# Patient Record
Sex: MALE | Race: OTHER | HISPANIC OR LATINO | URBAN - METROPOLITAN AREA
[De-identification: names, ages, dates, MRNs, and addresses within clinical notes are randomized per-mention and may not be internally consistent; named-entity substitution may affect disease eponyms.]

---

## 2017-07-18 ENCOUNTER — OUTPATIENT (OUTPATIENT)
Dept: OUTPATIENT SERVICES | Facility: HOSPITAL | Age: 77
LOS: 1 days | Discharge: HOME | End: 2017-07-18

## 2017-07-18 DIAGNOSIS — R79.82 ELEVATED C-REACTIVE PROTEIN (CRP): ICD-10-CM

## 2017-11-08 ENCOUNTER — OUTPATIENT (OUTPATIENT)
Dept: OUTPATIENT SERVICES | Facility: HOSPITAL | Age: 77
LOS: 1 days | Discharge: HOME | End: 2017-11-08

## 2017-11-08 DIAGNOSIS — R30.0 DYSURIA: ICD-10-CM

## 2017-11-08 DIAGNOSIS — R35.0 FREQUENCY OF MICTURITION: ICD-10-CM

## 2017-12-29 ENCOUNTER — OUTPATIENT (OUTPATIENT)
Dept: OUTPATIENT SERVICES | Facility: HOSPITAL | Age: 77
LOS: 1 days | Discharge: HOME | End: 2017-12-29

## 2017-12-29 DIAGNOSIS — R35.0 FREQUENCY OF MICTURITION: ICD-10-CM

## 2017-12-29 DIAGNOSIS — R30.0 DYSURIA: ICD-10-CM

## 2018-01-15 ENCOUNTER — OUTPATIENT (OUTPATIENT)
Dept: OUTPATIENT SERVICES | Facility: HOSPITAL | Age: 78
LOS: 1 days | Discharge: HOME | End: 2018-01-15

## 2018-01-15 DIAGNOSIS — R68.89 OTHER GENERAL SYMPTOMS AND SIGNS: ICD-10-CM

## 2018-01-15 DIAGNOSIS — E55.9 VITAMIN D DEFICIENCY, UNSPECIFIED: ICD-10-CM

## 2018-01-15 DIAGNOSIS — Z02.89 ENCOUNTER FOR OTHER ADMINISTRATIVE EXAMINATIONS: ICD-10-CM

## 2018-01-15 DIAGNOSIS — R74.8 ABNORMAL LEVELS OF OTHER SERUM ENZYMES: ICD-10-CM

## 2018-01-15 DIAGNOSIS — R79.82 ELEVATED C-REACTIVE PROTEIN (CRP): ICD-10-CM

## 2018-04-09 ENCOUNTER — OUTPATIENT (OUTPATIENT)
Dept: OUTPATIENT SERVICES | Facility: HOSPITAL | Age: 78
LOS: 1 days | Discharge: HOME | End: 2018-04-09

## 2018-04-09 DIAGNOSIS — R97.20 ELEVATED PROSTATE SPECIFIC ANTIGEN [PSA]: ICD-10-CM

## 2018-04-09 DIAGNOSIS — N40.1 BENIGN PROSTATIC HYPERPLASIA WITH LOWER URINARY TRACT SYMPTOMS: ICD-10-CM

## 2018-06-16 ENCOUNTER — OUTPATIENT (OUTPATIENT)
Dept: OUTPATIENT SERVICES | Facility: HOSPITAL | Age: 78
LOS: 1 days | Discharge: HOME | End: 2018-06-16

## 2018-06-16 DIAGNOSIS — N25.9 DISORDER RESULTING FROM IMPAIRED RENAL TUBULAR FUNCTION, UNSPECIFIED: ICD-10-CM

## 2018-10-10 ENCOUNTER — INPATIENT (INPATIENT)
Facility: HOSPITAL | Age: 78
LOS: 3 days | Discharge: HOME | End: 2018-10-14
Attending: INTERNAL MEDICINE | Admitting: INTERNAL MEDICINE
Payer: MEDICARE

## 2018-10-10 VITALS
SYSTOLIC BLOOD PRESSURE: 175 MMHG | RESPIRATION RATE: 19 BRPM | DIASTOLIC BLOOD PRESSURE: 89 MMHG | TEMPERATURE: 98 F | HEART RATE: 65 BPM | OXYGEN SATURATION: 99 % | WEIGHT: 160.94 LBS

## 2018-10-10 LAB
BASOPHILS # BLD AUTO: 0.03 K/UL — SIGNIFICANT CHANGE UP (ref 0–0.2)
BASOPHILS NFR BLD AUTO: 0.3 % — SIGNIFICANT CHANGE UP (ref 0–1)
EOSINOPHIL # BLD AUTO: 0.04 K/UL — SIGNIFICANT CHANGE UP (ref 0–0.7)
EOSINOPHIL NFR BLD AUTO: 0.4 % — SIGNIFICANT CHANGE UP (ref 0–8)
HCT VFR BLD CALC: 39.6 % — LOW (ref 42–52)
HGB BLD-MCNC: 13.5 G/DL — LOW (ref 14–18)
IMM GRANULOCYTES NFR BLD AUTO: 0.4 % — HIGH (ref 0.1–0.3)
LYMPHOCYTES # BLD AUTO: 0.91 K/UL — LOW (ref 1.2–3.4)
LYMPHOCYTES # BLD AUTO: 9.4 % — LOW (ref 20.5–51.1)
MCHC RBC-ENTMCNC: 30.7 PG — SIGNIFICANT CHANGE UP (ref 27–31)
MCHC RBC-ENTMCNC: 34.1 G/DL — SIGNIFICANT CHANGE UP (ref 32–37)
MCV RBC AUTO: 90 FL — SIGNIFICANT CHANGE UP (ref 80–94)
MONOCYTES # BLD AUTO: 0.4 K/UL — SIGNIFICANT CHANGE UP (ref 0.1–0.6)
MONOCYTES NFR BLD AUTO: 4.1 % — SIGNIFICANT CHANGE UP (ref 1.7–9.3)
NEUTROPHILS # BLD AUTO: 8.28 K/UL — HIGH (ref 1.4–6.5)
NEUTROPHILS NFR BLD AUTO: 85.4 % — HIGH (ref 42.2–75.2)
NRBC # BLD: 0 /100 WBCS — SIGNIFICANT CHANGE UP (ref 0–0)
PLATELET # BLD AUTO: 177 K/UL — SIGNIFICANT CHANGE UP (ref 130–400)
RBC # BLD: 4.4 M/UL — LOW (ref 4.7–6.1)
RBC # FLD: 12.2 % — SIGNIFICANT CHANGE UP (ref 11.5–14.5)
WBC # BLD: 9.7 K/UL — SIGNIFICANT CHANGE UP (ref 4.8–10.8)
WBC # FLD AUTO: 9.7 K/UL — SIGNIFICANT CHANGE UP (ref 4.8–10.8)

## 2018-10-10 NOTE — ED ADULT TRIAGE NOTE - CHIEF COMPLAINT QUOTE
Pt came c/o near syncope episode in a [employer]  at 8:30 pm today, pt was standing and was caught by his family preventing him from falling, pt also c/o nausea.

## 2018-10-11 LAB
ALBUMIN SERPL ELPH-MCNC: 4.2 G/DL — SIGNIFICANT CHANGE UP (ref 3.5–5.2)
ALP SERPL-CCNC: 90 U/L — SIGNIFICANT CHANGE UP (ref 30–115)
ALT FLD-CCNC: 13 U/L — SIGNIFICANT CHANGE UP (ref 0–41)
ANION GAP SERPL CALC-SCNC: 13 MMOL/L — SIGNIFICANT CHANGE UP (ref 7–14)
AST SERPL-CCNC: 16 U/L — SIGNIFICANT CHANGE UP (ref 0–41)
BILIRUB SERPL-MCNC: <0.2 MG/DL — SIGNIFICANT CHANGE UP (ref 0.2–1.2)
BUN SERPL-MCNC: 19 MG/DL — SIGNIFICANT CHANGE UP (ref 10–20)
CALCIUM SERPL-MCNC: 9.2 MG/DL — SIGNIFICANT CHANGE UP (ref 8.5–10.1)
CHLORIDE SERPL-SCNC: 98 MMOL/L — SIGNIFICANT CHANGE UP (ref 98–110)
CK MB CFR SERPL CALC: 2.3 NG/ML — SIGNIFICANT CHANGE UP (ref 0.6–6.3)
CK MB CFR SERPL CALC: 2.4 NG/ML — SIGNIFICANT CHANGE UP (ref 0.6–6.3)
CK SERPL-CCNC: 124 U/L — SIGNIFICANT CHANGE UP (ref 0–225)
CK SERPL-CCNC: 130 U/L — SIGNIFICANT CHANGE UP (ref 0–225)
CO2 SERPL-SCNC: 24 MMOL/L — SIGNIFICANT CHANGE UP (ref 17–32)
CREAT SERPL-MCNC: 1.2 MG/DL — SIGNIFICANT CHANGE UP (ref 0.7–1.5)
GLUCOSE BLDC GLUCOMTR-MCNC: 152 MG/DL — HIGH (ref 70–99)
GLUCOSE SERPL-MCNC: 119 MG/DL — HIGH (ref 70–99)
LACTATE SERPL-SCNC: 1.2 MMOL/L — SIGNIFICANT CHANGE UP (ref 0.5–2.2)
MAGNESIUM SERPL-MCNC: 2.4 MG/DL — SIGNIFICANT CHANGE UP (ref 1.8–2.4)
POTASSIUM SERPL-MCNC: 5 MMOL/L — SIGNIFICANT CHANGE UP (ref 3.5–5)
POTASSIUM SERPL-SCNC: 5 MMOL/L — SIGNIFICANT CHANGE UP (ref 3.5–5)
PROT SERPL-MCNC: 6.9 G/DL — SIGNIFICANT CHANGE UP (ref 6–8)
SODIUM SERPL-SCNC: 135 MMOL/L — SIGNIFICANT CHANGE UP (ref 135–146)
TROPONIN T SERPL-MCNC: <0.01 NG/ML — SIGNIFICANT CHANGE UP

## 2018-10-11 RX ORDER — CHLORHEXIDINE GLUCONATE 213 G/1000ML
1 SOLUTION TOPICAL
Qty: 0 | Refills: 0 | Status: DISCONTINUED | OUTPATIENT
Start: 2018-10-11 | End: 2018-10-14

## 2018-10-11 RX ORDER — ATORVASTATIN CALCIUM 80 MG/1
10 TABLET, FILM COATED ORAL AT BEDTIME
Qty: 0 | Refills: 0 | Status: DISCONTINUED | OUTPATIENT
Start: 2018-10-11 | End: 2018-10-14

## 2018-10-11 RX ORDER — METOPROLOL TARTRATE 50 MG
25 TABLET ORAL
Qty: 0 | Refills: 0 | Status: DISCONTINUED | OUTPATIENT
Start: 2018-10-11 | End: 2018-10-14

## 2018-10-11 RX ORDER — ASPIRIN/CALCIUM CARB/MAGNESIUM 324 MG
325 TABLET ORAL DAILY
Qty: 0 | Refills: 0 | Status: DISCONTINUED | OUTPATIENT
Start: 2018-10-11 | End: 2018-10-14

## 2018-10-11 RX ORDER — LANOLIN ALCOHOL/MO/W.PET/CERES
3 CREAM (GRAM) TOPICAL AT BEDTIME
Qty: 0 | Refills: 0 | Status: DISCONTINUED | OUTPATIENT
Start: 2018-10-11 | End: 2018-10-14

## 2018-10-11 RX ORDER — HEPARIN SODIUM 5000 [USP'U]/ML
5000 INJECTION INTRAVENOUS; SUBCUTANEOUS EVERY 8 HOURS
Qty: 0 | Refills: 0 | Status: DISCONTINUED | OUTPATIENT
Start: 2018-10-11 | End: 2018-10-14

## 2018-10-11 RX ADMIN — Medication 25 MILLIGRAM(S): at 17:09

## 2018-10-11 RX ADMIN — ATORVASTATIN CALCIUM 10 MILLIGRAM(S): 80 TABLET, FILM COATED ORAL at 22:01

## 2018-10-11 RX ADMIN — HEPARIN SODIUM 5000 UNIT(S): 5000 INJECTION INTRAVENOUS; SUBCUTANEOUS at 22:01

## 2018-10-11 RX ADMIN — CHLORHEXIDINE GLUCONATE 1 APPLICATION(S): 213 SOLUTION TOPICAL at 14:02

## 2018-10-11 RX ADMIN — Medication 325 MILLIGRAM(S): at 12:27

## 2018-10-11 RX ADMIN — HEPARIN SODIUM 5000 UNIT(S): 5000 INJECTION INTRAVENOUS; SUBCUTANEOUS at 14:02

## 2018-10-11 NOTE — ED PROVIDER NOTE - PHYSICAL EXAMINATION
CONSTITUTIONAL: Well-developed; well-nourished; in no acute distress, nontoxic appearing  SKIN: skin exam is warm and dry,  HEAD: Normocephalic; atraumatic.  EYES: PERRL, 3 mm bilateral, no nystagmus, EOM intact; conjunctiva and sclera clear.  ENT: MMM, no nasal congestion  NECK: Supple; non tender.+ full passive ROM in all directions. No JVD  CARD: S1, S2 normal, no murmur  RESP: No wheezes, rales or rhonchi. Good air movement bilaterally  ABD: soft; non-distended; non-tender. No Rebound, No guarding  EXT: Normal ROM. No cyanosis or edema. Dp Pulses intact.   NEURO: awake, alert, following commands CN II to XII wnl, normal motor and sensation exams,. GCS 15.   PSYCH: Cooperative, appropriate.

## 2018-10-11 NOTE — ED PROVIDER NOTE - OBJECTIVE STATEMENT
78 yr M with hx of CAD s/p 2 stents of  daily, hypothyroidism, HLD, BPH with complaints of near syncope. Pt was at [employer] when while state lost  consciousness, falling backwards. Pt was caught by other [employer] goers and denies a complete fall, no injuries. Denies any CP, SOB prior to falling. Endorses feeling tired for the whole day prior. No f/c, dysuria, hemturia.

## 2018-10-11 NOTE — ED PROVIDER NOTE - NS ED ROS FT
Constitutional:  no fevers, no chills, no malaise  Eyes:  No visual changes  ENMT: No neck pain or stiffness, no nasal congestion, no ear pain, no throat pain  Cardiac:  No chest pain, + hx of CAD  Respiratory:  No cough or sob  GI:  No nausea, vomiting, diarrhea or abdominal pain.  :  No dysuria, frequency or burning.  MS:  No back pain, no joint pain.  Neuro: As per HPI  Skin:  No skin rash

## 2018-10-11 NOTE — H&P ADULT - ATTENDING COMMENTS
patient seen and examined independently on morning rounds in ED, chart reviewed and discussed with medicine resident and agree with the above H/P and assessment and plan with the following addendum:     in brief, Mr. Lambert is a 79 yo man with h/o CAD s/p pci and dyslipidemia who p/w near syncope that occurred while at [employer]. He was standing up at the time- began feeling light headed and as per witnessed family members he was not responsive and post-episode does not recall events that occurred.  He had a similar episode of near syncope/dizziness last year while in [place] ambulating outside on hot day.  He denies any chest pain, sob, headache. no bowel or bladder incontinence.  no personal or family history of seizures and no witnessed tremors or tongue biting.      pcp- Dr. Calzada ([facility])/ Cardio- Dr. Vikki OWENS as per above otherwise review of systems unremarkable    PE:  GEN-NAD, AAOx3  HEENT- NCAT, PERRLA, EOMI  NECK- supple no jvd, no lymphadenopathy  PULM- Clear to auscultation bilaterally, fair air entry  CVS- +s1/s2 RRR no murmurs  GI- soft NT ND +bs, no rebound, no guarding  EXT- no edema  NEURO- nonfocal    labs/radiology reviewed    a/p:   #dizzness- pre-syncope  -suspect 2/2 orthostatics as associated with positional change  -check orthostatics  -EEG pending  -had carotid US done earlier in 2018 which as per wife showed mild stenosis---will repeat carotid US  -cardiology eval (Dr. Florez)  -ct head negative  -check ECHO    #CAD s/p cabg, dyslipidemia  -cont asa and statin---f/u cardiology recomm    #DVT/GI ppx    guarded prognosis---anticipate likley dc home in 24-48 hrs if no further symptoms and all workup negative

## 2018-10-11 NOTE — H&P ADULT - NSHPLABSRESULTS_GEN_ALL_CORE
CBC Full  -  ( 10 Oct 2018 23:15 )  WBC Count : 9.70 K/uL  Hemoglobin : 13.5 g/dL  Hematocrit : 39.6 %  Platelet Count - Automated : 177 K/uL  Mean Cell Volume : 90.0 fL  Mean Cell Hemoglobin : 30.7 pg  Mean Cell Hemoglobin Concentration : 34.1 g/dL  Auto Neutrophil # : 8.28 K/uL  Auto Lymphocyte # : 0.91 K/uL  Auto Monocyte # : 0.40 K/uL  Auto Eosinophil # : 0.04 K/uL  Auto Basophil # : 0.03 K/uL  Auto Neutrophil % : 85.4 %  Auto Lymphocyte % : 9.4 %  Auto Monocyte % : 4.1 %  Auto Eosinophil % : 0.4 %  Auto Basophil % : 0.3 %  10-10    135  |  98  |  19  ----------------------------<  119<H>  5.0   |  24  |  1.2    Ca    9.2      10 Oct 2018 23:15  Mg     2.4     10-10    TPro  6.9  /  Alb  4.2  /  TBili  <0.2  /  DBili  x   /  AST  16  /  ALT  13  /  AlkPhos  90  10-10  CARDIAC MARKERS ( 10 Oct 2018 23:15 )  x     / <0.01 ng/mL / x     / x     / x        < from: Xray Chest 1 View-PORTABLE IMMEDIATE (10.11.18 @ 05:01) >    Impression:      No radiographic evidence of acute pulmonary disease.    < end of copied text >    < from: CT Head No Cont (10.11.18 @ 01:40) >    Impression:    No evidence of acute intracranial abnormality.    < end of copied text >

## 2018-10-11 NOTE — H&P ADULT - RS GEN PE MLT RESP DETAILS PC
respirations non-labored/no rales/no wheezes/no rhonchi/breath sounds equal/good air movement/airway patent/normal

## 2018-10-11 NOTE — H&P ADULT - HISTORY OF PRESENT ILLNESS
Patient is a 77 yo M with PMH of CAD s/p 2 stents and DLD who presented to the hospital s/p pre-syncopal event. Patient and wife state that they were at [employer] when the patient suddenly felt weak, began to "space out" and almost fell backwards prior to being caught by 2 [employer] members. Patient states that he does not remember the event. Wife at bedside notes that the patient has been having the episode where he "spaces out" often and its almost as if he is "removed." He does not respond to verbal stimuli. She states that she has also noticed that when walking he seems to favor his left side and leans that way. When patient questioned about this he states that he doesn't feel that he is leaning to the left. They patient states that he does not feel like he is off balance. He denies a f/n/v/d/c/ chills, abdominal pain, cp, palpitations sob, denies ringing in the ears, denies prior history of seizures denies and dysuria, frequency, or hesitance. Patient and wife state that he only takes pravastatin and asa as reconciled. They also note that he has a left carotid stenosis of 25%.

## 2018-10-11 NOTE — H&P ADULT - ASSESSMENT
ASSESSMENT / PLAN:    Patient is a 79 yo M with PMH of CAD s/p 2 stents and DLD who presented to the hospital s/p pre-syncopal event associated with "spacing out" and when ambulating leaning to left. CT Head negative for an acute intracranial event.     # Syncopal Event:  (neuro mediated vs orthostatic vs cardiovascular)  - Admit tele   - Check monitor for arrhythmic events  - Check orthostatic vs  - Check eeg  - ECHO pending, f/u  - Cardiology evaluation: Dr. Florez's Service  - Neurology evaluation: Service     # CAD s/p CABG: Continue statin and ASA. Consider decreasing ASA to 81 mg PO QD.     # HLD: Patient is on Pravastatin at home. Will place him on Atorvastatin while inpatient.     dvt ppx    Full Code    ** Medications reconciled as per wife and patient. Pharmacy is [facility] on [facility]. ASSESSMENT / PLAN:    Patient is a 79 yo M with PMH of CAD s/p 2 stents and DLD who presented to the hospital s/p pre-syncopal event associated with "spacing out" and when ambulating leaning to left. CT Head negative for an acute intracranial event.     # Syncopal Event:  (neuro mediated vs orthostatic vs cardiovascular)  - Admit tele   - Check monitor for arrhythmic events  - Check orthostatic vs  - Check eeg  - ECHO pending, f/u  - Consider Cardiology evaluation: (If so, Dr. Florez's Service)  - Neurology evaluation: Service     # CAD s/p CABG: Continue statin and ASA. Consider decreasing ASA to 81 mg PO QD    # HLD: Patient is on Pravastatin at home. Will place him on Atorvastatin while inpatient    dvt ppx    Full Code    ** Medications reconciled as per wife and patient. [facility].

## 2018-10-11 NOTE — ED ADULT NURSE NOTE - CHIEF COMPLAINT QUOTE
Pt came c/o near syncope episode in a [employer]  at 8:30 pm today, pt was standing and was caught by his family preventing him from falling, pt also c/o nausea.

## 2018-10-11 NOTE — H&P ADULT - PMH
Coronary artery disease, angina presence unspecified, unspecified vessel or lesion type, unspecified whether native or transplanted heart    Hyperlipidemia, unspecified hyperlipidemia type

## 2018-10-11 NOTE — CONSULT NOTE ADULT - SUBJECTIVE AND OBJECTIVE BOX
Neurology Consult    Patient is a 78y old  Male who presents with a chief complaint of Weakness and " spacing out" (11 Oct 2018 11:42)      HPI:  Patient is a 79 yo M with PMH of CAD s/p 2 stents and DLD who presented to the hospital s/p pre-syncopal event. Patient and wife state that they were at [employer] when the patient suddenly felt weak, began to "space out" and almost fell backwards prior to being caught by 2 [employer] members. Patient states that he does not remember the event. Wife at bedside notes that the patient has been having the episode where he "spaces out" often and its almost as if he is "removed." He does not respond to verbal stimuli. She states that she has also noticed that when walking he seems to favor his left side and leans that way. When patient questioned about this he states that he doesn't feel that he is leaning to the left. They patient states that he does not feel like he is off balance. He denies a f/n/v/d/c/ chills, abdominal pain, cp, palpitations sob, denies ringing in the ears, denies prior history of seizures denies and dysuria, frequency, or hesitance. Patient and wife state that he only takes pravastatin and asa as reconciled. They also note that he has a left carotid stenosis of 25%. (11 Oct 2018 11:42)    ***   Patient's wife reports he was in [employer] last night and had dizzy spell.  He felt light headed.  He was given water and skin was pale.  WIfe states instead of low BP was found to have elevated BP.  Wife reports he had 5 minute episode where it was hard for him to find words.  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    After I examined patient I spoke to wife outside the room and asked if he has been experiencing any problems with his memory and wife says yes for 2 years such that she has been worried he may have dementia - maybe Alzheimers or a tumor.    PAST MEDICAL & SURGICAL HISTORY:  Hyperlipidemia, unspecified hyperlipidemia type  Coronary artery disease, angina presence unspecified, unspecified vessel or lesion type, unspecified whether native or transplanted heart  2 coronary stents placed ~10 years ago.    FAMILY HISTORY:    Social History: (-) x 3    Allergies  No Known Allergies        MEDICATIONS  (STANDING):  aspirin 325 milliGRAM(s) Oral daily  atorvastatin 10 milliGRAM(s) Oral at bedtime  chlorhexidine 4% Liquid 1 Application(s) Topical <User Schedule>  heparin  Injectable 5000 Unit(s) SubCutaneous every 8 hours  metoprolol tartrate 25 milliGRAM(s) Oral two times a day    wife says he is also on plavix daily    MEDICATIONS  (PRN):      Review of systems:    Constitutional: No fever, weight loss or fatigue    Eyes: No eye pain or discharge  ENMT:  No difficulty hearing; No sinus or throat pain  Neck: No pain or stiffness  Respiratory: No cough, wheezing, chills or hemoptysis  Cardiovascular: No chest pain, palpitations, shortness of breath, dyspnea on exertion  Gastrointestinal: No abdominal pain, nausea, vomiting or hematemesis; No diarrhea or constipation.   Genitourinary: No dysuria, frequency, hematuria or incontinence  Neurological: As per HPI  Skin: No rashes or lesions   Endocrine: No heat or cold intolerance; No hair loss  Musculoskeletal: No joint pain or swelling  Psychiatric: No depression, anxiety, mood swings  Heme/Lymph: No easy bruising or bleeding gums    Vital Signs Last 24 Hrs  T(C): 35.9 (11 Oct 2018 15:45), Max: 36.5 (10 Oct 2018 21:11)  T(F): 96.6 (11 Oct 2018 15:45), Max: 97.7 (10 Oct 2018 21:11)  HR: 76 (11 Oct 2018 15:45) (65 - 76)  BP: 155/99 (11 Oct 2018 15:59) (115/62 - 222/125)  BP(mean): --  RR: 18 (11 Oct 2018 15:45) (18 - 19)  SpO2: 99% (11 Oct 2018 15:45) (97% - 99%)    Neurologic Examination:  General:  Appearance is consistent with chronologic age.  No abnormal facies.   General: The patient is oriented to person, place not month or year.  Recent memory is severely impaired.  Remote memory less impaired.  Fund of knowledge is decreased.  Language - impaired naming and repetition.  Nondysarthric.  + apraxia.  Cranial nerves: intact VA, VFF.  EOMI w/o nystagmus, skew or reported double vision.  PERRL.  No ptosis/weakness of eyelid closure.  Facial sensation is normal with normal bite.  No facial asymmetry.  Hearing grossly intact b/l.  Palate elevates midline.  Tongue midline.  Motor examination:   Normal tone, bulk and range of motion.  No tenderness, twitching, tremors or involuntary movements.  Formal Muscle Strength Testing: (MRC grade R/L) 5/5 UE; 5/5 LE.  No observable drift.  Reflexes:   2+ b/l , biceps, triceps, brachioradialis, patella and Achilles.  Plantar response downgoing b/l.  Jaw jerk present. + bilateral Wartenberg and palmomental reflexes.  + glabellar reflex.  Sensory examination:   Intact to light touch temperature.  Vibration diminished in LE's.  Cerebellum:   FTN/HKS intact with normal YANELI in all limbs.  No dysmetria or dysdiadokinesia.  Gait wide based and unsteady.    Labs:   CBC Full  -  ( 10 Oct 2018 23:15 )  WBC Count : 9.70 K/uL  Hemoglobin : 13.5 g/dL  Hematocrit : 39.6 %  Platelet Count - Automated : 177 K/uL  Mean Cell Volume : 90.0 fL  Mean Cell Hemoglobin : 30.7 pg  Mean Cell Hemoglobin Concentration : 34.1 g/dL  Auto Neutrophil # : 8.28 K/uL  Auto Lymphocyte # : 0.91 K/uL  Auto Monocyte # : 0.40 K/uL  Auto Eosinophil # : 0.04 K/uL  Auto Basophil # : 0.03 K/uL  Auto Neutrophil % : 85.4 %  Auto Lymphocyte % : 9.4 %  Auto Monocyte % : 4.1 %  Auto Eosinophil % : 0.4 %  Auto Basophil % : 0.3 %    10-10    135  |  98  |  19  ----------------------------<  119<H>  5.0   |  24  |  1.2    Ca    9.2      10 Oct 2018 23:15  Mg     2.4     10-10    TPro  6.9  /  Alb  4.2  /  TBili  <0.2  /  DBili  x   /  AST  16  /  ALT  13  /  AlkPhos  90  10-10    LIVER FUNCTIONS - ( 10 Oct 2018 23:15 )  Alb: 4.2 g/dL / Pro: 6.9 g/dL / ALK PHOS: 90 U/L / ALT: 13 U/L / AST: 16 U/L / GGT: x             Neuroimaging:  NCHCT: CT Head No Cont:   EXAM:  CT BRAIN          PROCEDURE DATE:  10/11/2018      INTERPRETATION:  CLINICAL HISTORY/REASON FOR EXAM: Syncope.    TECHNIQUE: Contiguous axial CT images of the head were obtained from the   base of the skull to the vertex without IV contrast. Coronal and sagittal   reformatted images were obtained.     COMPARISON: None.    FINDINGS:    Technique: CT head without IV contrast performed. Multiple axial CT   images of the head were obtained from the base of the skull to the vertex   without the administration of IV contrast. Sagittal and coronal reformats   were obtained.    Comparison: None.    Findings:    Ventricular system, basal cisterns and sulcal patterns are symmetric and   appropriate for patient's stated age.       No acute extra-axial collection.  No mass effect, midline shift or acute   hemorrhage is seen.      Patchy hypodensities in the periventricular and subcortical white matter   without mass effect compatible with chronic microvascular changes.      No depressed skull fracture.     Paranasal sinuses and mastoid air cells are well-aerated.    Impression:  No evidence of acute intracranial abnormality.    GAURAV SANABRIA M.D., RESIDENT RADIOLOGIST  This document has been electronically signed.  MIGUEL RICHTER M.D., ATTENDING RADIOLOGIST  This document has been electronically signed. Oct 11 2018  2:16AM         (10-11-18 @ 01:40)      Assessment:    1.  This is a 78y Male with h/o 2 years of memory loss gradually progressive.  His exam findings are compatible with dementia, most likely Alzheimer type.  The staring episodes may simply be related to decreased ability to process information.  Alternatively it could be secondary to partial seizures.  2.  Patient does not appear weak currently.  It is possible there was some transient cardiac event.  Telemetry and orthostatics are recommended.    Plan:   1.  I discussed likely neurologic diagnosis with wife and patient should follow-up with me in 2-4 weeks outpatient.  2.  May order routine EEG to assess for epileptiform abnormalities.  3.  Telemetry and cardiac assessment given history of prior cardiac stents.  4.  Check TSH, RPR, B12/folate.    10-11-18 @ 18:52

## 2018-10-11 NOTE — H&P ADULT - ADDITIONAL PE
Neurological: strength 4/5 on upper extremities b/l and 5/5 on le b/l. Patient with decreased  b/l. He has sensation in tact.

## 2018-10-12 LAB
ANION GAP SERPL CALC-SCNC: 15 MMOL/L — HIGH (ref 7–14)
APTT BLD: 33.6 SEC — SIGNIFICANT CHANGE UP (ref 27–39.2)
BUN SERPL-MCNC: 16 MG/DL — SIGNIFICANT CHANGE UP (ref 10–20)
CALCIUM SERPL-MCNC: 9.6 MG/DL — SIGNIFICANT CHANGE UP (ref 8.5–10.1)
CHLORIDE SERPL-SCNC: 98 MMOL/L — SIGNIFICANT CHANGE UP (ref 98–110)
CO2 SERPL-SCNC: 23 MMOL/L — SIGNIFICANT CHANGE UP (ref 17–32)
CREAT SERPL-MCNC: 1.1 MG/DL — SIGNIFICANT CHANGE UP (ref 0.7–1.5)
GLUCOSE SERPL-MCNC: 137 MG/DL — HIGH (ref 70–99)
HCT VFR BLD CALC: 38.3 % — LOW (ref 42–52)
HGB BLD-MCNC: 13.4 G/DL — LOW (ref 14–18)
INR BLD: 1.17 RATIO — SIGNIFICANT CHANGE UP (ref 0.65–1.3)
MAGNESIUM SERPL-MCNC: 2.1 MG/DL — SIGNIFICANT CHANGE UP (ref 1.8–2.4)
MCHC RBC-ENTMCNC: 31.3 PG — HIGH (ref 27–31)
MCHC RBC-ENTMCNC: 35 G/DL — SIGNIFICANT CHANGE UP (ref 32–37)
MCV RBC AUTO: 89.5 FL — SIGNIFICANT CHANGE UP (ref 80–94)
NRBC # BLD: 0 /100 WBCS — SIGNIFICANT CHANGE UP (ref 0–0)
PLATELET # BLD AUTO: 161 K/UL — SIGNIFICANT CHANGE UP (ref 130–400)
POTASSIUM SERPL-MCNC: 3.9 MMOL/L — SIGNIFICANT CHANGE UP (ref 3.5–5)
POTASSIUM SERPL-SCNC: 3.9 MMOL/L — SIGNIFICANT CHANGE UP (ref 3.5–5)
PROTHROM AB SERPL-ACNC: 12.6 SEC — SIGNIFICANT CHANGE UP (ref 9.95–12.87)
RBC # BLD: 4.28 M/UL — LOW (ref 4.7–6.1)
RBC # FLD: 12.2 % — SIGNIFICANT CHANGE UP (ref 11.5–14.5)
SODIUM SERPL-SCNC: 136 MMOL/L — SIGNIFICANT CHANGE UP (ref 135–146)
WBC # BLD: 6.92 K/UL — SIGNIFICANT CHANGE UP (ref 4.8–10.8)
WBC # FLD AUTO: 6.92 K/UL — SIGNIFICANT CHANGE UP (ref 4.8–10.8)

## 2018-10-12 PROCEDURE — 93880 EXTRACRANIAL BILAT STUDY: CPT | Mod: 26

## 2018-10-12 RX ORDER — INFLUENZA VIRUS VACCINE 15; 15; 15; 15 UG/.5ML; UG/.5ML; UG/.5ML; UG/.5ML
0.5 SUSPENSION INTRAMUSCULAR ONCE
Qty: 0 | Refills: 0 | Status: COMPLETED | OUTPATIENT
Start: 2018-10-12 | End: 2018-10-14

## 2018-10-12 RX ADMIN — Medication 25 MILLIGRAM(S): at 05:38

## 2018-10-12 RX ADMIN — HEPARIN SODIUM 5000 UNIT(S): 5000 INJECTION INTRAVENOUS; SUBCUTANEOUS at 05:38

## 2018-10-12 RX ADMIN — HEPARIN SODIUM 5000 UNIT(S): 5000 INJECTION INTRAVENOUS; SUBCUTANEOUS at 21:16

## 2018-10-12 RX ADMIN — Medication 25 MILLIGRAM(S): at 17:24

## 2018-10-12 RX ADMIN — Medication 325 MILLIGRAM(S): at 13:10

## 2018-10-12 RX ADMIN — Medication 3 MILLIGRAM(S): at 21:16

## 2018-10-12 RX ADMIN — ATORVASTATIN CALCIUM 10 MILLIGRAM(S): 80 TABLET, FILM COATED ORAL at 21:16

## 2018-10-12 RX ADMIN — Medication 3 MILLIGRAM(S): at 00:14

## 2018-10-12 RX ADMIN — HEPARIN SODIUM 5000 UNIT(S): 5000 INJECTION INTRAVENOUS; SUBCUTANEOUS at 13:07

## 2018-10-12 NOTE — PROGRESS NOTE ADULT - SUBJECTIVE AND OBJECTIVE BOX
Patient is a 78y old  Male who presents with a chief complaint of Weakness and " spacing out" (11 Oct 2018 18:52)        Over Night Events: Patient still has pain         ROS:  See HPI    PHYSICAL EXAM    ICU Vital Signs Last 24 Hrs  T(C): 36.7 (12 Oct 2018 08:10), Max: 36.7 (11 Oct 2018 21:27)  T(F): 98 (12 Oct 2018 08:10), Max: 98.1 (11 Oct 2018 21:27)  HR: 60 (12 Oct 2018 08:10) (60 - 68)  BP: 107/57 (12 Oct 2018 08:10) (107/57 - 155/99)  BP(mean): --  ABP: --  ABP(mean): --  RR: 18 (12 Oct 2018 08:10) (18 - 20)  SpO2: 97% (12 Oct 2018 08:10) (97% - 98%)      General:  HEENT: GERARDO             Lymphatic system: No cervical LN   Lungs: Bilateral BS  Cardiovascular: Regular   Gastrointestinal: Soft, Positive BS  Extremities: No clubbing.  Moves extremities.  Full Range of motion   Skin: Warm, intact, left flank has at nephrostomy ostomy site redness x 7 cm wide, with no fluctuation or SQ collection, expressable scant serosanguoinous fluid.  Neurological: No motor or sensory deficit         LABS:                            13.4   6.92  )-----------( 161      ( 12 Oct 2018 08:45 )             38.3                                               10-12    136  |  98  |  16  ----------------------------<  137<H>  3.9   |  23  |  1.1    Ca    9.6      12 Oct 2018 08:45  Mg     2.1     10-12    TPro  6.9  /  Alb  4.2  /  TBili  <0.2  /  DBili  x   /  AST  16  /  ALT  13  /  AlkPhos  90  10-10      PT/INR - ( 12 Oct 2018 08:45 )   PT: 12.60 sec;   INR: 1.17 ratio         PTT - ( 12 Oct 2018 08:45 )  PTT:33.6 sec                                           CARDIAC MARKERS ( 11 Oct 2018 17:06 )  x     / <0.01 ng/mL / 130 U/L / x     / 2.4 ng/mL  CARDIAC MARKERS ( 11 Oct 2018 12:39 )  x     / <0.01 ng/mL / 124 U/L / x     / 2.3 ng/mL  CARDIAC MARKERS ( 10 Oct 2018 23:15 )  x     / <0.01 ng/mL / x     / x     / x                                                LIVER FUNCTIONS - ( 10 Oct 2018 23:15 )  Alb: 4.2 g/dL / Pro: 6.9 g/dL / ALK PHOS: 90 U/L / ALT: 13 U/L / AST: 16 U/L / GGT: x                                                                                                                                       MEDICATIONS  (STANDING):  aspirin 325 milliGRAM(s) Oral daily  atorvastatin 10 milliGRAM(s) Oral at bedtime  chlorhexidine 4% Liquid 1 Application(s) Topical <User Schedule>  heparin  Injectable 5000 Unit(s) SubCutaneous every 8 hours  melatonin 3 milliGRAM(s) Oral at bedtime  metoprolol tartrate 25 milliGRAM(s) Oral two times a day    MEDICATIONS  (PRN):      Xrays:                                                                                     ECHO Patient is a 78y old  Male who presents with a chief complaint of Weakness and " spacing out" (11 Oct 2018 18:52)        Over Night Events: Patient has no pain , SOB, weakness.      ROS:  See HPI    PHYSICAL EXAM    ICU Vital Signs Last 24 Hrs  T(C): 36.7 (12 Oct 2018 08:10), Max: 36.7 (11 Oct 2018 21:27)  T(F): 98 (12 Oct 2018 08:10), Max: 98.1 (11 Oct 2018 21:27)  HR: 60 (12 Oct 2018 08:10) (60 - 68)  BP: 107/57 (12 Oct 2018 08:10) (107/57 - 155/99)  BP(mean): --  ABP: --  ABP(mean): --  RR: 18 (12 Oct 2018 08:10) (18 - 20)  SpO2: 97% (12 Oct 2018 08:10) (97% - 98%)      General: NAD  HEENT: GERARDO             Lymphatic system: No cervical LN   Lungs: Bilateral BS   Cardiovascular: Regular   Gastrointestinal: Soft, Positive BS  Extremities: No clubbing.  Moves extremities.  Full Range of motion   Skin: Warm, intactNeurological: No motor or sensory deficit         LABS:                            13.4   6.92  )-----------( 161      ( 12 Oct 2018 08:45 )             38.3                                               10-12    136  |  98  |  16  ----------------------------<  137<H>  3.9   |  23  |  1.1    Ca    9.6      12 Oct 2018 08:45  Mg     2.1     10-12    TPro  6.9  /  Alb  4.2  /  TBili  <0.2  /  DBili  x   /  AST  16  /  ALT  13  /  AlkPhos  90  10-10      PT/INR - ( 12 Oct 2018 08:45 )   PT: 12.60 sec;   INR: 1.17 ratio         PTT - ( 12 Oct 2018 08:45 )  PTT:33.6 sec                                           CARDIAC MARKERS ( 11 Oct 2018 17:06 )  x     / <0.01 ng/mL / 130 U/L / x     / 2.4 ng/mL  CARDIAC MARKERS ( 11 Oct 2018 12:39 )  x     / <0.01 ng/mL / 124 U/L / x     / 2.3 ng/mL  CARDIAC MARKERS ( 10 Oct 2018 23:15 )  x     / <0.01 ng/mL / x     / x     / x                                                LIVER FUNCTIONS - ( 10 Oct 2018 23:15 )  Alb: 4.2 g/dL / Pro: 6.9 g/dL / ALK PHOS: 90 U/L / ALT: 13 U/L / AST: 16 U/L / GGT: x                                                                                                                                       MEDICATIONS  (STANDING):  aspirin 325 milliGRAM(s) Oral daily  atorvastatin 10 milliGRAM(s) Oral at bedtime  chlorhexidine 4% Liquid 1 Application(s) Topical <User Schedule>  heparin  Injectable 5000 Unit(s) SubCutaneous every 8 hours  melatonin 3 milliGRAM(s) Oral at bedtime  metoprolol tartrate 25 milliGRAM(s) Oral two times a day    MEDICATIONS  (PRN):      Xrays:                                                                                     ECHO

## 2018-10-12 NOTE — PROGRESS NOTE ADULT - SUBJECTIVE AND OBJECTIVE BOX
Patient is a 78y old  Male who presents with a chief complaint of Weakness and " spacing out" (12 Oct 2018 15:55)    HPI:  Patient is a 77 yo M with PMH of CAD s/p 2 stents and DLD who presented to the hospital s/p pre-syncopal event. Patient and wife state that they were at [employer] when the patient suddenly felt weak, began to "space out" and almost fell backwards prior to being caught by 2 [employer] members. Patient states that he does not remember the event. Wife at bedside notes that the patient has been having the episode where he "spaces out" often and its almost as if he is "removed." He does not respond to verbal stimuli. She states that she has also noticed that when walking he seems to favor his left side and leans that way. When patient questioned about this he states that he doesn't feel that he is leaning to the left. They patient states that he does not feel like he is off balance. He denies a f/n/v/d/c/ chills, abdominal pain, cp, palpitations sob, denies ringing in the ears, denies prior history of seizures denies and dysuria, frequency, or hesitance. Patient and wife state that he only takes pravastatin and asa as reconciled. They also note that he has a left carotid stenosis of 25%. (11 Oct 2018 11:42)    PAST MEDICAL & SURGICAL HISTORY:  Hyperlipidemia, unspecified hyperlipidemia type  Coronary artery disease, angina presence unspecified, unspecified vessel or lesion type, unspecified whether native or transplanted heart    patient seen and examined independently on morning rounds, chart reviewed and discussed with the medicine resident and on interdisciplinary rounds    overnight events noted---episode of dizziness with ambulating and variable bp       Vital Signs Last 24 Hrs  T(C): 36.2 (12 Oct 2018 16:10), Max: 36.7 (11 Oct 2018 21:27)  T(F): 97.1 (12 Oct 2018 16:10), Max: 98.1 (11 Oct 2018 21:27)  HR: 67 (12 Oct 2018 16:10) (60 - 68)  BP: 170/95 (12 Oct 2018 16:10) (107/57 - 170/95)  BP(mean): --  RR: 18 (12 Oct 2018 16:10) (18 - 20)  SpO2: 97% (12 Oct 2018 08:10) (97% - 98%)               PE:  GEN-NAD, AAOx3  HEENT- NCAT, PERRLA, EOMI  NECK- supple no jvd, no lymphadenopathy  PULM- Clear to auscultation bilaterally, fair air entry  CVS- +s1/s2 RRR no murmurs  GI- soft NT ND +bs, no rebound, no guarding  EXT- no edema  NEURO- nonfocal    labs/radiology reviewed               13.4   6.92  )-----------( 161      ( 12 Oct 2018 08:45 )             38.3     10-12    136  |  98  |  16  ----------------------------<  137<H>  3.9   |  23  |  1.1    Ca    9.6      12 Oct 2018 08:45  Mg     2.1     10-12    TPro  6.9  /  Alb  4.2  /  TBili  <0.2  /  DBili  x   /  AST  16  /  ALT  13  /  AlkPhos  90  10-10    CARDIAC MARKERS ( 11 Oct 2018 17:06 )  x     / <0.01 ng/mL / 130 U/L / x     / 2.4 ng/mL  CARDIAC MARKERS ( 11 Oct 2018 12:39 )  x     / <0.01 ng/mL / 124 U/L / x     / 2.3 ng/mL  CARDIAC MARKERS ( 10 Oct 2018 23:15 )  x     / <0.01 ng/mL / x     / x     / x                MEDICATIONS  (STANDING):  aspirin 325 milliGRAM(s) Oral daily  atorvastatin 10 milliGRAM(s) Oral at bedtime  chlorhexidine 4% Liquid 1 Application(s) Topical <User Schedule>  heparin  Injectable 5000 Unit(s) SubCutaneous every 8 hours  melatonin 3 milliGRAM(s) Oral at bedtime  metoprolol tartrate 25 milliGRAM(s) Oral two times a day

## 2018-10-13 RX ORDER — LANOLIN ALCOHOL/MO/W.PET/CERES
3 CREAM (GRAM) TOPICAL ONCE
Qty: 0 | Refills: 0 | Status: COMPLETED | OUTPATIENT
Start: 2018-10-13 | End: 2018-10-13

## 2018-10-13 RX ADMIN — HEPARIN SODIUM 5000 UNIT(S): 5000 INJECTION INTRAVENOUS; SUBCUTANEOUS at 05:40

## 2018-10-13 RX ADMIN — HEPARIN SODIUM 5000 UNIT(S): 5000 INJECTION INTRAVENOUS; SUBCUTANEOUS at 13:45

## 2018-10-13 RX ADMIN — Medication 3 MILLIGRAM(S): at 21:09

## 2018-10-13 RX ADMIN — ATORVASTATIN CALCIUM 10 MILLIGRAM(S): 80 TABLET, FILM COATED ORAL at 21:09

## 2018-10-13 RX ADMIN — Medication 25 MILLIGRAM(S): at 17:04

## 2018-10-13 RX ADMIN — Medication 25 MILLIGRAM(S): at 05:40

## 2018-10-13 RX ADMIN — HEPARIN SODIUM 5000 UNIT(S): 5000 INJECTION INTRAVENOUS; SUBCUTANEOUS at 21:08

## 2018-10-13 RX ADMIN — Medication 325 MILLIGRAM(S): at 11:11

## 2018-10-13 NOTE — PROGRESS NOTE ADULT - ASSESSMENT
a/p:   #dizzness- pre-syncope  -suspect 2/2 orthostatics as associated with positional change  -negative orthostatics as done in ED but with labile bp over last 24 hrs  -PLEASE repeat orthostatics today  -f/u EEG   -carotid us mild bilateral stnosis (R and L ICA 20-39%)  -awaiting cardiology eval (Dr. Florez)  -ct head negative  -Echo ef 71%  -PT evaluation    #CAD s/p cabg, dyslipidemia  -cont asa and statin---f/u cardiology recomm    #HTN- restarted metoprolol 25 mg bid  -all antihypertensive meds were stopped as outpatient prior to admission  -monitor hr and bp closely    #DVT/GI ppx    pcp-Dr. Calzada ([facility] physicians)    guarded prognosis---anticipate likley dc home in 24-48 hrs if no further symptoms and all syncope/cardio workup negative and once cleared by cardio    PT evaluation- patient is retired  and lives with wife- good family support but may benefit from outpatient PT
79 yo man with h/o CAD s/p pci and dyslipidemia who p/w near syncope that occurred while at [employer]. He was standing up at the time- began feeling light headed and as per witnessed family members he was not responsive and post-episode does not recall events that occurred.  He had a similar episode of near syncope/dizziness last year while in [place] ambulating outside on hot day.  He denies any chest pain, sob, headache. no bowel or bladder incontinence.  no personal or family history of seizures and no witnessed tremors or tongue biting.        #Dizziness- pre-syncope  -suspect 2/2 orthostatics as associated with positional change  -negative orthostatics as done in ED but with labile bp over last 24 hrs  -PLEASE repeat orthostatics today  -f/u EEG   -carotid us mild bilateral stenosis (R and L ICA 20-39%)  -awaiting cardiology eval (Dr. Sears)  -ct head negative  -Echo ef 71%  -PT evaluation    #CAD s/p cabg, dyslipidemia  -cont asa and statin  -f/u cardiology recommendations     #HTN  - restarted metoprolol 25 mg bid  -all antihypertensive meds were stopped as outpatient prior to admission  -monitor hr and bp closely    #DVT/GI ppx    pcp-Dr. Calzada ([facility] physicians)    guarded prognosis---anticipate likely dc home in 24-48 hrs if no further symptoms and all syncope/cardio workup negative and once cleared by cardio    PT evaluation- patient is retired  and lives with wife- good family support but may benefit from outpatient PT
a/p:   #dizzness- pre-syncope  -suspect 2/2 orthostatics as associated with positional change  -negative orthostatics but with labile bp  -EEG pending  -had carotid US done earlier in 2018 which as per wife showed mild stenosis---f/u repeat carotid US  -cardiology eval (Dr. Florez)  -ct head negative  -check ECHO    #CAD s/p cabg, dyslipidemia  -cont asa and statin---f/u cardiology recomm    #HTN- restarted metoprolol 25 mg bid  -all antihypertensive meds were stopped as outpatient prior to admission  -monitor hr and bp closely    #DVT/GI ppx    pcp-Dr. Calzada ([facility] physicians)    guarded prognosis---anticipate likley dc home in 24-48 hrs if no further symptoms and all syncope/cardio workup negative .
77 yo male hx of CAD and HTN presented for presyncope.    #dizzness- pre-syncope  -suspect 2/2 orthostatics as associated with positional change  -check orthostatics  -EEG pending  -had carotid US done earlier in 2018 which as per wife showed mild stenosis---will repeat carotid US  -cardiology eval (Dr. Florez)  -ct head negative  -check ECHO    #CAD s/p cabg, dyslipidemia  -cont asa and statin---f/u cardiology recommendations    #DVT/GI ppx

## 2018-10-13 NOTE — PROGRESS NOTE ADULT - SUBJECTIVE AND OBJECTIVE BOX
KERWIN HERNANDEZ, male, 78y ([birth year]),   MRN-[id number]  Admit Date: 10-11-18 (2d)      Chief Complaint:  Patient is a 78y old  Male who presents with a chief complaint of Weakness and " spacing out" (13 Oct 2018 14:48)      Interval History:  No acute events overnight. No complaints at this time. Pt was walking with the help of the wife and feels okay.     Past Medical and Surgical History:  PAST MEDICAL & SURGICAL HISTORY:  Hyperlipidemia, unspecified hyperlipidemia type  Coronary artery disease, angina presence unspecified, unspecified vessel or lesion type, unspecified whether native or transplanted heart      Current Medications:  MEDICATIONS  (STANDING):  aspirin 325 milliGRAM(s) Oral daily  atorvastatin 10 milliGRAM(s) Oral at bedtime  chlorhexidine 4% Liquid 1 Application(s) Topical <User Schedule>  heparin  Injectable 5000 Unit(s) SubCutaneous every 8 hours  influenza   Vaccine 0.5 milliLiter(s) IntraMuscular once  melatonin 3 milliGRAM(s) Oral at bedtime  metoprolol tartrate 25 milliGRAM(s) Oral two times a day    MEDICATIONS  (PRN):        Vital Signs:  T(F): 97.8 (10-13-18 @ 14:19), Max: 98.3 (10-12-18 @ 21:12)  HR: 59 (10-13-18 @ 14:19) (59 - 68)  BP: 123/63 (10-13-18 @ 14:19) (97/58 - 170/95)  RR: 18 (10-13-18 @ 14:19) (18 - 20)  SpO2: 97% (10-12-18 @ 08:10) (97% - 98%)  CAPILLARY BLOOD GLUCOSE          Physical Exam:  General: Not in distress.   HEENT: Moist mucus membranes. PERRLA.  Cardio: Regular rate and rhythm, S1, S2, no murmur, rub, or gallop.  Pulm: Clear to auscultation bilaterally. No wheezing, rales, or rhonchi.  Abdomen: Soft, non-tender, non-distended. Normoactive bowel sounds.  Extremities: No cyanosis or edema bilaterally. No calf tenderness to palpation.  Neuro: A&O x3. No focal deficits. CN II - XII grossly intact.    Labs and Imaging:  CBC Full  -  ( 12 Oct 2018 08:45 )  WBC Count : 6.92 K/uL  Hemoglobin : 13.4 g/dL  Hematocrit : 38.3 %  Platelet Count - Automated : 161 K/uL  Mean Cell Volume : 89.5 fL  Mean Cell Hemoglobin : 31.3 pg  Mean Cell Hemoglobin Concentration : 35.0 g/dL  Auto Neutrophil # : x  Auto Lymphocyte # : x  Auto Monocyte # : x  Auto Eosinophil # : x  Auto Basophil # : x  Auto Neutrophil % : x  Auto Lymphocyte % : x  Auto Monocyte % : x  Auto Eosinophil % : x  Auto Basophil % : x    RDW:   PT/INR/PTT: 10-12-18 @ 08:45  12.60 | 33.6        ^      1.17          Home Medications:  Home Medications:  aspirin 325 mg oral tablet: 1 tab(s) orally once a day (11 Oct 2018 11:47)  pravastatin 20 mg oral tablet: 1 tab(s) orally once a day (11 Oct 2018 11:47)

## 2018-10-13 NOTE — CONSULT NOTE ADULT - SUBJECTIVE AND OBJECTIVE BOX
Patient is a 78y old  Male who presents with a chief complaint of Weakness and " spacing out" (13 Oct 2018 18:34)    HPI:  Patient is a 79 yo M with PMH of CAD s/p 2 stents (10 yrs ago) and DLD who presented to the hospital s/p pre-syncopal event. Patient and wife state that they were at [employer] when the patient suddenly felt weak, began to "space out" and almost fell backwards prior to being caught by 2 [employer] members. Patient states that he does not remember the event. Wife at bedside notes that the patient has been having the episode where he "spaces out" often and its almost as if he is "removed." He does not respond to verbal stimuli. She states that she has also noticed that when walking he seems to favor his left side and leans that way. When patient questioned about this he states that he doesn't feel that he is leaning to the left. The patient states that he does not feel like he is off balance. He denies a f/n/v/d/c/ chills, abdominal pain, cp, palpitations sob, denies ringing in the ears, denies prior history of seizures denies and dysuria, frequency, or hesitance. Patient and wife state that he only takes pravastatin and asa as reconciled. They also note that he has a left carotid stenosis of 25%. (11 Oct 2018 11:42)      ROS:  All other systems reviewed and are negative    PAST MEDICAL & SURGICAL HISTORY  Hyperlipidemia, unspecified hyperlipidemia type  Coronary artery disease, angina presence unspecified, unspecified vessel or lesion type, unspecified whether native or transplanted heart      FAMILY HISTORY:  FAMILY HISTORY:  non contributory    SOCIAL HISTORY:  []smoker  []Alcohol  []Drug    ALLERGIES:  No Known Allergies      MEDICATIONS:  MEDICATIONS  (STANDING):  aspirin 325 milliGRAM(s) Oral daily  atorvastatin 10 milliGRAM(s) Oral at bedtime  chlorhexidine 4% Liquid 1 Application(s) Topical <User Schedule>  heparin  Injectable 5000 Unit(s) SubCutaneous every 8 hours  influenza   Vaccine 0.5 milliLiter(s) IntraMuscular once  melatonin 3 milliGRAM(s) Oral at bedtime  metoprolol tartrate 25 milliGRAM(s) Oral two times a day    MEDICATIONS  (PRN):      HOME MEDICATIONS:  Home Medications:  aspirin 325 mg oral tablet: 1 tab(s) orally once a day (11 Oct 2018 11:47)  pravastatin 20 mg oral tablet: 1 tab(s) orally once a day (11 Oct 2018 11:47)      VITALS:   T(F): 97.8 (10-13 @ 14:19), Max: 98.3 (10-12 @ 21:12)  HR: 59 (10-13 @ 14:19) (59 - 76)  BP: 123/63 (10-13 @ 14:19) (97/58 - 222/125)  BP(mean): --  RR: 18 (10-13 @ 14:19) (18 - 20)  SpO2: 97% (10-12 @ 08:10) (97% - 99%)    I&O's Summary      PHYSICAL EXAM:  GEN: Alert and oriented X 3, No acute distress  HEENT: no pallor  NECK: Supple, no JVD  LUNGS: Clear to auscultation bilaterally  CARDIOVASCULAR: S1/S2 regular, no murmurs or rubs  ABD: Soft, BS+  EXT: No Lower extremity edema/cyanosis  NEURO: Non focal  SKIN: Intact    LABS:                        13.4   6.92  )-----------( 161      ( 12 Oct 2018 08:45 )             38.3     10-12    136  |  98  |  16  ----------------------------<  137<H>  3.9   |  23  |  1.1    Ca    9.6      12 Oct 2018 08:45  Mg     2.1     10-12      PT/INR - ( 12 Oct 2018 08:45 )   PT: 12.60 sec;   INR: 1.17 ratio         PTT - ( 12 Oct 2018 08:45 )  PTT:33.6 sec          Troponin trend:        Hemoglobin A1C   Thyroid      RADIOLOGY:  -CXR:  < from: Xray Chest 1 View-PORTABLE IMMEDIATE (10.11.18 @ 05:01) >    Impression:      No radiographic evidence of acute pulmonary disease.    < end of copied text >    -CT:  < from: CT Head No Cont (10.11.18 @ 01:40) >      Impression:    No evidence of acute intracranial abnormality.    < end of copied text >    < from: VA Duplex Carotid, Bilat (10.12.18 @ 13:52) >  Impression:    20-39% stenosis of the right internal carotid artery.  20-39% stenosis of the left internal carotid artery.    < end of copied text >    -TTE:  < from: Transthoracic Echocardiogram (10.12.18 @ 11:51) >    Summary:   1. LV Ejection Fraction by Dale's Method with a biplane EF of 71 %.   2. Normal left ventricular size and wall thicknesses, with normal   systolic function.   3. The mean global longitudinal strain by speckle tracking is -19.1%   which is normal.   4. Mild tricuspid regurgitation.   5. LA volume Index is 17.1 ml/m² ml/m2.    < end of copied text >      ECG:  NSR

## 2018-10-13 NOTE — PROGRESS NOTE ADULT - SUBJECTIVE AND OBJECTIVE BOX
Patient is a 78y old  Male who presents with a chief complaint of Weakness and " spacing out" (12 Oct 2018 15:55)    HPI:  Patient is a 77 yo M with PMH of CAD s/p 2 stents and DLD who presented to the hospital s/p pre-syncopal event. Patient and wife state that they were at [employer] when the patient suddenly felt weak, began to "space out" and almost fell backwards prior to being caught by 2 [employer] members. Patient states that he does not remember the event. Wife at bedside notes that the patient has been having the episode where he "spaces out" often and its almost as if he is "removed." He does not respond to verbal stimuli. She states that she has also noticed that when walking he seems to favor his left side and leans that way. When patient questioned about this he states that he doesn't feel that he is leaning to the left. They patient states that he does not feel like he is off balance. He denies a f/n/v/d/c/ chills, abdominal pain, cp, palpitations sob, denies ringing in the ears, denies prior history of seizures denies and dysuria, frequency, or hesitance. Patient and wife state that he only takes pravastatin and asa as reconciled. They also note that he has a left carotid stenosis of 25%. (11 Oct 2018 11:42)    PAST MEDICAL & SURGICAL HISTORY:  Hyperlipidemia, unspecified hyperlipidemia type  Coronary artery disease, angina presence unspecified, unspecified vessel or lesion type, unspecified whether native or transplanted heart    patient seen and examined independently on morning rounds, chart reviewed and discussed with the medicine resident      no overnight events---wife bedside and states patient more tired today and not getting out of bed- also was moved to 4c unit from ED overnight- no chest pain or sob-     PE:  GEN-NAD, AAOx3  HEENT- NCAT, PERRLA, EOMI  NECK- supple no jvd, no lymphadenopathy  PULM- Clear to auscultation bilaterally, fair air entry  CVS- +s1/s2 RRR no murmurs  GI- soft NT ND +bs, no rebound, no guarding  EXT- no edema  NEURO- nonfocal    labs/radiology reviewed    Carotid US- bilateral 20-39% stenosis  Echo- EF 71% normal LV size and function                 13.4   6.92  )-----------( 161      ( 12 Oct 2018 08:45 )             38.3     10-12    136  |  98  |  16  ----------------------------<  137<H>  3.9   |  23  |  1.1    Ca    9.6      12 Oct 2018 08:45  Mg     2.1     10-12    TPro  6.9  /  Alb  4.2  /  TBili  <0.2  /  DBili  x   /  AST  16  /  ALT  13  /  AlkPhos  90  10-10    CARDIAC MARKERS ( 11 Oct 2018 17:06 )  x     / <0.01 ng/mL / 130 U/L / x     / 2.4 ng/mL  CARDIAC MARKERS ( 11 Oct 2018 12:39 )  x     / <0.01 ng/mL / 124 U/L / x     / 2.3 ng/mL  CARDIAC MARKERS ( 10 Oct 2018 23:15 )  x     / <0.01 ng/mL / x     / x     / x                MEDICATIONS  (STANDING):  aspirin 325 milliGRAM(s) Oral daily  atorvastatin 10 milliGRAM(s) Oral at bedtime  chlorhexidine 4% Liquid 1 Application(s) Topical <User Schedule>  heparin  Injectable 5000 Unit(s) SubCutaneous every 8 hours  melatonin 3 milliGRAM(s) Oral at bedtime  metoprolol tartrate 25 milliGRAM(s) Oral two times a day

## 2018-10-13 NOTE — CONSULT NOTE ADULT - ASSESSMENT
Patient is a 79 yo M with PMH of CAD s/p 2 stents (10 yrs ago) and DLD who presented to the hospital s/p pre-syncopal event. Patient is a 77 yo M with PMH of CAD s/p 2 stents (10 yrs ago) and DLD who presented to the hospital s/p pre-syncopal event.    Impression:  Labile HTN  Near syncopal episode  h/o DLD/ CAD s/p PCI    Plan:  cont ASA  d/c statin   cont lopressor  advised pt to monitor BP twice daily and take lisinopril 5 mg if SBP >150  check orthostats   out pt cardiology and neurology f/u.

## 2018-10-14 ENCOUNTER — TRANSCRIPTION ENCOUNTER (OUTPATIENT)
Age: 78
End: 2018-10-14

## 2018-10-14 VITALS — DIASTOLIC BLOOD PRESSURE: 69 MMHG | SYSTOLIC BLOOD PRESSURE: 113 MMHG | HEART RATE: 62 BPM | RESPIRATION RATE: 18 BRPM

## 2018-10-14 LAB
ANION GAP SERPL CALC-SCNC: 12 MMOL/L — SIGNIFICANT CHANGE UP (ref 7–14)
BUN SERPL-MCNC: 15 MG/DL — SIGNIFICANT CHANGE UP (ref 10–20)
CALCIUM SERPL-MCNC: 10 MG/DL — SIGNIFICANT CHANGE UP (ref 8.5–10.1)
CHLORIDE SERPL-SCNC: 100 MMOL/L — SIGNIFICANT CHANGE UP (ref 98–110)
CO2 SERPL-SCNC: 27 MMOL/L — SIGNIFICANT CHANGE UP (ref 17–32)
CREAT SERPL-MCNC: 1.1 MG/DL — SIGNIFICANT CHANGE UP (ref 0.7–1.5)
GLUCOSE SERPL-MCNC: 90 MG/DL — SIGNIFICANT CHANGE UP (ref 70–99)
HCT VFR BLD CALC: 41.6 % — LOW (ref 42–52)
HGB BLD-MCNC: 14.2 G/DL — SIGNIFICANT CHANGE UP (ref 14–18)
MAGNESIUM SERPL-MCNC: 2.2 MG/DL — SIGNIFICANT CHANGE UP (ref 1.8–2.4)
MCHC RBC-ENTMCNC: 30.9 PG — SIGNIFICANT CHANGE UP (ref 27–31)
MCHC RBC-ENTMCNC: 34.1 G/DL — SIGNIFICANT CHANGE UP (ref 32–37)
MCV RBC AUTO: 90.4 FL — SIGNIFICANT CHANGE UP (ref 80–94)
NRBC # BLD: 0 /100 WBCS — SIGNIFICANT CHANGE UP (ref 0–0)
PLATELET # BLD AUTO: 182 K/UL — SIGNIFICANT CHANGE UP (ref 130–400)
POTASSIUM SERPL-MCNC: 4.9 MMOL/L — SIGNIFICANT CHANGE UP (ref 3.5–5)
POTASSIUM SERPL-SCNC: 4.9 MMOL/L — SIGNIFICANT CHANGE UP (ref 3.5–5)
RBC # BLD: 4.6 M/UL — LOW (ref 4.7–6.1)
RBC # FLD: 12 % — SIGNIFICANT CHANGE UP (ref 11.5–14.5)
SODIUM SERPL-SCNC: 139 MMOL/L — SIGNIFICANT CHANGE UP (ref 135–146)
WBC # BLD: 5.63 K/UL — SIGNIFICANT CHANGE UP (ref 4.8–10.8)
WBC # FLD AUTO: 5.63 K/UL — SIGNIFICANT CHANGE UP (ref 4.8–10.8)

## 2018-10-14 RX ORDER — METOPROLOL TARTRATE 50 MG
1 TABLET ORAL
Qty: 60 | Refills: 0
Start: 2018-10-14 | End: 2018-11-12

## 2018-10-14 RX ORDER — ATORVASTATIN CALCIUM 80 MG/1
1 TABLET, FILM COATED ORAL
Qty: 30 | Refills: 0 | OUTPATIENT
Start: 2018-10-14 | End: 2018-11-12

## 2018-10-14 RX ORDER — ATORVASTATIN CALCIUM 80 MG/1
1 TABLET, FILM COATED ORAL
Qty: 0 | Refills: 0 | COMMUNITY
Start: 2018-10-14

## 2018-10-14 RX ADMIN — HEPARIN SODIUM 5000 UNIT(S): 5000 INJECTION INTRAVENOUS; SUBCUTANEOUS at 05:47

## 2018-10-14 RX ADMIN — Medication 25 MILLIGRAM(S): at 05:47

## 2018-10-14 RX ADMIN — Medication 3 MILLIGRAM(S): at 04:09

## 2018-10-14 RX ADMIN — Medication 325 MILLIGRAM(S): at 11:01

## 2018-10-14 RX ADMIN — INFLUENZA VIRUS VACCINE 0.5 MILLILITER(S): 15; 15; 15; 15 SUSPENSION INTRAMUSCULAR at 12:11

## 2018-10-14 NOTE — DISCHARGE NOTE ADULT - MEDICATION SUMMARY - MEDICATIONS TO TAKE
I will START or STAY ON the medications listed below when I get home from the hospital:    aspirin 325 mg oral tablet  -- 1 tab(s) by mouth once a day  -- Indication: For Coronary artery disease, angina presence unspecified, unspecified vessel or lesion type, unspecified whether native or transplanted heart    metoprolol tartrate 25 mg oral tablet  -- 1 tab(s) by mouth 2 times a day  -- Indication: For Near syncope

## 2018-10-14 NOTE — DISCHARGE NOTE ADULT - PATIENT PORTAL LINK FT
You can access the [facility] Patient Portal, offered by [facility], by registering with the following website: [email or web address]

## 2018-10-14 NOTE — DISCHARGE NOTE ADULT - CARE PROVIDER_API CALL
Babar Calzada), Internal Medicine  [address] [place] [address]  Phone: [phone]  Fax: [phone]    Tramaine Sears), Cardiovascular Disease; Interventional Cardiology  [address] [place] [address]  Phone: [phone]  Fax: [phone]

## 2018-10-14 NOTE — DISCHARGE NOTE ADULT - PLAN OF CARE
preventing future events continue metoprolol , check bp at home, if SBP is less than 110 or you feel dizzy hold metoprolol and call cardiology cont aspirin

## 2018-10-14 NOTE — DISCHARGE NOTE ADULT - HOSPITAL COURSE
HPI:  Patient is a 79 yo M with PMH of CAD s/p 2 stents and DLD who presented to the hospital s/p pre-syncopal event. Patient and wife state that they were at [employer] when the patient suddenly felt weak, began to "space out" and almost fell backwards prior to being caught by 2 [employer] members. Patient was diagnosed with syncope and found to have labile hypertension and possibly hypertensive encephalopathy. at this time he is asymptomatic, his blood pressure is controlled on metoprolol, was advised to start lisinopril if bp is high but pt's systolic blood pressure has been in 120 =130 . he is ambulating with no symptoms.   pt is seen by cardio , rec stop provastatin and cont metoprolo.   follow up with cardio and pmd as outpt.   start lisinopril as outpt if SBP is higher than 150 . HPI:  Patient is a 79 yo M with PMH of CAD s/p 2 stents and DLD who presented to the hospital s/p pre-syncopal event. Patient and wife state that they were at [employer] when the patient suddenly felt weak, began to "space out" and almost fell backwards prior to being caught by 2 [employer] members. Patient was diagnosed with syncope and found to have labile hypertension and possibly hypertensive encephalopathy. at this time he is asymptomatic, his blood pressure is controlled on metoprolol, was advised to start lisinopril if bp is high but pt's systolic blood pressure has been in 120 =130 . he is ambulating with no symptoms.   pt is seen by cardio , rec stop provastatin and cont metoprolo.   follow up with cardio and pmd as outpt.   start lisinopril as outpt if SBP is higher than 150 .   lipid profile to be checked as outpt

## 2018-10-14 NOTE — PROGRESS NOTE ADULT - SUBJECTIVE AND OBJECTIVE BOX
HPI:  Patient is a 79 yo M with PMH of CAD s/p 2 stents and DLD who presented to the hospital s/p pre-syncopal event. Patient and wife state that they were at [employer] when the patient suddenly felt weak, began to "space out" and almost fell backwards prior to being caught by 2 [employer] members. Patient states that he does not remember the event. Wife at bedside notes that the patient has been having the episode where he "spaces out" often and its almost as if he is "removed." He does not respond to verbal stimuli. She states that she has also noticed that when walking he seems to favor his left side and leans that way. When patient questioned about this he states that he doesn't feel that he is leaning to the left. They patient states that he does not feel like he is off balance. He denies a f/n/v/d/c/ chills, abdominal pain, cp, palpitations sob, denies ringing in the ears, denies prior history of seizures denies and dysuria, frequency, or hesitance. Patient and wife state that he only takes pravastatin and asa as reconciled. They also note that he has a left carotid stenosis of 25%. (11 Oct 2018 11:42)    today he is feeling well. no dizziness, ambulates independently, asymptomatic  cardio notes appreciated.     Vital Signs Last 24 Hrs  T(C): 35.9 (14 Oct 2018 05:21), Max: 36.8 (13 Oct 2018 20:24)  T(F): 96.7 (14 Oct 2018 05:21), Max: 98.3 (13 Oct 2018 20:24)  HR: 60 (14 Oct 2018 05:46) (55 - 60)  BP: 124/71 (14 Oct 2018 05:21) (123/63 - 134/82)  BP(mean): --  RR: 18 (14 Oct 2018 05:21) (18 - 18)  SpO2: --    Physical exam:   constitutional NAD, AAOX3, Respiratory  lungs CTA, CVS heart RRR, GI: abdomen Soft NT, ND, BS+, skin: intact  neuro exam non focal.                           14.2   5.63  )-----------( 182      ( 14 Oct 2018 07:51 )             41.6   10-14    139  |  100  |  15  ----------------------------<  90  4.9   |  27  |  1.1    Ca    10.0      14 Oct 2018 07:51  Mg     2.2     10-14    < from: Transthoracic Echocardiogram (10.12.18 @ 11:51) >   1. LV Ejection Fraction by Dale's Method with a biplane EF of 71 %.   2. Normal left ventricular size and wall thicknesses, with normal   systolic function.   3. The mean global longitudinal strain by speckle tracking is -19.1%   which is normal.   4. Mild tricuspid regurgitation.   5. LA volume Index is 17.1 ml/m² ml/m2.    < end of copied text >    a/p  labile HTN, metoprolol  lisinopril if sbp is high ( it is not high)     cad, cont asa    as per cardio statins stopped.     dc home ,  fu with cardio and PMD  time spent 35 min

## 2018-10-14 NOTE — DISCHARGE NOTE ADULT - CARE PLAN
Principal Discharge DX:	Near syncope  Goal:	preventing future events  Assessment and plan of treatment:	continue metoprolol , check bp at home, if SBP is less than 110 or you feel dizzy hold metoprolol and call cardiology  Secondary Diagnosis:	Coronary artery disease, angina presence unspecified, unspecified vessel or lesion type, unspecified whether native or transplanted heart  Goal:	preventing future events  Assessment and plan of treatment:	cont aspirin

## 2018-10-14 NOTE — PROGRESS NOTE ADULT - REASON FOR ADMISSION
Weakness and " spacing out"

## 2018-10-15 LAB
T PALLIDUM AB TITR SER: NEGATIVE — SIGNIFICANT CHANGE UP
TSH SERPL-MCNC: 1.54 UIU/ML — SIGNIFICANT CHANGE UP (ref 0.27–4.2)
VIT B12 SERPL-MCNC: 596 PG/ML — SIGNIFICANT CHANGE UP (ref 232–1245)

## 2018-10-22 DIAGNOSIS — I67.4 HYPERTENSIVE ENCEPHALOPATHY: ICD-10-CM

## 2018-10-22 DIAGNOSIS — I65.23 OCCLUSION AND STENOSIS OF BILATERAL CAROTID ARTERIES: ICD-10-CM

## 2018-10-22 DIAGNOSIS — R55 SYNCOPE AND COLLAPSE: ICD-10-CM

## 2018-10-22 DIAGNOSIS — R03.0 ELEVATED BLOOD-PRESSURE READING, WITHOUT DIAGNOSIS OF HYPERTENSION: ICD-10-CM

## 2018-10-22 DIAGNOSIS — Z95.1 PRESENCE OF AORTOCORONARY BYPASS GRAFT: ICD-10-CM

## 2018-10-22 DIAGNOSIS — N40.0 BENIGN PROSTATIC HYPERPLASIA WITHOUT LOWER URINARY TRACT SYMPTOMS: ICD-10-CM

## 2018-10-22 DIAGNOSIS — E78.5 HYPERLIPIDEMIA, UNSPECIFIED: ICD-10-CM

## 2018-10-22 DIAGNOSIS — G30.9 ALZHEIMER'S DISEASE, UNSPECIFIED: ICD-10-CM

## 2018-10-22 DIAGNOSIS — I25.10 ATHEROSCLEROTIC HEART DISEASE OF NATIVE CORONARY ARTERY WITHOUT ANGINA PECTORIS: ICD-10-CM

## 2018-10-22 DIAGNOSIS — E03.9 HYPOTHYROIDISM, UNSPECIFIED: ICD-10-CM

## 2018-10-22 DIAGNOSIS — Z95.5 PRESENCE OF CORONARY ANGIOPLASTY IMPLANT AND GRAFT: ICD-10-CM

## 2018-10-22 DIAGNOSIS — F02.80 DEMENTIA IN OTHER DISEASES CLASSIFIED ELSEWHERE, UNSPECIFIED SEVERITY, WITHOUT BEHAVIORAL DISTURBANCE, PSYCHOTIC DISTURBANCE, MOOD DISTURBANCE, AND ANXIETY: ICD-10-CM

## 2019-01-04 ENCOUNTER — OUTPATIENT (OUTPATIENT)
Dept: OUTPATIENT SERVICES | Facility: HOSPITAL | Age: 79
LOS: 1 days | Discharge: HOME | End: 2019-01-04

## 2019-01-04 DIAGNOSIS — R35.0 FREQUENCY OF MICTURITION: ICD-10-CM

## 2019-01-04 DIAGNOSIS — E29.1 TESTICULAR HYPOFUNCTION: ICD-10-CM

## 2019-01-04 DIAGNOSIS — R30.0 DYSURIA: ICD-10-CM

## 2019-01-04 PROBLEM — I25.10 ATHEROSCLEROTIC HEART DISEASE OF NATIVE CORONARY ARTERY WITHOUT ANGINA PECTORIS: Chronic | Status: ACTIVE | Noted: 2018-10-11

## 2019-01-04 PROBLEM — E78.5 HYPERLIPIDEMIA, UNSPECIFIED: Chronic | Status: ACTIVE | Noted: 2018-10-11

## 2019-02-05 ENCOUNTER — OUTPATIENT (OUTPATIENT)
Dept: OUTPATIENT SERVICES | Facility: HOSPITAL | Age: 79
LOS: 1 days | Discharge: HOME | End: 2019-02-05

## 2019-02-05 DIAGNOSIS — N39.0 URINARY TRACT INFECTION, SITE NOT SPECIFIED: ICD-10-CM

## 2019-02-19 ENCOUNTER — OUTPATIENT (OUTPATIENT)
Dept: OUTPATIENT SERVICES | Facility: HOSPITAL | Age: 79
LOS: 1 days | Discharge: HOME | End: 2019-02-19

## 2019-02-19 DIAGNOSIS — C61 MALIGNANT NEOPLASM OF PROSTATE: ICD-10-CM

## 2019-04-22 PROBLEM — Z00.00 ENCOUNTER FOR PREVENTIVE HEALTH EXAMINATION: Status: ACTIVE | Noted: 2019-04-22

## 2019-05-29 ENCOUNTER — RECORD ABSTRACTING (OUTPATIENT)
Age: 79
End: 2019-05-29

## 2019-05-29 DIAGNOSIS — R53.1 WEAKNESS: ICD-10-CM

## 2019-05-29 DIAGNOSIS — Z86.79 PERSONAL HISTORY OF OTHER DISEASES OF THE CIRCULATORY SYSTEM: ICD-10-CM

## 2019-05-29 DIAGNOSIS — E88.89 OTHER SPECIFIED METABOLIC DISORDERS: ICD-10-CM

## 2019-05-29 DIAGNOSIS — Z78.9 OTHER SPECIFIED HEALTH STATUS: ICD-10-CM

## 2019-05-29 DIAGNOSIS — I65.29 OCCLUSION AND STENOSIS OF UNSPECIFIED CAROTID ARTERY: ICD-10-CM

## 2019-05-29 DIAGNOSIS — Z86.39 PERSONAL HISTORY OF OTHER ENDOCRINE, NUTRITIONAL AND METABOLIC DISEASE: ICD-10-CM

## 2019-07-16 ENCOUNTER — INPATIENT (INPATIENT)
Facility: HOSPITAL | Age: 79
LOS: 1 days | Discharge: HOME | End: 2019-07-18
Attending: INTERNAL MEDICINE | Admitting: INTERNAL MEDICINE
Payer: MEDICARE

## 2019-07-16 VITALS
TEMPERATURE: 97 F | SYSTOLIC BLOOD PRESSURE: 151 MMHG | RESPIRATION RATE: 19 BRPM | DIASTOLIC BLOOD PRESSURE: 84 MMHG | OXYGEN SATURATION: 100 % | HEART RATE: 64 BPM

## 2019-07-16 LAB
ALBUMIN SERPL ELPH-MCNC: 4.4 G/DL — SIGNIFICANT CHANGE UP (ref 3.5–5.2)
ALP SERPL-CCNC: 92 U/L — SIGNIFICANT CHANGE UP (ref 30–115)
ALT FLD-CCNC: 11 U/L — SIGNIFICANT CHANGE UP (ref 0–41)
ANION GAP SERPL CALC-SCNC: 9 MMOL/L — SIGNIFICANT CHANGE UP (ref 7–14)
APTT BLD: 30.7 SEC — SIGNIFICANT CHANGE UP (ref 27–39.2)
AST SERPL-CCNC: 15 U/L — SIGNIFICANT CHANGE UP (ref 0–41)
BASOPHILS # BLD AUTO: 0.04 K/UL — SIGNIFICANT CHANGE UP (ref 0–0.2)
BASOPHILS NFR BLD AUTO: 0.8 % — SIGNIFICANT CHANGE UP (ref 0–1)
BILIRUB SERPL-MCNC: <0.2 MG/DL — SIGNIFICANT CHANGE UP (ref 0.2–1.2)
BUN SERPL-MCNC: 17 MG/DL — SIGNIFICANT CHANGE UP (ref 10–20)
CALCIUM SERPL-MCNC: 9.6 MG/DL — SIGNIFICANT CHANGE UP (ref 8.5–10.1)
CHLORIDE SERPL-SCNC: 104 MMOL/L — SIGNIFICANT CHANGE UP (ref 98–110)
CO2 SERPL-SCNC: 27 MMOL/L — SIGNIFICANT CHANGE UP (ref 17–32)
CREAT SERPL-MCNC: 1.1 MG/DL — SIGNIFICANT CHANGE UP (ref 0.7–1.5)
EOSINOPHIL # BLD AUTO: 0.1 K/UL — SIGNIFICANT CHANGE UP (ref 0–0.7)
EOSINOPHIL NFR BLD AUTO: 1.9 % — SIGNIFICANT CHANGE UP (ref 0–8)
GLUCOSE SERPL-MCNC: 88 MG/DL — SIGNIFICANT CHANGE UP (ref 70–99)
HCT VFR BLD CALC: 35.3 % — LOW (ref 42–52)
HGB BLD-MCNC: 11.4 G/DL — LOW (ref 14–18)
IMM GRANULOCYTES NFR BLD AUTO: 0.2 % — SIGNIFICANT CHANGE UP (ref 0.1–0.3)
INR BLD: 1.05 RATIO — SIGNIFICANT CHANGE UP (ref 0.65–1.3)
LACTATE SERPL-SCNC: 0.9 MMOL/L — SIGNIFICANT CHANGE UP (ref 0.5–2.2)
LYMPHOCYTES # BLD AUTO: 1.2 K/UL — SIGNIFICANT CHANGE UP (ref 1.2–3.4)
LYMPHOCYTES # BLD AUTO: 22.7 % — SIGNIFICANT CHANGE UP (ref 20.5–51.1)
MCHC RBC-ENTMCNC: 29.9 PG — SIGNIFICANT CHANGE UP (ref 27–31)
MCHC RBC-ENTMCNC: 32.3 G/DL — SIGNIFICANT CHANGE UP (ref 32–37)
MCV RBC AUTO: 92.7 FL — SIGNIFICANT CHANGE UP (ref 80–94)
MONOCYTES # BLD AUTO: 0.4 K/UL — SIGNIFICANT CHANGE UP (ref 0.1–0.6)
MONOCYTES NFR BLD AUTO: 7.6 % — SIGNIFICANT CHANGE UP (ref 1.7–9.3)
NEUTROPHILS # BLD AUTO: 3.54 K/UL — SIGNIFICANT CHANGE UP (ref 1.4–6.5)
NEUTROPHILS NFR BLD AUTO: 66.8 % — SIGNIFICANT CHANGE UP (ref 42.2–75.2)
NRBC # BLD: 0 /100 WBCS — SIGNIFICANT CHANGE UP (ref 0–0)
NT-PROBNP SERPL-SCNC: 25 PG/ML — SIGNIFICANT CHANGE UP (ref 0–300)
PLATELET # BLD AUTO: 182 K/UL — SIGNIFICANT CHANGE UP (ref 130–400)
POTASSIUM SERPL-MCNC: 4.8 MMOL/L — SIGNIFICANT CHANGE UP (ref 3.5–5)
POTASSIUM SERPL-SCNC: 4.8 MMOL/L — SIGNIFICANT CHANGE UP (ref 3.5–5)
PROT SERPL-MCNC: 7.5 G/DL — SIGNIFICANT CHANGE UP (ref 6–8)
PROTHROM AB SERPL-ACNC: 12.1 SEC — SIGNIFICANT CHANGE UP (ref 9.95–12.87)
RBC # BLD: 3.81 M/UL — LOW (ref 4.7–6.1)
RBC # FLD: 12.8 % — SIGNIFICANT CHANGE UP (ref 11.5–14.5)
SODIUM SERPL-SCNC: 140 MMOL/L — SIGNIFICANT CHANGE UP (ref 135–146)
TROPONIN T SERPL-MCNC: <0.01 NG/ML — SIGNIFICANT CHANGE UP
WBC # BLD: 5.29 K/UL — SIGNIFICANT CHANGE UP (ref 4.8–10.8)
WBC # FLD AUTO: 5.29 K/UL — SIGNIFICANT CHANGE UP (ref 4.8–10.8)

## 2019-07-16 PROCEDURE — 93010 ELECTROCARDIOGRAM REPORT: CPT

## 2019-07-16 PROCEDURE — 99285 EMERGENCY DEPT VISIT HI MDM: CPT

## 2019-07-16 PROCEDURE — 71045 X-RAY EXAM CHEST 1 VIEW: CPT | Mod: 26

## 2019-07-16 RX ORDER — ATORVASTATIN CALCIUM 80 MG/1
10 TABLET, FILM COATED ORAL AT BEDTIME
Refills: 0 | Status: DISCONTINUED | OUTPATIENT
Start: 2019-07-16 | End: 2019-07-18

## 2019-07-16 RX ORDER — ENOXAPARIN SODIUM 100 MG/ML
40 INJECTION SUBCUTANEOUS DAILY
Refills: 0 | Status: DISCONTINUED | OUTPATIENT
Start: 2019-07-16 | End: 2019-07-18

## 2019-07-16 RX ORDER — CHLORHEXIDINE GLUCONATE 213 G/1000ML
1 SOLUTION TOPICAL
Refills: 0 | Status: DISCONTINUED | OUTPATIENT
Start: 2019-07-16 | End: 2019-07-18

## 2019-07-16 RX ORDER — ASPIRIN/CALCIUM CARB/MAGNESIUM 324 MG
325 TABLET ORAL DAILY
Refills: 0 | Status: DISCONTINUED | OUTPATIENT
Start: 2019-07-16 | End: 2019-07-18

## 2019-07-16 RX ADMIN — ATORVASTATIN CALCIUM 10 MILLIGRAM(S): 80 TABLET, FILM COATED ORAL at 21:31

## 2019-07-16 NOTE — ED PROVIDER NOTE - OBJECTIVE STATEMENT
78 year old male, pmhx CAD with stents, HLD, presenting with frequent episodes of syncope. Per wife at bedside, patient has been having 1-2 episodes per day when he suddenly becomes unresponsive for 2-3 minutes and then awakens confused for a few seconds before returning to baseline. Patient follows with Dr. Florez as outpatient and is scheduled to undergo Holter monitor, echo, carotid doppler but per wife patient is having episodes more than before so she was concerned and brought him to the ED. Patient is poor historian but denies pre-episode symptoms or post-episode symptoms. States he is currently asymptomatic. Denies fevers, headache, vision changes, weakness/numbness, confusion, URI symptoms, neck pain, chest pain, back pain, dyspnea, cough, palpitations, nausea, vomiting, abdominal pain, diarrhea, constipation, blood in stool/dark stools, urinary symptoms, penile discharge/testicular pain, leg swelling, rash, recent travel or sick contacts.

## 2019-07-16 NOTE — H&P ADULT - HISTORY OF PRESENT ILLNESS
79 yo M w/ PMH of CAD with stents, HLD, presenting with frequent episodes of syncope. Per wife at bedside, patient has been having 1-2 episodes per day when he suddenly becomes unresponsive for 2-3 minutes and then awakens confused for a few seconds before returning to baseline. Patient follows with Dr. Florez as outpatient and is scheduled to undergo Holter monitor, echo, carotid doppler. Per wife, patient is having episodes more than before so she was concerned and brought him to the ED. Patient is poor historian but denies pre-episode symptoms or post-episode symptoms. States he is currently asymptomatic. Denies fevers, headache, vision changes, weakness/numbness, confusion, URI symptoms, neck pain, chest pain, back pain, dyspnea, cough, palpitations, nausea, vomiting, abdominal pain, diarrhea, constipation, blood in stool/dark stools, urinary symptoms, penile discharge/testicular pain, leg swelling, rash, recent travel or sick contacts.    Of note, pt was admitted to hospital on 10/2018 for similar syncopal episodes. Pt also has b/l carotid stenosis of 20-39% at that time. Pt was also to f/u with Dr. Florez and to stop metoprolol. 79 yo M w/ PMH of CAD with stents 20 years ago, HLD presenting to the ED with frequent episodes of syncope. Per wife at bedside, patient has been having 1-2 episodes per day and yesterday had 8 episodes when he suddenly becomes unresponsive for few seconds and then awakens confused for a few seconds before returning to baseline. pt does not recall these episode. wife denies jerking movement, bowel / urinary incontinence, tongue bitting or eye rolling. Patient follows with Dr. Florez as outpatient and is scheduled to undergo Holter monitor, echo, carotid doppler. Denies fevers, headache, vision changes, weakness/numbness, confusion, URI symptoms, neck pain, chest pain, back pain, dyspnea, cough, palpitations, nausea, vomiting, abdominal pain, diarrhea, constipation, blood in stool/dark stools, urinary symptoms, penile discharge/testicular pain, leg swelling, rash, recent travel or sick contacts.    Of note, pt was admitted to hospital on 10/2018 for similar syncopal episodes. Pt also has b/l carotid stenosis of 20-39% at that time. Pt was also to f/u with Dr. Florez and to stop metoprolol.  pt was seen by neurology, had negative eeg and was supposed to follow up with neurology but he didn't.    at the time of the interview pt is hemodynamically stable but he is AAO*0 which is not a baseline per wife and family.   in the ED, 151/84, HR 64, Temp 97, RR 19, SaO2 100% on RA.

## 2019-07-16 NOTE — ED ADULT NURSE NOTE - OBJECTIVE STATEMENT
Pt is a 77 y/o male from home with complaints of hypotension, dizziness and fainting spells almost daily in the morning for the last week. As per pt wife, while sitting down for breakfast in the mornings pt has been slumping over fainting and when he awakens he is disoriented.  As per wife, pt has not fallen to the floor and has not hit head. Pt denies dizziness/LH/headache at this time. Breathing is spontaneous, unlabored. No visibile trauma to head. Pt denies chest pain/SOB/weakness. VS stable.

## 2019-07-16 NOTE — CONSULT NOTE ADULT - ATTENDING COMMENTS
Patient seen last night and I had seen him on a prior hospitalization OCtober 2018.  At the time had 2 years (now > 2.5 years) of progressive memory decline.  Has also some slowing of walking and c/o intermittent spells of decreased responsiveness.    He still manifests some parkinson symptoms but he memory problems began prior to the changes in ambulation.    Recommend:    Once cardiac and telemetry completed if passes those tests consider starting low dose of donepezil 5mg daily in am with food.  Outpatient neurologic f/u in 4-6 weeks.

## 2019-07-16 NOTE — ED PROVIDER NOTE - PROGRESS NOTE DETAILS
Patient seen and evaluated by me. Labs/imaging ordered. Patient asymptomatic at this time. Neuro intact. Will speak with T[facility]urino pending work up. Case discussed with cardiology fellow for Dr. Florez - will come see patient and give recs. Per Cardiology - recommends obs placement over night, neurology eval as well. Patient agreeable with plan. Per Cardiology - recommends admission overnight, neurology eval as well. Patient agreeable with plan. Neurology paged. Spoke with neurology - will see patient and be on consult.

## 2019-07-16 NOTE — H&P ADULT - NSHPPHYSICALEXAM_GEN_ALL_CORE
GENERAL: NAD, lying in bed comfortably  HEAD:  Atraumatic, Normocephalic  EYES: EOMI, PERRLA, conjunctiva and sclera clear  ENT: Moist mucous membranes  NECK: Supple, No JVD  CHEST/LUNG: Clear to auscultation bilaterally; No rales, rhonchi, wheezing, or rubs. Unlabored respirations  HEART: Regular rate and rhythm; No murmurs, rubs, or gallops  ABDOMEN: Bowel sounds present; Soft, Nontender, Nondistended. No hepatomegally  EXTREMITIES:  2+ Peripheral Pulses, brisk capillary refill. No clubbing, cyanosis, or edema  NERVOUS SYSTEM:  Alert & Oriented X3, speech clear. No deficits   MSK: FROM all 4 extremities, full and equal strength  SKIN: No rashes or lesions GENERAL: NAD, lying in bed comfortably  HEAD:  Atraumatic, Normocephalic  EYES: EOMI, PERRLA, conjunctiva and sclera clear  ENT: Moist mucous membranes  NECK: Supple, No JVD  CHEST/LUNG: Clear to auscultation bilaterally; No rales, rhonchi, wheezing, or rubs. Unlabored respirations  HEART: Regular rate and rhythm; No murmurs, rubs, or gallops  ABDOMEN: Bowel sounds present; Soft, Nontender, Nondistended. No hepatomegally  EXTREMITIES:  2+ Peripheral Pulses, brisk capillary refill. No clubbing, cyanosis, or edema  NERVOUS SYSTEM:  Alert & Oriented X0, speech clear. No deficits   MSK: FROM all 4 extremities, full and equal strength  SKIN: No rashes or lesions

## 2019-07-16 NOTE — CONSULT NOTE ADULT - ASSESSMENT
78y male with PMH of CAD s/p stents (more than 10 yrs ago), HLD presents to the hospital complaining of dizziness.    # Syncope   - r/o cardiogenic   - troponin and BNP negative   - EKG shows   - order ECHO to evaluate EF and valvular anomalies   -     # CAD s/p stent  - 78y male with PMH of CAD s/p stents (more than 10 yrs ago), HLD presents to the hospital complaining of dizziness.    # Syncope   - r/o less likely cardiogenic   - troponin and BNP negative   - keep for 24 hr observation with cardiac monitoring   - if no cardiac event or bradycardia, pt can f/u with Dr. Sears for holter monitor   - recommend neuro consult for autonomic cause of syncope     # CAD s/p stent  - c/w aspirin 325 mg   - f/u Dr. Sears OP

## 2019-07-16 NOTE — ED PROVIDER NOTE - CLINICAL SUMMARY MEDICAL DECISION MAKING FREE TEXT BOX
Patient presented with episodes of syncope, otherwise afebrile, HD stable. Fully neuro intact, asymptomatic. Patient poor historian and unable to provide sufficient history in terms of pre-syncopal symptoms or post-episode symptoms. Obtained labs, EKG, CXR, trop which were all negative. Patient remained asymptomatic during ED course. Spoke with cardiology who recommended overnight admission for serial trops and will be on consult. Also requesting neurology eval - spoke with neurology and they will be on consult as well. Will admit for further work up and management. Patient and family agreeable with plan. HD stable at time of admission.

## 2019-07-16 NOTE — CONSULT NOTE ADULT - SUBJECTIVE AND OBJECTIVE BOX
KERWIN HERNANDEZ 78y Male  MRN#: [id number]     REASON FOR CONSULTATIONS: Syncope with Hx of CAD s/p stent    HPI:   79 yo M w/ PMH of CAD with stents, HLD, presenting with frequent episodes of syncope. Per wife at bedside, patient has been having 1-2 episodes per day when he suddenly becomes unresponsive for 2-3 minutes and then awakens confused for a few seconds before returning to baseline. Patient follows with Dr. Florez as outpatient and is scheduled to undergo Holter monitor, echo, carotid doppler. Per wife, patient is having episodes more than before so she was concerned and brought him to the ED. Patient is poor historian but denies pre-episode symptoms or post-episode symptoms. States he is currently asymptomatic. Denies fevers, headache, vision changes, weakness/numbness, confusion, URI symptoms, neck pain, chest pain, back pain, dyspnea, cough, palpitations, nausea, vomiting, abdominal pain, diarrhea, constipation, blood in stool/dark stools, urinary symptoms, penile discharge/testicular pain, leg swelling, rash, recent travel or sick contacts.    Of note, pt was admitted to hospital on 10/2018 for similar syncopal episodes. Pt also has b/l carotid stenosis of 20-39% at that time. Pt was also to f/u with Dr. Florez and to stop metoprolol.    SUBJECTIVE  Patient is a 78y old Male who presents with a chief complaint of   Today is hospital day 0, and this morning he is lying in bed without distress.   No acute overnight events.     OBJECTIVE  PAST MEDICAL & SURGICAL HISTORY  Hyperlipidemia, unspecified hyperlipidemia type  Coronary artery disease, angina presence unspecified, unspecified vessel or lesion type, unspecified whether native or transplanted heart  No significant past surgical history    ALLERGIES:  No Known Allergies    MEDICATIONS:  STANDING MEDICATIONS    PRN MEDICATIONS    HOME MEDICATIONS  Home Medications:  aspirin 325 mg oral tablet: 1 tab(s) orally once a day (16 Jul 2019 11:44)  atorvastatin:  (16 Jul 2019 11:44)      VITAL SIGNS: Last 24 Hours  T(C): 36.3 (16 Jul 2019 11:20), Max: 36.3 (16 Jul 2019 11:20)  T(F): 97.3 (16 Jul 2019 11:20), Max: 97.3 (16 Jul 2019 11:20)  HR: 60 (16 Jul 2019 11:20) (60 - 64)  BP: 158/80 (16 Jul 2019 11:20) (151/84 - 158/80)  BP(mean): --  RR: 17 (16 Jul 2019 11:20) (17 - 19)  SpO2: 99% (16 Jul 2019 11:20) (99% - 100%)      LABS:                        11.4   5.29  )-----------( 182      ( 16 Jul 2019 11:12 )             35.3     07-16    140  |  104  |  17  ----------------------------<  88  4.8   |  27  |  1.1    Ca    9.6      16 Jul 2019 11:12    TPro  7.5  /  Alb  4.4  /  TBili  <0.2  /  DBili  x   /  AST  15  /  ALT  11  /  AlkPhos  92  07-16    LIVER FUNCTIONS - ( 16 Jul 2019 11:12 )  Alb: 4.4 g/dL / Pro: 7.5 g/dL / ALK PHOS: 92 U/L / ALT: 11 U/L / AST: 15 U/L / GGT: x           PT/INR - ( 16 Jul 2019 11:12 )   PT: 12.10 sec;   INR: 1.05 ratio         PTT - ( 16 Jul 2019 11:12 )  PTT:30.7 sec      Lactate, Blood: 0.9 mmol/L (07-16-19 @ 11:12)  Troponin T, Serum: <0.01 ng/mL (07-16-19 @ 11:12)      CARDIAC MARKERS ( 16 Jul 2019 11:12 )  x     / <0.01 ng/mL / x     / x     / x        Serum Pro-Brain Natriuretic Peptide: 25 pg/mL (07.16.19 @ 11:12)      CAPILLARY BLOOD GLUCOSE    RADIOLOGY:    < from: Transthoracic Echocardiogram (10.12.18 @ 11:51) >  Summary:   1. LV Ejection Fraction by Dale's Method with a biplane EF of 71 %.   2. Normal left ventricular size and wall thicknesses, with normal   systolic function.   3. The mean global longitudinal strain by speckle tracking is -19.1%   which is normal.   4. Mild tricuspid regurgitation.   5. LA volume Index is 17.1 ml/m² ml/m2.  < end of copied text >      PHYSICAL EXAM:  GENERAL: NAD, AAO x 4, 78y M  HEAD:  Atraumatic, Normocephalic  EYES: EOMI, conjunctiva clear and sclera white  NECK: Supple, No JVD  CHEST/LUNG: Clear to auscultation bilaterally; No wheeze; No crackles; No accessory muscles used  HEART: Regular rate and rhythm; No murmurs;   ABDOMEN: Soft, Nontender, Nondistended; Bowel sounds present; No guarding  EXTREMITIES:  2+ Peripheral Pulses, No cyanosis or edema  NEUROLOGY: non-focal    ADMISSION SUMMARY  Patient is a 78y old Male who presents with a chief complaint of

## 2019-07-16 NOTE — CONSULT NOTE ADULT - ASSESSMENT
77 yo male with baseline dementia and cardiac history as stated above presents with syncopal episodes lasting for several seconds.     Plan:  Check orthostatics  CTH  CTA head and neck  REEG  Echo  PT eval      Neuroattending note will follow

## 2019-07-16 NOTE — H&P ADULT - NSICDXPASTMEDICALHX_GEN_ALL_CORE_FT
PAST MEDICAL HISTORY:  Coronary artery disease, angina presence unspecified, unspecified vessel or lesion type, unspecified whether native or transplanted heart     Hyperlipidemia, unspecified hyperlipidemia type

## 2019-07-16 NOTE — CONSULT NOTE ADULT - SUBJECTIVE AND OBJECTIVE BOX
Neurology Consult    Patient is a 78y old  Male who presents with a chief complaint of Syncope (16 Jul 2019 15:34)      HPI:  79 yo M w/ PMH of CAD with stents 20 years ago, HLD presenting to the ED with frequent episodes of syncope. Per wife at bedside, patient has been having 1-2 episodes per day and yesterday had 8 episodes when he suddenly becomes unresponsive for few seconds and then awakens confused for a few seconds before returning to baseline. pt does not recall these episode. wife denies jerking movement, bowel / urinary incontinence, tongue bitting or eye rolling. Patient follows with Dr. Florez as outpatient and is scheduled to undergo Holter monitor, echo, carotid doppler. Denies fevers, headache, vision changes, weakness/numbness, confusion, URI symptoms, neck pain, chest pain, back pain, dyspnea, cough, palpitations, nausea, vomiting, abdominal pain, diarrhea, constipation, blood in stool/dark stools, urinary symptoms, penile discharge/testicular pain, leg swelling, rash, recent travel or sick contacts.    Of note, pt was admitted to hospital on 10/2018 for similar syncopal episodes. Pt also has b/l carotid stenosis of 20-39% at that time. Pt was also to f/u with Dr. Florez and to stop metoprolol.  pt was seen by neurology, had negative eeg and was supposed to follow up with neurology but he didn't.    at the time of the interview pt is hemodynamically stable but he is AAO*0 which is not a baseline per wife and family.   in the ED, 151/84, HR 64, Temp 97, RR 19, SaO2 100% on RA. (16 Jul 2019 15:34)    Patient is examined at the bedside, he is aaox1, has difficulty following commands. As per family patient has been forgetful over the past 2 years and has slow shuffling gate.  Patient has no focal deficits on exam.  As per wife he has syncopal episodes for a few seconds when his body becomes flaccid without movement. Patient has no recollection of these episodes.  Patient has low blood pressure as per wife.    PAST MEDICAL & SURGICAL HISTORY:  Hyperlipidemia, unspecified hyperlipidemia type  Coronary artery disease, angina presence unspecified, unspecified vessel or lesion type, unspecified whether native or transplanted heart  No significant past surgical history      FAMILY HISTORY:      Social History: (-) x 3    Allergies    No Known Allergies    Intolerances        MEDICATIONS  (STANDING):  aspirin 325 milliGRAM(s) Oral daily  atorvastatin 10 milliGRAM(s) Oral at bedtime  chlorhexidine 4% Liquid 1 Application(s) Topical <User Schedule>  enoxaparin Injectable 40 milliGRAM(s) SubCutaneous daily    MEDICATIONS  (PRN):      Review of systems:    Constitutional: No fever, weight loss or fatigue    Eyes: No eye pain or discharge  ENMT:  No difficulty hearing; No sinus or throat pain  Neck: No pain or stiffness  Respiratory: No cough, wheezing, chills or hemoptysis  Cardiovascular: No chest pain, palpitations, shortness of breath, dyspnea on exertion  Gastrointestinal: No abdominal pain, nausea, vomiting or hematemesis; No diarrhea or constipation.   Genitourinary: No dysuria, frequency, hematuria or incontinence  Neurological: As per HPI  Skin: No rashes or lesions   Endocrine: No heat or cold intolerance; No hair loss  Musculoskeletal: No joint pain or swelling  Psychiatric: No depression, anxiety, mood swings  Heme/Lymph: No easy bruising or bleeding gums    Vital Signs Last 24 Hrs  T(C): 36.3 (16 Jul 2019 11:20), Max: 36.3 (16 Jul 2019 11:20)  T(F): 97.3 (16 Jul 2019 11:20), Max: 97.3 (16 Jul 2019 11:20)  HR: 63 (16 Jul 2019 16:00) (60 - 64)  BP: 167/93 (16 Jul 2019 16:00) (151/84 - 167/93)  BP(mean): --  RR: 18 (16 Jul 2019 16:00) (17 - 19)  SpO2: 98% (16 Jul 2019 16:00) (98% - 100%)    Neurologic Examination:  General:  Appearance is consistent with chronologic age.  No abnormal facies.   General: The patient is oriented to person,but not to place, time and date.  Nondysarthric.    Cranial nerves: intact VA, VFF.  EOMI w/o nystagmus, skew or reported double vision.  PERRL.  No ptosis/weakness of eyelid closure.  Facial sensation is normal with normal bite.  No facial asymmetry.  Hearing grossly intact b/l.  Palate elevates midline.  Tongue midline.  Motor examination:   Normal tone, bulk and range of motion.  No tenderness, twitching, tremors or involuntary movements.  Formal Muscle Strength Testing: (MRC grade R/L) 5/5 UE; 5/5 LE.  No observable drift.  Reflexes:   2+ b/l pectoralis, biceps, triceps, brachioradialis, patella and Achilles.  Plantar response downgoing b/l.  Jaw jerk, Myrna, clonus absent.  Sensory examination:   Intact to light touch and pinprick, pain, temperature and proprioception and vibration in all extremities.  Cerebellum:   FTN/HKS intact with normal YANELI in all limbs.  No dysmetria or dysdiadokinesia.  Gait is narrow and unsteady.    Labs:   CBC Full  -  ( 16 Jul 2019 11:12 )  WBC Count : 5.29 K/uL  RBC Count : 3.81 M/uL  Hemoglobin : 11.4 g/dL  Hematocrit : 35.3 %  Platelet Count - Automated : 182 K/uL  Mean Cell Volume : 92.7 fL  Mean Cell Hemoglobin : 29.9 pg  Mean Cell Hemoglobin Concentration : 32.3 g/dL  Auto Neutrophil # : 3.54 K/uL  Auto Lymphocyte # : 1.20 K/uL  Auto Monocyte # : 0.40 K/uL  Auto Eosinophil # : 0.10 K/uL  Auto Basophil # : 0.04 K/uL  Auto Neutrophil % : 66.8 %  Auto Lymphocyte % : 22.7 %  Auto Monocyte % : 7.6 %  Auto Eosinophil % : 1.9 %  Auto Basophil % : 0.8 %    07-16    140  |  104  |  17  ----------------------------<  88  4.8   |  27  |  1.1    Ca    9.6      16 Jul 2019 11:12    TPro  7.5  /  Alb  4.4  /  TBili  <0.2  /  DBili  x   /  AST  15  /  ALT  11  /  AlkPhos  92  07-16    LIVER FUNCTIONS - ( 16 Jul 2019 11:12 )  Alb: 4.4 g/dL / Pro: 7.5 g/dL / ALK PHOS: 92 U/L / ALT: 11 U/L / AST: 15 U/L / GGT: x           PT/INR - ( 16 Jul 2019 11:12 )   PT: 12.10 sec;   INR: 1.05 ratio         PTT - ( 16 Jul 2019 11:12 )  PTT:30.7 sec        Neuroimaging:  NCHCT:   CT Angiography/Perfusion:  MRI Brain NC:  MRA Head/Neck:  EEG:    Assessment:  This is a 78y Male with h/o     Plan:   -   07-16-19 @ 18:30

## 2019-07-16 NOTE — H&P ADULT - NSHPLABSRESULTS_GEN_ALL_CORE
Complete Blood Count + Automated Diff (07.16.19 @ 11:12)    WBC Count: 5.29 K/uL    RBC Count: 3.81 M/uL    Hemoglobin: 11.4 g/dL    Hematocrit: 35.3 %    Mean Cell Volume: 92.7 fL    Mean Cell Hemoglobin: 29.9 pg    Mean Cell Hemoglobin Conc: 32.3 g/dL    Red Cell Distrib Width: 12.8 %    Platelet Count - Automated: 182 K/uL    Auto Neutrophil #: 3.54 K/uL    Auto Lymphocyte #: 1.20 K/uL    Auto Monocyte #: 0.40 K/uL    Auto Eosinophil #: 0.10 K/uL    Auto Basophil #: 0.04 K/uL    Auto Neutrophil %: 66.8: Differential percentages must be correlated with absolute numbers for  clinical significance. %    Auto Lymphocyte %: 22.7 %    Auto Monocyte %: 7.6 %    Auto Eosinophil %: 1.9 %    Auto Basophil %: 0.8 %    Auto Immature Granulocyte %: 0.2 %    Nucleated RBC: 0 /100 WBCs      Comprehensive Metabolic Panel (07.16.19 @ 11:12)    Sodium, Serum: 140 mmol/L    Potassium, Serum: 4.8 mmol/L    Chloride, Serum: 104 mmol/L    Carbon Dioxide, Serum: 27 mmol/L    Anion Gap, Serum: 9 mmol/L    Blood Urea Nitrogen, Serum: 17 mg/dL    Creatinine, Serum: 1.1 mg/dL    Glucose, Serum: 88 mg/dL    Calcium, Total Serum: 9.6 mg/dL    Protein Total, Serum: 7.5 g/dL    Albumin, Serum: 4.4 g/dL    Bilirubin Total, Serum: <0.2 mg/dL    Alkaline Phosphatase, Serum: 92 U/L    Aspartate Aminotransferase (AST/SGOT): 15 U/L    Alanine Aminotransferase (ALT/SGPT): 11 U/L    eGFR if Non : 64: Interpretative comment  The units for eGFR are mL/min/1.73M2 (normalized body surface area). The  eGFR is calculated from a serum creatinine using the CKD-EPI equation.  Other variables required for calculation are race, age and sex. Among  patients with chronic kidney disease (CKD), the eGFR is useful in  determining the stage of disease according to KDOQI CKD classification.  All eGFR results are reported numerically with the following  interpretation.          GFR                    With                 Without     (ml/min/1.73 m2)    Kidney Damage       Kidney Damage        >= 90                    Stage 1                     Normal        60-89                    Stage 2                     Decreased GFR        30-59     Stage 3                     Stage 3        15-29                    Stage 4                     Stage 4        < 15                      Stage 5                     Stage 5  Each stage of CKD assumes that the associated GFR level has been in  effect for at least 3 months. Determination of stages one and two (with  eGFR > 59 ml/min/m2) requires estimation of kidney damage for at least 3  months as defined by structural or functional abnormalities.  Limitations: All estimates of GFR will be less accurate for patients at  extremes of muscle mass (including but not limited to frail elderly,  critically ill, or cancer patients), those with unusual diets, and those  with conditions associated with reduced secretion or extrarenal  elimination of creatinine. The eGFR equation is not recommended for use  in patients with unstable creatinine levels. mL/min/1.73M2    eGFR if African American: 74 mL/min/1.73M2      < from: 12 Lead ECG (07.16.19 @ 10:43) >    Diagnosis Line Normal sinus rhythm  Normal ECG      < end of copied text >    < from: Xray Chest 1 View-PORTABLE IMMEDIATE (07.16.19 @ 12:35) >    Impression:      No radiographic evidence of acute cardiopulmonary disease.      < end of copied text >

## 2019-07-16 NOTE — ED ADULT NURSE NOTE - NSIMPLEMENTINTERV_GEN_ALL_ED
Implemented All Fall Risk Interventions:  [place] to call system. Call bell, personal items and telephone within reach. Instruct patient to call for assistance. Room bathroom lighting operational. Non-slip footwear when patient is off stretcher. Physically safe environment: no spills, clutter or unnecessary equipment. Stretcher in lowest position, wheels locked, appropriate side rails in place. Provide visual cue, wrist band, yellow gown, etc. Monitor gait and stability. Monitor for mental status changes and reorient to person, place, and time. Review medications for side effects contributing to fall risk. Reinforce activity limits and safety measures with patient and family.

## 2019-07-17 LAB
ALBUMIN SERPL ELPH-MCNC: 3.7 G/DL — SIGNIFICANT CHANGE UP (ref 3.5–5.2)
ALP SERPL-CCNC: 84 U/L — SIGNIFICANT CHANGE UP (ref 30–115)
ALT FLD-CCNC: 10 U/L — SIGNIFICANT CHANGE UP (ref 0–41)
ANION GAP SERPL CALC-SCNC: 10 MMOL/L — SIGNIFICANT CHANGE UP (ref 7–14)
AST SERPL-CCNC: 14 U/L — SIGNIFICANT CHANGE UP (ref 0–41)
BASOPHILS # BLD AUTO: 0.03 K/UL — SIGNIFICANT CHANGE UP (ref 0–0.2)
BASOPHILS NFR BLD AUTO: 0.6 % — SIGNIFICANT CHANGE UP (ref 0–1)
BILIRUB SERPL-MCNC: 0.3 MG/DL — SIGNIFICANT CHANGE UP (ref 0.2–1.2)
BLD GP AB SCN SERPL QL: SIGNIFICANT CHANGE UP
BUN SERPL-MCNC: 13 MG/DL — SIGNIFICANT CHANGE UP (ref 10–20)
CALCIUM SERPL-MCNC: 9.2 MG/DL — SIGNIFICANT CHANGE UP (ref 8.5–10.1)
CHLORIDE SERPL-SCNC: 102 MMOL/L — SIGNIFICANT CHANGE UP (ref 98–110)
CHOLEST SERPL-MCNC: 148 MG/DL — SIGNIFICANT CHANGE UP (ref 100–200)
CO2 SERPL-SCNC: 26 MMOL/L — SIGNIFICANT CHANGE UP (ref 17–32)
CREAT SERPL-MCNC: 1.1 MG/DL — SIGNIFICANT CHANGE UP (ref 0.7–1.5)
EOSINOPHIL # BLD AUTO: 0.13 K/UL — SIGNIFICANT CHANGE UP (ref 0–0.7)
EOSINOPHIL NFR BLD AUTO: 2.4 % — SIGNIFICANT CHANGE UP (ref 0–8)
ESTIMATED AVERAGE GLUCOSE: 103 MG/DL — SIGNIFICANT CHANGE UP (ref 68–114)
GLUCOSE SERPL-MCNC: 99 MG/DL — SIGNIFICANT CHANGE UP (ref 70–99)
HBA1C BLD-MCNC: 5.2 % — SIGNIFICANT CHANGE UP (ref 4–5.6)
HCT VFR BLD CALC: 31.8 % — LOW (ref 42–52)
HDLC SERPL-MCNC: 57 MG/DL — SIGNIFICANT CHANGE UP
HGB BLD-MCNC: 10.6 G/DL — LOW (ref 14–18)
IMM GRANULOCYTES NFR BLD AUTO: 0.2 % — SIGNIFICANT CHANGE UP (ref 0.1–0.3)
LIPID PNL WITH DIRECT LDL SERPL: 86 MG/DL — SIGNIFICANT CHANGE UP (ref 4–129)
LYMPHOCYTES # BLD AUTO: 1.28 K/UL — SIGNIFICANT CHANGE UP (ref 1.2–3.4)
LYMPHOCYTES # BLD AUTO: 23.9 % — SIGNIFICANT CHANGE UP (ref 20.5–51.1)
MAGNESIUM SERPL-MCNC: 2.1 MG/DL — SIGNIFICANT CHANGE UP (ref 1.8–2.4)
MCHC RBC-ENTMCNC: 30.5 PG — SIGNIFICANT CHANGE UP (ref 27–31)
MCHC RBC-ENTMCNC: 33.3 G/DL — SIGNIFICANT CHANGE UP (ref 32–37)
MCV RBC AUTO: 91.6 FL — SIGNIFICANT CHANGE UP (ref 80–94)
MONOCYTES # BLD AUTO: 0.52 K/UL — SIGNIFICANT CHANGE UP (ref 0.1–0.6)
MONOCYTES NFR BLD AUTO: 9.7 % — HIGH (ref 1.7–9.3)
NEUTROPHILS # BLD AUTO: 3.39 K/UL — SIGNIFICANT CHANGE UP (ref 1.4–6.5)
NEUTROPHILS NFR BLD AUTO: 63.2 % — SIGNIFICANT CHANGE UP (ref 42.2–75.2)
NRBC # BLD: 0 /100 WBCS — SIGNIFICANT CHANGE UP (ref 0–0)
PLATELET # BLD AUTO: 161 K/UL — SIGNIFICANT CHANGE UP (ref 130–400)
POTASSIUM SERPL-MCNC: 4.2 MMOL/L — SIGNIFICANT CHANGE UP (ref 3.5–5)
POTASSIUM SERPL-SCNC: 4.2 MMOL/L — SIGNIFICANT CHANGE UP (ref 3.5–5)
PROT SERPL-MCNC: 6.5 G/DL — SIGNIFICANT CHANGE UP (ref 6–8)
RBC # BLD: 3.47 M/UL — LOW (ref 4.7–6.1)
RBC # FLD: 12.8 % — SIGNIFICANT CHANGE UP (ref 11.5–14.5)
SODIUM SERPL-SCNC: 138 MMOL/L — SIGNIFICANT CHANGE UP (ref 135–146)
TOTAL CHOLESTEROL/HDL RATIO MEASUREMENT: 2.6 RATIO — LOW (ref 4–5.5)
TRIGL SERPL-MCNC: 75 MG/DL — SIGNIFICANT CHANGE UP (ref 10–149)
WBC # BLD: 5.36 K/UL — SIGNIFICANT CHANGE UP (ref 4.8–10.8)
WBC # FLD AUTO: 5.36 K/UL — SIGNIFICANT CHANGE UP (ref 4.8–10.8)

## 2019-07-17 PROCEDURE — 99223 1ST HOSP IP/OBS HIGH 75: CPT | Mod: AI

## 2019-07-17 PROCEDURE — 93880 EXTRACRANIAL BILAT STUDY: CPT | Mod: 26

## 2019-07-17 PROCEDURE — 99222 1ST HOSP IP/OBS MODERATE 55: CPT

## 2019-07-17 RX ORDER — LANOLIN ALCOHOL/MO/W.PET/CERES
5 CREAM (GRAM) TOPICAL ONCE
Refills: 0 | Status: COMPLETED | OUTPATIENT
Start: 2019-07-17 | End: 2019-07-17

## 2019-07-17 RX ORDER — LANOLIN ALCOHOL/MO/W.PET/CERES
5 CREAM (GRAM) TOPICAL AT BEDTIME
Refills: 0 | Status: DISCONTINUED | OUTPATIENT
Start: 2019-07-17 | End: 2019-07-17

## 2019-07-17 RX ORDER — LANOLIN ALCOHOL/MO/W.PET/CERES
5 CREAM (GRAM) TOPICAL AT BEDTIME
Refills: 0 | Status: DISCONTINUED | OUTPATIENT
Start: 2019-07-17 | End: 2019-07-18

## 2019-07-17 RX ORDER — DONEPEZIL HYDROCHLORIDE 10 MG/1
5 TABLET, FILM COATED ORAL AT BEDTIME
Refills: 0 | Status: DISCONTINUED | OUTPATIENT
Start: 2019-07-17 | End: 2019-07-18

## 2019-07-17 RX ADMIN — ENOXAPARIN SODIUM 40 MILLIGRAM(S): 100 INJECTION SUBCUTANEOUS at 11:20

## 2019-07-17 RX ADMIN — Medication 325 MILLIGRAM(S): at 11:20

## 2019-07-17 RX ADMIN — ATORVASTATIN CALCIUM 10 MILLIGRAM(S): 80 TABLET, FILM COATED ORAL at 22:11

## 2019-07-17 RX ADMIN — DONEPEZIL HYDROCHLORIDE 5 MILLIGRAM(S): 10 TABLET, FILM COATED ORAL at 22:11

## 2019-07-17 RX ADMIN — Medication 5 MILLIGRAM(S): at 22:10

## 2019-07-17 RX ADMIN — Medication 5 MILLIGRAM(S): at 02:27

## 2019-07-17 NOTE — PROGRESS NOTE ADULT - ASSESSMENT
KERWIN HERNANDEZ 78y Male  MRN#: [id number]   CODE STATUS: FULL      SUBJECTIVE  Patient is a 78y old Male who presents with a chief complaint of Syncope (16 Jul 2019 18:29)    Currently admitted to medicine with the primary diagnosis of Syncope.      Today is hospital day 1d, and this morning he is laying in bed comfortably and reports no overnight events.   He denies chest pain, shortness of breath, nausea, vomiting or changes in bowel habits.   He has no complaints today.     Present Today:           [place] Catheter (x)No/ ()Yes?   Indication:             Central Line (x)No/ ()Yes?   Indication:          IV Fluids (x)No/ ()Yes? Type:  Rate:  Indication:    OBJECTIVE  PAST MEDICAL & SURGICAL HISTORY  Hyperlipidemia, unspecified hyperlipidemia type  Coronary artery disease, angina presence unspecified, unspecified vessel or lesion type, unspecified whether native or transplanted heart  No significant past surgical history    ALLERGIES:  No Known Allergies    HOME MEDICATIONS:  Home Medications:  aspirin 325 mg oral tablet: 1 tab(s) orally once a day (16 Jul 2019 11:44)  atorvastatin:  (16 Jul 2019 11:44)    MEDICATIONS:  STANDING MEDICATIONS  aspirin 325 milliGRAM(s) Oral daily  atorvastatin 10 milliGRAM(s) Oral at bedtime  chlorhexidine 4% Liquid 1 Application(s) Topical <User Schedule>  enoxaparin Injectable 40 milliGRAM(s) SubCutaneous daily    PRN MEDICATIONS      VITAL SIGNS: Last 24 Hours  T(C): 36.1 (17 Jul 2019 05:48), Max: 36.5 (16 Jul 2019 19:08)  T(F): 97 (17 Jul 2019 05:48), Max: 97.7 (16 Jul 2019 19:08)  HR: 68 (16 Jul 2019 19:08) (60 - 68)  BP: 145/77 (16 Jul 2019 19:08) (145/77 - 167/93)  BP(mean): --  RR: 18 (17 Jul 2019 05:48) (17 - 18)  SpO2: 97% (16 Jul 2019 19:16) (97% - 99%)    LABS:                        10.6   5.36  )-----------( 161      ( 17 Jul 2019 04:30 )             31.8     07-17    138  |  102  |  13  ----------------------------<  99  4.2   |  26  |  1.1    Ca    9.2      17 Jul 2019 04:30  Mg     2.1     07-17    TPro  6.5  /  Alb  3.7  /  TBili  0.3  /  DBili  x   /  AST  14  /  ALT  10  /  AlkPhos  84  07-17    PT/INR - ( 16 Jul 2019 11:12 )   PT: 12.10 sec;   INR: 1.05 ratio         PTT - ( 16 Jul 2019 11:12 )  PTT:30.7 sec      Lactate, Blood: 0.9 mmol/L (07-16-19 @ 11:12)  Troponin T, Serum: <0.01 ng/mL (07-16-19 @ 11:12)    CARDIAC MARKERS ( 16 Jul 2019 11:12 )  x     / <0.01 ng/mL / x     / x     / x            RADIOLOGY:    XR CHEST PORTABLE IMMED 1V            PROCEDURE DATE:  07/16/2019        Impression:      No radiographic evidence of acute cardiopulmonary disease.      ECHO:      PHYSICAL EXAM:    GENERAL: NAD, well-developed, AAOx3, Flat affect, speaks slowly and in low volume  HEENT:  Atraumatic, Normocephalic. EOMI, PERRLA, conjunctiva and sclera clear, No JVD  PULMONARY: Clear to auscultation bilaterally; No wheeze  CARDIOVASCULAR: Regular rate and rhythm; No murmurs, rubs, or gallops  GASTROINTESTINAL: Soft, Nontender, Nondistended; Bowel sounds present  MUSCULOSKELETAL:  2+ Peripheral Pulses, No clubbing, cyanosis, or edema  NEUROLOGY: non-focal  SKIN: No rashes or lesions    ASSESSMENT & PLAN    79 yo M w/ PMH of CAD with stents, HLD, presenting with frequent episodes of syncope.    #) Syncope  - had 8 recent, non traumatic episodes per wife at bedside  - Ddx neuro mediated  vs cardiovascular  - orthostatics negative   - r/o other causes for loc (seizure / tia / metabolic (low glu, o2, co2)  - check eeg, and carotid US (last 20-39% stenosis b/l in 2018)  - Cardio saw the patient: admit for observation and if no events, f/u with Dr. Sears for Holter monitor   - Neuro attending eval is pending, last saw in 10/18  -Given current presentation may need 24 hour EEG     #) Dementia, most likely Alzheimer type  -per neuro on last admission, and continues to have staring episodes that were at that time possibly related to decreased ability to process information pr  secondary to partial seizures.  TSH, RPR, B12/folate on last admission were wnl.   -Was supposed to f/u with neuro after last discharge but did not.     #) CAD s/p stents  -Continue statin and ASA.     Diet, Regular:   DASH/TLC Sodium & Cholesterol Restricted (07-16-19 @ 17:13)    DVT ppx: Lovenox    GI ppx: no ppi    Dispo: from home KERWIN HERNANDEZ 78y Male  MRN#: [id number]   CODE STATUS: FULL      SUBJECTIVE  Patient is a 78y old Male who presents with a chief complaint of Syncope (16 Jul 2019 18:29)    Currently admitted to medicine with the primary diagnosis of Syncope.      Today is hospital day 1d, and this morning he is laying in bed comfortably and reports no overnight events.   He denies chest pain, shortness of breath, nausea, vomiting or changes in bowel habits.   He has no complaints today.     Present Today:           [place] Catheter (x)No/ ()Yes?   Indication:             Central Line (x)No/ ()Yes?   Indication:          IV Fluids (x)No/ ()Yes? Type:  Rate:  Indication:    OBJECTIVE  PAST MEDICAL & SURGICAL HISTORY  Hyperlipidemia, unspecified hyperlipidemia type  Coronary artery disease, angina presence unspecified, unspecified vessel or lesion type, unspecified whether native or transplanted heart  No significant past surgical history    ALLERGIES:  No Known Allergies    HOME MEDICATIONS:  Home Medications:  aspirin 325 mg oral tablet: 1 tab(s) orally once a day (16 Jul 2019 11:44)  atorvastatin:  (16 Jul 2019 11:44)    MEDICATIONS:  STANDING MEDICATIONS  aspirin 325 milliGRAM(s) Oral daily  atorvastatin 10 milliGRAM(s) Oral at bedtime  chlorhexidine 4% Liquid 1 Application(s) Topical <User Schedule>  enoxaparin Injectable 40 milliGRAM(s) SubCutaneous daily    PRN MEDICATIONS      VITAL SIGNS: Last 24 Hours  T(C): 36.1 (17 Jul 2019 05:48), Max: 36.5 (16 Jul 2019 19:08)  T(F): 97 (17 Jul 2019 05:48), Max: 97.7 (16 Jul 2019 19:08)  HR: 68 (16 Jul 2019 19:08) (60 - 68)  BP: 145/77 (16 Jul 2019 19:08) (145/77 - 167/93)  BP(mean): --  RR: 18 (17 Jul 2019 05:48) (17 - 18)  SpO2: 97% (16 Jul 2019 19:16) (97% - 99%)    LABS:                        10.6   5.36  )-----------( 161      ( 17 Jul 2019 04:30 )             31.8     07-17    138  |  102  |  13  ----------------------------<  99  4.2   |  26  |  1.1    Ca    9.2      17 Jul 2019 04:30  Mg     2.1     07-17    TPro  6.5  /  Alb  3.7  /  TBili  0.3  /  DBili  x   /  AST  14  /  ALT  10  /  AlkPhos  84  07-17    PT/INR - ( 16 Jul 2019 11:12 )   PT: 12.10 sec;   INR: 1.05 ratio         PTT - ( 16 Jul 2019 11:12 )  PTT:30.7 sec      Lactate, Blood: 0.9 mmol/L (07-16-19 @ 11:12)  Troponin T, Serum: <0.01 ng/mL (07-16-19 @ 11:12)    CARDIAC MARKERS ( 16 Jul 2019 11:12 )  x     / <0.01 ng/mL / x     / x     / x            RADIOLOGY:    XR CHEST PORTABLE IMMED 1V            PROCEDURE DATE:  07/16/2019        Impression:      No radiographic evidence of acute cardiopulmonary disease.      ECHO:      PHYSICAL EXAM:    GENERAL: NAD, well-developed, AAOx3, Flat affect, speaks slowly and in low volume  HEENT:  Atraumatic, Normocephalic. EOMI, PERRLA, conjunctiva and sclera clear, No JVD  PULMONARY: Clear to auscultation bilaterally; No wheeze  CARDIOVASCULAR: Regular rate and rhythm; No murmurs, rubs, or gallops  GASTROINTESTINAL: Soft, Nontender, Nondistended; Bowel sounds present  MUSCULOSKELETAL:  2+ Peripheral Pulses, No clubbing, cyanosis, or edema  NEUROLOGY: non-focal  SKIN: No rashes or lesions    ASSESSMENT & PLAN    79 yo M w/ PMH of CAD with stents, HLD, presenting with frequent episodes of syncope.    #) Syncope  - had 8 recent, non traumatic episodes per wife at bedside  - Ddx neuro mediated  vs cardiovascular  - orthostatics negative   - r/o other causes for loc (seizure / tia / metabolic (low glu, o2, co2)  - check eeg, and carotid US (last 20-39% stenosis b/l in 2018)  - Cardio saw the patient: admit for observation and if no events, f/u with Dr. Sears for Holter monitor   - Neuro attending eval is pending, last saw in 10/18  -CTH in 10/18 was without acute change, chronic microvascular changes, may repeat if neuro indicates   -Given current presentation may need 24 hour EEG     #) Dementia, most likely Alzheimer type  -per neuro on last admission, and continues to have staring episodes that were at that time possibly related to decreased ability to process information pr  secondary to partial seizures.  TSH, RPR, B12/folate on last admission were wnl.   -Was supposed to f/u with neuro after last discharge but did not.     #) CAD s/p stents  -Continue statin and ASA.     Diet, Regular:   DASH/TLC Sodium & Cholesterol Restricted (07-16-19 @ 17:13)    DVT ppx: Lovenox    GI ppx: no ppi    Dispo: from home

## 2019-07-18 ENCOUNTER — TRANSCRIPTION ENCOUNTER (OUTPATIENT)
Age: 79
End: 2019-07-18

## 2019-07-18 VITALS
HEART RATE: 70 BPM | DIASTOLIC BLOOD PRESSURE: 85 MMHG | SYSTOLIC BLOOD PRESSURE: 122 MMHG | RESPIRATION RATE: 18 BRPM | TEMPERATURE: 98 F

## 2019-07-18 PROCEDURE — 99239 HOSP IP/OBS DSCHRG MGMT >30: CPT

## 2019-07-18 RX ORDER — DONEPEZIL HYDROCHLORIDE 10 MG/1
1 TABLET, FILM COATED ORAL
Qty: 30 | Refills: 0
Start: 2019-07-18 | End: 2019-08-16

## 2019-07-18 RX ORDER — ATORVASTATIN CALCIUM 80 MG/1
0 TABLET, FILM COATED ORAL
Qty: 0 | Refills: 0 | DISCHARGE

## 2019-07-18 RX ORDER — DONEPEZIL HYDROCHLORIDE 10 MG/1
1 TABLET, FILM COATED ORAL
Qty: 0 | Refills: 0 | DISCHARGE
Start: 2019-07-18

## 2019-07-18 RX ORDER — ASPIRIN/CALCIUM CARB/MAGNESIUM 324 MG
1 TABLET ORAL
Qty: 0 | Refills: 0 | DISCHARGE

## 2019-07-18 RX ORDER — ATORVASTATIN CALCIUM 80 MG/1
1 TABLET, FILM COATED ORAL
Qty: 0 | Refills: 0 | DISCHARGE
Start: 2019-07-18

## 2019-07-18 RX ADMIN — ENOXAPARIN SODIUM 40 MILLIGRAM(S): 100 INJECTION SUBCUTANEOUS at 12:05

## 2019-07-18 RX ADMIN — Medication 325 MILLIGRAM(S): at 12:04

## 2019-07-18 NOTE — DISCHARGE NOTE NURSING/CASE MANAGEMENT/SOCIAL WORK - NSDCDPATPORTLINK_GEN_ALL_CORE
You can access the [employer] Patient Portal, offered by [facility], by registering with the following website: [email or web address]

## 2019-07-18 NOTE — DISCHARGE NOTE PROVIDER - NSDCCPCAREPLAN_GEN_ALL_CORE_FT
PRINCIPAL DISCHARGE DIAGNOSIS  Diagnosis: Syncope  Assessment and Plan of Treatment: You had syncope that was likely related to dementia. You will need to follow up with your PMD and neurologist in 1 week.  Please follow up with your cardiologist in 1 week for Holter Monitor to rule out cardiac causes.      SECONDARY DISCHARGE DIAGNOSES  Diagnosis: Dementia  Assessment and Plan of Treatment: Please follow up with your neurologist in 1 week.

## 2019-07-18 NOTE — DISCHARGE NOTE PROVIDER - CARE PROVIDER_API CALL
Babar Calzada)  Internal Medicine  [address] [place] [address]  Phone: [phone]  Fax: [phone]  Follow Up Time:     Aristides Arias)  Neurology  [address], [address]  [place] [address]  Phone: [phone]  Fax: [phone]  Follow Up Time:     Tramaine Sears)  Cardiovascular Disease; Interventional Cardiology  [address] [place] [address], [address]  [place] [address]  Phone: [phone]  Fax: [phone]  Follow Up Time:

## 2019-07-18 NOTE — DISCHARGE NOTE PROVIDER - PROVIDER TOKENS
PROVIDER:[TOKEN:[[id number]]],PROVIDER:[TOKEN:[[id number]]],PROVIDER:[TOKEN:[[id number]]]

## 2019-07-18 NOTE — PROGRESS NOTE ADULT - ASSESSMENT
#syncope--unresponsiveness --advancing dementia-- as per neurology-- start donepezil 5mg.  - less likely cardiac as per cardiology, can get holter monitor as outpatient  # Cad s/o stents on aspirin and statin .    echo< from: Transthoracic Echocardiogram (10.12.18 @ 11:51) >  . LV Ejection Fraction by Dale's Method with a biplane EF of 71 %.   2. Normal left ventricular size and wall thicknesses, with normal   systolic function.   3. The mean global longitudinal strain by speckle tracking is -19.1%   which is normal.   4. Mild tricuspid regurgitation.   5. LA volume Index is 17.1 ml/m² ml/m2.    Dc home today--spent more than 30mins

## 2019-07-18 NOTE — DISCHARGE NOTE PROVIDER - CARE PROVIDERS DIRECT ADDRESSES
[email or web address]

## 2019-07-18 NOTE — DISCHARGE NOTE PROVIDER - HOSPITAL COURSE
79 yo M w/ PMH of CAD with stents 20 years ago, HLD presenting to the ED with frequent episodes of syncope. Per wife at bedside, patient has been having 1-2 episodes per day and yesterday had 8 episodes when he suddenly becomes unresponsive for few seconds and then awakens confused for a few seconds before returning to baseline. He was evaluated by neurology who recommended     initiating Donepezil, as telemetry found no events and the cause was likely dementia related.  He was to follow up with his cardiologist for Holter, Echo and his neurologist and PMD in 1 week. 77 yo M w/ PMH of CAD with stents 20 years ago, HLD presenting to the ED with frequent episodes of syncope. Per wife at bedside, patient has been having 1-2 episodes per day and yesterday had 8 episodes when he suddenly becomes unresponsive for few seconds and then awakens confused for a few seconds before returning to baseline. He was evaluated by neurology who recommended initiating Donepezil, as telemetry found no events and the cause was likely dementia related.  He was to follow up with his cardiologist for Holter, Echo and his neurologist and PMD in 1 week.

## 2019-07-18 NOTE — PROGRESS NOTE ADULT - SUBJECTIVE AND OBJECTIVE BOX
SUBJECTIVE:    Patient is a 78y old Male who presents with a chief complaint of Syncope (18 Jul 2019 09:59)    Currently admitted to medicine with the primary diagnosis of Syncope     Today is hospital day 2d.     PAST MEDICAL & SURGICAL HISTORY  Hyperlipidemia, unspecified hyperlipidemia type  Coronary artery disease, angina presence unspecified, unspecified vessel or lesion type, unspecified whether native or transplanted heart  No significant past surgical history    ALLERGIES:  No Known Allergies    MEDICATIONS:  STANDING MEDICATIONS  aspirin 325 milliGRAM(s) Oral daily  atorvastatin 10 milliGRAM(s) Oral at bedtime  chlorhexidine 4% Liquid 1 Application(s) Topical <User Schedule>  donepezil 5 milliGRAM(s) Oral at bedtime  enoxaparin Injectable 40 milliGRAM(s) SubCutaneous daily  melatonin 5 milliGRAM(s) Oral at bedtime    PRN MEDICATIONS    VITALS:   T(F): 98.3  HR: 70  BP: 122/85  RR: 18  SpO2: --    LABS:                        10.6   5.36  )-----------( 161      ( 17 Jul 2019 04:30 )             31.8     07-17    138  |  102  |  13  ----------------------------<  99  4.2   |  26  |  1.1    Ca    9.2      17 Jul 2019 04:30  Mg     2.1     07-17    TPro  6.5  /  Alb  3.7  /  TBili  0.3  /  DBili  x   /  AST  14  /  ALT  10  /  AlkPhos  84  07-17                  RADIOLOGY:    PHYSICAL EXAM:  GEN: No acute distress  LUNGS: Clear to auscultation bilaterally   HEART: S1/S2 present. RRR.   ABD/ GI: Soft, non-tender, non-distended. Bowel sounds present  EXT: NC/NC/NE/2+PP/MAHER  NEURO: flat affect with poor memory

## 2019-07-24 DIAGNOSIS — I25.10 ATHEROSCLEROTIC HEART DISEASE OF NATIVE CORONARY ARTERY WITHOUT ANGINA PECTORIS: ICD-10-CM

## 2019-07-24 DIAGNOSIS — R55 SYNCOPE AND COLLAPSE: ICD-10-CM

## 2019-07-24 DIAGNOSIS — I65.29 OCCLUSION AND STENOSIS OF UNSPECIFIED CAROTID ARTERY: ICD-10-CM

## 2019-07-24 DIAGNOSIS — G30.9 ALZHEIMER'S DISEASE, UNSPECIFIED: ICD-10-CM

## 2019-07-24 DIAGNOSIS — F02.80 DEMENTIA IN OTHER DISEASES CLASSIFIED ELSEWHERE, UNSPECIFIED SEVERITY, WITHOUT BEHAVIORAL DISTURBANCE, PSYCHOTIC DISTURBANCE, MOOD DISTURBANCE, AND ANXIETY: ICD-10-CM

## 2019-07-24 DIAGNOSIS — Z95.5 PRESENCE OF CORONARY ANGIOPLASTY IMPLANT AND GRAFT: ICD-10-CM

## 2019-07-24 DIAGNOSIS — E78.5 HYPERLIPIDEMIA, UNSPECIFIED: ICD-10-CM

## 2019-07-24 DIAGNOSIS — Z79.82 LONG TERM (CURRENT) USE OF ASPIRIN: ICD-10-CM

## 2019-08-28 ENCOUNTER — APPOINTMENT (OUTPATIENT)
Dept: NEUROLOGY | Facility: CLINIC | Age: 79
End: 2019-08-28

## 2019-10-16 ENCOUNTER — APPOINTMENT (OUTPATIENT)
Dept: NEUROLOGY | Facility: CLINIC | Age: 79
End: 2019-10-16

## 2019-11-21 NOTE — H&P ADULT - ASSESSMENT
79 yo M w/ PMH of CAD with stents, HLD, presenting with frequent episodes of syncope.    # Syncope:  Ddx (neuro mediated vs orthostatic vs cardiovascular)  - r/o other causes for loc (seizure / tia / metabolic (low glu, o2, co2))  - Admit tele   - check monitor for arrhythmic events  - Check orthostatic vs  - check eeg,    - Cardio saw the patient: admit for obsevation, if no events, f/u with Dr. Sears for holter monitor   - Neuro eval is pending    # CAD s/p stents: Continue statin and ASA. Yes 77 yo M w/ PMH of CAD with stents, HLD, presenting with frequent episodes of syncope.    # Syncope:  Ddx (neuro mediated vs orthostatic vs cardiovascular)  - r/o other causes for loc (seizure / tia / metabolic (low glu, o2, co2))  - Admit tele   - check monitor for arrhythmic events  - Check orthostatic vs  - check eeg, and carotid US  - Cardio saw the patient: admit for observation if no events, f/u with Dr. Sears for holter monitor   - Neuro eval is pending    # CAD s/p stents: Continue statin and ASA.     DVT ppx: Lovenox  GI ppx: no ppi  Dispo: from home

## 2020-01-11 ENCOUNTER — INPATIENT (INPATIENT)
Facility: HOSPITAL | Age: 80
LOS: 4 days | Discharge: HOME HEALTH | End: 2020-01-16
Attending: INTERNAL MEDICINE | Admitting: INTERNAL MEDICINE
Payer: MEDICARE

## 2020-01-11 VITALS
RESPIRATION RATE: 16 BRPM | SYSTOLIC BLOOD PRESSURE: 115 MMHG | OXYGEN SATURATION: 96 % | HEART RATE: 63 BPM | TEMPERATURE: 97 F | DIASTOLIC BLOOD PRESSURE: 71 MMHG

## 2020-01-11 LAB
ALBUMIN SERPL ELPH-MCNC: 4.2 G/DL — SIGNIFICANT CHANGE UP (ref 3.5–5.2)
ALP SERPL-CCNC: 135 U/L — HIGH (ref 30–115)
ALT FLD-CCNC: 23 U/L — SIGNIFICANT CHANGE UP (ref 0–41)
ANION GAP SERPL CALC-SCNC: 15 MMOL/L — HIGH (ref 7–14)
ANISOCYTOSIS BLD QL: SLIGHT — SIGNIFICANT CHANGE UP
APPEARANCE UR: CLEAR — SIGNIFICANT CHANGE UP
APTT BLD: 25.6 SEC — LOW (ref 27–39.2)
AST SERPL-CCNC: 22 U/L — SIGNIFICANT CHANGE UP (ref 0–41)
BACTERIA # UR AUTO: NEGATIVE — SIGNIFICANT CHANGE UP
BASE EXCESS BLDV CALC-SCNC: 0.4 MMOL/L — SIGNIFICANT CHANGE UP (ref -2–2)
BASOPHILS # BLD AUTO: 0.11 K/UL — SIGNIFICANT CHANGE UP (ref 0–0.2)
BASOPHILS NFR BLD AUTO: 1.7 % — HIGH (ref 0–1)
BILIRUB SERPL-MCNC: 0.4 MG/DL — SIGNIFICANT CHANGE UP (ref 0.2–1.2)
BILIRUB UR-MCNC: NEGATIVE — SIGNIFICANT CHANGE UP
BLD GP AB SCN SERPL QL: SIGNIFICANT CHANGE UP
BUN SERPL-MCNC: 12 MG/DL — SIGNIFICANT CHANGE UP (ref 10–20)
CA-I SERPL-SCNC: 1.23 MMOL/L — SIGNIFICANT CHANGE UP (ref 1.12–1.3)
CALCIUM SERPL-MCNC: 9.4 MG/DL — SIGNIFICANT CHANGE UP (ref 8.5–10.1)
CHLORIDE SERPL-SCNC: 102 MMOL/L — SIGNIFICANT CHANGE UP (ref 98–110)
CK SERPL-CCNC: 105 U/L — SIGNIFICANT CHANGE UP (ref 0–225)
CO2 SERPL-SCNC: 20 MMOL/L — SIGNIFICANT CHANGE UP (ref 17–32)
COLOR SPEC: SIGNIFICANT CHANGE UP
CREAT SERPL-MCNC: 1.1 MG/DL — SIGNIFICANT CHANGE UP (ref 0.7–1.5)
DIFF PNL FLD: ABNORMAL
EOSINOPHIL # BLD AUTO: 0.06 K/UL — SIGNIFICANT CHANGE UP (ref 0–0.7)
EOSINOPHIL NFR BLD AUTO: 0.9 % — SIGNIFICANT CHANGE UP (ref 0–8)
EPI CELLS # UR: 1 /HPF — SIGNIFICANT CHANGE UP (ref 0–5)
ETHANOL SERPL-MCNC: <10 MG/DL — SIGNIFICANT CHANGE UP
GAS PNL BLDV: 138 MMOL/L — SIGNIFICANT CHANGE UP (ref 136–145)
GAS PNL BLDV: SIGNIFICANT CHANGE UP
GIANT PLATELETS BLD QL SMEAR: PRESENT — SIGNIFICANT CHANGE UP
GLUCOSE BLDC GLUCOMTR-MCNC: 89 MG/DL — SIGNIFICANT CHANGE UP (ref 70–99)
GLUCOSE SERPL-MCNC: 130 MG/DL — HIGH (ref 70–99)
GLUCOSE UR QL: NEGATIVE — SIGNIFICANT CHANGE UP
HCO3 BLDV-SCNC: 26 MMOL/L — SIGNIFICANT CHANGE UP (ref 22–29)
HCT VFR BLD CALC: 40.9 % — LOW (ref 42–52)
HCT VFR BLDA CALC: 44.7 % — HIGH (ref 34–44)
HGB BLD CALC-MCNC: 14.6 G/DL — SIGNIFICANT CHANGE UP (ref 14–18)
HGB BLD-MCNC: 13.7 G/DL — LOW (ref 14–18)
HYALINE CASTS # UR AUTO: 1 /LPF — SIGNIFICANT CHANGE UP (ref 0–7)
INR BLD: 1.1 RATIO — SIGNIFICANT CHANGE UP (ref 0.65–1.3)
KETONES UR-MCNC: NEGATIVE — SIGNIFICANT CHANGE UP
LACTATE BLDV-MCNC: 2.2 MMOL/L — HIGH (ref 0.5–1.6)
LACTATE SERPL-SCNC: 5.3 MMOL/L — CRITICAL HIGH (ref 0.7–2)
LEUKOCYTE ESTERASE UR-ACNC: NEGATIVE — SIGNIFICANT CHANGE UP
LIDOCAIN IGE QN: 32 U/L — SIGNIFICANT CHANGE UP (ref 7–60)
LYMPHOCYTES # BLD AUTO: 0.93 K/UL — LOW (ref 1.2–3.4)
LYMPHOCYTES # BLD AUTO: 14.8 % — LOW (ref 20.5–51.1)
MANUAL SMEAR VERIFICATION: SIGNIFICANT CHANGE UP
MCHC RBC-ENTMCNC: 30.4 PG — SIGNIFICANT CHANGE UP (ref 27–31)
MCHC RBC-ENTMCNC: 33.5 G/DL — SIGNIFICANT CHANGE UP (ref 32–37)
MCV RBC AUTO: 90.7 FL — SIGNIFICANT CHANGE UP (ref 80–94)
METAMYELOCYTES # FLD: 0.9 % — HIGH (ref 0–0)
MONOCYTES # BLD AUTO: 0.38 K/UL — SIGNIFICANT CHANGE UP (ref 0.1–0.6)
MONOCYTES NFR BLD AUTO: 6.1 % — SIGNIFICANT CHANGE UP (ref 1.7–9.3)
NEUTROPHILS # BLD AUTO: 4.65 K/UL — SIGNIFICANT CHANGE UP (ref 1.4–6.5)
NEUTROPHILS NFR BLD AUTO: 73 % — SIGNIFICANT CHANGE UP (ref 42.2–75.2)
NEUTS BAND # BLD: 0.9 % — SIGNIFICANT CHANGE UP (ref 0–6)
NITRITE UR-MCNC: NEGATIVE — SIGNIFICANT CHANGE UP
PCO2 BLDV: 42 MMHG — SIGNIFICANT CHANGE UP (ref 41–51)
PH BLDV: 7.39 — SIGNIFICANT CHANGE UP (ref 7.26–7.43)
PH UR: 7.5 — SIGNIFICANT CHANGE UP (ref 5–8)
PLAT MORPH BLD: NORMAL — SIGNIFICANT CHANGE UP
PLATELET # BLD AUTO: 163 K/UL — SIGNIFICANT CHANGE UP (ref 130–400)
PO2 BLDV: 38 MMHG — SIGNIFICANT CHANGE UP (ref 20–40)
POIKILOCYTOSIS BLD QL AUTO: SIGNIFICANT CHANGE UP
POLYCHROMASIA BLD QL SMEAR: SIGNIFICANT CHANGE UP
POTASSIUM BLDV-SCNC: 3.9 MMOL/L — SIGNIFICANT CHANGE UP (ref 3.3–5.6)
POTASSIUM SERPL-MCNC: 4.9 MMOL/L — SIGNIFICANT CHANGE UP (ref 3.5–5)
POTASSIUM SERPL-SCNC: 4.9 MMOL/L — SIGNIFICANT CHANGE UP (ref 3.5–5)
PROT SERPL-MCNC: 7.1 G/DL — SIGNIFICANT CHANGE UP (ref 6–8)
PROT UR-MCNC: SIGNIFICANT CHANGE UP
PROTHROM AB SERPL-ACNC: 12.6 SEC — SIGNIFICANT CHANGE UP (ref 9.95–12.87)
RBC # BLD: 4.51 M/UL — LOW (ref 4.7–6.1)
RBC # FLD: 14.6 % — HIGH (ref 11.5–14.5)
RBC BLD AUTO: NORMAL — SIGNIFICANT CHANGE UP
RBC CASTS # UR COMP ASSIST: 146 /HPF — HIGH (ref 0–4)
SAO2 % BLDV: 72 % — SIGNIFICANT CHANGE UP
SODIUM SERPL-SCNC: 137 MMOL/L — SIGNIFICANT CHANGE UP (ref 135–146)
SP GR SPEC: 1.02 — SIGNIFICANT CHANGE UP (ref 1.01–1.02)
TROPONIN T SERPL-MCNC: <0.01 NG/ML — SIGNIFICANT CHANGE UP
UROBILINOGEN FLD QL: SIGNIFICANT CHANGE UP
VARIANT LYMPHS # BLD: 1.7 % — SIGNIFICANT CHANGE UP (ref 0–5)
WBC # BLD: 6.29 K/UL — SIGNIFICANT CHANGE UP (ref 4.8–10.8)
WBC # FLD AUTO: 6.29 K/UL — SIGNIFICANT CHANGE UP (ref 4.8–10.8)
WBC UR QL: 1 /HPF — SIGNIFICANT CHANGE UP (ref 0–5)

## 2020-01-11 PROCEDURE — 70450 CT HEAD/BRAIN W/O DYE: CPT | Mod: 26

## 2020-01-11 PROCEDURE — 99284 EMERGENCY DEPT VISIT MOD MDM: CPT

## 2020-01-11 PROCEDURE — 72125 CT NECK SPINE W/O DYE: CPT | Mod: 26

## 2020-01-11 PROCEDURE — 99221 1ST HOSP IP/OBS SF/LOW 40: CPT

## 2020-01-11 PROCEDURE — 99291 CRITICAL CARE FIRST HOUR: CPT | Mod: GC

## 2020-01-11 PROCEDURE — 71260 CT THORAX DX C+: CPT | Mod: 26

## 2020-01-11 PROCEDURE — 71045 X-RAY EXAM CHEST 1 VIEW: CPT | Mod: 26

## 2020-01-11 PROCEDURE — 72170 X-RAY EXAM OF PELVIS: CPT | Mod: 26

## 2020-01-11 PROCEDURE — 93010 ELECTROCARDIOGRAM REPORT: CPT

## 2020-01-11 PROCEDURE — 74177 CT ABD & PELVIS W/CONTRAST: CPT | Mod: 26

## 2020-01-11 RX ORDER — DONEPEZIL HYDROCHLORIDE 10 MG/1
5 TABLET, FILM COATED ORAL AT BEDTIME
Refills: 0 | Status: DISCONTINUED | OUTPATIENT
Start: 2020-01-11 | End: 2020-01-16

## 2020-01-11 RX ORDER — MORPHINE SULFATE 50 MG/1
2 CAPSULE, EXTENDED RELEASE ORAL
Refills: 0 | Status: DISCONTINUED | OUTPATIENT
Start: 2020-01-11 | End: 2020-01-16

## 2020-01-11 RX ORDER — ASPIRIN/CALCIUM CARB/MAGNESIUM 324 MG
81 TABLET ORAL DAILY
Refills: 0 | Status: DISCONTINUED | OUTPATIENT
Start: 2020-01-11 | End: 2020-01-16

## 2020-01-11 RX ORDER — ACETAMINOPHEN 500 MG
650 TABLET ORAL EVERY 6 HOURS
Refills: 0 | Status: DISCONTINUED | OUTPATIENT
Start: 2020-01-11 | End: 2020-01-16

## 2020-01-11 RX ORDER — ENOXAPARIN SODIUM 100 MG/ML
40 INJECTION SUBCUTANEOUS AT BEDTIME
Refills: 0 | Status: DISCONTINUED | OUTPATIENT
Start: 2020-01-11 | End: 2020-01-16

## 2020-01-11 RX ORDER — SENNA PLUS 8.6 MG/1
2 TABLET ORAL AT BEDTIME
Refills: 0 | Status: DISCONTINUED | OUTPATIENT
Start: 2020-01-11 | End: 2020-01-16

## 2020-01-11 RX ORDER — CHLORHEXIDINE GLUCONATE 213 G/1000ML
1 SOLUTION TOPICAL
Refills: 0 | Status: DISCONTINUED | OUTPATIENT
Start: 2020-01-11 | End: 2020-01-16

## 2020-01-11 RX ORDER — ATORVASTATIN CALCIUM 80 MG/1
10 TABLET, FILM COATED ORAL AT BEDTIME
Refills: 0 | Status: DISCONTINUED | OUTPATIENT
Start: 2020-01-11 | End: 2020-01-16

## 2020-01-11 RX ORDER — SODIUM CHLORIDE 9 MG/ML
1000 INJECTION INTRAMUSCULAR; INTRAVENOUS; SUBCUTANEOUS ONCE
Refills: 0 | Status: COMPLETED | OUTPATIENT
Start: 2020-01-11 | End: 2020-01-11

## 2020-01-11 RX ORDER — TAMSULOSIN HYDROCHLORIDE 0.4 MG/1
0.4 CAPSULE ORAL AT BEDTIME
Refills: 0 | Status: DISCONTINUED | OUTPATIENT
Start: 2020-01-11 | End: 2020-01-16

## 2020-01-11 RX ADMIN — MORPHINE SULFATE 2 MILLIGRAM(S): 50 CAPSULE, EXTENDED RELEASE ORAL at 21:53

## 2020-01-11 RX ADMIN — DONEPEZIL HYDROCHLORIDE 5 MILLIGRAM(S): 10 TABLET, FILM COATED ORAL at 21:52

## 2020-01-11 RX ADMIN — MORPHINE SULFATE 2 MILLIGRAM(S): 50 CAPSULE, EXTENDED RELEASE ORAL at 22:00

## 2020-01-11 RX ADMIN — SODIUM CHLORIDE 2000 MILLILITER(S): 9 INJECTION INTRAMUSCULAR; INTRAVENOUS; SUBCUTANEOUS at 12:00

## 2020-01-11 RX ADMIN — TAMSULOSIN HYDROCHLORIDE 0.4 MILLIGRAM(S): 0.4 CAPSULE ORAL at 21:52

## 2020-01-11 RX ADMIN — ENOXAPARIN SODIUM 40 MILLIGRAM(S): 100 INJECTION SUBCUTANEOUS at 21:52

## 2020-01-11 RX ADMIN — SENNA PLUS 2 TABLET(S): 8.6 TABLET ORAL at 21:52

## 2020-01-11 RX ADMIN — ATORVASTATIN CALCIUM 10 MILLIGRAM(S): 80 TABLET, FILM COATED ORAL at 21:52

## 2020-01-11 NOTE — ED ADULT TRIAGE NOTE - CHIEF COMPLAINT QUOTE
pt biba for fall at home, hit his head, altered mental status after fall, ( hx of some dementia , more altered and lethargic after fall.

## 2020-01-11 NOTE — ED PROVIDER NOTE - OBJECTIVE STATEMENT
80 yo M w/ PMH of CAD with stents 20 years ago, HLD, hx of syncopal episodes on prior admissions p/w s/p fall today. Found by family down on floor, urinary incontinence, oral mucosal injury - as per family found of floor, frontal contusion to head, acting normally at his baseline, no fever in ED or at home, no hx of sick contacts. no bloody stools, no nausea/vomiting/diarrhea. no cough or congestion.

## 2020-01-11 NOTE — ED PROVIDER NOTE - PHYSICAL EXAMINATION
VITAL SIGNS: I have reviewed nursing notes and confirm.  CONSTITUTIONAL: well-appearing, non-toxic  SKIN: Warm dry, normal skin turgor  HEAD: NC left frontal contusion   EYES: PERRL, no scleral icterus  ENT: Moist mucous membranes, normal pharynx with no erythema or exudates  NECK: Supple; non tender. Full ROM. No cervical LAD  CARD: RRR, no murmurs, rubs or gallops  RESP: clear to ausculation b/l.  No rales, rhonchi, or wheezing.  ABD: soft, + BS, non-tender, non-distended, no rebound or guarding. =  EXT: Full ROM, no bony tenderness, no pedal edema, no calf tenderness  NEURO: moves all extremities, non-focal   PSYCH: Cooperative, appropriate.

## 2020-01-11 NOTE — H&P ADULT - NSHPPHYSICALEXAM_GEN_ALL_CORE
PHYSICAL EXAM:  GENERAL: NAD, Forehead bruise,   HEAD:  Atraumatic, Normocephalic  EYES: EOMI, PERRLA, conjunctiva and sclera clear  NECK: Supple, No JVD  CHEST/LUNG: Clear to auscultation bilaterally; No wheeze  HEART: Regular rate and rhythm; No murmurs, rubs, or gallops  ABDOMEN: Soft, Nontender, Nondistended; Bowel sounds present  EXTREMITIES:  2+ Peripheral Pulses, No clubbing, cyanosis, or edema  PSYCH: AAOx3  NEUROLOGY: non-focal, AAO x1  SKIN: No rashes or lesions

## 2020-01-11 NOTE — H&P ADULT - ASSESSMENT
80 yo M w/ PMH of CAD with stents 20 years ago, HLD, hx of syncopal episodes on prior admissions p/w s/p fall today.     Unable top raeach Pt wife on number listed in EMR. Unalbe to verify medicines listed in EMR with pt pharmacy- [facility]. Please verify meds in AM    #) H/o Fall with possible LOC( 2/2 Likely seizure vs r/o syncope- 2/2 orthostatics vs cardiac etiology)  - check orthostatics, normal CK, elevated lactate- improved post IV fluids  - CT head negative, Trauma workup negative except age indeterminate Thoracic fracture  - F/u EEG, f/u neuro  - May benefit from keppra  - seizure and fall precautions  - PT eval    #) Thoracic fracture- age indeterminate  - seen by neurosx  - f/u bone scan  - pain control prn    #) stool burden on CT scan  - start on senna    #) prostatomegaly with Prominent collecting system on CT scan  - start on flomax  -monitor urine output and kidney function    #) H/o dementia  -c/w donepezil    #) h/o CAD  - c/w statin, as per EMR- metoprolol was stopped by cardiologist in the past  - c/w ASA    #)diet- dash  #) ambulate as tolerated  #) dvt ppx- lovenox  #) full code  #) from home 80 yo M w/ PMH of CAD with stents 20 years ago, HLD, hx of syncopal episodes on prior admissions p/w s/p fall today.     Unable top raeach Pt wife on number listed in EMR. Unalbe to verify medicines listed in EMR with pt pharmacy- [facility]. Please verify meds in AM    #) H/o Fall with possible LOC( 2/2 Likely seizure vs r/o syncope- 2/2 orthostatics vs cardiac etiology)  - check orthostatics, normal CK, elevated lactate- improved post IV fluids  - CT head negative, Trauma workup negative except age indeterminate Thoracic fracture  - F/u EEG, f/u neuro  - May benefit from keppra  - seizure and fall precautions  - PT eval    #) Thoracic fracture- age indeterminate  - seen by neurosx  - f/u bone scan  - pain control prn    #) stool burden on CT scan  - start on senna    #) prostatomegaly with Prominent collecting system on CT scan  - + blood on UA ( possible from fall during seizure)  - start on flomax  -monitor urine output and kidney function    #) H/o dementia  -c/w donepezil    #) h/o CAD  - c/w statin, as per EMR- metoprolol was stopped by cardiologist in the past  - c/w ASA    #)diet- dash  #) ambulate as tolerated  #) dvt ppx- lovenox  #) full code  #) from home 80 yo M w/ PMH of CAD with stents 20 years ago, HLD, hx of syncopal episodes on prior admissions p/w s/p fall today.     Unable top raeach Pt wife on number listed in EMR. Unalbe to verify medicines listed in EMR with pt pharmacy- [facility]. Please verify meds in AM    #) H/o Fall with possible LOC( 2/2 Likely seizure vs r/o syncope- 2/2 orthostatics vs cardiac etiology)  - check orthostatics, normal CK, elevated lactate- improved post IV fluids  - CT head negative, Trauma workup negative except age indeterminate Thoracic fracture  - F/u EEG, f/u neuro  - May benefit from keppra  - seizure and fall precautions  - PT eval    #) Thoracic fracture- age indeterminate  - seen by neurosx  - f/u bone scan- as per radiology- regular bone scan not done in house.  - pain control prn    #) stool burden on CT scan  - start on senna    #) prostatomegaly with Prominent collecting system on CT scan  - + blood on UA ( possible from fall during seizure)  - start on flomax  -monitor urine output and kidney function    #) H/o dementia  -c/w donepezil    #) h/o CAD  - c/w statin, as per EMR- metoprolol was stopped by cardiologist in the past  - c/w ASA    #)diet- dash  #) ambulate as tolerated  #) dvt ppx- lovenox  #) full code  #) from home

## 2020-01-11 NOTE — CONSULT NOTE ADULT - ATTENDING COMMENTS
Patient seen and examined with surgery team on rounds and discussed management plans. Patient seen and examined in ED hallway and discussed management plans with spouse. Patient with dementia with minimal verbal response lying on stretcher. CT reviewed. No acute trauma of spine. No further trauma workup needed at this time. syncopal workup as per medicine

## 2020-01-11 NOTE — CONSULT NOTE ADULT - SUBJECTIVE AND OBJECTIVE BOX
Patient is a 80 y/o male with PMHx of Dementia, HLD, CAD ( prior stent) that presented from home after being brought in by family fir an unwitnessed fall. Patient was found by family laying on the floor of his kitchen but was not sure exactly what happened. He was found to have an abrasion on his forehead. He note no current pain and appears comfortable in the ED. He is a poor historian and can participate but does not go into details during history.      MECHANISM OF INJURY:   Unwitnessed Fall/ Abrasion on Head      PAST MEDICAL & SURGICAL HISTORY:  Hyperlipidemia, unspecified hyperlipidemia type  Coronary artery disease, angina presence unspecified, unspecified vessel or lesion type, unspecified whether native or transplanted heart  [place]  No significant past surgical history      Allergies  No Known Allergies      Home Medications:  aspirin 81 mg oral tablet: 1 tab(s) orally once a day (18 Jul 2019 13:48)  atorvastatin 10 mg oral tablet: 1 tab(s) orally once a day (at bedtime) (18 Jul 2019 13:48)  donepezil 5 mg oral tablet: 1 tab(s) orally once a day (at bedtime) (18 Jul 2019 15:04)      Review of Systems  General:  Denies Fatigue, Denies Fever, Denies Weakness   HEENT: Denies Trouble Swallowing ,Denies  Sore Throat , Denies Change in hearing/vision/speech ,Denies Dizziness    Cardio: Denies  Chest Pain , Palpitations    Respiratory: Denies worsening of SOB, Denies Cough  Abdomen: Denies abdominal pain   Denies Dizziness, Denies Paresthesias  MSK: Denies pain in Bones/Joints/Muscles   Psych: Patient denies depression  Integ: Patient Denies rash, or new skin lesions   Neuro: Denies Headache        Vital Signs:  T(F): 96.8 (11 Jan 2020 18:19), Max: 98.4 (11 Jan 2020 11:35)  HR: 76 (11 Jan 2020 18:19) (63 - 76)  BP: 182/88 (11 Jan 2020 18:19) (115/71 - 182/88)  RR: 18 (11 Jan 2020 18:19) (16 - 18)  SpO2: 97% (11 Jan 2020 18:19) (96% - 98%)  Physical Exam  Gen: NAD  HEENT: NC/small abrasion over left forehead  Cardio: S1/S2 , RRR  Resp: CTA B/L  Abdomen: Soft, ND/NT  Extremities: FROM x 4  Spine: No stepoff or deformity, no point tenderness                           13.7   6.29  )-----------( 163      ( 11 Jan 2020 11:30 )             40.9       01-11    137  |  102  |  12  ----------------------------<  130<H>  4.9   |  20  |  1.1    Ca    9.4      11 Jan 2020 11:30    TPro  7.1  /  Alb  4.2  /  TBili  0.4  /  DBili  x   /  AST  22  /  ALT  23  /  AlkPhos  135<H>  01-11      CT Abdomen and Pelvis w/ IV Cont 01.11.20   IMPRESSION:     Multiple vertebral body compression deformities as mentioned above, age-indeterminate. The compression deformity at the superior end plate T12 demonstrates trace retropulsion of fracture fragment into the spinalcanal by approximately 2 mm. Correlate with point tenderness for acuity.    Findings may be secondary to prostatomegaly and chronic outflow tract obstruction.        CT Chest w/ IV Cont 01.11.20   IMPRESSION:     Multiple vertebral body compression deformities as mentioned above, age-indeterminate. The compression deformity at the superior end plate T12 demonstrates trace retropulsion of fracture fragment into the spinalcanal by approximately 2 mm. Correlate with point tenderness for acuity.    Findings may be secondary to prostatomegaly and chronic outflow tract obstruction.      CT Cervical Spine No Cont 01.11.20  IMPRESSION:    No evidence of acute cervical spine fracture or subluxation.    Multilevel cervical degenerative changes.      CT Head No Cont 01.11.20  IMPRESSION:     In comparison with the prior noncontrast CT scan of the brain dated October 11, 2018:    No evidence for acute intracranial hemorrhage.    Stable examination.

## 2020-01-11 NOTE — ED PROVIDER NOTE - PROGRESS NOTE DETAILS
Pt comfortable, NAD; CT shows compression frx of indeterminate age. Back non ttp, but pt poor historian. Trauma consulted, Neurosx called, but could not reach, will call again. I spoke w/ Linda, Neurosurgery, will see pt

## 2020-01-11 NOTE — CONSULT NOTE ADULT - ASSESSMENT
Patient is a 78 y/o male with PMHx of Dementia, HLD, CAD ( prior stent) that presented from home after being brought in by family fir an unwitnessed fall. Patient was found by family laying on the floor of his kitchen but was not sure exactly what happened. He was found to have an abrasion on his forehead. Patient had multiple CT Scans done which was notable only for T12 deformity ( could note determine if acute or not). Patient to be admitted to medicine for syncope workup    Unwitnessed fall/ T12 Deformity  - Neurosurgery consult appreciated  - Tertiary survey  - Admit to medicine for syncope workup  - No need for current surgical intervention at this time ASSESSMENT  Patient is a 80 y/o male with PMHx of Dementia, HLD, CAD ( prior stent) that presented from home after being brought in by family fir an unwitnessed fall. Patient was found by family laying on the floor of his kitchen but was not sure exactly what happened. He was found to have an abrasion on his forehead. Patient had multiple CT Scans done which was notable only for T12 deformity ( could note determine if acute or not). Patient to be admitted to medicine for syncope workup    PLAN  Unwitnessed fall/Multiple age indeterminate T and L spine compression abnormalities, T12 Deformity w/ 2mm retropulsion; no focal neuro deficits on exam  - Neurosurgery consult appreciated - pending bone scan, syncope/seizure w/u  - No need for current surgical intervention at this time   - Dispo as per medical team cleared from trauma  - Tertiary survey to follow    Senior Surgical Resident Note  I have edited the above note and agree with the current treatment plan  Above plan was discussed with Dr. Molina, patient, patient's family present at bedside, and the ED team  ---------------------------------------------------------------------------------------  01-11-20 @ 19:45

## 2020-01-11 NOTE — H&P ADULT - NSHPLABSRESULTS_GEN_ALL_CORE
13.7   6.29  )-----------( 163      ( 2020 11:30 )             40.9           137  |  102  |  12  ----------------------------<  130<H>  4.9   |  20  |  1.1    Ca    9.4      2020 11:30    TPro  7.1  /  Alb  4.2  /  TBili  0.4  /  DBili  x   /  AST  22  /  ALT  23  /  AlkPhos  135<H>                Urinalysis Basic - ( 2020 15:00 )    Color: Light Yellow / Appearance: Clear / S.025 / pH: x  Gluc: x / Ketone: Negative  / Bili: Negative / Urobili: <2 mg/dL   Blood: x / Protein: Trace / Nitrite: Negative   Leuk Esterase: Negative / RBC: 146 /HPF / WBC 1 /HPF   Sq Epi: x / Non Sq Epi: 1 /HPF / Bacteria: Negative        PT/INR - ( 2020 11:30 )   PT: 12.60 sec;   INR: 1.10 ratio         PTT - ( 2020 11:30 )  PTT:25.6 sec    Lactate Trend   @ 11:30 Lactate:5.3       CARDIAC MARKERS ( 2020 11:30 )  x     / <0.01 ng/mL / 105 U/L / x     / x            CAPILLARY BLOOD GLUCOSE      POCT Blood Glucose.: 89 mg/dL (2020 18:45)      < from: CT Abdomen and Pelvis w/ IV Cont (20 @ 14:41) >    IMPRESSION:     Multiple vertebral body compression deformities as mentioned above, age-indeterminate. The compression deformity at the superior end plate T12 demonstrates trace retropulsion of fracture fragment into the spinalcanal by approximately 2 mm. Correlate with point tenderness for acuity.    Findings may be secondary to prostatomegaly and chronic outflow tract obstruction.    < end of copied text >    < from: CT Head No Cont (20 @ 14:40) >    IMPRESSION:     In comparison with the prior noncontrast CT scan of the brain dated 2018:    No evidence for acute intracranial hemorrhage.    Stable examination.    < end of copied text >

## 2020-01-11 NOTE — H&P ADULT - HISTORY OF PRESENT ILLNESS
Pt is a 78 yo M w/ PMH of CAD with stents 20 years ago, HLD, hx of syncopal episodes on prior admissions p/w s/p fall today.   Pt last admitted in july 2019- with suimilar episodes when he was seen by neurology and started on donepezil with outpt follow up.   Pt  is a poor historian. H and P obtained from ED chart.   As per chart, pt wife left pt asleep on bed, went to adjoining room when she heard a  thud and found pt on floor next to bed, unresponsive for < 1min.  As per chart, pt had + urine and fecal incontinence and tongue bite.  Pt seen at bedside, on my encounter- pt has limited verbal communication. Unable to obtain ROS.   VS on arrival noted to be stable. Labs showed elevate lactate, CK normal. Taruma workup showed Thoracic vertebrae fracture- for which neurosx was consulted.

## 2020-01-11 NOTE — ED ADULT NURSE NOTE - OBJECTIVE STATEMENT
pt BIBA s/p fall at home abrasion noted to left side of forehead. awake and alert. oriented x1. no other obvious injuries noted.

## 2020-01-11 NOTE — ED PROVIDER NOTE - CLINICAL SUMMARY MEDICAL DECISION MAKING FREE TEXT BOX
Pt presenting after being found down/after a fall. labs ekg imaging reviewed. dw trauma, nsx, will admit to medicine for syncope v seizure workup.

## 2020-01-11 NOTE — ED ADULT NURSE NOTE - NSIMPLEMENTINTERV_GEN_ALL_ED
Implemented All Fall with Harm Risk Interventions:  [place] to call system. Call bell, personal items and telephone within reach. Instruct patient to call for assistance. Room bathroom lighting operational. Non-slip footwear when patient is off stretcher. Physically safe environment: no spills, clutter or unnecessary equipment. Stretcher in lowest position, wheels locked, appropriate side rails in place. Provide visual cue, wrist band, yellow gown, etc. Monitor gait and stability. Monitor for mental status changes and reorient to person, place, and time. Review medications for side effects contributing to fall risk. Reinforce activity limits and safety measures with patient and family. Provide visual clues: red socks.

## 2020-01-11 NOTE — ED PROVIDER NOTE - ATTENDING CONTRIBUTION TO CARE
78 y/o M pmh dementia, cad s/p stent, not on anticoag, here for eval fall. As per wife, she left pt asleep on bed, when to adjoining room, heard thud, found pt on floor next to bed, unresponsive for < 1min. + urine and fecal incontinence; + bit tongue. Has hx prior falls, syncope, but not sz. rapidly back to baseline, which awake, alert, very limited verbal. ROS PE above; Pt is NAD; + contusion for forehead; exam non ttp globally; knows name, moves extrem, but does not follow commands; does not answer questions.  IMP: fall, syncope vs sz vs cva?  P: labs, ekg, ua, panscan, ivf, reassess.

## 2020-01-12 LAB
ALBUMIN SERPL ELPH-MCNC: 4.4 G/DL — SIGNIFICANT CHANGE UP (ref 3.5–5.2)
ALP SERPL-CCNC: 149 U/L — HIGH (ref 30–115)
ALT FLD-CCNC: 19 U/L — SIGNIFICANT CHANGE UP (ref 0–41)
ANION GAP SERPL CALC-SCNC: 15 MMOL/L — HIGH (ref 7–14)
AST SERPL-CCNC: 24 U/L — SIGNIFICANT CHANGE UP (ref 0–41)
BASOPHILS # BLD AUTO: 0.03 K/UL — SIGNIFICANT CHANGE UP (ref 0–0.2)
BASOPHILS NFR BLD AUTO: 0.4 % — SIGNIFICANT CHANGE UP (ref 0–1)
BILIRUB SERPL-MCNC: 0.5 MG/DL — SIGNIFICANT CHANGE UP (ref 0.2–1.2)
BUN SERPL-MCNC: 12 MG/DL — SIGNIFICANT CHANGE UP (ref 10–20)
CALCIUM SERPL-MCNC: 9.7 MG/DL — SIGNIFICANT CHANGE UP (ref 8.5–10.1)
CHLORIDE SERPL-SCNC: 101 MMOL/L — SIGNIFICANT CHANGE UP (ref 98–110)
CO2 SERPL-SCNC: 22 MMOL/L — SIGNIFICANT CHANGE UP (ref 17–32)
CREAT SERPL-MCNC: 1 MG/DL — SIGNIFICANT CHANGE UP (ref 0.7–1.5)
CULTURE RESULTS: NO GROWTH — SIGNIFICANT CHANGE UP
EOSINOPHIL # BLD AUTO: 0.01 K/UL — SIGNIFICANT CHANGE UP (ref 0–0.7)
EOSINOPHIL NFR BLD AUTO: 0.1 % — SIGNIFICANT CHANGE UP (ref 0–8)
GLUCOSE SERPL-MCNC: 105 MG/DL — HIGH (ref 70–99)
HCT VFR BLD CALC: 41.3 % — LOW (ref 42–52)
HGB BLD-MCNC: 13.9 G/DL — LOW (ref 14–18)
IMM GRANULOCYTES NFR BLD AUTO: 0.2 % — SIGNIFICANT CHANGE UP (ref 0.1–0.3)
LYMPHOCYTES # BLD AUTO: 1.14 K/UL — LOW (ref 1.2–3.4)
LYMPHOCYTES # BLD AUTO: 14.1 % — LOW (ref 20.5–51.1)
MAGNESIUM SERPL-MCNC: 2.1 MG/DL — SIGNIFICANT CHANGE UP (ref 1.8–2.4)
MCHC RBC-ENTMCNC: 29.7 PG — SIGNIFICANT CHANGE UP (ref 27–31)
MCHC RBC-ENTMCNC: 33.7 G/DL — SIGNIFICANT CHANGE UP (ref 32–37)
MCV RBC AUTO: 88.2 FL — SIGNIFICANT CHANGE UP (ref 80–94)
MONOCYTES # BLD AUTO: 0.52 K/UL — SIGNIFICANT CHANGE UP (ref 0.1–0.6)
MONOCYTES NFR BLD AUTO: 6.4 % — SIGNIFICANT CHANGE UP (ref 1.7–9.3)
NEUTROPHILS # BLD AUTO: 6.39 K/UL — SIGNIFICANT CHANGE UP (ref 1.4–6.5)
NEUTROPHILS NFR BLD AUTO: 78.8 % — HIGH (ref 42.2–75.2)
NRBC # BLD: 0 /100 WBCS — SIGNIFICANT CHANGE UP (ref 0–0)
PLATELET # BLD AUTO: 157 K/UL — SIGNIFICANT CHANGE UP (ref 130–400)
POTASSIUM SERPL-MCNC: 4.7 MMOL/L — SIGNIFICANT CHANGE UP (ref 3.5–5)
POTASSIUM SERPL-SCNC: 4.7 MMOL/L — SIGNIFICANT CHANGE UP (ref 3.5–5)
PROT SERPL-MCNC: 7.5 G/DL — SIGNIFICANT CHANGE UP (ref 6–8)
RBC # BLD: 4.68 M/UL — LOW (ref 4.7–6.1)
RBC # FLD: 14.6 % — HIGH (ref 11.5–14.5)
SODIUM SERPL-SCNC: 138 MMOL/L — SIGNIFICANT CHANGE UP (ref 135–146)
SPECIMEN SOURCE: SIGNIFICANT CHANGE UP
WBC # BLD: 8.11 K/UL — SIGNIFICANT CHANGE UP (ref 4.8–10.8)
WBC # FLD AUTO: 8.11 K/UL — SIGNIFICANT CHANGE UP (ref 4.8–10.8)

## 2020-01-12 PROCEDURE — 99222 1ST HOSP IP/OBS MODERATE 55: CPT

## 2020-01-12 RX ADMIN — SENNA PLUS 2 TABLET(S): 8.6 TABLET ORAL at 22:18

## 2020-01-12 RX ADMIN — MORPHINE SULFATE 2 MILLIGRAM(S): 50 CAPSULE, EXTENDED RELEASE ORAL at 03:09

## 2020-01-12 RX ADMIN — DONEPEZIL HYDROCHLORIDE 5 MILLIGRAM(S): 10 TABLET, FILM COATED ORAL at 22:18

## 2020-01-12 RX ADMIN — ENOXAPARIN SODIUM 40 MILLIGRAM(S): 100 INJECTION SUBCUTANEOUS at 22:18

## 2020-01-12 RX ADMIN — CHLORHEXIDINE GLUCONATE 1 APPLICATION(S): 213 SOLUTION TOPICAL at 05:18

## 2020-01-12 RX ADMIN — Medication 81 MILLIGRAM(S): at 11:35

## 2020-01-12 RX ADMIN — TAMSULOSIN HYDROCHLORIDE 0.4 MILLIGRAM(S): 0.4 CAPSULE ORAL at 22:18

## 2020-01-12 RX ADMIN — ATORVASTATIN CALCIUM 10 MILLIGRAM(S): 80 TABLET, FILM COATED ORAL at 22:18

## 2020-01-12 RX ADMIN — MORPHINE SULFATE 2 MILLIGRAM(S): 50 CAPSULE, EXTENDED RELEASE ORAL at 04:00

## 2020-01-12 NOTE — PROGRESS NOTE ADULT - ASSESSMENT
80 yo M w/ PMH of CAD with stents 20 years ago, HLD, hx of syncopal episodes on prior admissions p/w s/p fall     #) Fall with possible LOC  DDX: Seizures v. Syncope  agree w chk orthostatics  - CT head negative, Trauma workup negative except age indeterminate Thoracic fracture  - F/u EEG, f/u neuro   - seizure and fall precautions  - PT eval    #) Thoracic fracture- age indeterminate  - seen by neurosx  - f/u bone scan- as per radiology- regular bone scan not done in house.  - pain control prn       #) prostatomegaly with Prominent collecting system on CT scan  now  on flomax     #) H/o dementia  on donepezil    #) h/o CAD   - c/w ASA         #Progress Note Handoff    Pending (specify):  Consults__Neurology_______, Tests__EEG, bone scan______, Test Results_______, Other_________    Family discussion:  na    Disposition: Home___/SNF___/Other________/Unknown at this time__x______

## 2020-01-12 NOTE — CONSULT NOTE ADULT - SUBJECTIVE AND OBJECTIVE BOX
Neurology Consult    Patient is a 79y old  Male who presents with a chief complaint of Loss of consciousness/ Seizures (2020 13:33)    HPI:  Pt is a 80 yo M w/ PMH of CAD with stents 20 years ago, HLD, hx of syncopal episodes on prior admissions p/w s/p fall today.  Admitted 2019 w/ similar episode seen  by Dr. Arias wtih negative EEG/CTA.  Was suspected of having underlying dementia unclear if parkinson-plus but started on donepezil.      As per chart, pt wife left pt asleep on bed, went to adjoining room when she heard a  thud and found pt on floor next to bed, unresponsive for < 1min.  As per chart, pt had + urine and fecal incontinence and tongue bite.  Pt seen at bedside, on my encounter- pt has limited verbal communication. Unable to obtain ROS.   VS on arrival noted to be stable. Labs showed elevate lactate, CK normal. Taruma workup showed Thoracic vertebrae fracture- for which neurosx was consulted. (2020 20:31)    PAST MEDICAL & SURGICAL HISTORY:  Hyperlipidemia, unspecified hyperlipidemia type  Coronary artery disease, angina presence unspecified, unspecified vessel or lesion type, unspecified whether native or transplanted heart  No significant past surgical history    FAMILY HISTORY:    Social History: (-) x 3    Allergies    No Known Allergies    Intolerances    MEDICATIONS  (STANDING):  aspirin  chewable 81 milliGRAM(s) Oral daily  atorvastatin 10 milliGRAM(s) Oral at bedtime  chlorhexidine 4% Liquid 1 Application(s) Topical <User Schedule>  donepezil 5 milliGRAM(s) Oral at bedtime  enoxaparin Injectable 40 milliGRAM(s) SubCutaneous at bedtime  senna 2 Tablet(s) Oral at bedtime  tamsulosin 0.4 milliGRAM(s) Oral at bedtime    MEDICATIONS  (PRN):  acetaminophen   Tablet .. 650 milliGRAM(s) Oral every 6 hours PRN Moderate Pain (4 - 6)  morphine  - Injectable 2 milliGRAM(s) IV Push every 3 hours PRN Severe Pain (7 - 10)    Review of systems:    Constitutional: as per HPI  Eyes: No eye pain or discharge  ENMT:  No difficulty hearing; No sinus or throat pain  Neck: No pain or stiffness  Respiratory: No cough, wheezing, chills or hemoptysis  Cardiovascular: No chest pain, palpitations, shortness of breath, dyspnea on exertion  Gastrointestinal: No abdominal pain, nausea, vomiting or hematemesis; No diarrhea or constipation.   Genitourinary: No dysuria, frequency, hematuria or incontinence  Neurological: As per HPI  Skin: No rashes or lesions   Endocrine: No heat or cold intolerance; No hair loss  Musculoskeletal: No joint pain or swelling  Psychiatric: No depression, anxiety, mood swings  Heme/Lymph: No easy bruising or bleeding gums    Vital Signs Last 24 Hrs  T(C): 35.9 (2020 08:29), Max: 37.2 (2020 00:32)  T(F): 96.6 (2020 08:29), Max: 99 (2020 00:32)  HR: 61 (2020 08:29) (61 - 76)  BP: 141/73 (2020 08:29) (140/78 - 182/88)  BP(mean): --  RR: 18 (2020 08:29) (18 - 18)  SpO2: 96% (2020 08:29) (96% - 97%)    Examination:  General:  Appearance is consistent with chronologic age.  No abnormal facies.  Gross skin survey within normal limits.    Cognitive/Language:  The patient is oriented to person, place, time and date.  Recent and remote memory intact.  Fund of knowledge is intact and normal.  Language with normal repetition, comprehension and naming.  Nondysarthric.    Eyes: intact VA, VFF.  EOMI w/o nystagmus, skew or reported double vision.  PERRL.  No ptosis/weakness of eyelid closure.    Face:  Facial sensation normal V1 - 3, no facial asymmetry.    Ears/Nose/Throat:  Hearing grossly intact b/l.  Palate elevates midline.  Tongue and uvula midline.   Motor examination:   Normal tone, bulk and range of motion.  No tenderness, twitching, tremors or involuntary movements.  Formal Muscle Strength Testing: (MRC grade R/L) 5/5 UE; 5/5 LE.  No observable drift.  Reflexes:   2+ b/l pectoralis, biceps, triceps, brachioradialis, patella and Achilles.  Plantar response downgoing b/l.  Jaw jerk, Myrna, clonus absent.  Sensory examination:   Intact to light touch and pinprick, pain, temperature and proprioception and vibration in all extremities.  Cerebellum:   FTN/HKS intact with normal YANELI in all limbs.  No dysmetria or dysdiadokinesia.  Gait narrow based and normal.    Respiratory:  no audible wheezing or inspiratory stridor.  no use of accessory muscles.   Cardiac: pulse palpable, no audible bruits  Abdomen: supple, no guarding, no TTP    Labs:   CBC Full  -  ( 2020 07:34 )  WBC Count : 8.11 K/uL  RBC Count : 4.68 M/uL  Hemoglobin : 13.9 g/dL  Hematocrit : 41.3 %  Platelet Count - Automated : 157 K/uL  Mean Cell Volume : 88.2 fL  Mean Cell Hemoglobin : 29.7 pg  Mean Cell Hemoglobin Concentration : 33.7 g/dL  Auto Neutrophil # : 6.39 K/uL  Auto Lymphocyte # : 1.14 K/uL  Auto Monocyte # : 0.52 K/uL  Auto Eosinophil # : 0.01 K/uL  Auto Basophil # : 0.03 K/uL  Auto Neutrophil % : 78.8 %  Auto Lymphocyte % : 14.1 %  Auto Monocyte % : 6.4 %  Auto Eosinophil % : 0.1 %  Auto Basophil % : 0.4 %        138  |  101  |  12  ----------------------------<  105<H>  4.7   |  22  |  1.0    Ca    9.7      2020 07:34  Mg     2.1         TPro  7.5  /  Alb  4.4  /  TBili  0.5  /  DBili  x   /  AST  24  /  ALT  19  /  AlkPhos  149<H>  12    LIVER FUNCTIONS - ( 2020 07:34 )  Alb: 4.4 g/dL / Pro: 7.5 g/dL / ALK PHOS: 149 U/L / ALT: 19 U/L / AST: 24 U/L / GGT: x           PT/INR - ( 2020 11:30 )   PT: 12.60 sec;   INR: 1.10 ratio         PTT - ( 2020 11:30 )  PTT:25.6 sec  Urinalysis Basic - ( 2020 15:00 )    Color: Light Yellow / Appearance: Clear / S.025 / pH: x  Gluc: x / Ketone: Negative  / Bili: Negative / Urobili: <2 mg/dL   Blood: x / Protein: Trace / Nitrite: Negative   Leuk Esterase: Negative / RBC: 146 /HPF / WBC 1 /HPF   Sq Epi: x / Non Sq Epi: 1 /HPF / Bacteria: Negative    INTERPRETATION:  Clinical History / Reason for exam: Fall, trauma.    Technique: Noncontrast head CT.  Contiguous unenhanced CT axial images of the head from the base to the vertex with coronal and sagittal reformats.    Comparison: Noncontrast head CT dated 10/11/2018.    Findings:    The lateral and third ventricles are dilated out of proportion to the cortical sulci. 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     There is no evidence for acute intracranial hemorrhage, midline shift, mass effect, or loss of gray-white differentiation to suggest acute territorial infarction.     Scattered foci of periventricular and subcortical white matter hypodensity without surrounding mass effect, most consistent with chronic small vessel ischemic disease.     No evidence of depressed calvarial fracture.    Bilateral basal ganglia calcifications.    Vascular calcifications are noted.    IMPRESSION:     In comparison with the prior noncontrast CT scan of the brain dated 2018:    No evidence for acute intracranial hemorrhage.    Stable examination.    ASHU HORTA M.D., RESIDENT RADIOLOGIST  This document has been electronically signed.  FREDERICK ZEPEDA M.D., ATTENDING RADIOLOGIST  This document has been electronically signed. 2020  3:22PM       EXAM:  EEG        PROCEDURE DATE:  2019        INTERPRETATION:  Clinical History / Reason for exam:     Exam Performed on: 16 Channel Digital Routine EEG done on [employer]   recording system.    History  Possible Seizures  SYNCOPE, DIZZINESS, HLD, CORONARY ARTERY DISEASE    Medications    Medication  Date  LOVENOX    LIPITOR    ASPRIN      State  Awake and Drowsy    Symmetry  Symmetric  -  Organization  -   PDR   -  Background: 6-7 hz    Generalized Slowing  Yes  mild - moderate    Focal Slowing  No    Breach Artifact: -  -    Activation Procedure  Hyper Ventilation  No  -  -Photic Stimulation  No  -  Epileptiform Activity  No    Frequency:  -  -Side:  -  Type:  -  -Area of Activity:  -  Events:  -  -Impression  -  Abnormal due to the presence of: generalized slowing as above  -  Clinical Correlation & Recommendations   -  Reviewed by:  Armand Young    Signed by:  Armand YOUNG MD  This document has been electronically signed. 2019  8:28PM      < from: VA Duplex Carotid, Bilat (19 @ 09:59) >  Impression:    20-39% stenosis of the right internal carotid artery.  20-39% stenosis of the left internal carotid artery.    ICD-10: R42    DEXTER ORNELAS M.D., ATTENDING VASCULAR  This document has been electronically signed. 2019  2:13PM    < end of copied text > Neurology Consult    Patient is a 79y old  Male who presents with a chief complaint of Loss of consciousness/ Seizures (2020 13:33)    HPI:  Pt is a 80 yo M w/ PMH of CAD with stents 20 years ago, HLD, hx of syncopal episodes on prior admissions p/w s/p fall today.  Admitted 2019 w/ similar episode seen  by Dr. Arias wtih negative EEG/CTA.  Was suspected of having underlying dementia unclear if parkinson-plus but started on donepezil.  Currently back to baseline and asymptomatic.  per chart, wife found him on side of bed unresponsive for < 1min with urinary/fecal incontinence and tongue bite.  Pt found with thoracic compression fracture followed by neurosx.     PAST MEDICAL & SURGICAL HISTORY:  Hyperlipidemia, unspecified hyperlipidemia type  Coronary artery disease, angina presence unspecified, unspecified vessel or lesion type, unspecified whether native or transplanted heart  No significant past surgical history    FAMILY HISTORY:    Social History: (-) x 3    Allergies    No Known Allergies    Intolerances    MEDICATIONS  (STANDING):  aspirin  chewable 81 milliGRAM(s) Oral daily  atorvastatin 10 milliGRAM(s) Oral at bedtime  chlorhexidine 4% Liquid 1 Application(s) Topical <User Schedule>  donepezil 5 milliGRAM(s) Oral at bedtime  enoxaparin Injectable 40 milliGRAM(s) SubCutaneous at bedtime  senna 2 Tablet(s) Oral at bedtime  tamsulosin 0.4 milliGRAM(s) Oral at bedtime    MEDICATIONS  (PRN):  acetaminophen   Tablet .. 650 milliGRAM(s) Oral every 6 hours PRN Moderate Pain (4 - 6)  morphine  - Injectable 2 milliGRAM(s) IV Push every 3 hours PRN Severe Pain (7 - 10)    Review of systems:    Constitutional: as per HPI  Eyes: No eye pain or discharge  ENMT:  No difficulty hearing; No sinus or throat pain  Neck: No pain or stiffness  Respiratory: No cough, wheezing, chills or hemoptysis  Cardiovascular: No chest pain, palpitations, shortness of breath, dyspnea on exertion  Gastrointestinal: No abdominal pain, nausea, vomiting or hematemesis; No diarrhea or constipation.   Genitourinary: No dysuria, frequency, hematuria or incontinence  Neurological: As per HPI  Skin: No rashes or lesions   Endocrine: No heat or cold intolerance; No hair loss  Musculoskeletal: No joint pain or swelling  Psychiatric: No depression, anxiety, mood swings  Heme/Lymph: No easy bruising or bleeding gums    Vital Signs Last 24 Hrs  T(C): 35.9 (2020 08:29), Max: 37.2 (2020 00:32)  T(F): 96.6 (2020 08:29), Max: 99 (2020 00:32)  HR: 61 (2020 08:29) (61 - 76)  BP: 141/73 (2020 08:29) (140/78 - 182/88)  BP(mean): --  RR: 18 (2020 08:29) (18 - 18)  SpO2: 96% (2020 08:29) (96% - 97%)    Examination:  General:  Appearance is consistent with chronologic age.  No abnormal facies.  Gross skin survey within normal limits.    Cognitive/Language:  AO x 2 person, "hospitals."  language intact.   CN intact  Ears/Nose/Throat:  Hearing grossly intact b/l.  Palate elevates midline.  Tongue and uvula midline.   Motor examination:   Normal tone, bulk and range of motion.  No tenderness, twitching, tremors or involuntary movements.  Formal Muscle Strength Testing: (MRC grade R/L) 5/5 UE; 5/5 LE.  No observable drift.  Reflexes:   1+ b/l pectoralis, biceps, triceps, brachioradialis, patella and Achilles.  Plantar response downgoing b/l.   Sensory examination:   grossly intact  Cerebellum:   FTN/HKS intact with normal YANELI in all limbs.  No dysmetria or dysdiadokinesia.    Respiratory:  no audible wheezing or inspiratory stridor.  no use of accessory muscles.   Cardiac: pulse palpable, no audible bruits  Abdomen: supple, no guarding, no TTP    Labs:   CBC Full  -  ( 2020 07:34 )  WBC Count : 8.11 K/uL  RBC Count : 4.68 M/uL  Hemoglobin : 13.9 g/dL  Hematocrit : 41.3 %  Platelet Count - Automated : 157 K/uL  Mean Cell Volume : 88.2 fL  Mean Cell Hemoglobin : 29.7 pg  Mean Cell Hemoglobin Concentration : 33.7 g/dL  Auto Neutrophil # : 6.39 K/uL  Auto Lymphocyte # : 1.14 K/uL  Auto Monocyte # : 0.52 K/uL  Auto Eosinophil # : 0.01 K/uL  Auto Basophil # : 0.03 K/uL  Auto Neutrophil % : 78.8 %  Auto Lymphocyte % : 14.1 %  Auto Monocyte % : 6.4 %  Auto Eosinophil % : 0.1 %  Auto Basophil % : 0.4 %        138  |  101  |  12  ----------------------------<  105<H>  4.7   |  22  |  1.0    Ca    9.7      2020 07:34  Mg     2.1         TPro  7.5  /  Alb  4.4  /  TBili  0.5  /  DBili  x   /  AST  24  /  ALT  19  /  AlkPhos  149<H>  12    LIVER FUNCTIONS - ( 2020 07:34 )  Alb: 4.4 g/dL / Pro: 7.5 g/dL / ALK PHOS: 149 U/L / ALT: 19 U/L / AST: 24 U/L / GGT: x           PT/INR - ( 2020 11:30 )   PT: 12.60 sec;   INR: 1.10 ratio         PTT - ( 2020 11:30 )  PTT:25.6 sec  Urinalysis Basic - ( 2020 15:00 )    Color: Light Yellow / Appearance: Clear / S.025 / pH: x  Gluc: x / Ketone: Negative  / Bili: Negative / Urobili: <2 mg/dL   Blood: x / Protein: Trace / Nitrite: Negative   Leuk Esterase: Negative / RBC: 146 /HPF / WBC 1 /HPF   Sq Epi: x / Non Sq Epi: 1 /HPF / Bacteria: Negative    INTERPRETATION:  Clinical History / Reason for exam: Fall, trauma.    Technique: Noncontrast head CT.  Contiguous unenhanced CT axial images of the head from the base to the vertex with coronal and sagittal reformats.    Comparison: Noncontrast head CT dated 10/11/2018.    Findings:    The lateral and third ventricles are dilated out of proportion to the cortical sulci. 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     There is no evidence for acute intracranial hemorrhage, midline shift, mass effect, or loss of gray-white differentiation to suggest acute territorial infarction.     Scattered foci of periventricular and subcortical white matter hypodensity without surrounding mass effect, most consistent with chronic small vessel ischemic disease.     No evidence of depressed calvarial fracture.    Bilateral basal ganglia calcifications.    Vascular calcifications are noted.    IMPRESSION:     In comparison with the prior noncontrast CT scan of the brain dated 2018:    No evidence for acute intracranial hemorrhage.    Stable examination.    ASHU HORTA M.D., RESIDENT RADIOLOGIST  This document has been electronically signed.  FREDERICK ZEPEDA M.D., ATTENDING RADIOLOGIST  This document has been electronically signed. 2020  3:22PM       EXAM:  EEG        PROCEDURE DATE:  2019        INTERPRETATION:  Clinical History / Reason for exam:     Exam Performed on: 16 Channel Digital Routine EEG done on [employer]   recording system.    History  Possible Seizures  SYNCOPE, DIZZINESS, HLD, CORONARY ARTERY DISEASE    Medications    Medication  Date  LOVENOX    LIPITOR    ASPRIN      State  Awake and Drowsy    Symmetry  Symmetric  -  Organization  -   PDR   -  Background: 6-7 hz    Generalized Slowing  Yes  mild - moderate    Focal Slowing  No    Breach Artifact: -  -    Activation Procedure  Hyper Ventilation  No  -  -Photic Stimulation  No  -  Epileptiform Activity  No    Frequency:  -  -Side:  -  Type:  -  -Area of Activity:  -  Events:  -  -Impression  -  Abnormal due to the presence of: generalized slowing as above  -  Clinical Correlation & Recommendations   -  Reviewed by:  Armand Young    Signed by:  Armand YOUNG MD  This document has been electronically signed. 2019  8:28PM      < from: VA Duplex Carotid, Bilat (19 @ 09:59) >  Impression:    20-39% stenosis of the right internal carotid artery.  20-39% stenosis of the left internal carotid artery.    ICD-10: R42    DEXTER ORNELAS M.D., ATTENDING VASCULAR  This document has been electronically signed. 2019  2:13PM    < end of copied text >

## 2020-01-12 NOTE — CONSULT NOTE ADULT - ASSESSMENT
80 yo M w/ PMH of CAD with stents 20 years ago, HLD, hx of prior syncopal episodes 80 yo M w/ PMH of CAD with stents 20 years ago, HLD, hx of prior syncopal episodes r/o seizure    Plan:  - REEG  - CTA head/neck (never done)  - f/u neurosurgery for thoracic Fx  - cardiac w/u

## 2020-01-12 NOTE — CHART NOTE - NSCHARTNOTEFT_GEN_A_CORE
Trauma tertiary Survey    Patient seen and examined. Patient is laying comfortably in bed with c-collar in place. Reports no new complaints at this time.     PHYSICAL EXAM:  Craniofacial: Atraumatic, No deformity, forehead abrasion  Eyes: Pupil Size equal B/L and RTL. EOMI  Oropharynx: Atraumatic  Neck: Non-tender, No deformity, Trachea midline, in c-collar  Chest: Equal breath sounds, non-tender, No deformity or crepitus  Heart: RSR, No murmurs, no rubs  Abdomen: Atraumatic, Non-tender, non-distended. No hepatosplenomegaly  Pelvis: Stable, non-tender, no deformity  Back: Spine non-tender, Atraumatic  Extremities: Atraumatic, no deformities, normal pulses, moving all extremities   Neurologic: CN intact, Motor intact throughout. Sensation intact/normal throughout.  Psych: Mood and affect normal. Judgment and insight normal    Alcohol, Blood: <10 mg/dL (01-11-20 @ 11:30)    All images/reports reviewed. No further traumatic work-up warranted.

## 2020-01-12 NOTE — PROGRESS NOTE ADULT - SUBJECTIVE AND OBJECTIVE BOX
SUBJECTIVE:   Patient not able to give accurate hx at this time.    *********************** VITALS ******************************************  Vital Signs Last 24 Hrs  T(C): 35.9 (12 Jan 2020 08:29), Max: 37.2 (12 Jan 2020 00:32)  T(F): 96.6 (12 Jan 2020 08:29), Max: 99 (12 Jan 2020 00:32)  HR: 61 (12 Jan 2020 08:29) (61 - 76)  BP: 141/73 (12 Jan 2020 08:29) (140/78 - 182/88)  BP(mean): --  RR: 18 (12 Jan 2020 08:29) (18 - 18)  SpO2: 96% (12 Jan 2020 08:29) (96% - 97%)    ******************************** PHYSICAL EXAM:**************************************************  GENERAL:  NAD     PSYCH: no agitation     HEENT:   EOMI     NERVOUS SYSTEM:  Alert & Oriented X1    PULMONARY:  patient is breathing comfortably    CARDIOVASCULAR:  RRR, S1 S2 audible      ABDOMEN: Soft, NT, ND     EXTREMITIES:  warm        LABS:                        13.9   8.11  )-----------( 157      ( 12 Jan 2020 07:34 )             41.3       01-12    138  |  101  |  12  ----------------------------<  105<H>  4.7   |  22  |  1.0    Ca    9.7      12 Jan 2020 07:34  Mg     2.1     01-12    TPro  7.5  /  Alb  4.4  /  TBili  0.5  /  DBili  x   /  AST  24  /  ALT  19  /  AlkPhos  149<H>  01-12      LIVER FUNCTIONS - ( 12 Jan 2020 07:34 )  Alb: 4.4 g/dL / Pro: 7.5 g/dL / ALK PHOS: 149 U/L / ALT: 19 U/L / AST: 24 U/L / GGT: x SUBJECTIVE:   Patient not able to give accurate hx at this time.    *********************** VITALS ******************************************  Vital Signs Last 24 Hrs  T(C): 35.9 (12 Jan 2020 08:29), Max: 37.2 (12 Jan 2020 00:32)  T(F): 96.6 (12 Jan 2020 08:29), Max: 99 (12 Jan 2020 00:32)  HR: 61 (12 Jan 2020 08:29) (61 - 76)  BP: 141/73 (12 Jan 2020 08:29) (140/78 - 182/88)  BP(mean): --  RR: 18 (12 Jan 2020 08:29) (18 - 18)  SpO2: 96% (12 Jan 2020 08:29) (96% - 97%)    ******************************** PHYSICAL EXAM:**************************************************     	GENERAL: NAD, Forehead bruise,   	HEAD:  Atraumatic, Normocephalic  	EYES: EOMI, PERRLA, conjunctiva and sclera clear  	NECK: Supple, No JVD  	CHEST/LUNG: Clear to auscultation bilaterally; No wheeze  	HEART: Regular rate and rhythm; No murmurs, rubs, or gallops  	ABDOMEN: Soft, Nontender, Nondistended; Bowel sounds present  	EXTREMITIES:  2+ Peripheral Pulses, No clubbing, cyanosis, or edema  	PSYCH: AAOx3  	NEUROLOGY: non-focal, AAO x1              SKIN: No rashes or lesions    LABS:                        13.9   8.11  )-----------( 157      ( 12 Jan 2020 07:34 )             41.3       01-12    138  |  101  |  12  ----------------------------<  105<H>  4.7   |  22  |  1.0    Ca    9.7      12 Jan 2020 07:34  Mg     2.1     01-12    TPro  7.5  /  Alb  4.4  /  TBili  0.5  /  DBili  x   /  AST  24  /  ALT  19  /  AlkPhos  149<H>  01-12      LIVER FUNCTIONS - ( 12 Jan 2020 07:34 )  Alb: 4.4 g/dL / Pro: 7.5 g/dL / ALK PHOS: 149 U/L / ALT: 19 U/L / AST: 24 U/L / GGT: x

## 2020-01-13 ENCOUNTER — TRANSCRIPTION ENCOUNTER (OUTPATIENT)
Age: 80
End: 2020-01-13

## 2020-01-13 PROCEDURE — 99233 SBSQ HOSP IP/OBS HIGH 50: CPT

## 2020-01-13 PROCEDURE — 78306 BONE IMAGING WHOLE BODY: CPT | Mod: 26

## 2020-01-13 PROCEDURE — 70498 CT ANGIOGRAPHY NECK: CPT | Mod: 26

## 2020-01-13 PROCEDURE — 70496 CT ANGIOGRAPHY HEAD: CPT | Mod: 26

## 2020-01-13 PROCEDURE — 95816 EEG AWAKE AND DROWSY: CPT | Mod: 26

## 2020-01-13 RX ADMIN — DONEPEZIL HYDROCHLORIDE 5 MILLIGRAM(S): 10 TABLET, FILM COATED ORAL at 21:34

## 2020-01-13 RX ADMIN — ATORVASTATIN CALCIUM 10 MILLIGRAM(S): 80 TABLET, FILM COATED ORAL at 21:34

## 2020-01-13 RX ADMIN — SENNA PLUS 2 TABLET(S): 8.6 TABLET ORAL at 21:34

## 2020-01-13 RX ADMIN — TAMSULOSIN HYDROCHLORIDE 0.4 MILLIGRAM(S): 0.4 CAPSULE ORAL at 21:34

## 2020-01-13 RX ADMIN — CHLORHEXIDINE GLUCONATE 1 APPLICATION(S): 213 SOLUTION TOPICAL at 05:42

## 2020-01-13 RX ADMIN — ENOXAPARIN SODIUM 40 MILLIGRAM(S): 100 INJECTION SUBCUTANEOUS at 21:34

## 2020-01-13 RX ADMIN — Medication 81 MILLIGRAM(S): at 11:12

## 2020-01-13 NOTE — PROGRESS NOTE ADULT - SUBJECTIVE AND OBJECTIVE BOX
HPI  Pt is a 80 yo M w/ PMH of CAD with stents 20 years ago, HLD, hx of syncopal episodes on prior admissions p/w s/p fall today.   Pt last admitted in 2019- with suimilar episodes when he was seen by neurology and started on donepezil with outpt follow up.   Pt  is a poor historian. H and P obtained from ED chart.   As per chart, pt wife left pt asleep on bed, went to adjoining room when she heard a  thud and found pt on floor next to bed, unresponsive for < 1min.  As per chart, pt had + urine and fecal incontinence and tongue bite.  Pt seen at bedside, on my encounter- pt has limited verbal communication. Unable to obtain ROS.   VS on arrival noted to be stable. Labs showed elevate lactate, CK normal. Trauma workup showed Thoracic vertebrae fracture- for which neurosx was consulted.    Currently admitted to medicine with the primary diagnosis of Syncope     Today is hospital day 2d.     INTERVAL HPI / OVERNIGHT EVENTS:  Patient was examined and seen at bedside. This morning he is resting comfortably in bed and reports no new issues or overnight events.     ROS: Otherwise unremarkable     PAST MEDICAL & SURGICAL HISTORY  Hyperlipidemia, unspecified hyperlipidemia type  Coronary artery disease, angina presence unspecified, unspecified vessel or lesion type, unspecified whether native or transplanted heart  No significant past surgical history    ALLERGIES  No Known Allergies    MEDICATIONS  STANDING MEDICATIONS  aspirin  chewable 81 milliGRAM(s) Oral daily  atorvastatin 10 milliGRAM(s) Oral at bedtime  chlorhexidine 4% Liquid 1 Application(s) Topical <User Schedule>  donepezil 5 milliGRAM(s) Oral at bedtime  enoxaparin Injectable 40 milliGRAM(s) SubCutaneous at bedtime  senna 2 Tablet(s) Oral at bedtime  tamsulosin 0.4 milliGRAM(s) Oral at bedtime    PRN MEDICATIONS  acetaminophen   Tablet .. 650 milliGRAM(s) Oral every 6 hours PRN  morphine  - Injectable 2 milliGRAM(s) IV Push every 3 hours PRN    VITALS:  T(F): 97.1  HR: 46  BP: 134/79  RR: 18  SpO2: 97%    PHYSICAL EXAM  GEN: NAD, Resting comfortably in bed  PULM: Clear to auscultation bilaterally, No wheezes  CVS: Regular rate and rhythm, S1-S2, no murmurs  ABD: Soft, non-tender, non-distended, no guarding  EXT: No edema  NEURO: AAOx3, no focal deficits    LABS                        13.9   8.11  )-----------( 157      ( 2020 07:34 )             41.3         138  |  101  |  12  ----------------------------<  105<H>  4.7   |  22  |  1.0    Ca    9.7      2020 07:34  Mg     2.1         TPro  7.5  /  Alb  4.4  /  TBili  0.5  /  DBili  x   /  AST  24  /  ALT  19  /  AlkPhos  149<H>      PT/INR - ( 2020 11:30 )   PT: 12.60 sec;   INR: 1.10 ratio         PTT - ( 2020 11:30 )  PTT:25.6 sec  Urinalysis Basic - ( 2020 15:00 )    Color: Light Yellow / Appearance: Clear / S.025 / pH: x  Gluc: x / Ketone: Negative  / Bili: Negative / Urobili: <2 mg/dL   Blood: x / Protein: Trace / Nitrite: Negative   Leuk Esterase: Negative / RBC: 146 /HPF / WBC 1 /HPF   Sq Epi: x / Non Sq Epi: 1 /HPF / Bacteria: Negative            Culture - Urine (collected 2020 15:00)  Source: .Urine Catheterized  Final Report (2020 21:07):    No growth      CARDIAC MARKERS ( 2020 11:30 )  x     / <0.01 ng/mL / 105 U/L / x     / x          RADIOLOGY    < from: CT Abdomen and Pelvis w/ IV Cont (20 @ 14:41) >    IMPRESSION:     Multiple vertebral body compression deformities as mentioned above, age-indeterminate. The compression deformity at the superior end plate T12 demonstrates trace retropulsion of fracture fragment into the spinalcanal by approximately 2 mm. Correlate with point tenderness for acuity.    Findings may be secondary to prostatomegaly and chronic outflow tract obstruction.    < end of copied text >    < from: CT Chest w/ IV Cont (20 @ 14:40) >  IMPRESSION:     Multiple vertebral body compression deformities as mentioned above, age-indeterminate. The compression deformity at the superior end plate T12 demonstrates trace retropulsion of fracture fragment into the spinalcanal by approximately 2 mm. Correlate with point tenderness for acuity.    Findings may be secondary to prostatomegaly and chronic outflow tract obstruction.    < end of copied text >    < from: CT Cervical Spine No Cont (20 @ 14:40) >  IMPRESSION:    No evidence of acute cervical spine fracture or subluxation.    Multilevel cervical degenerative changes.    < end of copied text >        < from: CT Head No Cont (20 @ 14:40) >  IMPRESSION:     In comparison with the prior noncontrast CT scan of the brain dated 2018:    No evidence for acute intracranial hemorrhage.    Stable examination.    < end of copied text >    < from: Xray Pelvis AP only (20 @ 11:39) >    IMPRESSION:    No evidence of acute fracture.    < end of copied text >    < from: Xray Chest 1 View AP/PA (20 @ 11:38) >  Impression:    Support devices: None.    Cardiac/mediastinum/hilum: Stable    Lung parenchyma/Pleura: Retrocardiac opacity, likely atelectasis. Right lung is unremarkable. No evidence of pneumothorax.    Skeleton/soft tissues: Stable    < end of copied text >    < from: Transthoracic Echocardiogram (10.12.18 @ 11:51) >  Summary:   1. LV Ejection Fraction by Dale's Method with a biplane EF of 71 %.   2. Normal left ventricular size and wall thicknesses, with normal   systolic function.   3. The mean global longitudinal strain by speckle tracking is -19.1%   which is normal.   4. Mild tricuspid regurgitation.   5. LA volume Index is 17.1 ml/m² ml/m2.    < end of copied text > HPI  Pt is a 78 yo M w/ PMH of CAD with stents 20 years ago, HLD, hx of syncopal episodes on prior admissions p/w s/p fall today.   Pt last admitted in 2019- with suimilar episodes when he was seen by neurology and started on donepezil with outpt follow up.   Pt  is a poor historian. H and P obtained from ED chart.   As per chart, pt wife left pt asleep on bed, went to adjoining room when she heard a  thud and found pt on floor next to bed, unresponsive for < 1min.  As per chart, pt had + urine and fecal incontinence and tongue bite.  Pt seen at bedside, on my encounter- pt has limited verbal communication. Unable to obtain ROS.   VS on arrival noted to be stable. Labs showed elevate lactate, CK normal. Trauma workup showed Thoracic vertebrae fracture- for which neurosx was consulted.    Currently admitted to medicine with the primary diagnosis of Syncope     Today is hospital day 2d.     INTERVAL HPI / OVERNIGHT EVENTS:  Patient was examined and seen at bedside. This morning he is resting comfortably in bed and reports no new issues or overnight events. Patient denies all complaints but is AAOx1 and confused.    ROS: Otherwise unremarkable     PAST MEDICAL & SURGICAL HISTORY  Hyperlipidemia, unspecified hyperlipidemia type  Coronary artery disease, angina presence unspecified, unspecified vessel or lesion type, unspecified whether native or transplanted heart  No significant past surgical history    ALLERGIES  No Known Allergies    MEDICATIONS  STANDING MEDICATIONS  aspirin  chewable 81 milliGRAM(s) Oral daily  atorvastatin 10 milliGRAM(s) Oral at bedtime  chlorhexidine 4% Liquid 1 Application(s) Topical <User Schedule>  donepezil 5 milliGRAM(s) Oral at bedtime  enoxaparin Injectable 40 milliGRAM(s) SubCutaneous at bedtime  senna 2 Tablet(s) Oral at bedtime  tamsulosin 0.4 milliGRAM(s) Oral at bedtime    PRN MEDICATIONS  acetaminophen   Tablet .. 650 milliGRAM(s) Oral every 6 hours PRN  morphine  - Injectable 2 milliGRAM(s) IV Push every 3 hours PRN    VITALS:  T(F): 97.1  HR: 46  BP: 134/79  RR: 18  SpO2: 97%    PHYSICAL EXAM  GEN: NAD, Resting comfortably in bed  PULM: Clear to auscultation on left, right sided wheeze  CVS: Regular rate and rhythm, S1-S2  ABD: Soft, non-tender, non-distended, no guarding  NEURO: AAOx1, no focal deficits    LABS                        13.9   8.11  )-----------( 157      ( 2020 07:34 )             41.3     12    138  |  101  |  12  ----------------------------<  105<H>  4.7   |  22  |  1.0    Ca    9.7      2020 07:34  Mg     2.1         TPro  7.5  /  Alb  4.4  /  TBili  0.5  /  DBili  x   /  AST  24  /  ALT  19  /  AlkPhos  149<H>  12    PT/INR - ( 2020 11:30 )   PT: 12.60 sec;   INR: 1.10 ratio         PTT - ( 2020 11:30 )  PTT:25.6 sec  Urinalysis Basic - ( 2020 15:00 )    Color: Light Yellow / Appearance: Clear / S.025 / pH: x  Gluc: x / Ketone: Negative  / Bili: Negative / Urobili: <2 mg/dL   Blood: x / Protein: Trace / Nitrite: Negative   Leuk Esterase: Negative / RBC: 146 /HPF / WBC 1 /HPF   Sq Epi: x / Non Sq Epi: 1 /HPF / Bacteria: Negative            Culture - Urine (collected 2020 15:00)  Source: .Urine Catheterized  Final Report (2020 21:07):    No growth      CARDIAC MARKERS ( 2020 11:30 )  x     / <0.01 ng/mL / 105 U/L / x     / x          RADIOLOGY    < from: CT Abdomen and Pelvis w/ IV Cont (20 @ 14:41) >    IMPRESSION:     Multiple vertebral body compression deformities as mentioned above, age-indeterminate. The compression deformity at the superior end plate T12 demonstrates trace retropulsion of fracture fragment into the spinalcanal by approximately 2 mm. Correlate with point tenderness for acuity.    Findings may be secondary to prostatomegaly and chronic outflow tract obstruction.    < end of copied text >    < from: CT Chest w/ IV Cont (20 @ 14:40) >  IMPRESSION:     Multiple vertebral body compression deformities as mentioned above, age-indeterminate. The compression deformity at the superior end plate T12 demonstrates trace retropulsion of fracture fragment into the spinalcanal by approximately 2 mm. Correlate with point tenderness for acuity.    Findings may be secondary to prostatomegaly and chronic outflow tract obstruction.    < end of copied text >    < from: CT Cervical Spine No Cont (20 @ 14:40) >  IMPRESSION:    No evidence of acute cervical spine fracture or subluxation.    Multilevel cervical degenerative changes.    < end of copied text >        < from: CT Head No Cont (20 @ 14:40) >  IMPRESSION:     In comparison with the prior noncontrast CT scan of the brain dated 2018:    No evidence for acute intracranial hemorrhage.    Stable examination.    < end of copied text >    < from: Xray Pelvis AP only (20 @ 11:39) >    IMPRESSION:    No evidence of acute fracture.    < end of copied text >    < from: Xray Chest 1 View AP/PA (20 @ 11:38) >  Impression:    Support devices: None.    Cardiac/mediastinum/hilum: Stable    Lung parenchyma/Pleura: Retrocardiac opacity, likely atelectasis. Right lung is unremarkable. No evidence of pneumothorax.    Skeleton/soft tissues: Stable    < end of copied text >    < from: Transthoracic Echocardiogram (10.12.18 @ 11:51) >  Summary:   1. LV Ejection Fraction by Dale's Method with a biplane EF of 71 %.   2. Normal left ventricular size and wall thicknesses, with normal   systolic function.   3. The mean global longitudinal strain by speckle tracking is -19.1%   which is normal.   4. Mild tricuspid regurgitation.   5. LA volume Index is 17.1 ml/m² ml/m2.    < end of copied text >

## 2020-01-13 NOTE — PROGRESS NOTE ADULT - SUBJECTIVE AND OBJECTIVE BOX
Neurology Progress Note    Interval History:    Patient seen this afternoon, wife at bedside. Patient reports feeling fine. Denies any syncopal events throughout hospitalization. Patient able to identify being in a hospital, unable to identify which hospital. Patient unable to identify day, month or year. Able to recognize his wife. Wife reports she would like to take patient home as she is retired and can take care of him, does not want patient to go to rehab. Reports that before acute event that led to patient's hospitalization patient was functioning at baseline. Identifies that patient is back to his baseline now.     HPI:  Pt is a 78 yo M w/ PMH of CAD with stents 20 years ago, HLD, hx of syncopal episodes on prior admissions p/w s/p fall today.  Admitted July 2019 w/ similar episode seen  by Dr. Arias wt negative EEG/CTA.  Was suspected of having underlying dementia unclear if parkinson-plus but started on donepezil.  Currently back to baseline and asymptomatic.  per chart, wife found him on side of bed unresponsive for < 1min with urinary/fecal incontinence and tongue bite.  Pt found with thoracic compression fracture followed by neurosx.     PAST MEDICAL & SURGICAL HISTORY:  Hyperlipidemia, unspecified hyperlipidemia type  Coronary artery disease, angina presence unspecified, unspecified vessel or lesion type, unspecified whether native or transplanted heart  No significant past surgical history          Vital Signs Last 24 Hrs  T(C): 36.3 (13 Jan 2020 13:45), Max: 37.4 (12 Jan 2020 20:18)  T(F): 97.4 (13 Jan 2020 13:45), Max: 99.4 (12 Jan 2020 20:18)  HR: 73 (13 Jan 2020 13:45) (46 - 78)  BP: 118/57 (13 Jan 2020 13:45) (118/57 - 134/79)  BP(mean): --  RR: 18 (13 Jan 2020 13:45) (18 - 18)  SpO2: 96% (13 Jan 2020 09:46) (96% - 96%)    Neurological Exam:   General:  Appearance is consistent with chronologic age.  No abnormal facies.  Gross skin survey within normal limits.    Cognitive/Language:  AO x 2 person, "hospital."  language intact.   CN intact  Ears/Nose/Throat:  Hearing grossly intact b/l.  Palate elevates midline.  Tongue and uvula midline.   Motor examination:   Normal tone, bulk and range of motion.  No tenderness, twitching, tremors or involuntary movements.  Formal Muscle Strength Testing: (MRC grade R/L) 5/5 UE; 5/5 LE.  No observable drift.  Reflexes:   1+ b/l pectoralis, biceps, triceps, brachioradialis, patella and Achilles.  Plantar response downgoing b/l.   Sensory examination:   grossly intact  Cerebellum:    normal ROM in all limbs.  No dysmetria or dysdiadokinesia.    acetaminophen   Tablet .. 650 milliGRAM(s) Oral every 6 hours PRN  aspirin  chewable 81 milliGRAM(s) Oral daily  atorvastatin 10 milliGRAM(s) Oral at bedtime  chlorhexidine 4% Liquid 1 Application(s) Topical <User Schedule>  donepezil 5 milliGRAM(s) Oral at bedtime  enoxaparin Injectable 40 milliGRAM(s) SubCutaneous at bedtime  morphine  - Injectable 2 milliGRAM(s) IV Push every 3 hours PRN  senna 2 Tablet(s) Oral at bedtime  tamsulosin 0.4 milliGRAM(s) Oral at bedtime      Labs:  CBC Full  -  ( 12 Jan 2020 07:34 )  WBC Count : 8.11 K/uL  RBC Count : 4.68 M/uL  Hemoglobin : 13.9 g/dL  Hematocrit : 41.3 %  Platelet Count - Automated : 157 K/uL  Mean Cell Volume : 88.2 fL  Mean Cell Hemoglobin : 29.7 pg  Mean Cell Hemoglobin Concentration : 33.7 g/dL  Auto Neutrophil # : 6.39 K/uL  Auto Lymphocyte # : 1.14 K/uL  Auto Monocyte # : 0.52 K/uL  Auto Eosinophil # : 0.01 K/uL  Auto Basophil # : 0.03 K/uL  Auto Neutrophil % : 78.8 %  Auto Lymphocyte % : 14.1 %  Auto Monocyte % : 6.4 %  Auto Eosinophil % : 0.1 %  Auto Basophil % : 0.4 %    01-12    138  |  101  |  12  ----------------------------<  105<H>  4.7   |  22  |  1.0    Ca    9.7      12 Jan 2020 07:34  Mg     2.1     01-12    TPro  7.5  /  Alb  4.4  /  TBili  0.5  /  DBili  x   /  AST  24  /  ALT  19  /  AlkPhos  149<H>  01-12    LIVER FUNCTIONS - ( 12 Jan 2020 07:34 )  Alb: 4.4 g/dL / Pro: 7.5 g/dL / ALK PHOS: 149 U/L / ALT: 19 U/L / AST: 24 U/L / GGT: x                 < from: EEG Awake or Drowsy (01.12.20 @ 14:00) >  Impression  Abnormal due to the presence of: generalized slowing as above, interictal activity as above      Clinical Correlation & Recommendations   Consistent with diffuse cerebral electrophysiological dysfunction, secondary to nonspecific cause. The significance of sharp transients in regard to epileptogenicity is not clear.  Clinical correlation is recommended.       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    < end of copied text >

## 2020-01-13 NOTE — DISCHARGE NOTE NURSING/CASE MANAGEMENT/SOCIAL WORK - PATIENT PORTAL LINK FT
You can access the FollowMyHealth Patient Portal offered by [facility] by registering at the following website: [email or web address]. By joining [employer]’s FollowMyHealth portal, you will also be able to view your health information using other applications (apps) compatible with our system.

## 2020-01-13 NOTE — PROGRESS NOTE ADULT - ASSESSMENT
78 yo M w/ PMH of CAD with stents 20 years ago, HLD, hx of syncopal episodes on prior admissions p/w s/p fall     #) Fall with possible LOC, DDX: Seizures v. Syncope  - Follow up orthostatics  - CT head negative, Trauma workup negative except age indeterminate Thoracic fracture  - F/u EEG  - Neuro appreciated  - F/U CTA head/neck (never done)  - f/u neurosurgery for thoracic Fx  - cardiac w/u  - seizure and fall precautions  - PT eval    #) Thoracic fracture- age indeterminate  - seen by neurosx  - f/u bone scan- as per radiology- regular bone scan not done in house.  - pain control prn     #) prostatomegaly with Prominent collecting system on CT scan  now  on flomax     #) H/o dementia  on donepezil    #) h/o CAD   - c/w ASA     #Progress Note Handoff    Pending (specify):  Consults__Neurology_______, Tests__EEG, bone scan______, Test Results_______, Other_________    Family discussion:  na    Disposition: Home___/SNF___/Other________/Unknown at this time__x______ 78 yo M w/ PMH of CAD with stents 20 years ago, HLD, hx of syncopal episodes on prior admissions p/w s/p fall     #) Fall with possible LOC, DDX: Seizures v. Syncope  - Follow up orthostatics  - CT head negative, Trauma workup negative except age indeterminate Thoracic fracture  - F/u EEG  - Neuro appreciated  - F/U CTA head/neck (never done)  - f/u neurosurgery for thoracic Fx  - cardiac w/u  - seizure and fall precautions  - PT eval    #) Thoracic fracture- age indeterminate  - seen by neurosx  - f/u bone scan  - pain control prn     #) prostatomegaly with Prominent collecting system on CT scan  now  on flomax     #) H/o dementia  on donepezil    #) h/o CAD   - c/w ASA, Atorvastatin     Diet: Dash/TLC  Activity: Fall precaution  DVT PPX: ASA and Enoxaparin

## 2020-01-13 NOTE — PROGRESS NOTE ADULT - ATTENDING COMMENTS
Patient seen this morning 1/14/20 and has advanced dementia (just finished breakfast with pieces of pancake on his gown and can't tell me what he just ate for breakfast).  Able to follow simple commands.  Episode of syncope.  Okay to continue donepezil.  Would avoid memantine given syncope hx.  Okay to discharge home from neurologic standpoint. Patient seen this morning 1/14/20 and has advanced dementia (just finished breakfast with pieces of pancake on his gown and can't tell me what he just ate for breakfast).  Able to follow simple commands.  Episode of syncope.  Okay to continue donepezil.  Would avoid memantine given syncope hx.  Okay to discharge home from neurologic standpoint.    CTA showed:  Moderate calcific stenosis of the proximal V4 segment of the left vertebral artery.  I don't think this is likely to have caused his syncope and no intervention is warranted.  Vascular risk factor reduction - aspirin/statin. Patient seen this morning 1/14/20 and has advanced dementia (just finished breakfast with pieces of pancake on his gown and can't tell me what he just ate for breakfast).  Able to follow simple commands.  Episode of syncope.  Okay to continue donepezil.  Would avoid memantine given syncope hx.  Okay to discharge home from neurologic standpoint.    CTA showed:  Moderate calcific stenosis of the proximal V4 segment of the left vertebral artery.  I don't think this is likely to have caused his syncope and no intervention is warranted.  Vascular risk factor reduction - aspirin/statin.    addendum:  EEG from 1/12 reported as showing bisynchronous sharp waves.  No electrographic seizures reported.  Okay to begin valproic acid 250 mg po bid.  Outpatient neurologic f/u.  No loading dose.

## 2020-01-13 NOTE — PROGRESS NOTE ADULT - ASSESSMENT
Assessment:   80 yo M w/ PMH of CAD with stents 20 years ago, HLD, likely underlying cognitive impairment, hx of prior syncopal episodes r/o seizure. EEG shows generalized slowing. CTA of head and neck shows moderate stenosis of left vertebral artery.     Plan:  - c/w Donepezil   - PT evaluation   - bone scan per neurosx   - no further neurologic intervention, neurology team to sign off. Please recall as needed. Assessment:   80 yo M w/ PMH of CAD with stents 20 years ago, HLD, likely underlying cognitive impairment, hx of prior syncopal episodes r/o seizure. EEG shows generalized slowing. Bisynchronous sharp waves reported without electrographic seizure. CTA of head and neck shows moderate stenosis of left vertebral artery.     Plan:  - c/w Donepezil   - PT evaluation   - bone scan per neurosx   - no further neurologic intervention, neurology team to sign off. Please recall as needed.

## 2020-01-13 NOTE — PROGRESS NOTE ADULT - ATTENDING COMMENTS
I saw and evaluated patient  by bedside  All labs, radiology studies, VS was reviewed  I have reviewed the resident's note and agree with documented findings and  plan of care.  - syncopal episode- ? seizure event  - s/p EEG- multifocal slowing, f/up neuro  - cta head/neck- moderate stenosis v4 left vertebral artery  - PT eval   - maintain fall precautions  - orthost, VS monitoring    -thoracic fx- obtain bone scan    -h/p BPH- flomax tx.    -h/o Dementia- continue to assist pt. with daily living activities  - h/o CAD- stable , resumed on home regimen tx.    #Progress Note Handoff: monitor neuro status, f/up neuro  Family discussion: patient's family by bedside Disposition: to be determined

## 2020-01-14 ENCOUNTER — TRANSCRIPTION ENCOUNTER (OUTPATIENT)
Age: 80
End: 2020-01-14

## 2020-01-14 LAB
ALBUMIN SERPL ELPH-MCNC: 4 G/DL — SIGNIFICANT CHANGE UP (ref 3.5–5.2)
ALP SERPL-CCNC: 128 U/L — HIGH (ref 30–115)
ALT FLD-CCNC: 16 U/L — SIGNIFICANT CHANGE UP (ref 0–41)
ANION GAP SERPL CALC-SCNC: 14 MMOL/L — SIGNIFICANT CHANGE UP (ref 7–14)
AST SERPL-CCNC: 21 U/L — SIGNIFICANT CHANGE UP (ref 0–41)
BILIRUB SERPL-MCNC: 0.4 MG/DL — SIGNIFICANT CHANGE UP (ref 0.2–1.2)
BUN SERPL-MCNC: 13 MG/DL — SIGNIFICANT CHANGE UP (ref 10–20)
CALCIUM SERPL-MCNC: 9.3 MG/DL — SIGNIFICANT CHANGE UP (ref 8.5–10.1)
CHLORIDE SERPL-SCNC: 99 MMOL/L — SIGNIFICANT CHANGE UP (ref 98–110)
CO2 SERPL-SCNC: 22 MMOL/L — SIGNIFICANT CHANGE UP (ref 17–32)
CREAT SERPL-MCNC: 1 MG/DL — SIGNIFICANT CHANGE UP (ref 0.7–1.5)
GLUCOSE SERPL-MCNC: 102 MG/DL — HIGH (ref 70–99)
HCT VFR BLD CALC: 43.4 % — SIGNIFICANT CHANGE UP (ref 42–52)
HGB BLD-MCNC: 14.9 G/DL — SIGNIFICANT CHANGE UP (ref 14–18)
MAGNESIUM SERPL-MCNC: 2.2 MG/DL — SIGNIFICANT CHANGE UP (ref 1.8–2.4)
MCHC RBC-ENTMCNC: 30.4 PG — SIGNIFICANT CHANGE UP (ref 27–31)
MCHC RBC-ENTMCNC: 34.3 G/DL — SIGNIFICANT CHANGE UP (ref 32–37)
MCV RBC AUTO: 88.6 FL — SIGNIFICANT CHANGE UP (ref 80–94)
NRBC # BLD: 0 /100 WBCS — SIGNIFICANT CHANGE UP (ref 0–0)
PLATELET # BLD AUTO: 152 K/UL — SIGNIFICANT CHANGE UP (ref 130–400)
POTASSIUM SERPL-MCNC: 4.3 MMOL/L — SIGNIFICANT CHANGE UP (ref 3.5–5)
POTASSIUM SERPL-SCNC: 4.3 MMOL/L — SIGNIFICANT CHANGE UP (ref 3.5–5)
PROT SERPL-MCNC: 7 G/DL — SIGNIFICANT CHANGE UP (ref 6–8)
RBC # BLD: 4.9 M/UL — SIGNIFICANT CHANGE UP (ref 4.7–6.1)
RBC # FLD: 14.6 % — HIGH (ref 11.5–14.5)
SODIUM SERPL-SCNC: 135 MMOL/L — SIGNIFICANT CHANGE UP (ref 135–146)
WBC # BLD: 6.35 K/UL — SIGNIFICANT CHANGE UP (ref 4.8–10.8)
WBC # FLD AUTO: 6.35 K/UL — SIGNIFICANT CHANGE UP (ref 4.8–10.8)

## 2020-01-14 PROCEDURE — 99239 HOSP IP/OBS DSCHRG MGMT >30: CPT

## 2020-01-14 PROCEDURE — 99232 SBSQ HOSP IP/OBS MODERATE 35: CPT

## 2020-01-14 RX ORDER — DIVALPROEX SODIUM 500 MG/1
1 TABLET, DELAYED RELEASE ORAL
Qty: 60 | Refills: 0
Start: 2020-01-14 | End: 2020-02-12

## 2020-01-14 RX ORDER — TAMSULOSIN HYDROCHLORIDE 0.4 MG/1
1 CAPSULE ORAL
Qty: 30 | Refills: 0
Start: 2020-01-14 | End: 2020-02-12

## 2020-01-14 RX ORDER — DIVALPROEX SODIUM 500 MG/1
250 TABLET, DELAYED RELEASE ORAL
Refills: 0 | Status: DISCONTINUED | OUTPATIENT
Start: 2020-01-14 | End: 2020-01-16

## 2020-01-14 RX ORDER — SENNA PLUS 8.6 MG/1
2 TABLET ORAL
Qty: 60 | Refills: 0
Start: 2020-01-14 | End: 2020-02-12

## 2020-01-14 RX ADMIN — Medication 81 MILLIGRAM(S): at 11:06

## 2020-01-14 RX ADMIN — DONEPEZIL HYDROCHLORIDE 5 MILLIGRAM(S): 10 TABLET, FILM COATED ORAL at 21:26

## 2020-01-14 RX ADMIN — ATORVASTATIN CALCIUM 10 MILLIGRAM(S): 80 TABLET, FILM COATED ORAL at 21:25

## 2020-01-14 RX ADMIN — TAMSULOSIN HYDROCHLORIDE 0.4 MILLIGRAM(S): 0.4 CAPSULE ORAL at 21:26

## 2020-01-14 RX ADMIN — SENNA PLUS 2 TABLET(S): 8.6 TABLET ORAL at 21:26

## 2020-01-14 RX ADMIN — DIVALPROEX SODIUM 250 MILLIGRAM(S): 500 TABLET, DELAYED RELEASE ORAL at 17:12

## 2020-01-14 RX ADMIN — ENOXAPARIN SODIUM 40 MILLIGRAM(S): 100 INJECTION SUBCUTANEOUS at 21:26

## 2020-01-14 RX ADMIN — CHLORHEXIDINE GLUCONATE 1 APPLICATION(S): 213 SOLUTION TOPICAL at 05:15

## 2020-01-14 NOTE — PHYSICAL THERAPY INITIAL EVALUATION ADULT - IMPAIRMENTS FOUND, PT EVAL
aerobic capacity/endurance/gait, locomotion, and balance/cognitive impairment/ergonomics and body mechanics/joint integrity and mobility/gross motor/muscle strength/poor safety awareness

## 2020-01-14 NOTE — PROGRESS NOTE ADULT - ASSESSMENT
80 yo M w/ PMH of CAD with stents 20 years ago, HLD, hx of syncopal episodes on prior admissions p/w s/p fall     #) Fall with possible LOC, DDX: Seizures v. Syncope  - Follow up orthostatics  - CT head negative, Trauma workup negative except age indeterminate Thoracic fracture  - EEG showed some ictal activity. Significance?  - Neuro appreciated  - CTA Head/Neck some stenosis of left vertebral. See above.  - f/u neurosurgery for thoracic Fx  - cardiac w/u  - seizure and fall precautions  - PT eval    #) Thoracic fracture- age indeterminate  - seen by neurosx  - Bone scan results as noted above  - pain control prn     #) prostatomegaly with Prominent collecting system on CT scan  - now on flomax     #) H/o dementia  on donepezil    #) h/o CAD   - c/w ASA, Atorvastatin     Diet: Dash/TLC  Activity: Fall precaution  DVT PPX: ASA and Enoxaparin

## 2020-01-14 NOTE — PHYSICAL THERAPY INITIAL EVALUATION ADULT - CRITERIA FOR SKILLED THERAPEUTIC INTERVENTIONS
therapy frequency/predicted duration of therapy intervention/anticipated equipment needs at discharge/impairments found/rehab potential/risk reduction/prevention/functional limitations in following categories

## 2020-01-14 NOTE — DISCHARGE NOTE PROVIDER - PROVIDER TOKENS
PROVIDER:[TOKEN:[[id number]],FOLLOWUP:[1 week]],PROVIDER:[TOKEN:[[id number]],FOLLOWUP:[1 week]]

## 2020-01-14 NOTE — PHYSICAL THERAPY INITIAL EVALUATION ADULT - GENERAL OBSERVATIONS, REHAB EVAL
Pt seen [id number] for a total of 50 minutes. Pt encountered semifowlers in bed, no apparent distress, agreeable to physical therapy. Pt is confused, reported he lives with parents.

## 2020-01-14 NOTE — DISCHARGE NOTE PROVIDER - CARE PROVIDER_API CALL
Babar Calzada)  Internal Medicine  [address] [place] [address]  Phone: [phone]  Fax: [phone]  Follow Up Time: 1 week    Aristides Arias)  Neurology  [address] [facility], [address]  [place] [address]  Phone: [phone]  Fax: [phone]  Follow Up Time: 1 week

## 2020-01-14 NOTE — DISCHARGE NOTE PROVIDER - NSDCMRMEDTOKEN_GEN_ALL_CORE_FT
aspirin 81 mg oral tablet: 1 tab(s) orally once a day  atorvastatin 10 mg oral tablet: 1 tab(s) orally once a day (at bedtime)  divalproex sodium 250 mg oral delayed release tablet: 1 tab(s) orally 2 times a day  donepezil 5 mg oral tablet: 1 tab(s) orally once a day (at bedtime)  senna oral tablet: 2 tab(s) orally once a day (at bedtime)  tamsulosin 0.4 mg oral capsule: 1 cap(s) orally once a day (at bedtime)

## 2020-01-14 NOTE — DISCHARGE NOTE PROVIDER - CARE PROVIDERS DIRECT ADDRESSES
[email or web address]

## 2020-01-14 NOTE — PROGRESS NOTE ADULT - ATTENDING COMMENTS
I saw and evaluated patient  by bedside, remains confused, with h/o mild dementia, possibly at baseline  All labs, radiology studies, VS was reviewed  I have reviewed the resident's note and agree with documented findings and  plan of care.  - syncopal episode- ? seizure event  - s/p EEG- multifocal slowing, f/up neuro if patient will need to be started on AED tx.  - cta head/neck- moderate stenosis v4 left vertebral artery  - PT  tx as tolerated  - maintain fall precautions  - orthost, VS monitoring    - post fall acute left 2 rib fractures- no further interventions    -thoracic fx- s/p bone scan- no acute thoracic fx.    -h/p BPH- flomax tx.    -h/o Dementia- continue to assist pt. with daily living activities  - h/o CAD- stable , resumed on home regimen tx.    #Progress Note Handoff: monitor neuro status, f/up neuro  Family discussion: patient's family by bedside Disposition: to be determined .

## 2020-01-14 NOTE — PHYSICAL THERAPY INITIAL EVALUATION ADULT - TRANSFER SAFETY CONCERNS NOTED: SIT/STAND, REHAB EVAL
decreased balance during turns/decreased safety awareness/decreased weight-shifting ability/decreased proprioception/decreased sequencing ability

## 2020-01-14 NOTE — PHYSICAL THERAPY INITIAL EVALUATION ADULT - PATIENT/FAMILY AGREES WITH PLAN
Despite educating pts wife that pt would be highrisk to fall at home, and that he should go to a rehab to get stronger and improve balance, pt's wife refuses and will only take him home with home care services/no

## 2020-01-14 NOTE — PROGRESS NOTE ADULT - SUBJECTIVE AND OBJECTIVE BOX
HPI  Patient is a 79y old Male who presents with a chief complaint of Loss of consciousness/ Seizures (13 Jan 2020 18:34)    Currently admitted to medicine with the primary diagnosis of Syncope     Today is hospital day 3d.     INTERVAL HPI / OVERNIGHT EVENTS:  Patient was examined and seen at bedside. This morning he is resting comfortably in bed and reports no new issues or overnight events. Patient denies all complaints but is AAOx1 and confused.    ROS: Otherwise unremarkable     PAST MEDICAL & SURGICAL HISTORY  Hyperlipidemia, unspecified hyperlipidemia type  Coronary artery disease, angina presence unspecified, unspecified vessel or lesion type, unspecified whether native or transplanted heart  No significant past surgical history    ALLERGIES  No Known Allergies    MEDICATIONS  STANDING MEDICATIONS  aspirin  chewable 81 milliGRAM(s) Oral daily  atorvastatin 10 milliGRAM(s) Oral at bedtime  chlorhexidine 4% Liquid 1 Application(s) Topical <User Schedule>  donepezil 5 milliGRAM(s) Oral at bedtime  enoxaparin Injectable 40 milliGRAM(s) SubCutaneous at bedtime  senna 2 Tablet(s) Oral at bedtime  tamsulosin 0.4 milliGRAM(s) Oral at bedtime    PRN MEDICATIONS  acetaminophen   Tablet .. 650 milliGRAM(s) Oral every 6 hours PRN  morphine  - Injectable 2 milliGRAM(s) IV Push every 3 hours PRN    VITALS:  T(F): 97.1  HR: 66  BP: 137/78  RR: 18  SpO2: 97%    PHYSICAL EXAM  GEN: NAD, Resting comfortably in bed  PULM: Clear to auscultation on left, right sided wheeze  CVS: Regular rate and rhythm, S1-S2  ABD: Soft, non-tender, non-distended, no guarding  NEURO: AAOx1, no focal deficits    LABS                        14.9   6.35  )-----------( 152      ( 14 Jan 2020 06:43 )             43.4     01-14    135  |  99  |  13  ----------------------------<  102<H>  4.3   |  22  |  1.0    Ca    9.3      14 Jan 2020 06:43  Mg     2.2     01-14    TPro  7.0  /  Alb  4.0  /  TBili  0.4  /  DBili  x   /  AST  21  /  ALT  16  /  AlkPhos  128<H>  01-14              Culture - Urine (collected 11 Jan 2020 15:00)  Source: .Urine Catheterized  Final Report (12 Jan 2020 21:07):    No growth          RADIOLOGY    < from: NM Bone Imaging Total (01.13.20 @ 16:10) >  Impression:  No abnormal bone agent uptake corresponding with compression deformities at T6, T7, T8, T12, L2 vertebral bodies. Therefore compression fractures noted on recent CT are not likely to represent recent events.  L5 is obscured by bladder activity and cannot be evaluated    Increased uptake in the left 4 and 5 anterior ribs lining up one on top of the other in a pattern consistent with rib fractures and clinical correlation is suggested    < end of copied text >    < from: CT Angio Neck w/ IV Cont (01.13.20 @ 11:03) >  IMPRESSION:     Scattered calcific atheromatous plaque with moderate stenosis of the proximal V4 segment of the left vertebral artery and additional scattered mild stenoses.    < end of copied text >    < from: CT Abdomen and Pelvis w/ IV Cont (01.11.20 @ 14:41) >    IMPRESSION:     Multiple vertebral body compression deformities as mentioned above, age-indeterminate. The compression deformity at the superior end plate T12 demonstrates trace retropulsion of fracture fragment into the spinalcanal by approximately 2 mm. Correlate with point tenderness for acuity.    Findings may be secondary to prostatomegaly and chronic outflow tract obstruction.    < end of copied text >    < from: CT Chest w/ IV Cont (01.11.20 @ 14:40) >  IMPRESSION:     Multiple vertebral body compression deformities as mentioned above, age-indeterminate. The compression deformity at the superior end plate T12 demonstrates trace retropulsion of fracture fragment into the spinalcanal by approximately 2 mm. Correlate with point tenderness for acuity.    Findings may be secondary to prostatomegaly and chronic outflow tract obstruction.    < end of copied text >    < from: CT Cervical Spine No Cont (01.11.20 @ 14:40) >  IMPRESSION:    No evidence of acute cervical spine fracture or subluxation.    Multilevel cervical degenerative changes.    < end of copied text >        < from: CT Head No Cont (01.11.20 @ 14:40) >  IMPRESSION:     In comparison with the prior noncontrast CT scan of the brain dated October 11, 2018:    No evidence for acute intracranial hemorrhage.    Stable examination.    < end of copied text >    < from: Xray Pelvis AP only (01.11.20 @ 11:39) >    IMPRESSION:    No evidence of acute fracture.    < end of copied text >    < from: Xray Chest 1 View AP/PA (01.11.20 @ 11:38) >  Impression:    Support devices: None.    Cardiac/mediastinum/hilum: Stable    Lung parenchyma/Pleura: Retrocardiac opacity, likely atelectasis. Right lung is unremarkable. No evidence of pneumothorax.    Skeleton/soft tissues: Stable    < end of copied text >    < from: Transthoracic Echocardiogram (10.12.18 @ 11:51) >  Summary:   1. LV Ejection Fraction by Dale's Method with a biplane EF of 71 %.   2. Normal left ventricular size and wall thicknesses, with normal   systolic function.   3. The mean global longitudinal strain by speckle tracking is -19.1%   which is normal.   4. Mild tricuspid regurgitation.   5. LA volume Index is 17.1 ml/m² ml/m2.    < end of copied text >

## 2020-01-14 NOTE — PHYSICAL THERAPY INITIAL EVALUATION ADULT - PLANNED THERAPY INTERVENTIONS, PT EVAL
bed mobility training/gait training/transfer training/strengthening/ROM/stretching/balance training/stair negotiation

## 2020-01-14 NOTE — DISCHARGE NOTE PROVIDER - HOSPITAL COURSE
HPI:    Pt is a 78 yo M w/ PMH of CAD with stents 20 years ago, HLD, hx of syncopal episodes on prior admissions p/w s/p fall today.     Pt last admitted in july 2019- with suimilar episodes when he was seen by neurology and started on donepezil with outpt follow up.     Pt  is a poor historian. H and P obtained from ED chart.     As per chart, pt wife left pt asleep on bed, went to adjoining room when she heard a  thud and found pt on floor next to bed, unresponsive for < 1min.  As per chart, pt had + urine and fecal incontinence and tongue bite.    Pt seen at bedside, on my encounter- pt has limited verbal communication. Unable to obtain ROS.     VS on arrival noted to be stable. Labs showed elevate lactate, CK normal. Taruma workup showed Thoracic vertebrae fracture- for which neurosx was consulted. (11 Jan 2020 20:31)        Patient was evaluated by neurology, with no further workup needed. The patient had an EEG that revealed some positivity for ictal activity, and is placed on depakote 250mg BID. The patient/family was aware of the plan of care and is agreeable to the circumstances surrounding their discharge. The hospital stay was uneventful.            78 yo M w/ PMH of CAD with stents 20 years ago, HLD, hx of syncopal episodes on prior admissions p/w s/p fall         #) Fall with possible LOC, DDX: Seizures v. Syncope    - Follow up orthostatics    - CT head negative, Trauma workup negative except age indeterminate Thoracic fracture    - EEG showed some ictal activity. Significance?    - Neuro appreciated    - CTA Head/Neck some stenosis of left vertebral. See above.    - f/u neurosurgery for thoracic Fx    - cardiac w/u    - seizure and fall precautions    - PT eval        #) Thoracic fracture- age indeterminate    - seen by neurosx    - Bone scan results as noted above    - pain control prn         #) prostatomegaly with Prominent collecting system on CT scan    - now on flomax         #) H/o dementia    on donepezil        #) h/o CAD     - c/w ASA, Atorvastatin         Diet: Dash/TLC    Activity: Fall precaution    DVT PPX: ASA and Enoxapari HPI:    Pt is a 80 yo M w/ PMH of CAD with stents 20 years ago, HLD, hx of syncopal episodes on prior admissions p/w s/p fall today.     Pt last admitted in july 2019- with suimilar episodes when he was seen by neurology and started on donepezil with outpt follow up.     Pt  is a poor historian. H and P obtained from ED chart.     As per chart, pt wife left pt asleep on bed, went to adjoining room when she heard a  thud and found pt on floor next to bed, unresponsive for < 1min.  As per chart, pt had + urine and fecal incontinence and tongue bite.    Pt seen at bedside, on my encounter- pt has limited verbal communication. Unable to obtain ROS.     VS on arrival noted to be stable. Labs showed elevate lactate, CK normal. Taruma workup showed Thoracic vertebrae fracture- for which neurosx was consulted. (11 Jan 2020 20:31)        Patient was evaluated by neurology, with no further workup needed. The patient had an EEG that revealed some positivity for ictal activity, and is placed on depakote 250mg BID. The patient/family was aware of the plan of care and is agreeable to the circumstances surrounding their discharge. The hospital stay was uneventful.            80 yo M w/ PMH of CAD with stents 20 years ago, HLD, hx of syncopal episodes on prior admissions p/w s/p fall         #) Fall with possible LOC, DDX: Seizures v. Syncope    - Orthostatics reportedly negative    - CT head negative, Trauma workup negative except age indeterminate Thoracic fracture    - EEG showed some ictal activity. Significance?    - Neuro appreciated    - CTA Head/Neck some stenosis of left vertebral. See above.    - f/u neurosurgery for thoracic Fx    - cardiac w/u    - seizure and fall precautions    - PT on case    - Will go home on depakote 250mg bid        #) Thoracic fracture- age indeterminate    - seen by neurosx    - Bone scan results as noted above    - pain control prn         #) prostatomegaly with Prominent collecting system on CT scan    - now on flomax         #) H/o dementia    on donepezil        #) h/o CAD     - c/w ASA, Atorvastatin         Diet: Dash/TLC    Activity: Fall precaution    DVT PPX: ASA and Enoxapari HPI:    Pt is a 80 yo M w/ PMH of CAD with stents 20 years ago, HLD, hx of syncopal episodes on prior admissions p/w s/p fall today.     Pt last admitted in july 2019- with suimilar episodes when he was seen by neurology and started on donepezil with outpt follow up.     Pt  is a poor historian. H and P obtained from ED chart.     As per chart, pt wife left pt asleep on bed, went to adjoining room when she heard a  thud and found pt on floor next to bed, unresponsive for < 1min.  As per chart, pt had + urine and fecal incontinence and tongue bite.    Pt seen at bedside, on my encounter- pt has limited verbal communication. Unable to obtain ROS.     VS on arrival noted to be stable. Labs showed elevate lactate, CK normal. Taruma workup showed Thoracic vertebrae fracture- for which neurosx was consulted. (11 Jan 2020 20:31)        Patient was evaluated by neurology, with no further workup needed. The patient had an EEG that revealed some positivity for ictal activity, and is placed on depakote 250mg BID. The patient/family was aware of the plan of care and is agreeable to the circumstances surrounding their discharge. The hospital stay was uneventful.            80 yo M w/ PMH of CAD with stents 20 years ago, HLD, hx of syncopal episodes on prior admissions p/w s/p fall         #) Fall with possible LOC, DDX: Seizures v. Syncope    - Orthostatics reportedly negative    - CT head negative, Trauma workup negative except age indeterminate Thoracic fracture    - EEG showed some ictal activity. Significance?    - Neuro appreciated    - CTA Head/Neck some stenosis of left vertebral. See above.    - f/u neurosurgery for thoracic Fx    - cardiac w/u    - seizure and fall precautions    - PT on case    - Will go home on depakote 250mg bid        #) Thoracic fracture- age indeterminate    - seen by neurosx    - Bone scan results as noted above    - pain control prn         #) prostatomegaly with Prominent collecting system on CT scan    - now on flomax         #) H/o dementia    on donepezil        #) h/o CAD     - c/w ASA, Atorvastatin         #Patient has a h/o an abdominal surgery:     agrees to f/u his surgeons for outpatient removal of staples.         Diet: Dash/TLC    Activity: Fall precaution    DVT PPX: ASA and Enoxapari            Attending Note:    Patient seen and examined independently. I personally had a face-to-face encounter with the patient, examined the patient myself and reviewed the plan of care with the housestaff. Agree with resident's note but my note supersedes that of the resident in the matters hereby listed.     Patient presented with questionable seizure this time and fall. D/c on AEDs advised by Neuro. f/u Neuro outpatient.

## 2020-01-14 NOTE — DISCHARGE NOTE PROVIDER - NSDCCPCAREPLAN_GEN_ALL_CORE_FT
PRINCIPAL DISCHARGE DIAGNOSIS  Diagnosis: Syncope  Assessment and Plan of Treatment: You presented to the hospital with syncope. You were evaluated by neurology specialists and underwent an EEG, that may have shown some seizure-like activity. This does not mean you had a seizure, but neurology specialists recommended starting you on divalproex 250mg two times a day to prevent seizures. You were also evaluated with CT scan of the head and neck and revealed some moderate narrowing of an artery. Please follow up with your primary care providers after you are discharged from the hospital. It is important that you continue taking your medications as prescribed. If your condition acutely worsens, please return to the ED immediately. PRINCIPAL DISCHARGE DIAGNOSIS  Diagnosis: Syncope  Assessment and Plan of Treatment: You presented to the hospital with syncope. You were evaluated by neurology specialists and underwent an EEG, that may have shown some seizure-like activity. This does not mean you had a seizure, but neurology specialists recommended starting you on divalproex 250mg two times a day to prevent seizures. You were also evaluated with CT scan of the head and neck and revealed some moderate narrowing of an artery. Please follow up with your primary care providers after you are discharged from the hospital. It is important that you continue taking your medications as prescribed. If your condition acutely worsens, please return to the ED immediately.      SECONDARY DISCHARGE DIAGNOSES  Diagnosis: Hematuria, microscopic  Assessment and Plan of Treatment: Ua here inicidentally showed +Blood and RBCs. No UTI. Repeat UA in 2 weeks with PMD.

## 2020-01-15 LAB
ANION GAP SERPL CALC-SCNC: 12 MMOL/L — SIGNIFICANT CHANGE UP (ref 7–14)
BUN SERPL-MCNC: 15 MG/DL — SIGNIFICANT CHANGE UP (ref 10–20)
CALCIUM SERPL-MCNC: 9.4 MG/DL — SIGNIFICANT CHANGE UP (ref 8.5–10.1)
CHLORIDE SERPL-SCNC: 98 MMOL/L — SIGNIFICANT CHANGE UP (ref 98–110)
CO2 SERPL-SCNC: 24 MMOL/L — SIGNIFICANT CHANGE UP (ref 17–32)
CREAT SERPL-MCNC: 1 MG/DL — SIGNIFICANT CHANGE UP (ref 0.7–1.5)
GLUCOSE SERPL-MCNC: 102 MG/DL — HIGH (ref 70–99)
HCT VFR BLD CALC: 39.4 % — LOW (ref 42–52)
HGB BLD-MCNC: 13.4 G/DL — LOW (ref 14–18)
MAGNESIUM SERPL-MCNC: 2.2 MG/DL — SIGNIFICANT CHANGE UP (ref 1.8–2.4)
MCHC RBC-ENTMCNC: 29.9 PG — SIGNIFICANT CHANGE UP (ref 27–31)
MCHC RBC-ENTMCNC: 34 G/DL — SIGNIFICANT CHANGE UP (ref 32–37)
MCV RBC AUTO: 87.9 FL — SIGNIFICANT CHANGE UP (ref 80–94)
NRBC # BLD: 0 /100 WBCS — SIGNIFICANT CHANGE UP (ref 0–0)
PLATELET # BLD AUTO: 166 K/UL — SIGNIFICANT CHANGE UP (ref 130–400)
POTASSIUM SERPL-MCNC: 4.5 MMOL/L — SIGNIFICANT CHANGE UP (ref 3.5–5)
POTASSIUM SERPL-SCNC: 4.5 MMOL/L — SIGNIFICANT CHANGE UP (ref 3.5–5)
RBC # BLD: 4.48 M/UL — LOW (ref 4.7–6.1)
RBC # FLD: 14.6 % — HIGH (ref 11.5–14.5)
SODIUM SERPL-SCNC: 134 MMOL/L — LOW (ref 135–146)
WBC # BLD: 8.62 K/UL — SIGNIFICANT CHANGE UP (ref 4.8–10.8)
WBC # FLD AUTO: 8.62 K/UL — SIGNIFICANT CHANGE UP (ref 4.8–10.8)

## 2020-01-15 PROCEDURE — 99239 HOSP IP/OBS DSCHRG MGMT >30: CPT

## 2020-01-15 RX ADMIN — DIVALPROEX SODIUM 250 MILLIGRAM(S): 500 TABLET, DELAYED RELEASE ORAL at 05:01

## 2020-01-15 RX ADMIN — DIVALPROEX SODIUM 250 MILLIGRAM(S): 500 TABLET, DELAYED RELEASE ORAL at 17:25

## 2020-01-15 RX ADMIN — TAMSULOSIN HYDROCHLORIDE 0.4 MILLIGRAM(S): 0.4 CAPSULE ORAL at 22:24

## 2020-01-15 RX ADMIN — ATORVASTATIN CALCIUM 10 MILLIGRAM(S): 80 TABLET, FILM COATED ORAL at 22:24

## 2020-01-15 RX ADMIN — Medication 81 MILLIGRAM(S): at 11:26

## 2020-01-15 RX ADMIN — ENOXAPARIN SODIUM 40 MILLIGRAM(S): 100 INJECTION SUBCUTANEOUS at 22:23

## 2020-01-15 RX ADMIN — CHLORHEXIDINE GLUCONATE 1 APPLICATION(S): 213 SOLUTION TOPICAL at 05:02

## 2020-01-15 RX ADMIN — SENNA PLUS 2 TABLET(S): 8.6 TABLET ORAL at 22:24

## 2020-01-15 RX ADMIN — DONEPEZIL HYDROCHLORIDE 5 MILLIGRAM(S): 10 TABLET, FILM COATED ORAL at 22:24

## 2020-01-15 NOTE — PROGRESS NOTE ADULT - ASSESSMENT
78 yo M w/ PMH of CAD with stents 20 years ago, HLD, hx of syncopal episodes on prior admissions p/w s/p fall     #) Fall with possible LOC, DDX: Seizures v. Syncope  - Orthostatics reportedly negative  - CT head negative, Trauma workup negative except age indeterminate Thoracic fracture  - EEG showed some ictal activity. Significance?  - Neuro appreciated  - CTA Head/Neck some stenosis of left vertebral. See above.  - f/u neurosurgery for thoracic Fx  - cardiac w/u  - seizure and fall precautions  - Pt on case  - starting on depakote 250mg BID as per neurology    #) Thoracic fracture- age indeterminate  - seen by neurosx  - Bone scan results as noted above  - pain control prn     #) prostatomegaly with Prominent collecting system on CT scan  - now on flomax     #) H/o dementia  on donepezil    #) h/o CAD   - c/w ASA, Atorvastatin     Diet: Dash/TLC  Activity: Fall precaution  DVT PPX: ASA and Enoxaparin  Dispo: DC today

## 2020-01-15 NOTE — PROGRESS NOTE ADULT - SUBJECTIVE AND OBJECTIVE BOX
HPI  Patient is a 79y old Male who presents with a chief complaint of Loss of consciousness/ Seizures (14 Jan 2020 14:56)    Currently admitted to medicine with the primary diagnosis of Syncope     Today is hospital day 4d.     INTERVAL HPI / OVERNIGHT EVENTS:  Patient was examined and seen at bedside. This morning he is resting comfortably in bed and reports no new issues or overnight events. Patient denies all complaints but is AAOx1 andstill confused today.    ROS: Otherwise unremarkable     PAST MEDICAL & SURGICAL HISTORY  Hyperlipidemia, unspecified hyperlipidemia type  Coronary artery disease, angina presence unspecified, unspecified vessel or lesion type, unspecified whether native or transplanted heart  No significant past surgical history    ALLERGIES  No Known Allergies    MEDICATIONS  STANDING MEDICATIONS  aspirin  chewable 81 milliGRAM(s) Oral daily  atorvastatin 10 milliGRAM(s) Oral at bedtime  chlorhexidine 4% Liquid 1 Application(s) Topical <User Schedule>  diVALproex  milliGRAM(s) Oral two times a day  donepezil 5 milliGRAM(s) Oral at bedtime  enoxaparin Injectable 40 milliGRAM(s) SubCutaneous at bedtime  senna 2 Tablet(s) Oral at bedtime  tamsulosin 0.4 milliGRAM(s) Oral at bedtime    PRN MEDICATIONS  acetaminophen   Tablet .. 650 milliGRAM(s) Oral every 6 hours PRN  morphine  - Injectable 2 milliGRAM(s) IV Push every 3 hours PRN    VITALS:  T(F): 95.7  HR: 65  BP: 137/78  RR: 18  SpO2: 97%    PHYSICAL EXAM  GEN: NAD, Resting comfortably in bed  PULM: Clear to auscultation on left, right sided wheeze  CVS: Regular rate and rhythm, S1-S2  ABD: Soft, non-tender, non-distended, no guarding  NEURO: AAOx1, no focal deficits    LABS                        13.4   8.62  )-----------( 166      ( 15 Macho 2020 05:38 )             39.4     01-15    134<L>  |  98  |  15  ----------------------------<  102<H>  4.5   |  24  |  1.0    Ca    9.4      15 Macho 2020 05:38  Mg     2.2     01-15    TPro  7.0  /  Alb  4.0  /  TBili  0.4  /  DBili  x   /  AST  21  /  ALT  16  /  AlkPhos  128<H>  01-14          RADIOLOGY    < from: NM Bone Imaging Total (01.13.20 @ 16:10) >  Impression:  No abnormal bone agent uptake corresponding with compression deformities at T6, T7, T8, T12, L2 vertebral bodies. Therefore compression fractures noted on recent CT are not likely to represent recent events.  L5 is obscured by bladder activity and cannot be evaluated    Increased uptake in the left 4 and 5 anterior ribs lining up one on top of the other in a pattern consistent with rib fractures and clinical correlation is suggested    < end of copied text >    < from: CT Angio Neck w/ IV Cont (01.13.20 @ 11:03) >  IMPRESSION:     Scattered calcific atheromatous plaque with moderate stenosis of the proximal V4 segment of the left vertebral artery and additional scattered mild stenoses.    < end of copied text >    < from: CT Abdomen and Pelvis w/ IV Cont (01.11.20 @ 14:41) >    IMPRESSION:     Multiple vertebral body compression deformities as mentioned above, age-indeterminate. The compression deformity at the superior end plate T12 demonstrates trace retropulsion of fracture fragment into the spinalcanal by approximately 2 mm. Correlate with point tenderness for acuity.    Findings may be secondary to prostatomegaly and chronic outflow tract obstruction.    < end of copied text >    < from: CT Chest w/ IV Cont (01.11.20 @ 14:40) >  IMPRESSION:     Multiple vertebral body compression deformities as mentioned above, age-indeterminate. The compression deformity at the superior end plate T12 demonstrates trace retropulsion of fracture fragment into the spinalcanal by approximately 2 mm. Correlate with point tenderness for acuity.    Findings may be secondary to prostatomegaly and chronic outflow tract obstruction.    < end of copied text >    < from: CT Cervical Spine No Cont (01.11.20 @ 14:40) >  IMPRESSION:    No evidence of acute cervical spine fracture or subluxation.    Multilevel cervical degenerative changes.    < end of copied text >        < from: CT Head No Cont (01.11.20 @ 14:40) >  IMPRESSION:     In comparison with the prior noncontrast CT scan of the brain dated October 11, 2018:    No evidence for acute intracranial hemorrhage.    Stable examination.    < end of copied text >    < from: Xray Pelvis AP only (01.11.20 @ 11:39) >    IMPRESSION:    No evidence of acute fracture.    < end of copied text >    < from: Xray Chest 1 View AP/PA (01.11.20 @ 11:38) >  Impression:    Support devices: None.    Cardiac/mediastinum/hilum: Stable    Lung parenchyma/Pleura: Retrocardiac opacity, likely atelectasis. Right lung is unremarkable. No evidence of pneumothorax.    Skeleton/soft tissues: Stable    < end of copied text >    < from: Transthoracic Echocardiogram (10.12.18 @ 11:51) >  Summary:   1. LV Ejection Fraction by Dale's Method with a biplane EF of 71 %.   2. Normal left ventricular size and wall thicknesses, with normal   systolic function.   3. The mean global longitudinal strain by speckle tracking is -19.1%   which is normal.   4. Mild tricuspid regurgitation.   5. LA volume Index is 17.1 ml/m² ml/m2.    < end of copied text >

## 2020-01-16 VITALS — TEMPERATURE: 99 F | RESPIRATION RATE: 18 BRPM

## 2020-01-16 PROCEDURE — 99231 SBSQ HOSP IP/OBS SF/LOW 25: CPT

## 2020-01-16 RX ADMIN — CHLORHEXIDINE GLUCONATE 1 APPLICATION(S): 213 SOLUTION TOPICAL at 06:09

## 2020-01-16 RX ADMIN — DIVALPROEX SODIUM 250 MILLIGRAM(S): 500 TABLET, DELAYED RELEASE ORAL at 06:09

## 2020-01-16 RX ADMIN — Medication 81 MILLIGRAM(S): at 11:45

## 2020-01-16 NOTE — PROGRESS NOTE ADULT - SUBJECTIVE AND OBJECTIVE BOX
HPI  Patient is a 79y old Male who presents with a chief complaint of Loss of consciousness/ Seizures (15 Macho 2020 09:04)    Currently admitted to medicine with the primary diagnosis of Syncope     Today is hospital day 5d.     INTERVAL HPI / OVERNIGHT EVENTS:  Patient was examined and seen at bedside. This morning he is resting comfortably in bed and reports no new issues or overnight events. Was scheduled for DC yesterday.    ROS: Otherwise unremarkable     PAST MEDICAL & SURGICAL HISTORY  Hyperlipidemia, unspecified hyperlipidemia type  Coronary artery disease, angina presence unspecified, unspecified vessel or lesion type, unspecified whether native or transplanted heart  No significant past surgical history    ALLERGIES  No Known Allergies    MEDICATIONS  STANDING MEDICATIONS  aspirin  chewable 81 milliGRAM(s) Oral daily  atorvastatin 10 milliGRAM(s) Oral at bedtime  chlorhexidine 4% Liquid 1 Application(s) Topical <User Schedule>  diVALproex  milliGRAM(s) Oral two times a day  donepezil 5 milliGRAM(s) Oral at bedtime  enoxaparin Injectable 40 milliGRAM(s) SubCutaneous at bedtime  senna 2 Tablet(s) Oral at bedtime  tamsulosin 0.4 milliGRAM(s) Oral at bedtime    PRN MEDICATIONS  acetaminophen   Tablet .. 650 milliGRAM(s) Oral every 6 hours PRN  morphine  - Injectable 2 milliGRAM(s) IV Push every 3 hours PRN    VITALS:  T(F): 98.1  HR: 70  BP: 160/80  RR: 18  SpO2: 97%    PHYSICAL EXAM  GEN: NAD, Resting comfortably in bed  PULM: Clear to auscultation on left, right sided wheeze  CVS: Regular rate and rhythm, S1-S2  ABD: Soft, non-tender, non-distended, no guarding  NEURO: AAOx1, no focal deficits    LABS                        13.4   8.62  )-----------( 166      ( 15 Macho 2020 05:38 )             39.4     01-15    134<L>  |  98  |  15  ----------------------------<  102<H>  4.5   |  24  |  1.0    Ca    9.4      15 Macho 2020 05:38  Mg     2.2     01-15        RADIOLOGY    < from: NM Bone Imaging Total (01.13.20 @ 16:10) >  Impression:  No abnormal bone agent uptake corresponding with compression deformities at T6, T7, T8, T12, L2 vertebral bodies. Therefore compression fractures noted on recent CT are not likely to represent recent events.  L5 is obscured by bladder activity and cannot be evaluated    Increased uptake in the left 4 and 5 anterior ribs lining up one on top of the other in a pattern consistent with rib fractures and clinical correlation is suggested    < end of copied text >    < from: CT Angio Neck w/ IV Cont (01.13.20 @ 11:03) >  IMPRESSION:     Scattered calcific atheromatous plaque with moderate stenosis of the proximal V4 segment of the left vertebral artery and additional scattered mild stenoses.    < end of copied text >    < from: CT Abdomen and Pelvis w/ IV Cont (01.11.20 @ 14:41) >    IMPRESSION:     Multiple vertebral body compression deformities as mentioned above, age-indeterminate. The compression deformity at the superior end plate T12 demonstrates trace retropulsion of fracture fragment into the spinalcanal by approximately 2 mm. Correlate with point tenderness for acuity.    Findings may be secondary to prostatomegaly and chronic outflow tract obstruction.    < end of copied text >    < from: CT Chest w/ IV Cont (01.11.20 @ 14:40) >  IMPRESSION:     Multiple vertebral body compression deformities as mentioned above, age-indeterminate. The compression deformity at the superior end plate T12 demonstrates trace retropulsion of fracture fragment into the spinalcanal by approximately 2 mm. Correlate with point tenderness for acuity.    Findings may be secondary to prostatomegaly and chronic outflow tract obstruction.    < end of copied text >    < from: CT Cervical Spine No Cont (01.11.20 @ 14:40) >  IMPRESSION:    No evidence of acute cervical spine fracture or subluxation.    Multilevel cervical degenerative changes.    < end of copied text >        < from: CT Head No Cont (01.11.20 @ 14:40) >  IMPRESSION:     In comparison with the prior noncontrast CT scan of the brain dated October 11, 2018:    No evidence for acute intracranial hemorrhage.    Stable examination.    < end of copied text >    < from: Xray Pelvis AP only (01.11.20 @ 11:39) >    IMPRESSION:    No evidence of acute fracture.    < end of copied text >    < from: Xray Chest 1 View AP/PA (01.11.20 @ 11:38) >  Impression:    Support devices: None.    Cardiac/mediastinum/hilum: Stable    Lung parenchyma/Pleura: Retrocardiac opacity, likely atelectasis. Right lung is unremarkable. No evidence of pneumothorax.    Skeleton/soft tissues: Stable    < end of copied text >    < from: Transthoracic Echocardiogram (10.12.18 @ 11:51) >  Summary:   1. LV Ejection Fraction by Dale's Method with a biplane EF of 71 %.   2. Normal left ventricular size and wall thicknesses, with normal   systolic function.   3. The mean global longitudinal strain by speckle tracking is -19.1%   which is normal.   4. Mild tricuspid regurgitation.   5. LA volume Index is 17.1 ml/m² ml/m2.    < end of copied text > HPI  Patient is a 79y old Male who presents with a chief complaint of Loss of consciousness/ Seizures (15 Macho 2020 09:04)    Currently admitted to medicine with the primary diagnosis of Syncope     Today is hospital day 5d.     INTERVAL HPI / OVERNIGHT EVENTS:  Patient was examined and seen at bedside. This morning he is resting comfortably in bed and reports no new issues or overnight events. Was scheduled for DC yesterday, but did not go because patient had an episode of hypotension. Better BP today and is stable.     ROS: Otherwise unremarkable     PAST MEDICAL & SURGICAL HISTORY  Hyperlipidemia, unspecified hyperlipidemia type  Coronary artery disease, angina presence unspecified, unspecified vessel or lesion type, unspecified whether native or transplanted heart  No significant past surgical history    ALLERGIES  No Known Allergies    MEDICATIONS  STANDING MEDICATIONS  aspirin  chewable 81 milliGRAM(s) Oral daily  atorvastatin 10 milliGRAM(s) Oral at bedtime  chlorhexidine 4% Liquid 1 Application(s) Topical <User Schedule>  diVALproex  milliGRAM(s) Oral two times a day  donepezil 5 milliGRAM(s) Oral at bedtime  enoxaparin Injectable 40 milliGRAM(s) SubCutaneous at bedtime  senna 2 Tablet(s) Oral at bedtime  tamsulosin 0.4 milliGRAM(s) Oral at bedtime    PRN MEDICATIONS  acetaminophen   Tablet .. 650 milliGRAM(s) Oral every 6 hours PRN  morphine  - Injectable 2 milliGRAM(s) IV Push every 3 hours PRN    VITALS:  T(F): 98.1  HR: 70  BP: 160/80  RR: 18  SpO2: 97%    PHYSICAL EXAM  GEN: NAD, Resting comfortably in bed  PULM: Clear to auscultation on left, right sided wheeze  CVS: Regular rate and rhythm, S1-S2  ABD: Soft, non-tender, non-distended, no guarding  NEURO: AAOx1, no focal deficits    LABS                        13.4   8.62  )-----------( 166      ( 15 Macho 2020 05:38 )             39.4     01-15    134<L>  |  98  |  15  ----------------------------<  102<H>  4.5   |  24  |  1.0    Ca    9.4      15 Macho 2020 05:38  Mg     2.2     01-15        RADIOLOGY    < from: NM Bone Imaging Total (01.13.20 @ 16:10) >  Impression:  No abnormal bone agent uptake corresponding with compression deformities at T6, T7, T8, T12, L2 vertebral bodies. Therefore compression fractures noted on recent CT are not likely to represent recent events.  L5 is obscured by bladder activity and cannot be evaluated    Increased uptake in the left 4 and 5 anterior ribs lining up one on top of the other in a pattern consistent with rib fractures and clinical correlation is suggested    < end of copied text >    < from: CT Angio Neck w/ IV Cont (01.13.20 @ 11:03) >  IMPRESSION:     Scattered calcific atheromatous plaque with moderate stenosis of the proximal V4 segment of the left vertebral artery and additional scattered mild stenoses.    < end of copied text >    < from: CT Abdomen and Pelvis w/ IV Cont (01.11.20 @ 14:41) >    IMPRESSION:     Multiple vertebral body compression deformities as mentioned above, age-indeterminate. The compression deformity at the superior end plate T12 demonstrates trace retropulsion of fracture fragment into the spinalcanal by approximately 2 mm. Correlate with point tenderness for acuity.    Findings may be secondary to prostatomegaly and chronic outflow tract obstruction.    < end of copied text >    < from: CT Chest w/ IV Cont (01.11.20 @ 14:40) >  IMPRESSION:     Multiple vertebral body compression deformities as mentioned above, age-indeterminate. The compression deformity at the superior end plate T12 demonstrates trace retropulsion of fracture fragment into the spinalcanal by approximately 2 mm. Correlate with point tenderness for acuity.    Findings may be secondary to prostatomegaly and chronic outflow tract obstruction.    < end of copied text >    < from: CT Cervical Spine No Cont (01.11.20 @ 14:40) >  IMPRESSION:    No evidence of acute cervical spine fracture or subluxation.    Multilevel cervical degenerative changes.    < end of copied text >        < from: CT Head No Cont (01.11.20 @ 14:40) >  IMPRESSION:     In comparison with the prior noncontrast CT scan of the brain dated October 11, 2018:    No evidence for acute intracranial hemorrhage.    Stable examination.    < end of copied text >    < from: Xray Pelvis AP only (01.11.20 @ 11:39) >    IMPRESSION:    No evidence of acute fracture.    < end of copied text >    < from: Xray Chest 1 View AP/PA (01.11.20 @ 11:38) >  Impression:    Support devices: None.    Cardiac/mediastinum/hilum: Stable    Lung parenchyma/Pleura: Retrocardiac opacity, likely atelectasis. Right lung is unremarkable. No evidence of pneumothorax.    Skeleton/soft tissues: Stable    < end of copied text >    < from: Transthoracic Echocardiogram (10.12.18 @ 11:51) >  Summary:   1. LV Ejection Fraction by Dale's Method with a biplane EF of 71 %.   2. Normal left ventricular size and wall thicknesses, with normal   systolic function.   3. The mean global longitudinal strain by speckle tracking is -19.1%   which is normal.   4. Mild tricuspid regurgitation.   5. LA volume Index is 17.1 ml/m² ml/m2.    < end of copied text >

## 2020-01-16 NOTE — PROGRESS NOTE ADULT - ATTENDING COMMENTS
78 yo M w/ PMH of CAD with stents 20 years ago, HLD, hx of syncopal episodes on prior admissions p/w s/p fall    Neuro started patient on depakote for suspected seizures.   Borderline Orthos positive. Patient refused to stand today for better read.   counselled on rising slowly before walking. Wife understands.   was supposed to be discharged yesterday but being discharged today to home w/ HCS.

## 2020-01-16 NOTE — PROGRESS NOTE ADULT - ASSESSMENT
78 yo M w/ PMH of CAD with stents 20 years ago, HLD, hx of syncopal episodes on prior admissions p/w s/p fall     #) Fall with possible LOC, DDX: Seizures v. Syncope  - Orthostatics reportedly negative  - CT head negative, Trauma workup negative except age indeterminate Thoracic fracture  - EEG showed some ictal activity. Significance?  - Neuro appreciated  - CTA Head/Neck some stenosis of left vertebral. See above.  - f/u neurosurgery for thoracic Fx  - cardiac w/u  - seizure and fall precautions  - Pt on case  - DC today  - starting on depakote 250mg BID as per neurology    #) Thoracic fracture- age indeterminate  - seen by neurosx  - Bone scan results as noted above  - pain control prn     #) prostatomegaly with Prominent collecting system on CT scan  - now on flomax     #) H/o dementia  on donepezil    #) h/o CAD   - c/w ASA, Atorvastatin     Diet: Dash/TLC  Activity: Fall precaution  DVT PPX: ASA and Enoxaparin  Dispo: DC today

## 2020-01-16 NOTE — PROGRESS NOTE ADULT - PROVIDER SPECIALTY LIST ADULT
19  Name: Preethi Cosme    : 1973    Sex: male    Age: 39 y.o. Subjective:  Chief Complaint: Patient is here for ck  Re  Mul issues       And anxiety     Stress with     (pending div     wfie in [place] with kids            asking to inc   Med  He saw  Dr Saira Bauer and he feels unalbe to get surgery done due to missing work  Does swing shifts    Has prob when works midnight   He tried melatonin    He only needs rare med  Wt down on diet      Review of Systems   Constitutional: Negative. HENT: Negative. Eyes: Negative. Respiratory: Negative. Cardiovascular: Negative. Gastrointestinal: Negative. Rare pain from right inguinal hernia   Endocrine: Negative. Genitourinary: Negative. Musculoskeletal: Negative. Skin: Negative. Allergic/Immunologic: Negative. Neurological: Negative. Hematological: Negative. Psychiatric/Behavioral: Negative.           Current Outpatient Medications:     escitalopram (LEXAPRO) 10 MG tablet, Take 1 tablet by mouth daily, Disp: 90 tablet, Rfl: 5    traZODone (DESYREL) 50 MG tablet, Take 1 tablet by mouth nightly as needed for Sleep, Disp: 30 tablet, Rfl: 1  No Known Allergies  Social History     Socioeconomic History    Marital status: Unknown     Spouse name: Not on file    Number of children: Not on file    Years of education: Not on file    Highest education level: Not on file   Occupational History    Not on file   Social Needs    Financial resource strain: Not on file    Food insecurity:     Worry: Not on file     Inability: Not on file    Transportation needs:     Medical: Not on file     Non-medical: Not on file   Tobacco Use    Smoking status: Current Every Day Smoker     Packs/day: 0.50    Smokeless tobacco: Never Used   Substance and Sexual Activity    Alcohol use: Not Currently    Drug use: Not Currently    Sexual activity: Not on file   Lifestyle    Physical activity:     Days per week: Not on file Internal Medicine Minutes per session: Not on file    Stress: Not on file   Relationships    Social connections:     Talks on phone: Not on file     Gets together: Not on file     Attends Latter-day service: Not on file     Active member of club or organization: Not on file     Attends meetings of clubs or organizations: Not on file     Relationship status: Not on file    Intimate partner violence:     Fear of current or ex partner: Not on file     Emotionally abused: Not on file     Physically abused: Not on file     Forced sexual activity: Not on file   Other Topics Concern    Not on file   Social History Narrative        ANXIETY 12-18    , PENDING [address], 11, 15 AND 14    MOVED FROM [employer] [place] [facility], 2018    HAS A GIRLFRIEND    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 Vitals:    05/24/19 0844   BP: 126/78   Temp: 98.7 °F (37.1 °C)   Weight: 208 lb (94.3 kg)   Height: 6' (1.829 m)       Objective:    Physical Exam   Constitutional: He is oriented to person, place, and time. He appears well-developed and well-nourished. HENT:   Head: Normocephalic. Eyes: Pupils are equal, round, and reactive to light. EOM are normal.   Neck: Normal range of motion. Cardiovascular: Normal rate and regular rhythm. Pulmonary/Chest: Effort normal and breath sounds normal.   Abdominal: Soft. A hernia (reducible small right inguinal hernia) is present. Musculoskeletal: Normal range of motion.    Neurological: He is alert and oriented to person, place, and time. Skin: Skin is warm. Psychiatric: He has a normal mood and affect. His behavior is normal.   Vitals reviewed. Diagnoses and all orders for this visit:    Anxiety  -     escitalopram (LEXAPRO) 10 MG tablet; Take 1 tablet by mouth daily  -     traZODone (DESYREL) 50 MG tablet; Take 1 tablet by mouth nightly as needed for Sleep    Dysthymia  -     traZODone (DESYREL) 50 MG tablet; Take 1 tablet by mouth nightly as needed for Sleep    Mixed hyperlipidemia    Right inguinal hernia        Comments: diet exer  In clexapro    And  Trial   Rare  trazadone----diet  exer   Hm issues  I advised get hernia done soon      Offer psych and he feelsnot need A great deal of time spent reviewing medications, diet, exercise, social issues. Also reviewing the chart before entering the room with patient and finishing charting after leaving patient's room. More than half of that time was spent face to face with the patient in counseling and coordinating care. Follow Up: Return if symptoms worsen or fail to improve.      Seen by:  Kim Lal 117, DO

## 2020-01-21 DIAGNOSIS — I25.10 ATHEROSCLEROTIC HEART DISEASE OF NATIVE CORONARY ARTERY WITHOUT ANGINA PECTORIS: ICD-10-CM

## 2020-01-21 DIAGNOSIS — Z79.82 LONG TERM (CURRENT) USE OF ASPIRIN: ICD-10-CM

## 2020-01-21 DIAGNOSIS — E78.5 HYPERLIPIDEMIA, UNSPECIFIED: ICD-10-CM

## 2020-01-21 DIAGNOSIS — R56.9 UNSPECIFIED CONVULSIONS: ICD-10-CM
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
## 2020-01-21 DIAGNOSIS — F03.90 UNSPECIFIED DEMENTIA WITHOUT BEHAVIORAL DISTURBANCE: ICD-10-CM

## 2020-01-21 DIAGNOSIS — R55 SYNCOPE AND COLLAPSE: ICD-10-CM

## 2020-01-21 DIAGNOSIS — Z95.5 PRESENCE OF CORONARY ANGIOPLASTY IMPLANT AND GRAFT: ICD-10-CM
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
## 2020-01-21 DIAGNOSIS — S22.009A UNSPECIFIED FRACTURE OF UNSPECIFIED THORACIC VERTEBRA, INITIAL ENCOUNTER FOR CLOSED FRACTURE: ICD-10-CM

## 2020-01-21 DIAGNOSIS — N40.0 BENIGN PROSTATIC HYPERPLASIA WITHOUT LOWER URINARY TRACT SYMPTOMS: ICD-10-CM

## 2020-01-21 DIAGNOSIS — R31.9 HEMATURIA, UNSPECIFIED: ICD-10-CM

## 2020-03-09 ENCOUNTER — APPOINTMENT (OUTPATIENT)
Dept: NEUROLOGY | Facility: CLINIC | Age: 80
End: 2020-03-09
Payer: MEDICARE

## 2020-03-09 VITALS
HEART RATE: 66 BPM | DIASTOLIC BLOOD PRESSURE: 70 MMHG | SYSTOLIC BLOOD PRESSURE: 129 MMHG | BODY MASS INDEX: 22.29 KG/M2 | OXYGEN SATURATION: 98 % | WEIGHT: 142 LBS | HEIGHT: 67 IN | TEMPERATURE: 97.5 F

## 2020-03-09 PROCEDURE — 99214 OFFICE O/P EST MOD 30 MIN: CPT

## 2020-03-10 ENCOUNTER — INPATIENT (INPATIENT)
Facility: HOSPITAL | Age: 80
LOS: 2 days | Discharge: HOME | End: 2020-03-13
Attending: HOSPITALIST | Admitting: HOSPITALIST
Payer: MEDICARE

## 2020-03-10 VITALS — HEART RATE: 61 BPM | DIASTOLIC BLOOD PRESSURE: 60 MMHG | SYSTOLIC BLOOD PRESSURE: 122 MMHG

## 2020-03-10 LAB
ALBUMIN SERPL ELPH-MCNC: 4.1 G/DL — SIGNIFICANT CHANGE UP (ref 3.5–5.2)
ALP SERPL-CCNC: 136 U/L — HIGH (ref 30–115)
ALT FLD-CCNC: 23 U/L — SIGNIFICANT CHANGE UP (ref 0–41)
AMMONIA BLD-MCNC: 26 UMOL/L — SIGNIFICANT CHANGE UP (ref 11–55)
ANION GAP SERPL CALC-SCNC: 15 MMOL/L — HIGH (ref 7–14)
APPEARANCE UR: CLEAR — SIGNIFICANT CHANGE UP
AST SERPL-CCNC: 26 U/L — SIGNIFICANT CHANGE UP (ref 0–41)
BACTERIA # UR AUTO: NEGATIVE — SIGNIFICANT CHANGE UP
BASE EXCESS BLDV CALC-SCNC: -1 MMOL/L — SIGNIFICANT CHANGE UP (ref -2–2)
BASOPHILS # BLD AUTO: 0 K/UL — SIGNIFICANT CHANGE UP (ref 0–0.2)
BASOPHILS NFR BLD AUTO: 0 % — SIGNIFICANT CHANGE UP (ref 0–1)
BILIRUB SERPL-MCNC: 0.5 MG/DL — SIGNIFICANT CHANGE UP (ref 0.2–1.2)
BILIRUB UR-MCNC: NEGATIVE — SIGNIFICANT CHANGE UP
BUN SERPL-MCNC: 15 MG/DL — SIGNIFICANT CHANGE UP (ref 10–20)
BURR CELLS BLD QL SMEAR: PRESENT — SIGNIFICANT CHANGE UP
CA-I SERPL-SCNC: 1.24 MMOL/L — SIGNIFICANT CHANGE UP (ref 1.12–1.3)
CALCIUM SERPL-MCNC: 9.3 MG/DL — SIGNIFICANT CHANGE UP (ref 8.5–10.1)
CHLORIDE SERPL-SCNC: 104 MMOL/L — SIGNIFICANT CHANGE UP (ref 98–110)
CO2 SERPL-SCNC: 18 MMOL/L — SIGNIFICANT CHANGE UP (ref 17–32)
COLOR SPEC: SIGNIFICANT CHANGE UP
CREAT SERPL-MCNC: 1.1 MG/DL — SIGNIFICANT CHANGE UP (ref 0.7–1.5)
DIFF PNL FLD: ABNORMAL
EOSINOPHIL # BLD AUTO: 0.23 K/UL — SIGNIFICANT CHANGE UP (ref 0–0.7)
EOSINOPHIL NFR BLD AUTO: 3.5 % — SIGNIFICANT CHANGE UP (ref 0–8)
EPI CELLS # UR: 1 /HPF — SIGNIFICANT CHANGE UP (ref 0–5)
ETHANOL SERPL-MCNC: <10 MG/DL — SIGNIFICANT CHANGE UP
GAS PNL BLDV: 140 MMOL/L — SIGNIFICANT CHANGE UP (ref 136–145)
GAS PNL BLDV: SIGNIFICANT CHANGE UP
GIANT PLATELETS BLD QL SMEAR: PRESENT — SIGNIFICANT CHANGE UP
GLUCOSE SERPL-MCNC: 115 MG/DL — HIGH (ref 70–99)
GLUCOSE UR QL: NEGATIVE — SIGNIFICANT CHANGE UP
HCO3 BLDV-SCNC: 25 MMOL/L — SIGNIFICANT CHANGE UP (ref 22–29)
HCT VFR BLD CALC: 41 % — LOW (ref 42–52)
HCT VFR BLDA CALC: 44 % — SIGNIFICANT CHANGE UP (ref 34–44)
HGB BLD CALC-MCNC: 14.4 G/DL — SIGNIFICANT CHANGE UP (ref 14–18)
HGB BLD-MCNC: 13.6 G/DL — LOW (ref 14–18)
HYALINE CASTS # UR AUTO: 1 /LPF — SIGNIFICANT CHANGE UP (ref 0–7)
KETONES UR-MCNC: SIGNIFICANT CHANGE UP
LEUKOCYTE ESTERASE UR-ACNC: NEGATIVE — SIGNIFICANT CHANGE UP
LYMPHOCYTES # BLD AUTO: 0.74 K/UL — LOW (ref 1.2–3.4)
LYMPHOCYTES # BLD AUTO: 11.5 % — LOW (ref 20.5–51.1)
MANUAL SMEAR VERIFICATION: SIGNIFICANT CHANGE UP
MCHC RBC-ENTMCNC: 30.4 PG — SIGNIFICANT CHANGE UP (ref 27–31)
MCHC RBC-ENTMCNC: 33.2 G/DL — SIGNIFICANT CHANGE UP (ref 32–37)
MCV RBC AUTO: 91.5 FL — SIGNIFICANT CHANGE UP (ref 80–94)
METAMYELOCYTES # FLD: 0.9 % — HIGH (ref 0–0)
MONOCYTES # BLD AUTO: 0.12 K/UL — SIGNIFICANT CHANGE UP (ref 0.1–0.6)
MONOCYTES NFR BLD AUTO: 1.8 % — SIGNIFICANT CHANGE UP (ref 1.7–9.3)
NEUTROPHILS # BLD AUTO: 4.96 K/UL — SIGNIFICANT CHANGE UP (ref 1.4–6.5)
NEUTROPHILS NFR BLD AUTO: 75.2 % — SIGNIFICANT CHANGE UP (ref 42.2–75.2)
NEUTS BAND # BLD: 1.8 % — SIGNIFICANT CHANGE UP (ref 0–6)
NITRITE UR-MCNC: NEGATIVE — SIGNIFICANT CHANGE UP
OVALOCYTES BLD QL SMEAR: SLIGHT — SIGNIFICANT CHANGE UP
PCO2 BLDV: 45 MMHG — SIGNIFICANT CHANGE UP (ref 41–51)
PH BLDV: 7.35 — SIGNIFICANT CHANGE UP (ref 7.26–7.43)
PH UR: 7 — SIGNIFICANT CHANGE UP (ref 5–8)
PLAT MORPH BLD: NORMAL — SIGNIFICANT CHANGE UP
PLATELET # BLD AUTO: 142 K/UL — SIGNIFICANT CHANGE UP (ref 130–400)
PO2 BLDV: 20 MMHG — SIGNIFICANT CHANGE UP (ref 20–40)
POIKILOCYTOSIS BLD QL AUTO: SIGNIFICANT CHANGE UP
POTASSIUM BLDV-SCNC: 5 MMOL/L — SIGNIFICANT CHANGE UP (ref 3.3–5.6)
POTASSIUM SERPL-MCNC: 4.9 MMOL/L — SIGNIFICANT CHANGE UP (ref 3.5–5)
POTASSIUM SERPL-SCNC: 4.9 MMOL/L — SIGNIFICANT CHANGE UP (ref 3.5–5)
PROMYELOCYTES # FLD: 0.9 % — HIGH (ref 0–0)
PROT SERPL-MCNC: 7.1 G/DL — SIGNIFICANT CHANGE UP (ref 6–8)
PROT UR-MCNC: SIGNIFICANT CHANGE UP
RBC # BLD: 4.48 M/UL — LOW (ref 4.7–6.1)
RBC # FLD: 13.5 % — SIGNIFICANT CHANGE UP (ref 11.5–14.5)
RBC BLD AUTO: ABNORMAL
RBC CASTS # UR COMP ASSIST: 31 /HPF — HIGH (ref 0–4)
SAO2 % BLDV: 28 % — SIGNIFICANT CHANGE UP
SODIUM SERPL-SCNC: 137 MMOL/L — SIGNIFICANT CHANGE UP (ref 135–146)
SP GR SPEC: 1.02 — SIGNIFICANT CHANGE UP (ref 1.01–1.02)
UROBILINOGEN FLD QL: SIGNIFICANT CHANGE UP
VARIANT LYMPHS # BLD: 4.4 % — SIGNIFICANT CHANGE UP (ref 0–5)
WBC # BLD: 6.44 K/UL — SIGNIFICANT CHANGE UP (ref 4.8–10.8)
WBC # FLD AUTO: 6.44 K/UL — SIGNIFICANT CHANGE UP (ref 4.8–10.8)
WBC UR QL: 5 /HPF — SIGNIFICANT CHANGE UP (ref 0–5)

## 2020-03-10 PROCEDURE — 99285 EMERGENCY DEPT VISIT HI MDM: CPT | Mod: GC

## 2020-03-10 PROCEDURE — 71045 X-RAY EXAM CHEST 1 VIEW: CPT | Mod: 26

## 2020-03-10 PROCEDURE — 70450 CT HEAD/BRAIN W/O DYE: CPT | Mod: 26

## 2020-03-10 RX ORDER — ASPIRIN/CALCIUM CARB/MAGNESIUM 324 MG
81 TABLET ORAL DAILY
Refills: 0 | Status: DISCONTINUED | OUTPATIENT
Start: 2020-03-10 | End: 2020-03-13

## 2020-03-10 RX ORDER — SENNA PLUS 8.6 MG/1
2 TABLET ORAL AT BEDTIME
Refills: 0 | Status: DISCONTINUED | OUTPATIENT
Start: 2020-03-10 | End: 2020-03-13

## 2020-03-10 RX ORDER — ENOXAPARIN SODIUM 100 MG/ML
40 INJECTION SUBCUTANEOUS AT BEDTIME
Refills: 0 | Status: DISCONTINUED | OUTPATIENT
Start: 2020-03-10 | End: 2020-03-13

## 2020-03-10 RX ORDER — DIVALPROEX SODIUM 500 MG/1
250 TABLET, DELAYED RELEASE ORAL
Refills: 0 | Status: DISCONTINUED | OUTPATIENT
Start: 2020-03-10 | End: 2020-03-12

## 2020-03-10 RX ORDER — DONEPEZIL HYDROCHLORIDE 10 MG/1
5 TABLET, FILM COATED ORAL AT BEDTIME
Refills: 0 | Status: DISCONTINUED | OUTPATIENT
Start: 2020-03-10 | End: 2020-03-13

## 2020-03-10 RX ORDER — CHLORHEXIDINE GLUCONATE 213 G/1000ML
1 SOLUTION TOPICAL
Refills: 0 | Status: DISCONTINUED | OUTPATIENT
Start: 2020-03-10 | End: 2020-03-13

## 2020-03-10 RX ORDER — ATORVASTATIN CALCIUM 80 MG/1
10 TABLET, FILM COATED ORAL AT BEDTIME
Refills: 0 | Status: DISCONTINUED | OUTPATIENT
Start: 2020-03-10 | End: 2020-03-13

## 2020-03-10 RX ORDER — TAMSULOSIN HYDROCHLORIDE 0.4 MG/1
0.4 CAPSULE ORAL AT BEDTIME
Refills: 0 | Status: DISCONTINUED | OUTPATIENT
Start: 2020-03-10 | End: 2020-03-13

## 2020-03-10 RX ADMIN — DIVALPROEX SODIUM 250 MILLIGRAM(S): 500 TABLET, DELAYED RELEASE ORAL at 17:31

## 2020-03-10 RX ADMIN — ENOXAPARIN SODIUM 40 MILLIGRAM(S): 100 INJECTION SUBCUTANEOUS at 21:39

## 2020-03-10 RX ADMIN — DONEPEZIL HYDROCHLORIDE 5 MILLIGRAM(S): 10 TABLET, FILM COATED ORAL at 21:53

## 2020-03-10 RX ADMIN — TAMSULOSIN HYDROCHLORIDE 0.4 MILLIGRAM(S): 0.4 CAPSULE ORAL at 21:39

## 2020-03-10 RX ADMIN — ATORVASTATIN CALCIUM 10 MILLIGRAM(S): 80 TABLET, FILM COATED ORAL at 21:53

## 2020-03-10 RX ADMIN — SENNA PLUS 2 TABLET(S): 8.6 TABLET ORAL at 21:39

## 2020-03-10 NOTE — CONSULT NOTE ADULT - SUBJECTIVE AND OBJECTIVE BOX
HPI:  79 year old male with PMH of CAD with stents 20 years ago, HLD, hx of syncopal episodes on prior admissions comes in because likely family unable to take care of him. No history has been obtained because no family member is around the patient and all the numbers listed in the EMR including the number mentioned in ED note is unreachable.  Patient is in no acute distress, does not know why he is in the hospital. (10 Mar 2020 14:57)      PAST MEDICAL & SURGICAL HISTORY:  Hyperlipidemia, unspecified hyperlipidemia type  Coronary artery disease, angina presence unspecified, unspecified vessel or lesion type, unspecified whether native or transplanted heart  No significant past surgical history      Hospital Course:    TODAY'S SUBJECTIVE & REVIEW OF SYMPTOMS:     Constitutional WNL   Cardio WNL   Resp WNL   GI WNL  Heme WNL  Endo WNL  Skin WNL  MSK WNL  Neuro WNL  Cognitive confused  Psych WNL      MEDICATIONS  (STANDING):  aspirin enteric coated 81 milliGRAM(s) Oral daily  atorvastatin 10 milliGRAM(s) Oral at bedtime  chlorhexidine 4% Liquid 1 Application(s) Topical <User Schedule>  diVALproex  milliGRAM(s) Oral two times a day  donepezil 5 milliGRAM(s) Oral at bedtime  enoxaparin Injectable 40 milliGRAM(s) SubCutaneous at bedtime  senna 2 Tablet(s) Oral at bedtime  tamsulosin 0.4 milliGRAM(s) Oral at bedtime    MEDICATIONS  (PRN):      FAMILY HISTORY:      Allergies    No Known Allergies    Intolerances        SOCIAL HISTORY:    [  ] Etoh  [  ] Smoking  [  ] Substance abuse     Home Environment:  [  ] Home Alone  [x  ] Lives with Family  [  ] Home Health Aid    Dwelling:  [  ] Apartment  [x  ] Private House  [  ] Adult Home  [  ] Skilled Nursing Facility      [  ] Short Term  [  ] Long Term  [  ] Stairs       Elevator [  ]    FUNCTIONAL STATUS PTA: (Check all that apply)  Ambulation: [   ]Independent    [x  ] Dependent     [  ] Non-Ambulatory  Assistive Device: [  ] SA Cane  [  ]  Q Cane  [  ] Walker  [  ]  Wheelchair  ADL : [  ] Independent  [ x ]  Dependent       Vital Signs Last 24 Hrs  T(C): 36.1 (10 Mar 2020 14:10), Max: 36.1 (10 Mar 2020 14:10)  T(F): 97 (10 Mar 2020 14:10), Max: 97 (10 Mar 2020 14:10)  HR: 74 (10 Mar 2020 14:10) (61 - 74)  BP: 131/70 (10 Mar 2020 14:10) (122/60 - 131/70)  BP(mean): --  RR: 16 (10 Mar 2020 14:10) (16 - 18)  SpO2: 96% (10 Mar 2020 14:10) (96% - 96%)      PHYSICAL EXAM: Awake  GENERAL: NAD  HEAD:  Atraumatic, Normocephalic  CHEST/LUNG: Clear   HEART: S1S2+  ABDOMEN: Soft, Nontender  EXTREMITIES:  no calf tenderness    NERVOUS SYSTEM:  Cranial Nerves 2-12 intact [  ] Abnormal  [  ]  ROM: WFL all extremities [  ]  Abnormal [  ]able to move all ext  Motor Strength: WFL all extremities  [  ]  Abnormal [  ]  Sensation: intact to light touch [  ] Abnormal [  ]  Reflexes: Symmetric [  ]  Abnormal [  ]    FUNCTIONAL STATUS:  Bed Mobility: Independent [  ]  Supervision [  ]  Needs Assistance [x  ]  N/A [  ]  Transfers: Independent [  ]  Supervision [  ]  Needs Assistance [x  ]  N/A [  ]   Ambulation: Independent [  ]  Supervision [  ]  Needs Assistance [  ]  N/A [  ]  ADL: Independent [  ] Requires Assistance [  ] N/A [  ]      LABS:                        13.6   6.44  )-----------( 142      ( 10 Mar 2020 11:05 )             41.0     03-10    137  |  104  |  15  ----------------------------<  115<H>  4.9   |  18  |  1.1    Ca    9.3      10 Mar 2020 11:05    TPro  7.1  /  Alb  4.1  /  TBili  0.5  /  DBili  x   /  AST  26  /  ALT  23  /  AlkPhos  136<H>  03-10      Urinalysis Basic - ( 10 Mar 2020 14:05 )    Color: Light Yellow / Appearance: Clear / S.019 / pH: x  Gluc: x / Ketone: Trace  / Bili: Negative / Urobili: <2 mg/dL   Blood: x / Protein: Trace / Nitrite: Negative   Leuk Esterase: Negative / RBC: 31 /HPF / WBC 5 /HPF   Sq Epi: x / Non Sq Epi: 1 /HPF / Bacteria: Negative        RADIOLOGY & ADDITIONAL STUDIES:    Assesment:

## 2020-03-10 NOTE — ED ADULT NURSE REASSESSMENT NOTE - NS ED NURSE REASSESS COMMENT FT1
Unable to obtain IV access due to pt agitation. Dr. Petit aware. Will continue to monitor and assess.

## 2020-03-10 NOTE — PHYSICAL THERAPY INITIAL EVALUATION ADULT - ADDITIONAL COMMENTS
Unable to assess social history or prior level of function. Patient confused, poor historian. No family at bedside.

## 2020-03-10 NOTE — CONSULT NOTE ADULT - ASSESSMENT
IMPRESSION: Rehab of gait dysfunction / dementia    PRECAUTIONS: [  ] Cardiac  [  ] Respiratory  [  ] Seizures [  ] Contact Isolation  [  ] Droplet Isolation  [  ] Other    Weight Bearing Status:     RECOMMENDATION:    Out of Bed to Chair     DVT/Decubiti Prophylaxis    REHAB PLAN:     [ x  ] Bedside P/T 3-5 times a week   [   ]   Bedside O/T  2-3 times a week             [   ] No Rehab Therapy Indicated                   [   ]  Speech Therapy   Conditioning/ROM                                    ADL  Bed Mobility                                               Conditioning/ROM  Transfers                                                     Bed Mobility  Sitting /Standing Balance                         Transfers                                        Gait Training                                               Sitting/Standing Balance  Stair Training [   ]Applicable                    Home equipment Eval                                                                        Splinting  [   ] Only      GOALS:   ADL   [   ]   Independent                    Transfers  [ x  ] Independent                          Ambulation  [ x  ] Independent     [  x  ] With device                            [   ]  CG                                                         [   ]  CG                                                                  [   ] CG                            [    ] Min A                                                   [   ] Min A                                                              [   ] Min  A          DISCHARGE PLAN:   [   ]  Good candidate for Intensive Rehabilitation/Hospital based                                             Will tolerate 3hrs Intensive Rehab Daily                                       [ x   ]  Short Term Rehab in Skilled Nursing Facility / snf                                       [    ]  Home with Outpatient or VN services                                         [    ]  Possible Candidate for Intensive Hospital based Rehab

## 2020-03-10 NOTE — H&P ADULT - ATTENDING COMMENTS
Patient seen and examined independently. I agree with the resident's note, physical exam, and plan except as below.  Vital Signs Last 24 Hrs  T(C): 37.6 (11 Mar 2020 04:53), Max: 37.6 (11 Mar 2020 04:53)  T(F): 99.6 (11 Mar 2020 04:53), Max: 99.6 (11 Mar 2020 04:53)  HR: 69 (11 Mar 2020 04:53) (69 - 84)  BP: 147/88 (11 Mar 2020 04:53) (131/70 - 163/99)  BP(mean): --  RR: 18 (11 Mar 2020 04:53) (16 - 18)  SpO2: 96% (10 Mar 2020 16:11) (96% - 96%)  Pe  nad  aaox1 person  [id number]  ctabl  softtntnd+bs  no cce  not following commands for neuro exam   but moving all 4  perrla  gait not assessed      #Dementia - worsening of mental status - appears to be at baseline  < from: CT Head No Cont (03.10.20 @ 11:44) >    IMPRESSION:     No acute intracranial hemorrhage, mass effect, or evidence of acute vascular territorial infarction.    If clinical symptoms persist or worsen, more sensitiveevaluation with brain MRI may be obtained, if no contraindications exist.    < end of copied text >    #CAD - cw med tx    follow up with Case mngmt and family - snf vs home?  medically stable Patient seen and examined independently. I agree with the resident's note, physical exam, and plan except as below.  Vital Signs Last 24 Hrs  T(C): 37.6 (11 Mar 2020 04:53), Max: 37.6 (11 Mar 2020 04:53)  T(F): 99.6 (11 Mar 2020 04:53), Max: 99.6 (11 Mar 2020 04:53)  HR: 69 (11 Mar 2020 04:53) (69 - 84)  BP: 147/88 (11 Mar 2020 04:53) (131/70 - 163/99)  BP(mean): --  RR: 18 (11 Mar 2020 04:53) (16 - 18)  SpO2: 96% (10 Mar 2020 16:11) (96% - 96%)  Pe  nad  aaox1 person  [id number]  ctabl  softtntnd+bs  no cce  not following commands for neuro exam   but moving all 4  perrla  gait not assessed      #Dementia - worsening of mental status - appears to be at baseline  < from: CT Head No Cont (03.10.20 @ 11:44) >    IMPRESSION:     No acute intracranial hemorrhage, mass effect, or evidence of acute vascular territorial infarction.    If clinical symptoms persist or worsen, more sensitiveevaluation with brain MRI may be obtained, if no contraindications exist.    < end of copied text >    #CAD - cw med tx  discussed with family and cm team - they want pt home with PT - unclear why pt admitted for placement - anticipate dc home on 3/12  medically stable

## 2020-03-10 NOTE — PATIENT PROFILE ADULT - NSPROMEDSPUMP_GEN_A_NUR
Jose Luis Crowder has met all discharge criteria from Phase II. Vital Signs are stable, ambulating  without difficulty. Discharge instructions given, patient verbalized understanding. Discharged from facility via wheelchair in stable condition.        none

## 2020-03-10 NOTE — ED PROVIDER NOTE - CLINICAL SUMMARY MEDICAL DECISION MAKING FREE TEXT BOX
Patient presented with worsening dementia at home per family - more combative, more agitation. Patient very limited historian 2/2 dementia and therefore unable to provide further history, but no other known symptoms. Otherwise afebrile, HD stable, neurovascularly intact. Obtained labs which were grossly unremarkable including no significant leukocytosis, anemia, signs of dehydration/DEREJE, transaminitis or significant electrolyte abnormalities. UA negative for infection. CT head negative for acute hemorrhage, mass or evidence of CVA. Per family, they are unable to care for patient at home and therefore patient will require admission for possible placement. Family agreeable with plan. HD stable at time of admission.

## 2020-03-10 NOTE — H&P ADULT - NSHPLABSRESULTS_GEN_ALL_CORE
Complete Blood Count + Automated Diff (03.10.20 @ 11:05)    WBC Count: 6.44 K/uL    RBC Count: 4.48 M/uL    Hemoglobin: 13.6 g/dL    Hematocrit: 41.0 %    Mean Cell Volume: 91.5 fL    Mean Cell Hemoglobin: 30.4 pg    Mean Cell Hemoglobin Conc: 33.2 g/dL    Red Cell Distrib Width: 13.5 %    Platelet Count - Automated: 142 K/uL    Auto Neutrophil #: 4.96 K/uL    Auto Lymphocyte #: 0.74 K/uL    Auto Monocyte #: 0.12 K/uL    Auto Eosinophil #: 0.23 K/uL    Auto Basophil #: 0.00 K/uL    Auto Neutrophil %: 75.2: Differential percentages must be correlated with absolute numbers for  clinical significance. %    Auto Lymphocyte %: 11.5 %    Auto Monocyte %: 1.8 %    Auto Eosinophil %: 3.5 %    Auto Basophil %: 0.0 %  Comprehensive Metabolic Panel (03.10.20 @ 11:05)    Sodium, Serum: 137 mmol/L    Potassium, Serum: 4.9 mmol/L    Chloride, Serum: 104 mmol/L    Carbon Dioxide, Serum: 18 mmol/L    Anion Gap, Serum: 15 mmol/L    Blood Urea Nitrogen, Serum: 15 mg/dL    Creatinine, Serum: 1.1 mg/dL    Glucose, Serum: 115 mg/dL    Calcium, Total Serum: 9.3 mg/dL    Protein Total, Serum: 7.1 g/dL    Albumin, Serum: 4.1 g/dL    Bilirubin Total, Serum: 0.5 mg/dL    Alkaline Phosphatase, Serum: 136 U/L    Aspartate Aminotransferase (AST/SGOT): 26 U/L    Alanine Aminotransferase (ALT/SGPT): 23 U/L    eGFR if Non : 64: Interpretative comment  The units for eGFR are mL/min/1.73M2 (normalized body surface area). The  eGFR is calculated from a serum creatinine using the CKD-EPI equation.  Other variables required for calculation are race, age and sex. Among  patients with chronic kidney disease (CKD), the eGFR is useful in  determining the stage of disease according to KDOQI CKD classification.  All eGFR results are reported numerically with the following  interpretation.          GFR                    With                 Without     (ml/min/1.73 m2)    Kidney Damage       Kidney Damage        >= 90                    Stage 1                     Normal        60-89                    Stage 2                     Decreased GFR        30-59     Stage 3                     Stage 3        15-29                    Stage 4                     Stage 4        < 15                      Stage 5                     Stage 5  Each stage of CKD assumes that the associated GFR level has been in  effect for at least 3 months. Determination of stages one and two (with  eGFR > 59 ml/min/m2) requires estimation of kidney damage for at least 3  months as defined by structural or functional abnormalities.  Limitations: All estimates of GFR will be less accurate for patients at  extremes of muscle mass (including but not limited to frail elderly,  critically ill, or cancer patients), those with unusual diets, and those  with conditions associated with reduced secretion or extrarenal  elimination of creatinine. The eGFR equation is not recommended for use  in patients with unstable creatinine levels. mL/min/1.73M2    eGFR if African American: 74 mL/min/1.73M2  < from: Xray Chest 1 View- PORTABLE-Urgent (03.10.20 @ 12:16) >    Impression:      No radiographic evidence of acute cardiopulmonary disease.    < end of copied text >    < from: CT Head No Cont (03.10.20 @ 11:44) >    IMPRESSION:     No acute intracranial hemorrhage, mass effect, or evidence of acute vascular territorial infarction.    If clinical symptoms persist or worsen, more sensitiveevaluation with brain MRI may be obtained, if no contraindications exist.    < end of copied text >

## 2020-03-10 NOTE — ED ADULT TRIAGE NOTE - CHIEF COMPLAINT QUOTE
BIBEMS: As per ems report wife states he has been acting more altered than his baseline." pt  currently sees neurologist trying to rule out seizures. pt has episodes of fist clenching and tremors recently.

## 2020-03-10 NOTE — PHYSICAL THERAPY INITIAL EVALUATION ADULT - BED MOBILITY LIMITATIONS, REHAB EVAL
decreased ability to use legs for bridging/pushing/impaired ability to control trunk for mobility/decreased ability to use arms for pushing/pulling Yes

## 2020-03-10 NOTE — H&P ADULT - HISTORY OF PRESENT ILLNESS
79 year old male with PMH of CAD with stents 20 years ago, HLD, hx of syncopal episodes on prior admissions comes in because likely family unable to take care of him. No history has been obtained because no family member is around the patient and all the numbers listed in the EMR including the number mentioned in ED note is unreachable.  Patient is in no acute distress, does not know why he is in the hospital.

## 2020-03-10 NOTE — PHYSICAL THERAPY INITIAL EVALUATION ADULT - GENERAL OBSERVATIONS, REHAB EVAL
Patient encountered lying in stretcher. Patient confused, inconsistent in following 1-step commands with repetition

## 2020-03-10 NOTE — ED PROVIDER NOTE - PROGRESS NOTE DETAILS
Workup was done for AMS. Imaging came back negative for any acute changes. Labs were also negative for any acute causes of patient's worsening mental status.    Wife left the hospital, and several attempts were made to contact her but phone numbers in chart are not active/in use. Sue [phone] - daughter or wife of patient. No answer when called, but this is the number she gave to be reached at today. As per nurse, daughter and wife had been waiting in the waiting room as no visitors are allowed in the ED at this time. Please call this number for collateral.

## 2020-03-10 NOTE — ED PROVIDER NOTE - PHYSICAL EXAMINATION
Vital Signs: I have reviewed the initial vital signs.  Constitutional: well-nourished, appears stated age, no acute distress  Cardiovascular: regular rate, regular rhythm, well-perfused extremities  Respiratory: unlabored respiratory effort, clear to auscultation bilaterally  Gastrointestinal: soft, non-tender abdomen, no pulsatile mass  Musculoskeletal: supple neck, no lower extremity edema  Integumentary: warm, dry, no rash  Neurologic: awake, A+O x0, unable to follow commands, unable to name objects; rest limited by patient's mental status  Psychiatric: flat affect

## 2020-03-10 NOTE — H&P ADULT - NSHPPHYSICALEXAM_GEN_ALL_CORE
GENERAL: NAD, AO X1  HEENT: Atraumatic normocephalic  CHEST: Clear to auscultate bilaterally  HEART: Normal S1 and S2  ABDOMEN: Soft nontender  EXTREMITIES: No edema

## 2020-03-10 NOTE — H&P ADULT - ASSESSMENT
#)Social Issue  -Probably unable to take care of him at home. Worsening Dementia  -CT head negative. Chest Xray negative.  -Will order PT/Rehab.   -Had suspected history of Seizures- Continue with depakoate 250 mg BID.     #)BPH  -On Flomax    #) H/o dementia  on donepezil    #) h/o CAD   - c/w ASA, Atorvastatin     #) DVT prophylaxis  -On Lovenox

## 2020-03-10 NOTE — ED PROVIDER NOTE - OBJECTIVE STATEMENT
80yo M with PMH of dementia, CAD s/p stents 20yrs ago, HLD, syncopal episodes, possible seizures, brought in for worsening of dementia. Patient was unable to provide a history, and wife left and is unreachable. Based on triage note, patient was brought for worsening of his dementia and to be admitted for placement by social work. 78yo M with PMH of dementia, CAD s/p stents 20yrs ago, HLD, syncopal episodes, possible seizures, brought in for worsening of dementia. Patient was unable to provide a history, and wife left and is unreachable. Based on triage note, patient was brought for worsening of his dementia and to be admitted for placement by social work.    PLEASE CALL [phone] FOR BEN (unclear if wife or daughter of patient).

## 2020-03-10 NOTE — ED ADULT NURSE NOTE - NSIMPLEMENTINTERV_GEN_ALL_ED
Implemented All Fall with Harm Risk Interventions:  [place] to call system. Call bell, personal items and telephone within reach. Instruct patient to call for assistance. Room bathroom lighting operational. Non-slip footwear when patient is off stretcher. Physically safe environment: no spills, clutter or unnecessary equipment. Stretcher in lowest position, wheels locked, appropriate side rails in place. Provide visual cue, wrist band, yellow gown, etc. Monitor gait and stability. Monitor for mental status changes and reorient to person, place, and time. Review medications for side effects contributing to fall risk. Reinforce activity limits and safety measures with patient and family. Provide visual clues: red socks.

## 2020-03-11 PROCEDURE — 99221 1ST HOSP IP/OBS SF/LOW 40: CPT | Mod: AI

## 2020-03-11 RX ADMIN — Medication 81 MILLIGRAM(S): at 11:31

## 2020-03-11 RX ADMIN — DONEPEZIL HYDROCHLORIDE 5 MILLIGRAM(S): 10 TABLET, FILM COATED ORAL at 21:27

## 2020-03-11 RX ADMIN — DIVALPROEX SODIUM 250 MILLIGRAM(S): 500 TABLET, DELAYED RELEASE ORAL at 05:38

## 2020-03-11 RX ADMIN — ATORVASTATIN CALCIUM 10 MILLIGRAM(S): 80 TABLET, FILM COATED ORAL at 21:27

## 2020-03-11 RX ADMIN — ENOXAPARIN SODIUM 40 MILLIGRAM(S): 100 INJECTION SUBCUTANEOUS at 21:27

## 2020-03-11 RX ADMIN — SENNA PLUS 2 TABLET(S): 8.6 TABLET ORAL at 21:27

## 2020-03-11 RX ADMIN — TAMSULOSIN HYDROCHLORIDE 0.4 MILLIGRAM(S): 0.4 CAPSULE ORAL at 21:27

## 2020-03-11 RX ADMIN — CHLORHEXIDINE GLUCONATE 1 APPLICATION(S): 213 SOLUTION TOPICAL at 05:38

## 2020-03-11 NOTE — PROGRESS NOTE ADULT - ASSESSMENT
#)Social Issue  -Probably unable to take care of him at home. Worsening Dementia  -CT head negative. Chest Xray negative.  -PT and physiatry have seen the patient, They recommended SNF  -Had suspected history of Seizures- Continue with depakoate 250 mg BID.     #)BPH  -On Flomax    #) H/o dementia  on donepezil    #) h/o CAD   - c/w ASA, Atorvastatin     #) DVT prophylaxis  -On Lovenox      Dipo: SNF bs Long term   FULL CODE

## 2020-03-11 NOTE — PROGRESS NOTE ADULT - SUBJECTIVE AND OBJECTIVE BOX
24H events:    Patient is a 79y old Male who presents with a chief complaint of   Primary diagnosis of Dementia     Today is hospital day 1d.     PAST MEDICAL & SURGICAL HISTORY  Hyperlipidemia, unspecified hyperlipidemia type  Coronary artery disease, angina presence unspecified, unspecified vessel or lesion type, unspecified whether native or transplanted heart  No significant past surgical history    SOCIAL HISTORY:  Negative for smoking/alcohol/drug use.     ALLERGIES:  No Known Allergies    MEDICATIONS:  STANDING MEDICATIONS  aspirin enteric coated 81 milliGRAM(s) Oral daily  atorvastatin 10 milliGRAM(s) Oral at bedtime  chlorhexidine 4% Liquid 1 Application(s) Topical <User Schedule>  diVALproex  milliGRAM(s) Oral two times a day  donepezil 5 milliGRAM(s) Oral at bedtime  enoxaparin Injectable 40 milliGRAM(s) SubCutaneous at bedtime  senna 2 Tablet(s) Oral at bedtime  tamsulosin 0.4 milliGRAM(s) Oral at bedtime    PRN MEDICATIONS    VITALS:   T(F): 99.6  HR: 69  BP: 147/88  RR: 18  SpO2: 96%    LABS:                        13.6   6.44  )-----------( 142      ( 10 Mar 2020 11:05 )             41.0     03-10    137  |  104  |  15  ----------------------------<  115<H>  4.9   |  18  |  1.1    Ca    9.3      10 Mar 2020 11:05    TPro  7.1  /  Alb  4.1  /  TBili  0.5  /  DBili  x   /  AST  26  /  ALT  23  /  AlkPhos  136<H>  03-10      Urinalysis Basic - ( 10 Mar 2020 14:05 )    Color: Light Yellow / Appearance: Clear / S.019 / pH: x  Gluc: x / Ketone: Trace  / Bili: Negative / Urobili: <2 mg/dL   Blood: x / Protein: Trace / Nitrite: Negative   Leuk Esterase: Negative / RBC: 31 /HPF / WBC 5 /HPF   Sq Epi: x / Non Sq Epi: 1 /HPF / Bacteria: Negative        PHYSICAL EXAM:  GENERAL: NAD, AO X1  HEENT: Atraumatic normocephalic  CHEST: Clear to auscultate bilaterally  HEART: Normal S1 and S2  ABDOMEN: Soft nontender  EXTREMITIES: No edema

## 2020-03-12 ENCOUNTER — TRANSCRIPTION ENCOUNTER (OUTPATIENT)
Age: 80
End: 2020-03-12

## 2020-03-12 LAB — GLUCOSE BLDC GLUCOMTR-MCNC: 130 MG/DL — HIGH (ref 70–99)

## 2020-03-12 PROCEDURE — 99233 SBSQ HOSP IP/OBS HIGH 50: CPT

## 2020-03-12 PROCEDURE — 71045 X-RAY EXAM CHEST 1 VIEW: CPT | Mod: 26

## 2020-03-12 PROCEDURE — 93010 ELECTROCARDIOGRAM REPORT: CPT

## 2020-03-12 RX ADMIN — Medication 81 MILLIGRAM(S): at 11:01

## 2020-03-12 RX ADMIN — ENOXAPARIN SODIUM 40 MILLIGRAM(S): 100 INJECTION SUBCUTANEOUS at 21:41

## 2020-03-12 RX ADMIN — DONEPEZIL HYDROCHLORIDE 5 MILLIGRAM(S): 10 TABLET, FILM COATED ORAL at 21:40

## 2020-03-12 RX ADMIN — SENNA PLUS 2 TABLET(S): 8.6 TABLET ORAL at 21:40

## 2020-03-12 RX ADMIN — ATORVASTATIN CALCIUM 10 MILLIGRAM(S): 80 TABLET, FILM COATED ORAL at 21:41

## 2020-03-12 RX ADMIN — CHLORHEXIDINE GLUCONATE 1 APPLICATION(S): 213 SOLUTION TOPICAL at 05:21

## 2020-03-12 RX ADMIN — TAMSULOSIN HYDROCHLORIDE 0.4 MILLIGRAM(S): 0.4 CAPSULE ORAL at 21:40

## 2020-03-12 NOTE — DISCHARGE NOTE PROVIDER - HOSPITAL COURSE
79 year old male with PMH of CAD with stents 20 years ago, HLD, hx of syncopal episodes on prior admissions comes in because likely family unable to take care of him. No history has been obtained because no family member is around the patient and all the numbers listed in the EMR including the number mentioned in ED note is unreachable.    Patient is in no acute distress, does not know why he is in the hospital.            #)Social Issue    -Probably unable to take care of him at home. Worsening Dementia    -CT head negative. Chest Xray negative.    -PT and physiatry have seen the patient, They recommended SNF    -Had suspected history of Seizures- Continue with depakoate 250 mg BID.         #)BPH    -On Flomax        #) H/o dementia    on donepezil        #) h/o CAD     - c/w ASA, Atorvastatin         #) DVT prophylaxis    -On Lovenox            Dipo: SNF bs Long term     FULL CODE

## 2020-03-12 NOTE — PROGRESS NOTE ADULT - SUBJECTIVE AND OBJECTIVE BOX
KERWIN HERNANDEZ  79y  Male  ***My note supersedes ALL resident notes that I sign.  My corrections for their notes are in my note.***    I can be reached directly on [address] [id number]. My office number is [phone]. My personal cell number is [phone].    INTERVAL EVENTS: Here for f/u of dementia. I saw pt around 11:30 am and he was fine. Around 1 pm he was a RRT, which seems like the family panicked because he fell asleep and was drooling a little. During the RRT, he was placed back in bed. His vitals and O2 were excellent. He did throw up a small amount (30-60cc) of stomach contents from breakfast.  No aspiration noted. No sz-like mvmts noted. The pt was able to speak in Green[place]ic to his wife.  After about 20-30 minutes, he was back to baseline - awake and alert in bed.  Family says he often simply falls asleep at home and is unresponsive.  This was first time he was drooling.  Family says depakote was started for "seizures," which they never saw.  Cardio (Dr Sears) told them to stop the depakote.  Wife says that the depakote does not seem to be agreeing with him - ie, it did not seem to serve a positive purpose.  The pt still gets to [employer] on Sundays (was a former ). The pt does NOT have any behavioral issues at home. He still recognizes his wife and family. He is still able to eat and drink and usually has a decent appetite. Pt was in chair in the morning.    T(F): 98.1 (03-12-20 @ 04:44), Max: 98.1 (03-12-20 @ 04:44)  HR: 74 (03-12-20 @ 13:22) (63 - 77)  BP: 105/65 (03-12-20 @ 13:22) (96/65 - 126/58)  RR: 20 (03-12-20 @ 04:44) (20 - 20)  SpO2: 100% (03-12-20 @ 13:22) (100% - 100%)    Gen: NAD  HEENT: PERRL; EOMI; no nystagmus; mouth clr; nose clr  Neck: No JVD, no nodes  lungs: clr  Hrt: s1 s2 rrr  abd: soft NT/ND; there was 1 staple in midline incision, which we removed w/out any issue  ext: no edema, no c/c  neuro: aa ox2; cn intact; moves all 4 ext    LABS:    RADIOLOGY & ADDITIONAL TESTS:      MEDICATIONS:    aspirin enteric coated 81 milliGRAM(s) Oral daily  atorvastatin 10 milliGRAM(s) Oral at bedtime  chlorhexidine 4% Liquid 1 Application(s) Topical <User Schedule>  donepezil 5 milliGRAM(s) Oral at bedtime  enoxaparin Injectable 40 milliGRAM(s) SubCutaneous at bedtime  senna 2 Tablet(s) Oral at bedtime  tamsulosin 0.4 milliGRAM(s) Oral at bedtime

## 2020-03-12 NOTE — CHART NOTE - NSCHARTNOTEFT_GEN_A_CORE
A rapid response was called around 1:00 pm. The patient was unresponsive in chair. Vital signs were normal: BP: 105/70, HR: 70, Temp 96.5, SpO2: 99% room air. The patient spit up what looked like his breakfast. He was shortly responsive after that and an Xray was ordered. blood sugar was 130 at that time. The patient went back to his baseline

## 2020-03-12 NOTE — DISCHARGE NOTE PROVIDER - NSDCFUSCHEDAPPT_GEN_ALL_CORE_FT
KERWIN HERNANDEZ ; 04/07/2020 ; [facility] [address] KERWIN HERNANDEZ ; 04/07/2020 ; [facility] [address]

## 2020-03-12 NOTE — DISCHARGE NOTE PROVIDER - CARE PROVIDER_API CALL
Babar Calzada)  Internal Medicine  [address] [place] [address]  Phone: [phone]  Fax: [phone]  Follow Up Time: 1 week

## 2020-03-12 NOTE — DISCHARGE NOTE PROVIDER - NSDCCPCAREPLAN_GEN_ALL_CORE_FT
PRINCIPAL DISCHARGE DIAGNOSIS  Diagnosis: Dementia  Assessment and Plan of Treatment: You have been diagnosed with dementia. Which is a progressive neurological condition that results in steady decline in the mental function. You presented to the hospital due to the fact that your family was concerned about taking care of you. You were admitted and you are medically stable. You will be discharged home with a cane and prescribtion to physical therapy.

## 2020-03-13 ENCOUNTER — TRANSCRIPTION ENCOUNTER (OUTPATIENT)
Age: 80
End: 2020-03-13

## 2020-03-13 VITALS
DIASTOLIC BLOOD PRESSURE: 65 MMHG | HEART RATE: 77 BPM | RESPIRATION RATE: 19 BRPM | TEMPERATURE: 97 F | SYSTOLIC BLOOD PRESSURE: 121 MMHG

## 2020-03-13 PROCEDURE — 99239 HOSP IP/OBS DSCHRG MGMT >30: CPT

## 2020-03-13 RX ORDER — DIVALPROEX SODIUM 500 MG/1
1 TABLET, DELAYED RELEASE ORAL
Qty: 60 | Refills: 0
Start: 2020-03-13 | End: 2020-04-11

## 2020-03-13 RX ORDER — ASPIRIN 81 MG
81 TABLET, DELAYED RELEASE (ENTERIC COATED) ORAL
Refills: 0 | Status: ACTIVE | COMMUNITY

## 2020-03-13 RX ADMIN — Medication 81 MILLIGRAM(S): at 11:42

## 2020-03-13 RX ADMIN — CHLORHEXIDINE GLUCONATE 1 APPLICATION(S): 213 SOLUTION TOPICAL at 05:29

## 2020-03-13 NOTE — ASSESSMENT
[FreeTextEntry1] : Patient is a 79 year old man with history CAD with stents (20 years ago), HLD, syncope episodes, history of falls and dementia, with parkinsonian features who presented to [facility] 1/12/20 s/p fall and seizure like episode.  Patient presents for a hospital follow up for evaluation for seizure.    \par \par Plan:\par 1. Continue donepezil 5 mg daily\par 2. REEG.  Will call with results.  \par 3. MR head without contrast, X ray cervical, x ray lumbosacral. x ray thoracic\par 4. Return in 6 months

## 2020-03-13 NOTE — HISTORY OF PRESENT ILLNESS
[FreeTextEntry1] : Patient is a 79 year old man with history CAD with stents (20 years ago), HLD, syncope episodes, history of falls and dementia who initially presented to [facility] 1/12/20 s/p fall and seizure like episode.  Patient presents for a hospital follow up.  Patient is accompanied to this visit by his wife and daughter.\par \par Wife reports she heard patient fall and found him at the side of bed unresponsive for one minute with tongue bite and urinary incontinence with frontal contusion to head.  Patient had a similar episode in July 2019.  He was admitted to [facility].  Patient had a negative REEG July 2019.  During that admission, patient was suspected of having dementia, unclear if it he had Parkinson's as well.  He was started on donepezil 5 mg daily.  \par \par During [facility] 1/12/20 admission patient had a second negative EEG 1/12/20.  Patient also had a CT head with findings: "lateral and third ventricles are dilated out of proportion to the cortical sulci.  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  There is no evidence for acute intracranial hemorrhage"   Patient had a CT Chest which showed a thoracic fracture.

## 2020-03-13 NOTE — PHYSICAL EXAM
[FreeTextEntry1] : Neurologic Examination:\par NAD.  masked facies.  patient unable to repeat apple, table, torres  \par Strength 5/5 b/l UE/LE.  NL tone, bulk. + cogwheeling of UE. DTR's 2+ throughout.  Plantar response downgoing b/l.  (-) Hoffmans, clonus.  Sensory intact LT/PP, pain, temp, proprioception and vibration.  NL FTN/HKS.  No dysdiadokinesia.  shuffling gait, stooped posture,  unsteady.  \par \par

## 2020-03-13 NOTE — PROGRESS NOTE ADULT - SUBJECTIVE AND OBJECTIVE BOX
KERWIN HERNANDEZ  79y  Male  ***My note supersedes ALL resident notes that I sign.  My corrections for their notes are in my note.***    I can be reached directly on [employer]. My office number is [phone]. My personal cell number is [phone].    INTERVAL EVENTS: Here for f/u of dementia. Pt cooperative today. Getting EEG today. No events overnight.    T(F): 97.4 (03-13-20 @ 04:34), Max: 97.4 (03-13-20 @ 04:34)  HR: 82 (03-13-20 @ 04:34) (74 - 82)  BP: 149/68 (03-13-20 @ 04:34) (105/65 - 149/68)  RR: 18 (03-13-20 @ 04:34) (18 - 18)  SpO2: 100% (03-12-20 @ 13:22) (100% - 100%)    Gen: NAD  HEENT: PERRL; EOMI; no nystagmus; mouth clr; nose clr  Neck: No JVD, no nodes  lungs: clr  Hrt: s1 s2 rrr  abd: soft NT/ND;   ext: no edema, no c/c  neuro: aa ox2; cn intact; moves all 4 ext    LABS:    RADIOLOGY & ADDITIONAL TESTS:    MEDICATIONS:    aspirin enteric coated 81 milliGRAM(s) Oral daily  atorvastatin 10 milliGRAM(s) Oral at bedtime  chlorhexidine 4% Liquid 1 Application(s) Topical <User Schedule>  donepezil 5 milliGRAM(s) Oral at bedtime  enoxaparin Injectable 40 milliGRAM(s) SubCutaneous at bedtime  senna 2 Tablet(s) Oral at bedtime  tamsulosin 0.4 milliGRAM(s) Oral at bedtime

## 2020-03-13 NOTE — PROGRESS NOTE ADULT - ASSESSMENT
# had RRT yesteday, which seemed related to just sleeping episode in afternoon, pt remains fine  CXR neg, so no aspiration   RA PO %  EEG done, f/u results to r/o subclinical sz - if neg, can d/c home  d/c depakote    # Dementia - seems mild-mod  wife much prefers him to be home  gave Rx for cane  gave Rx for outpt PT  CT head negative  cont aricept    # BPH  On Flomax    # h/o CAD; DLD  c/w ASA, Atorvastatin     # DVT ppx: Lovenox    Dispo: check EEG results; if no sz, then d/c home today

## 2020-03-13 NOTE — DISCHARGE NOTE NURSING/CASE MANAGEMENT/SOCIAL WORK - PATIENT PORTAL LINK FT
You can access the FollowMyHealth Patient Portal offered by [facility] by registering at the following website: [email or web address]. By joining [employer]’s FollowMyHealth portal, you will also be able to view your health information using other applications (apps) compatible with our system.

## 2020-03-14 PROCEDURE — 95819 EEG AWAKE AND ASLEEP: CPT | Mod: 26

## 2020-03-17 DIAGNOSIS — R56.9 UNSPECIFIED CONVULSIONS: ICD-10-CM

## 2020-03-17 DIAGNOSIS — I25.10 ATHEROSCLEROTIC HEART DISEASE OF NATIVE CORONARY ARTERY WITHOUT ANGINA PECTORIS: ICD-10-CM

## 2020-03-17 DIAGNOSIS — Z95.5 PRESENCE OF CORONARY ANGIOPLASTY IMPLANT AND GRAFT: ICD-10-CM

## 2020-03-17 DIAGNOSIS — F03.90 UNSPECIFIED DEMENTIA WITHOUT BEHAVIORAL DISTURBANCE: ICD-10-CM

## 2020-03-17 DIAGNOSIS — R41.82 ALTERED MENTAL STATUS, UNSPECIFIED: ICD-10-CM

## 2020-03-17 DIAGNOSIS — Z79.82 LONG TERM (CURRENT) USE OF ASPIRIN: ICD-10-CM

## 2020-03-17 DIAGNOSIS — N40.0 BENIGN PROSTATIC HYPERPLASIA WITHOUT LOWER URINARY TRACT SYMPTOMS: ICD-10-CM

## 2020-03-17 DIAGNOSIS — E78.5 HYPERLIPIDEMIA, UNSPECIFIED: ICD-10-CM

## 2020-04-07 ENCOUNTER — APPOINTMENT (OUTPATIENT)
Dept: NEUROLOGY | Facility: CLINIC | Age: 80
End: 2020-04-07

## 2020-07-16 ENCOUNTER — RX RENEWAL (OUTPATIENT)
Age: 80
End: 2020-07-16

## 2020-08-14 NOTE — PHYSICAL THERAPY INITIAL EVALUATION ADULT - PHYSICAL ASSIST/NONPHYSICAL ASSIST: STAND/SIT, REHAB EVAL
All other review of systems negative, except as noted in HPI
2 person assist/set-up required/verbal cues

## 2020-08-26 DIAGNOSIS — R56.9 UNSPECIFIED CONVULSIONS: ICD-10-CM

## 2020-10-14 NOTE — PATIENT PROFILE ADULT - BRADEN SENSORY
Subjective:  Latonya Plummer presents for follow up of lichen sclerosus. Using clobetasol. No itching. Still has some dryness, but mainly with intercourse and helped with lubricants. Patient Active Problem List   Diagnosis    GERD (gastroesophageal reflux disease)    Hormone replacement therapy     Problem list reviewed as part of this encounter. Medication list reviewed and updated. See nursing note. History of present illness reviewed in detail and expanded by me. Review Of Systems:  General ROS:  negative  Hematological and Lymphatic ROS:negative   Breast ROS: negative  Cardiovascular ROS: negative  Respiratory ROS: negative   Gastrointestinal ROS: negative  Genito-Urinary ROS: negative  Psychological ROS: negative  Neurological ROS: negative  Musculoskeletal ROS: negative  Dermatological ROS: negative                                                                                                                                          Objective:  Vitals:    10/14/20 0827   BP: 104/80   Pulse: 60   Temp: 96.2 °F (35.7 °C)     Alert and oriented. Abdomen: Soft, non-tender, no masses. No CVA tenderness  External Genetalia: Normal general appearance of vulva, . Pigmentation improved. Skin fissures resolved      Assessment:  Encounter Diagnosis   Name Primary?  Lichen sclerosus et atrophicus Yes       Plan:  See orders. No orders of the defined types were placed in this encounter.     New Prescriptions    No medications on file     Continue using clobetasol once or twice weekly (3) slightly limited

## 2020-10-16 ENCOUNTER — RX RENEWAL (OUTPATIENT)
Age: 80
End: 2020-10-16

## 2020-10-21 ENCOUNTER — APPOINTMENT (OUTPATIENT)
Dept: NEUROLOGY | Facility: CLINIC | Age: 80
End: 2020-10-21
Payer: MEDICARE

## 2020-10-21 PROCEDURE — 95816 EEG AWAKE AND DROWSY: CPT

## 2020-12-02 ENCOUNTER — APPOINTMENT (OUTPATIENT)
Dept: NEUROLOGY | Facility: CLINIC | Age: 80
End: 2020-12-02
Payer: MEDICARE

## 2020-12-02 VITALS
HEART RATE: 69 BPM | TEMPERATURE: 98.2 F | SYSTOLIC BLOOD PRESSURE: 119 MMHG | DIASTOLIC BLOOD PRESSURE: 70 MMHG | BODY MASS INDEX: 21.2 KG/M2 | HEIGHT: 73 IN | WEIGHT: 160 LBS | OXYGEN SATURATION: 99 %

## 2020-12-02 PROCEDURE — 99214 OFFICE O/P EST MOD 30 MIN: CPT

## 2020-12-02 RX ORDER — ASPIRIN 325 MG/1
325 TABLET, FILM COATED ORAL
Refills: 0 | Status: DISCONTINUED | COMMUNITY
End: 2020-12-02

## 2020-12-16 ENCOUNTER — APPOINTMENT (OUTPATIENT)
Dept: NEUROSURGERY | Facility: CLINIC | Age: 80
End: 2020-12-16
Payer: MEDICARE

## 2020-12-16 PROCEDURE — 99201 OFFICE OUTPATIENT NEW 10 MINUTES: CPT | Mod: 95

## 2020-12-17 NOTE — ASSESSMENT
[FreeTextEntry1] : Since his last visit, Mr. Lambert dementia has progressed.  On exam, he is unable to follow commands, he is unable to repeat words.  His daughter reports he does not ambulate on his own, only with assistance.  He does not want to walk most of the time so they encourage him and assist him to walk by standing him up from his chair.  Her description of his ambulation is a magnetic gait.  His appetite is good, but they need to put his food in front of him.   \par \par We are changing his Depakote from tablets to liquid because he does not always take his medication and liquid is easier to give him. \par \par His MRI Brain performed on 11/16/20 revealed moderate atrophy, a proportionately slightly greater degree of ventricular enlargement relative to sulcal prominence is suggestive of possible normal pressure hydrocephalus.\par We will refer him to Neurosurgery for evaluation of NPH.          \par \par Plan:\par 1. Neurosurgery referral for evaluation of NPH\par 2. Physical therapy referral\par 3. B/W for Valproic acid level, Lipid profile, CMP, Hepatic function panel\par 4. Start Valproic Acid Oral Solution 250 mg/5ml.  Take 5 ml at bedtime\par 5. Increase donepezil to 10 mg at bedtime\par 6. Start Memantine 5 mg BID\par 7. Return in 2 months

## 2020-12-17 NOTE — HISTORY OF PRESENT ILLNESS
[FreeTextEntry1] : cognitive decline [de-identified] : Mr. Lambert is an 80 year-old male who presents with urinary incontinence, gait imbalance and cognitive decline over years.  He now uses assistive devices to ambulate.\par \par CT over the years demonstrate gradual enlargement of the ventricles.\par \par Rule out NPH\par Recommend high volume LP\par Will follow-up after LP and discuss with his family whether there was improvement.  \par \par This consultation took 10 minutes.

## 2020-12-23 ENCOUNTER — RX RENEWAL (OUTPATIENT)
Age: 80
End: 2020-12-23

## 2020-12-28 ENCOUNTER — LABORATORY RESULT (OUTPATIENT)
Age: 80
End: 2020-12-28

## 2020-12-28 ENCOUNTER — RX RENEWAL (OUTPATIENT)
Age: 80
End: 2020-12-28

## 2020-12-28 LAB
ALBUMIN SERPL ELPH-MCNC: 3.8 G/DL
ALP BLD-CCNC: 115 U/L
ALT SERPL-CCNC: 18 U/L
ANION GAP SERPL CALC-SCNC: 18 MMOL/L
AST SERPL-CCNC: 27 U/L
BILIRUB SERPL-MCNC: 0.5 MG/DL
BUN SERPL-MCNC: 12 MG/DL
CALCIUM SERPL-MCNC: 9.4 MG/DL
CHLORIDE SERPL-SCNC: 96 MMOL/L
CHOLEST SERPL-MCNC: 161 MG/DL
CO2 SERPL-SCNC: 18 MMOL/L
CREAT SERPL-MCNC: 1 MG/DL
GLUCOSE SERPL-MCNC: 86 MG/DL
HDLC SERPL-MCNC: 48 MG/DL
LDLC SERPL CALC-MCNC: 104 MG/DL
NONHDLC SERPL-MCNC: 113 MG/DL
POTASSIUM SERPL-SCNC: 4.4 MMOL/L
PROT SERPL-MCNC: 6.8 G/DL
SODIUM SERPL-SCNC: 132 MMOL/L
TRIGL SERPL-MCNC: 92 MG/DL

## 2020-12-29 LAB
INR PPP: 1.12 RATIO
PT BLD: 12.9 SEC
VALPROATE SERPL-MCNC: 17 UG/ML

## 2021-01-03 ENCOUNTER — EMERGENCY (EMERGENCY)
Facility: HOSPITAL | Age: 81
LOS: 0 days | Discharge: HOME | End: 2021-01-03
Attending: STUDENT IN AN ORGANIZED HEALTH CARE EDUCATION/TRAINING PROGRAM | Admitting: STUDENT IN AN ORGANIZED HEALTH CARE EDUCATION/TRAINING PROGRAM
Payer: MEDICARE

## 2021-01-03 VITALS
RESPIRATION RATE: 18 BRPM | SYSTOLIC BLOOD PRESSURE: 147 MMHG | HEART RATE: 82 BPM | DIASTOLIC BLOOD PRESSURE: 117 MMHG | TEMPERATURE: 96 F | OXYGEN SATURATION: 95 % | HEIGHT: 66 IN

## 2021-01-03 DIAGNOSIS — Z79.899 OTHER LONG TERM (CURRENT) DRUG THERAPY: ICD-10-CM

## 2021-01-03 DIAGNOSIS — F03.90 UNSPECIFIED DEMENTIA WITHOUT BEHAVIORAL DISTURBANCE: ICD-10-CM

## 2021-01-03 DIAGNOSIS — M79.89 OTHER SPECIFIED SOFT TISSUE DISORDERS: ICD-10-CM

## 2021-01-03 DIAGNOSIS — Z79.82 LONG TERM (CURRENT) USE OF ASPIRIN: ICD-10-CM

## 2021-01-03 DIAGNOSIS — I25.10 ATHEROSCLEROTIC HEART DISEASE OF NATIVE CORONARY ARTERY WITHOUT ANGINA PECTORIS: ICD-10-CM

## 2021-01-03 DIAGNOSIS — E78.5 HYPERLIPIDEMIA, UNSPECIFIED: ICD-10-CM

## 2021-01-03 PROCEDURE — 73630 X-RAY EXAM OF FOOT: CPT | Mod: 26,LT

## 2021-01-03 PROCEDURE — 93970 EXTREMITY STUDY: CPT | Mod: 26

## 2021-01-03 PROCEDURE — 99284 EMERGENCY DEPT VISIT MOD MDM: CPT

## 2021-01-03 PROCEDURE — 73610 X-RAY EXAM OF ANKLE: CPT | Mod: 26,LT

## 2021-01-03 NOTE — ED PROVIDER NOTE - PHYSICAL EXAMINATION
Physical Exam    Vital Signs: I have reviewed the initial vital signs.  Constitutional: well-nourished, appears stated age, no acute distress  Eyes: Conjunctiva pink, Sclera clear   Cardiovascular: S1 and S2, regular rate, regular rhythm, well-perfused extremities, radial and pedal pulses equal and 2+ b/l. good capillary refill <2 seconds.   Respiratory: unlabored respiratory effort, clear to auscultation bilaterally no wheezing, rales and rhonchi. pt is speaking full sentences. no accessory muscle use.   Gastrointestinal: soft, non-tender, nondistended abdomen, no pulsatile mass, normal bowl sounds, no rebound, no guarding, no organomegaly.   Musculoskeletal: supple neck, (+) left lower extremity edema from the left foot extending to the left mid tib/fib, no calf tenderness, no midline tenderness, no palpable spinal step offs  Integumentary: warm, dry, no rash. no skin discoloration.   Neurologic: awake, alert  Psychiatric: appropriate mood, appropriate affect

## 2021-01-03 NOTE — ED ADULT TRIAGE NOTE - CHIEF COMPLAINT QUOTE
BIBA from home as per EMS "Left foot swelling progressing to the knee today, denies fall/trauma". BIBA from home as per EMS "Left foot swelling progressing to the knee today, denies fall/trauma". Pt daughter: Rosa Pires: [phone], Pt. wife Senait [phone]

## 2021-01-03 NOTE — ED PROVIDER NOTE - OBJECTIVE STATEMENT
79 y/o male with HLD, CAD, hydrocephalus, and dementia presents to the ED for evaluation of left lower extremity swelling that began today. as per pt daughter pt has been bed bound for the past 2-3 months due to hydrocephalus. daughter reports she noticed the left foot was swollen and then throughout the day noticed swelling spreading up the left lower leg. daughter denies witnessed any trauma, or falls. as per daughter when pt was younger he had phlebitis after sitting in a chair for a while. as per daughter pt has not had any other symptoms. no use of blood thinners, only on aspirin daily.

## 2021-01-03 NOTE — ED PROVIDER NOTE - PROGRESS NOTE DETAILS
FF: spoke with pt daughter. FF: discussed radiology results with daughter. advised to elevate leg and use warm compresses. advised of return precautions. f/u with vascular. agreeable to dc.

## 2021-01-03 NOTE — ED PROVIDER NOTE - PATIENT PORTAL LINK FT
You can access the FollowMyHealth Patient Portal offered by [facility] by registering at the following website: [email or web address]. By joining [facility]’s FollowMyHealth portal, you will also be able to view your health information using other applications (apps) compatible with our system.

## 2021-01-03 NOTE — ED PROVIDER NOTE - CARE PROVIDER_API CALL
Guillermo Covarrubias  Vascular Surgery  [address] [place] [id number]  Phone: [phone]  Fax: [phone]  Follow Up Time: 1-3 Days

## 2021-01-03 NOTE — ED PROVIDER NOTE - ATTENDING CONTRIBUTION TO CARE
79 y/o M pmh as noted w/ atraumatic, non painful leg leg and foot swelling x1d. ROS PE above. Pt is at baseline, foot and leg are well perfused w/ NL pulses, calf non ttp. LE duplex neg for dvt; xray neg for frx/ dislocation. IMP: LE swelling. Pt stable for dc w/ cont outpt fup. dc home.

## 2021-01-03 NOTE — ED PROVIDER NOTE - NS ED ROS FT
as per daughter,     CONST: No fever, chills or bodyaches  EYES: No pain, redness, drainage or visual changes.  ENT: No ear pain or discharge, nasal discharge or congestion. No sore throat  CARD: No chest pain, palpitations  RESP: No SOB, cough, hemoptysis. No hx of asthma or COPD  GI: No abdominal pain, N/V/D  : No urinary symptoms  MS: No joint pain, back pain. (+) left lower leg swelling.   SKIN: No rashes  NEURO: No headache, dizziness, paresthesias or LOC

## 2021-01-25 ENCOUNTER — RX RENEWAL (OUTPATIENT)
Age: 81
End: 2021-01-25

## 2021-01-29 LAB
APTT BLD: NORMAL SEC
BASOPHILS # BLD AUTO: 0.04 K/UL
BASOPHILS NFR BLD AUTO: 0.7 %
EOSINOPHIL # BLD AUTO: 0.17 K/UL
EOSINOPHIL NFR BLD AUTO: 3.2 %
HCT VFR BLD CALC: 42.6 %
HGB BLD-MCNC: 13.9 G/DL
IMM GRANULOCYTES NFR BLD AUTO: 0.4 %
INR PPP: NORMAL RATIO
LYMPHOCYTES # BLD AUTO: 1.27 K/UL
LYMPHOCYTES NFR BLD AUTO: 23.6 %
MAN DIFF?: NORMAL
MCHC RBC-ENTMCNC: 30.4 PG
MCHC RBC-ENTMCNC: 32.6 G/DL
MCV RBC AUTO: 93.2 FL
MONOCYTES # BLD AUTO: 0.34 K/UL
MONOCYTES NFR BLD AUTO: 6.3 %
NEUTROPHILS # BLD AUTO: 3.55 K/UL
NEUTROPHILS NFR BLD AUTO: 65.8 %
PLATELET # BLD AUTO: 175 K/UL
PT BLD: NORMAL SEC
RBC # BLD: 4.57 M/UL
RBC # FLD: 13.2 %
WBC # FLD AUTO: 5.39 K/UL

## 2021-02-04 ENCOUNTER — APPOINTMENT (OUTPATIENT)
Dept: NEUROLOGY | Facility: CLINIC | Age: 81
End: 2021-02-04
Payer: MEDICARE

## 2021-02-04 ENCOUNTER — TRANSCRIPTION ENCOUNTER (OUTPATIENT)
Age: 81
End: 2021-02-04

## 2021-02-04 DIAGNOSIS — R26.9 UNSPECIFIED ABNORMALITIES OF GAIT AND MOBILITY: ICD-10-CM

## 2021-02-04 PROCEDURE — 99213 OFFICE O/P EST LOW 20 MIN: CPT | Mod: 95

## 2021-02-04 RX ORDER — CHLORHEXIDINE GLUCONATE 4 G/100ML
4 SOLUTION TOPICAL
Refills: 0 | Status: DISCONTINUED | COMMUNITY
End: 2021-02-04

## 2021-02-04 RX ORDER — METOPROLOL TARTRATE 75 MG/1
TABLET, FILM COATED ORAL
Refills: 0 | Status: DISCONTINUED | COMMUNITY
End: 2021-02-04

## 2021-02-04 RX ORDER — ATORVASTATIN CALCIUM 10 MG/1
10 TABLET, FILM COATED ORAL
Refills: 0 | Status: DISCONTINUED | COMMUNITY
End: 2021-02-04

## 2021-02-04 RX ORDER — CLOPIDOGREL 75 MG/1
TABLET, FILM COATED ORAL
Refills: 0 | Status: DISCONTINUED | COMMUNITY
End: 2021-02-04

## 2021-02-05 PROBLEM — R26.9 GAIT ABNORMALITY: Status: ACTIVE | Noted: 2020-03-09

## 2021-02-05 NOTE — HISTORY OF PRESENT ILLNESS
[Home] : at home, [unfilled] , at the time of the visit. [Medical Office: ([facility])___] : at the medical office located in  [Verbal consent obtained from patient] : the patient, [unfilled] [FreeTextEntry1] : They wanted this as a telehealth visit.\par have no questions.\par The family( daughter and the wife are reporting that he is doing well with his cognition and behavior. however the main issue is her gait and difficulty walking and often initiating the  walk.\par No Falls\par They saw Dr Hudson and they were supposed to do blood test . I had reviewed the chart on all script .\par He is scheduled for a high volume spinal tap\par Otherwise no change\par IImpression\par R/O NPH

## 2021-02-08 ENCOUNTER — LABORATORY RESULT (OUTPATIENT)
Age: 81
End: 2021-02-08

## 2021-02-22 ENCOUNTER — INPATIENT (INPATIENT)
Facility: HOSPITAL | Age: 81
LOS: 2 days | Discharge: HOME HEALTH | End: 2021-02-25
Attending: INTERNAL MEDICINE | Admitting: INTERNAL MEDICINE
Payer: MEDICARE

## 2021-02-22 VITALS
WEIGHT: 160.06 LBS | TEMPERATURE: 100 F | SYSTOLIC BLOOD PRESSURE: 135 MMHG | HEIGHT: 66 IN | HEART RATE: 96 BPM | OXYGEN SATURATION: 95 % | DIASTOLIC BLOOD PRESSURE: 94 MMHG | RESPIRATION RATE: 19 BRPM

## 2021-02-22 DIAGNOSIS — I26.99 OTHER PULMONARY EMBOLISM WITHOUT ACUTE COR PULMONALE: ICD-10-CM

## 2021-02-22 LAB
ALBUMIN SERPL ELPH-MCNC: 4 G/DL — SIGNIFICANT CHANGE UP (ref 3.5–5.2)
ALP SERPL-CCNC: 152 U/L — HIGH (ref 30–115)
ALT FLD-CCNC: 22 U/L — SIGNIFICANT CHANGE UP (ref 0–41)
ANION GAP SERPL CALC-SCNC: 10 MMOL/L — SIGNIFICANT CHANGE UP (ref 7–14)
AST SERPL-CCNC: 29 U/L — SIGNIFICANT CHANGE UP (ref 0–41)
BASOPHILS # BLD AUTO: 0.03 K/UL — SIGNIFICANT CHANGE UP (ref 0–0.2)
BASOPHILS NFR BLD AUTO: 0.3 % — SIGNIFICANT CHANGE UP (ref 0–1)
BILIRUB SERPL-MCNC: 0.3 MG/DL — SIGNIFICANT CHANGE UP (ref 0.2–1.2)
BUN SERPL-MCNC: 16 MG/DL — SIGNIFICANT CHANGE UP (ref 10–20)
CALCIUM SERPL-MCNC: 9.6 MG/DL — SIGNIFICANT CHANGE UP (ref 8.5–10.1)
CHLORIDE SERPL-SCNC: 103 MMOL/L — SIGNIFICANT CHANGE UP (ref 98–110)
CO2 SERPL-SCNC: 29 MMOL/L — SIGNIFICANT CHANGE UP (ref 17–32)
CREAT SERPL-MCNC: 0.9 MG/DL — SIGNIFICANT CHANGE UP (ref 0.7–1.5)
EOSINOPHIL # BLD AUTO: 0.05 K/UL — SIGNIFICANT CHANGE UP (ref 0–0.7)
EOSINOPHIL NFR BLD AUTO: 0.6 % — SIGNIFICANT CHANGE UP (ref 0–8)
GLUCOSE SERPL-MCNC: 158 MG/DL — HIGH (ref 70–99)
HCT VFR BLD CALC: 41.4 % — LOW (ref 42–52)
HGB BLD-MCNC: 13.6 G/DL — LOW (ref 14–18)
IMM GRANULOCYTES NFR BLD AUTO: 0.4 % — HIGH (ref 0.1–0.3)
LYMPHOCYTES # BLD AUTO: 1.12 K/UL — LOW (ref 1.2–3.4)
LYMPHOCYTES # BLD AUTO: 12.6 % — LOW (ref 20.5–51.1)
MAGNESIUM SERPL-MCNC: 2.5 MG/DL — HIGH (ref 1.8–2.4)
MCHC RBC-ENTMCNC: 30.7 PG — SIGNIFICANT CHANGE UP (ref 27–31)
MCHC RBC-ENTMCNC: 32.9 G/DL — SIGNIFICANT CHANGE UP (ref 32–37)
MCV RBC AUTO: 93.5 FL — SIGNIFICANT CHANGE UP (ref 80–94)
MONOCYTES # BLD AUTO: 0.44 K/UL — SIGNIFICANT CHANGE UP (ref 0.1–0.6)
MONOCYTES NFR BLD AUTO: 4.9 % — SIGNIFICANT CHANGE UP (ref 1.7–9.3)
NEUTROPHILS # BLD AUTO: 7.23 K/UL — HIGH (ref 1.4–6.5)
NEUTROPHILS NFR BLD AUTO: 81.2 % — HIGH (ref 42.2–75.2)
NRBC # BLD: 0 /100 WBCS — SIGNIFICANT CHANGE UP (ref 0–0)
NT-PROBNP SERPL-SCNC: 210 PG/ML — SIGNIFICANT CHANGE UP (ref 0–300)
PLATELET # BLD AUTO: 213 K/UL — SIGNIFICANT CHANGE UP (ref 130–400)
POTASSIUM SERPL-MCNC: 5.5 MMOL/L — HIGH (ref 3.5–5)
POTASSIUM SERPL-SCNC: 5.5 MMOL/L — HIGH (ref 3.5–5)
PROT SERPL-MCNC: 7.9 G/DL — SIGNIFICANT CHANGE UP (ref 6–8)
RAPID RVP RESULT: SIGNIFICANT CHANGE UP
RBC # BLD: 4.43 M/UL — LOW (ref 4.7–6.1)
RBC # FLD: 13.3 % — SIGNIFICANT CHANGE UP (ref 11.5–14.5)
SARS-COV-2 RNA SPEC QL NAA+PROBE: SIGNIFICANT CHANGE UP
SODIUM SERPL-SCNC: 142 MMOL/L — SIGNIFICANT CHANGE UP (ref 135–146)
TROPONIN T SERPL-MCNC: <0.01 NG/ML — SIGNIFICANT CHANGE UP
WBC # BLD: 8.91 K/UL — SIGNIFICANT CHANGE UP (ref 4.8–10.8)
WBC # FLD AUTO: 8.91 K/UL — SIGNIFICANT CHANGE UP (ref 4.8–10.8)

## 2021-02-22 PROCEDURE — 99285 EMERGENCY DEPT VISIT HI MDM: CPT

## 2021-02-22 PROCEDURE — 93970 EXTREMITY STUDY: CPT | Mod: 26

## 2021-02-22 PROCEDURE — 99223 1ST HOSP IP/OBS HIGH 75: CPT | Mod: AI

## 2021-02-22 PROCEDURE — 71045 X-RAY EXAM CHEST 1 VIEW: CPT | Mod: 26

## 2021-02-22 RX ORDER — HEPARIN SODIUM 5000 [USP'U]/ML
INJECTION INTRAVENOUS; SUBCUTANEOUS
Qty: 25000 | Refills: 0 | Status: DISCONTINUED | OUTPATIENT
Start: 2021-02-22 | End: 2021-02-23

## 2021-02-22 RX ORDER — HEPARIN SODIUM 5000 [USP'U]/ML
80 INJECTION INTRAVENOUS; SUBCUTANEOUS ONCE
Refills: 0 | Status: DISCONTINUED | OUTPATIENT
Start: 2021-02-22 | End: 2021-02-22

## 2021-02-22 RX ORDER — ASPIRIN/CALCIUM CARB/MAGNESIUM 324 MG
1 TABLET ORAL
Qty: 0 | Refills: 0 | DISCHARGE

## 2021-02-22 RX ORDER — HEPARIN SODIUM 5000 [USP'U]/ML
6000 INJECTION INTRAVENOUS; SUBCUTANEOUS ONCE
Refills: 0 | Status: COMPLETED | OUTPATIENT
Start: 2021-02-22 | End: 2021-02-22

## 2021-02-22 RX ADMIN — HEPARIN SODIUM 1300 UNIT(S)/HR: 5000 INJECTION INTRAVENOUS; SUBCUTANEOUS at 22:34

## 2021-02-22 RX ADMIN — HEPARIN SODIUM 6000 UNIT(S): 5000 INJECTION INTRAVENOUS; SUBCUTANEOUS at 22:34

## 2021-02-22 NOTE — ED PROVIDER NOTE - PROGRESS NOTE DETAILS
labs, cxr, ekg and vascular duplex b/l obtained. VSS US w/ L femoral DVT near bifurcation.  Consulted vasc surg.  Given proximity to bifurcation, will adm while awaiting recs. Daughter: Rosa Pires ([phone]) understands pt will be admitted for anticoagulation.  inpatient team: pls note pressure ulcer to left heel!

## 2021-02-22 NOTE — ED PROVIDER NOTE - PHYSICAL EXAMINATION
Physical Exam    Vital Signs: I have reviewed the initial vital signs.  Constitutional: well-nourished, appears stated age, no acute distress  Eyes: Conjunctiva pink, Sclera clear, PERRLA,  Cardiovascular: S1 and S2, regular rate, regular rhythm, well-perfused extremities, radial pulses equal and 2+  Respiratory: unlabored respiratory effort, clear to auscultation bilaterally no wheezing, rales and rhonchi  Gastrointestinal: soft, non-tender abdomen, no pulsatile mass, normal bowl sounds  Musculoskeletal: supple neck, left lower extremity swelling no palpable cords, no erythema, no midline tenderness  Integumentary: warm, dry, no rash  Neurologic: awake, alert, to name, eyes spontaneously open, sensation to pain in all 4 extremities, no tremors, nonambulatory Physical Exam    Vital Signs: I have reviewed the initial vital signs.  Constitutional: well-nourished, appears stated age, no acute distress  Eyes: Conjunctiva pink, Sclera clear, PERRLA,  Cardiovascular: S1 and S2, regular rate, regular rhythm, well-perfused extremities, radial pulses equal and 2+  Respiratory: unlabored respiratory effort, clear to auscultation bilaterally no wheezing, rales and rhonchi  Gastrointestinal: soft, non-tender abdomen, no pulsatile mass, normal bowl sounds  Musculoskeletal: supple neck, left lower extremity swelling no palpable cords, no erythema, no midline tenderness  Integumentary: warm, dry, no rash, pressure ulcer to left heel  Neurologic: awake, alert, to name, eyes spontaneously open, sensation to pain in all 4 extremities, no tremors, nonambulatory

## 2021-02-22 NOTE — H&P ADULT - NSHPLABSRESULTS_GEN_ALL_CORE
13.6   8.91  )-----------( 213      ( 22 Feb 2021 19:20 )             41.4     02-22    142  |  103  |  16  ----------------------------<  158<H>  5.5<H>   |  29  |  0.9    Ca    9.6      22 Feb 2021 19:20  Mg     2.5     02-22    TPro  7.9  /  Alb  4.0  /  TBili  0.3  /  DBili  x   /  AST  29  /  ALT  22  /  AlkPhos  152<H>  02-22              Lactate Trend    CARDIAC MARKERS ( 22 Feb 2021 19:20 )  x     / <0.01 ng/mL / x     / x     / x          CAPILLARY BLOOD GLUCOSE

## 2021-02-22 NOTE — H&P ADULT - HISTORY OF PRESENT ILLNESS
(Patient confused, poor historian)  (Patient confused, poor historian) 79yo male whose PMH includes CAD (3 stents), and alzheimer's dementia, is  sent to the ER because evidence of a DVT was found on studies done in his doctor's office. Appanetly patient's aspirin was stopped for a 2 week period so that a lumbar puncture could be performed. Patient himself cannot tell me if he has pain, modifying factors or even associated symptoms though he appears comfortable on his stretcher in the ER

## 2021-02-22 NOTE — ED PROVIDER NOTE - CLINICAL SUMMARY MEDICAL DECISION MAKING FREE TEXT BOX
80yM CAD s/p PCI dementia bedbound p/w LLE swelling and DVT found as o/p.  No record available of outside DVT study, so repeat US done in the ED, with verbal report of proximal L femoral DVT near bifurcation.  Labs reassuring.  EKG w/o ischemia or arrhythmia.  CXR w/o ptx or pna.  No clinical concern for PE.  Given location of DVT, will adm for IV heparin; vasc surgery consulted for ?thrombectomy, awaiting recs.
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
## 2021-02-22 NOTE — ED PROVIDER NOTE - NS ED ROS FT
I am unable to obtain a comprehensive history, review of systems, past medical history, and/or physical exam due to constraints imposed by the urgency of the patient's clinical condition and/or mental status.,  See HPI for hx obtained from pt's daughter in ED waiting room.

## 2021-02-22 NOTE — ED PROVIDER NOTE - ATTENDING CONTRIBUTION TO CARE
80yM CAD s/p PCI Alzheimer's bedbound at baseline p/w LLE swelling - pt has been off aspirin for 2wk in preparation for NSGY procedure w/ Dr. Hudson.  Today, family noticed inc swelling compared to baseline and brought him to urgent care, where US showed DVT.  Pt was sent to the ED.  No CP/SOB as per daughter.

## 2021-02-22 NOTE — ED ADULT NURSE NOTE - PMH
Coronary artery disease, angina presence unspecified, unspecified vessel or lesion type, unspecified whether native or transplanted heart    Dementia    Hyperlipidemia, unspecified hyperlipidemia type    Seizure

## 2021-02-22 NOTE — H&P ADULT - NSICDXPASTMEDICALHX_GEN_ALL_CORE_FT
PAST MEDICAL HISTORY:  Coronary artery disease, angina presence unspecified, unspecified vessel or lesion type, unspecified whether native or transplanted heart     Dementia     Hyperlipidemia, unspecified hyperlipidemia type     Seizure

## 2021-02-23 DIAGNOSIS — I82.409 ACUTE EMBOLISM AND THROMBOSIS OF UNSPECIFIED DEEP VEINS OF UNSPECIFIED LOWER EXTREMITY: ICD-10-CM

## 2021-02-23 DIAGNOSIS — F03.90 UNSPECIFIED DEMENTIA WITHOUT BEHAVIORAL DISTURBANCE: ICD-10-CM

## 2021-02-23 DIAGNOSIS — Z79.899 OTHER LONG TERM (CURRENT) DRUG THERAPY: ICD-10-CM

## 2021-02-23 DIAGNOSIS — E78.5 HYPERLIPIDEMIA, UNSPECIFIED: ICD-10-CM

## 2021-02-23 DIAGNOSIS — L97.409 NON-PRESSURE CHRONIC ULCER OF UNSPECIFIED HEEL AND MIDFOOT WITH UNSPECIFIED SEVERITY: ICD-10-CM

## 2021-02-23 DIAGNOSIS — R56.9 UNSPECIFIED CONVULSIONS: ICD-10-CM

## 2021-02-23 DIAGNOSIS — I25.10 ATHEROSCLEROTIC HEART DISEASE OF NATIVE CORONARY ARTERY WITHOUT ANGINA PECTORIS: ICD-10-CM

## 2021-02-23 LAB
ALBUMIN SERPL ELPH-MCNC: 3.6 G/DL — SIGNIFICANT CHANGE UP (ref 3.5–5.2)
ALP SERPL-CCNC: 133 U/L — HIGH (ref 30–115)
ALT FLD-CCNC: 20 U/L — SIGNIFICANT CHANGE UP (ref 0–41)
ANION GAP SERPL CALC-SCNC: 12 MMOL/L — SIGNIFICANT CHANGE UP (ref 7–14)
APTT BLD: 103.7 SEC — CRITICAL HIGH (ref 27–39.2)
APTT BLD: 160.7 SEC — CRITICAL HIGH (ref 27–39.2)
AST SERPL-CCNC: 23 U/L — SIGNIFICANT CHANGE UP (ref 0–41)
BILIRUB SERPL-MCNC: 0.4 MG/DL — SIGNIFICANT CHANGE UP (ref 0.2–1.2)
BUN SERPL-MCNC: 14 MG/DL — SIGNIFICANT CHANGE UP (ref 10–20)
CALCIUM SERPL-MCNC: 9.3 MG/DL — SIGNIFICANT CHANGE UP (ref 8.5–10.1)
CHLORIDE SERPL-SCNC: 106 MMOL/L — SIGNIFICANT CHANGE UP (ref 98–110)
CO2 SERPL-SCNC: 25 MMOL/L — SIGNIFICANT CHANGE UP (ref 17–32)
CREAT SERPL-MCNC: 0.8 MG/DL — SIGNIFICANT CHANGE UP (ref 0.7–1.5)
GLUCOSE SERPL-MCNC: 129 MG/DL — HIGH (ref 70–99)
HCT VFR BLD CALC: 38.7 % — LOW (ref 42–52)
HGB BLD-MCNC: 12.8 G/DL — LOW (ref 14–18)
MAGNESIUM SERPL-MCNC: 2.3 MG/DL — SIGNIFICANT CHANGE UP (ref 1.8–2.4)
MCHC RBC-ENTMCNC: 31.1 PG — HIGH (ref 27–31)
MCHC RBC-ENTMCNC: 33.1 G/DL — SIGNIFICANT CHANGE UP (ref 32–37)
MCV RBC AUTO: 94.2 FL — HIGH (ref 80–94)
NRBC # BLD: 0 /100 WBCS — SIGNIFICANT CHANGE UP (ref 0–0)
PLATELET # BLD AUTO: 179 K/UL — SIGNIFICANT CHANGE UP (ref 130–400)
POTASSIUM SERPL-MCNC: 4.5 MMOL/L — SIGNIFICANT CHANGE UP (ref 3.5–5)
POTASSIUM SERPL-SCNC: 4.5 MMOL/L — SIGNIFICANT CHANGE UP (ref 3.5–5)
PROT SERPL-MCNC: 6.9 G/DL — SIGNIFICANT CHANGE UP (ref 6–8)
RBC # BLD: 4.11 M/UL — LOW (ref 4.7–6.1)
RBC # FLD: 13.4 % — SIGNIFICANT CHANGE UP (ref 11.5–14.5)
SODIUM SERPL-SCNC: 143 MMOL/L — SIGNIFICANT CHANGE UP (ref 135–146)
WBC # BLD: 7.14 K/UL — SIGNIFICANT CHANGE UP (ref 4.8–10.8)
WBC # FLD AUTO: 7.14 K/UL — SIGNIFICANT CHANGE UP (ref 4.8–10.8)

## 2021-02-23 PROCEDURE — 71275 CT ANGIOGRAPHY CHEST: CPT | Mod: 26

## 2021-02-23 PROCEDURE — 36569 INSJ PICC 5 YR+ W/O IMAGING: CPT

## 2021-02-23 PROCEDURE — 76937 US GUIDE VASCULAR ACCESS: CPT | Mod: 26,59

## 2021-02-23 PROCEDURE — 99233 SBSQ HOSP IP/OBS HIGH 50: CPT

## 2021-02-23 PROCEDURE — 99221 1ST HOSP IP/OBS SF/LOW 40: CPT

## 2021-02-23 RX ORDER — DONEPEZIL HYDROCHLORIDE 10 MG/1
10 TABLET, FILM COATED ORAL AT BEDTIME
Refills: 0 | Status: DISCONTINUED | OUTPATIENT
Start: 2021-02-23 | End: 2021-02-25

## 2021-02-23 RX ORDER — MEMANTINE HYDROCHLORIDE 10 MG/1
5 TABLET ORAL
Refills: 0 | Status: DISCONTINUED | OUTPATIENT
Start: 2021-02-23 | End: 2021-02-25

## 2021-02-23 RX ORDER — CHLORHEXIDINE GLUCONATE 213 G/1000ML
1 SOLUTION TOPICAL
Refills: 0 | Status: DISCONTINUED | OUTPATIENT
Start: 2021-02-23 | End: 2021-02-25

## 2021-02-23 RX ORDER — HEPARIN SODIUM 5000 [USP'U]/ML
INJECTION INTRAVENOUS; SUBCUTANEOUS
Qty: 25000 | Refills: 0 | Status: DISCONTINUED | OUTPATIENT
Start: 2021-02-23 | End: 2021-02-23

## 2021-02-23 RX ORDER — DIVALPROEX SODIUM 500 MG/1
250 TABLET, DELAYED RELEASE ORAL
Refills: 0 | Status: DISCONTINUED | OUTPATIENT
Start: 2021-02-23 | End: 2021-02-25

## 2021-02-23 RX ORDER — HEPARIN SODIUM 5000 [USP'U]/ML
1000 INJECTION INTRAVENOUS; SUBCUTANEOUS
Qty: 25000 | Refills: 0 | Status: DISCONTINUED | OUTPATIENT
Start: 2021-02-23 | End: 2021-02-23

## 2021-02-23 RX ORDER — HEPARIN SODIUM 5000 [USP'U]/ML
800 INJECTION INTRAVENOUS; SUBCUTANEOUS
Qty: 25000 | Refills: 0 | Status: DISCONTINUED | OUTPATIENT
Start: 2021-02-23 | End: 2021-02-24

## 2021-02-23 RX ADMIN — MEMANTINE HYDROCHLORIDE 5 MILLIGRAM(S): 10 TABLET ORAL at 05:35

## 2021-02-23 RX ADMIN — DONEPEZIL HYDROCHLORIDE 10 MILLIGRAM(S): 10 TABLET, FILM COATED ORAL at 21:47

## 2021-02-23 RX ADMIN — CHLORHEXIDINE GLUCONATE 1 APPLICATION(S): 213 SOLUTION TOPICAL at 17:18

## 2021-02-23 RX ADMIN — MEMANTINE HYDROCHLORIDE 5 MILLIGRAM(S): 10 TABLET ORAL at 17:18

## 2021-02-23 RX ADMIN — DIVALPROEX SODIUM 250 MILLIGRAM(S): 500 TABLET, DELAYED RELEASE ORAL at 05:36

## 2021-02-23 RX ADMIN — DIVALPROEX SODIUM 250 MILLIGRAM(S): 500 TABLET, DELAYED RELEASE ORAL at 17:18

## 2021-02-23 RX ADMIN — HEPARIN SODIUM 1000 UNIT(S)/HR: 5000 INJECTION INTRAVENOUS; SUBCUTANEOUS at 12:01

## 2021-02-23 NOTE — CONSULT NOTE ADULT - SUBJECTIVE AND OBJECTIVE BOX
PODIATRY CONSULT   KERWIN HERNANDEZ is a 80y Male Patient is a 80y old  Male who presents with a chief complaint of   HPI:  (Patient confused, poor historian) 81yo male whose PMH includes CAD (3 stents), and alzheimer's dementia, is  sent to the ER because evidence of a DVT was found on studies done in his doctor's office. Appanetly patient's aspirin was stopped for a 2 week period so that a lumbar puncture could be performed. Patient himself cannot tell me if he has pain, modifying factors or even associated symptoms though he appears comfortable on his stretcher in the ER (22 Feb 2021 22:55)      DVT(DEEP VENOUS THROMBOSIS)    Dementia    Seizure    Hyperlipidemia, unspecified hyperlipidemia type    Coronary artery disease, angina presence unspecified, unspecified vessel or lesion type, unspecified whether native or transplanted heart        PMH: DVT(DEEP VENOUS THROMBOSIS)    Dementia    Seizure    Hyperlipidemia, unspecified hyperlipidemia type    Coronary artery disease, angina presence unspecified, unspecified vessel or lesion type, unspecified whether native or transplanted heart      PSH: No significant past surgical history      Medication   Allergy: No Known Allergies        Labs:                        13.6   8.91  )-----------( 213      ( 22 Feb 2021 19:20 )             41.4       02-22    142  |  103  |  16  ----------------------------<  158<H>  5.5<H>   |  29  |  0.9    Ca    9.6      22 Feb 2021 19:20  Mg     2.5     02-22    TPro  7.9  /  Alb  4.0  /  TBili  0.3  /  DBili  x   /  AST  29  /  ALT  22  /  AlkPhos  152<H>  02-22    CARDIAC MARKERS ( 22 Feb 2021 19:20 )  x     / <0.01 ng/mL / x     / x     / x              ROS:  [x]A ten point review of system was otherwise negative except as noted    Physical Exam -Left Lower Extremity Focused:   Derm:   Ulceration Left Heel  - Wound Edges +Irregular +hyperkeratotic border -Macerated, +Necrotic, +Clean, -Rolled, -Undermining  - Wound base Necrotic, wound size (1 cm X 1cm), -fluctuance, -probe to bone/deep tissue, -Tunneling,  -Malodor  - Esperanza-wound skin -Erythema, -Streaking erythema, -Warmth  -Edema, -Hemosiderin Deposit, -Xerosis  - No Drainage    Vascular:   DP and PT pulses are mildly diminished. Cap re-fill time < than 3 sec to the digits. Skin temperature is warm to the touch.  Neuro:  - Protective sensation moderately diminished.   MSK:   Manual Muscle strength 5/5 in all muscle compartments.        Assessment:   L. Heel Necrotic Ulcer -stable-    Plan:  Chart reviewed and Patient evaluated  Discussed diagnosis and treatment with patient  Wound cleansed w/ normal saline  Applied betadine with 4x4 gauze Allevyn pad  Wound Care Orders: As stated above, Q24h  Cont. w/ LWC: As stated above  Offloading to Left Heel w/ pillows.   V-Duplex ordered: Pending report  Discussed plan w/ Attending, Dr. Shin    Spectra: [phone]     PODIATRY CONSULT   KERWIN HERNANDEZ is a 80y Male Patient is a 80y old  Male who presents with a chief complaint of   HPI:  (Patient confused, poor historian) 81yo male whose PMH includes CAD (3 stents), and alzheimer's dementia, is  sent to the ER because evidence of a DVT was found on studies done in his doctor's office. Appanetly patient's aspirin was stopped for a 2 week period so that a lumbar puncture could be performed. Patient himself cannot tell me if he has pain, modifying factors or even associated symptoms though he appears comfortable on his stretcher in the ER (22 Feb 2021 22:55)      DVT(DEEP VENOUS THROMBOSIS)    Dementia    Seizure    Hyperlipidemia, unspecified hyperlipidemia type    Coronary artery disease, angina presence unspecified, unspecified vessel or lesion type, unspecified whether native or transplanted heart        PMH: DVT(DEEP VENOUS THROMBOSIS)    Dementia    Seizure    Hyperlipidemia, unspecified hyperlipidemia type    Coronary artery disease, angina presence unspecified, unspecified vessel or lesion type, unspecified whether native or transplanted heart      PSH: No significant past surgical history      Medication   Allergy: No Known Allergies        Labs:                        13.6   8.91  )-----------( 213      ( 22 Feb 2021 19:20 )             41.4       02-22    142  |  103  |  16  ----------------------------<  158<H>  5.5<H>   |  29  |  0.9    Ca    9.6      22 Feb 2021 19:20  Mg     2.5     02-22    TPro  7.9  /  Alb  4.0  /  TBili  0.3  /  DBili  x   /  AST  29  /  ALT  22  /  AlkPhos  152<H>  02-22    CARDIAC MARKERS ( 22 Feb 2021 19:20 )  x     / <0.01 ng/mL / x     / x     / x              ROS:  [x]A ten point review of system was otherwise negative except as noted    Physical Exam -Left Lower Extremity Focused:   Derm:   Ulceration Left Heel  - Wound Edges +Irregular +hyperkeratotic border -Macerated, +Necrotic, +Clean, -Rolled, -Undermining  - Wound base Necrotic, wound size (1 cm X 1cm), -fluctuance, -probe to bone/deep tissue, -Tunneling,  -Malodor  - Esperanza-wound skin -Erythema, -Streaking erythema, -Warmth  -Edema, -Hemosiderin Deposit, -Xerosis  - No Drainage    Vascular:   DP and PT pulses are mildly diminished. Cap re-fill time < than 3 sec to the digits. Skin temperature is warm to the touch.  Neuro:  - Protective sensation moderately diminished.   MSK:   Manual Muscle strength 5/5 in all muscle compartments.        Assessment:   L. Heel Necrotic Ulcer -stable-    Plan:  Chart reviewed and Patient evaluated  Discussed diagnosis and treatment with patient  Wound cleansed w/ normal saline  Applied betadine with 4x4 gauze Allevyn pad  Wound Care Orders: As stated above, Q24h  Cont. w/ LWC: As stated above  Offloading to Left Heel w/ pillows.   V-Duplex ordered: Pending report  Pt. can f/u as o/p w/ Dr. Shin in clinic at [address] [place] [address]. [address] post d/c   Discussed plan w/ Attending, Dr. Shin    Spectra: [phone]

## 2021-02-23 NOTE — PROGRESS NOTE ADULT - ASSESSMENT
ED presentation:   81yo male whose PMH includes CAD (3 stents), and alzheimer's dementia, is  sent to the ER because evidence of a DVT was found on studies done in his doctor's office. Appanetly patient's aspirin was stopped for a 2 week period so that a lumbar puncture could be performed. Patient himself cannot tell me if he has pain, modifying factors or even associated symptoms though he appears comfortable on his stretcher in the ER      1) Acute Left Femoral DVT/Acute Pulmonary Emboli/Abnormal CT chest scan? pulm infarct  -on heparin drip; monitor PTT; elevated and decreased the rate to 1000cc/hr  -check PTT 7pm  -check ECHO   -Pulmonary consult     2) Dementia   -home meds     3) CAD  -home meds     4) Seizure   on AEDs twice daily      5) Skin care as per nursing staff  -Wound care consult for reccs     Attending Physician Dr. An Chanel  # [id number]

## 2021-02-23 NOTE — CONSULT NOTE ADULT - ATTENDING COMMENTS
left heel ulcer- unstageable   no signs of infection  recommend local wound care w/ betadine application to keep eschar dry, continue with allevyn pads. would also recommend waffle boot to off-load  no surgical intervention at this time   pt can follow up as outpatient for wound care

## 2021-02-23 NOTE — ADVANCED PRACTICE NURSE CONSULT - ASSESSMENT
Patient is a 81yo male whose PMH includes CAD (3 stents), and alzheimer's dementia, is  sent to the ER because evidence of a DVT was found on studies done in his doctor's office. Appanetly patient's aspirin was stopped for a 2 week period so that a lumbar puncture could be performed. Patient himself cannot tell me if he has pain, modifying factors or even associated symptoms though he appears comfortable on his stretcher in the ER   PAST MEDICAL & SURGICAL HISTORY:  Dementia  Seizure  Hyperlipidemia, unspecified hyperlipidemia type  Coronary artery disease, angina presence unspecified, unspecified vessel or lesion type, unspecified whether native or transplanted heart  No significant past surgical history      Assessment:  Patient received , awake confused and combative                      skin assessed                       L heel ulceration - see podiatry note and recommendation      pressure Injury  #1  Location:  L buttock   Size: Length: 2Width: 1 Depth: ****    Tissue Description :   [ ] Necrotic   [ ] Slough   [ ] Infected   [ ] Granulation (firm, beefy red tissue)   [ ] Hypergranulation (soft, gelatinous)  [x ] Poor-Quality Granulation (pale, grey/brown/red granulation tissue)   [ ] Epithelium (pink/mauve at wound edges)  [ ] Macerated  [ ] Other: _______  Wound Exudate : None    Wound Edge): Inatct  Periwound Condition :   [x ] Maceration [ ] Excoriation [ ] Dry skin [ ] Hyperkeratosis [ ] Callus [ ] Eczema [ ] Other: _______    Pressure due to: [ ] Immobility [ ] Medical Device   [ ] Stage I  [x ]  Stage II  [ ]  Stage III  [ ]  Stage VI  [ ]  Suspected Deep Tissue Injury (DTI)  [ ]  Unstageable    Other Etiology:  [ ] Aterial  [ ] Venous   [ ] Surgical Incision  [ ] Other: ________

## 2021-02-24 LAB
ALBUMIN SERPL ELPH-MCNC: 3.3 G/DL — LOW (ref 3.5–5.2)
ALP SERPL-CCNC: 119 U/L — HIGH (ref 30–115)
ALT FLD-CCNC: 20 U/L — SIGNIFICANT CHANGE UP (ref 0–41)
ANION GAP SERPL CALC-SCNC: 8 MMOL/L — SIGNIFICANT CHANGE UP (ref 7–14)
ANION GAP SERPL CALC-SCNC: 9 MMOL/L — SIGNIFICANT CHANGE UP (ref 7–14)
APTT BLD: 58.5 SEC — HIGH (ref 27–39.2)
AST SERPL-CCNC: 24 U/L — SIGNIFICANT CHANGE UP (ref 0–41)
BILIRUB SERPL-MCNC: 0.4 MG/DL — SIGNIFICANT CHANGE UP (ref 0.2–1.2)
BUN SERPL-MCNC: 12 MG/DL — SIGNIFICANT CHANGE UP (ref 10–20)
BUN SERPL-MCNC: 13 MG/DL — SIGNIFICANT CHANGE UP (ref 10–20)
CALCIUM SERPL-MCNC: 9.1 MG/DL — SIGNIFICANT CHANGE UP (ref 8.5–10.1)
CALCIUM SERPL-MCNC: 9.6 MG/DL — SIGNIFICANT CHANGE UP (ref 8.5–10.1)
CHLORIDE SERPL-SCNC: 103 MMOL/L — SIGNIFICANT CHANGE UP (ref 98–110)
CHLORIDE SERPL-SCNC: 103 MMOL/L — SIGNIFICANT CHANGE UP (ref 98–110)
CO2 SERPL-SCNC: 27 MMOL/L — SIGNIFICANT CHANGE UP (ref 17–32)
CO2 SERPL-SCNC: 27 MMOL/L — SIGNIFICANT CHANGE UP (ref 17–32)
CREAT SERPL-MCNC: 0.8 MG/DL — SIGNIFICANT CHANGE UP (ref 0.7–1.5)
CREAT SERPL-MCNC: 0.8 MG/DL — SIGNIFICANT CHANGE UP (ref 0.7–1.5)
GLUCOSE SERPL-MCNC: 78 MG/DL — SIGNIFICANT CHANGE UP (ref 70–99)
GLUCOSE SERPL-MCNC: 92 MG/DL — SIGNIFICANT CHANGE UP (ref 70–99)
HCT VFR BLD CALC: 36.3 % — LOW (ref 42–52)
HCT VFR BLD CALC: 39.7 % — LOW (ref 42–52)
HGB BLD-MCNC: 12.1 G/DL — LOW (ref 14–18)
HGB BLD-MCNC: 13.2 G/DL — LOW (ref 14–18)
MAGNESIUM SERPL-MCNC: 2.3 MG/DL — SIGNIFICANT CHANGE UP (ref 1.8–2.4)
MCHC RBC-ENTMCNC: 30.6 PG — SIGNIFICANT CHANGE UP (ref 27–31)
MCHC RBC-ENTMCNC: 30.7 PG — SIGNIFICANT CHANGE UP (ref 27–31)
MCHC RBC-ENTMCNC: 33.2 G/DL — SIGNIFICANT CHANGE UP (ref 32–37)
MCHC RBC-ENTMCNC: 33.3 G/DL — SIGNIFICANT CHANGE UP (ref 32–37)
MCV RBC AUTO: 92.1 FL — SIGNIFICANT CHANGE UP (ref 80–94)
MCV RBC AUTO: 92.1 FL — SIGNIFICANT CHANGE UP (ref 80–94)
NRBC # BLD: 0 /100 WBCS — SIGNIFICANT CHANGE UP (ref 0–0)
NRBC # BLD: 0 /100 WBCS — SIGNIFICANT CHANGE UP (ref 0–0)
PLATELET # BLD AUTO: 152 K/UL — SIGNIFICANT CHANGE UP (ref 130–400)
PLATELET # BLD AUTO: 162 K/UL — SIGNIFICANT CHANGE UP (ref 130–400)
POTASSIUM SERPL-MCNC: 4.3 MMOL/L — SIGNIFICANT CHANGE UP (ref 3.5–5)
POTASSIUM SERPL-MCNC: 4.5 MMOL/L — SIGNIFICANT CHANGE UP (ref 3.5–5)
POTASSIUM SERPL-SCNC: 4.3 MMOL/L — SIGNIFICANT CHANGE UP (ref 3.5–5)
POTASSIUM SERPL-SCNC: 4.5 MMOL/L — SIGNIFICANT CHANGE UP (ref 3.5–5)
PROT SERPL-MCNC: 6.6 G/DL — SIGNIFICANT CHANGE UP (ref 6–8)
RBC # BLD: 3.94 M/UL — LOW (ref 4.7–6.1)
RBC # BLD: 4.31 M/UL — LOW (ref 4.7–6.1)
RBC # FLD: 13.1 % — SIGNIFICANT CHANGE UP (ref 11.5–14.5)
RBC # FLD: 13.3 % — SIGNIFICANT CHANGE UP (ref 11.5–14.5)
SODIUM SERPL-SCNC: 138 MMOL/L — SIGNIFICANT CHANGE UP (ref 135–146)
SODIUM SERPL-SCNC: 139 MMOL/L — SIGNIFICANT CHANGE UP (ref 135–146)
TROPONIN T SERPL-MCNC: <0.01 NG/ML — SIGNIFICANT CHANGE UP
WBC # BLD: 7.49 K/UL — SIGNIFICANT CHANGE UP (ref 4.8–10.8)
WBC # BLD: 8.9 K/UL — SIGNIFICANT CHANGE UP (ref 4.8–10.8)
WBC # FLD AUTO: 7.49 K/UL — SIGNIFICANT CHANGE UP (ref 4.8–10.8)
WBC # FLD AUTO: 8.9 K/UL — SIGNIFICANT CHANGE UP (ref 4.8–10.8)

## 2021-02-24 PROCEDURE — 99233 SBSQ HOSP IP/OBS HIGH 50: CPT

## 2021-02-24 PROCEDURE — 99223 1ST HOSP IP/OBS HIGH 75: CPT

## 2021-02-24 RX ORDER — ENOXAPARIN SODIUM 100 MG/ML
60 INJECTION SUBCUTANEOUS EVERY 12 HOURS
Refills: 0 | Status: DISCONTINUED | OUTPATIENT
Start: 2021-02-24 | End: 2021-02-25

## 2021-02-24 RX ADMIN — DIVALPROEX SODIUM 250 MILLIGRAM(S): 500 TABLET, DELAYED RELEASE ORAL at 05:57

## 2021-02-24 RX ADMIN — MEMANTINE HYDROCHLORIDE 5 MILLIGRAM(S): 10 TABLET ORAL at 18:38

## 2021-02-24 RX ADMIN — CHLORHEXIDINE GLUCONATE 1 APPLICATION(S): 213 SOLUTION TOPICAL at 05:54

## 2021-02-24 RX ADMIN — DONEPEZIL HYDROCHLORIDE 10 MILLIGRAM(S): 10 TABLET, FILM COATED ORAL at 20:59

## 2021-02-24 RX ADMIN — ENOXAPARIN SODIUM 60 MILLIGRAM(S): 100 INJECTION SUBCUTANEOUS at 20:59

## 2021-02-24 RX ADMIN — DIVALPROEX SODIUM 250 MILLIGRAM(S): 500 TABLET, DELAYED RELEASE ORAL at 18:38

## 2021-02-24 RX ADMIN — MEMANTINE HYDROCHLORIDE 5 MILLIGRAM(S): 10 TABLET ORAL at 05:57

## 2021-02-24 NOTE — DIETITIAN INITIAL EVALUATION ADULT. - FACTORS AFF FOOD INTAKE
Po intake not consistently recorded per EMR; breakfast tray at bedside upon visit -- off to the side, untouched as pt is sleeping in bed. Unable to arouse pt as time of visit this morning. Will f/u po intake trend. Last BM on 2/23 per EMR documentation./none

## 2021-02-24 NOTE — PROGRESS NOTE ADULT - SUBJECTIVE AND OBJECTIVE BOX
KERWIN HERNANDEZ  80y  Male      Patient is a 80y old  Male who presents with a chief complaint of     INTERVAL HPI/OVERNIGHT EVENTS:    pt seen and examined at bedside   -on heparin drip for dvt found on admission  -I ordered chest CT PE protocol --- and found to have Pulmonary emboli  -place pulmonary consult   -monitor PTT (supra therapeutic INR); recheck at 7pm to keep 50-70  -check ECHO     REVIEW OF SYSTEMS:  unable to assess     -Vital Signs Last 24 Hrs  T(C): 37 (23 Feb 2021 14:05), Max: 37.6 (22 Feb 2021 18:08)  T(F): 98.6 (23 Feb 2021 14:05), Max: 99.7 (22 Feb 2021 18:08)  HR: 72 (23 Feb 2021 14:05) (72 - 96)  BP: 142/87 (23 Feb 2021 14:05) (103/67 - 159/90  RR: 16 (23 Feb 2021 14:05) (16 - 19)  SpO2: 97% (22 Feb 2021 21:47) (95% - 97%)    PHYSICAL EXAM:  GENERAL:   HEAD:  Atraumatic, Normocephalic  EYES: EOMI, PERRLA, conjunctiva and sclera clear  NERVOUS SYSTEM:  awake not alert  CHEST/LUNG: Clear to percussion bilaterally; No rales, rhonchi, wheezing, or rubs  CV/HEART: Regular rate and rhythm; No murmurs, rubs, or gallops  GI/ABDOMEN: Soft, Nontender, Nondistended; Bowel sounds present  EXTREMITIES:  2+ Peripheral Pulses, No clubbing, cyanosis, or edema  SKIN: No rashes or lesions    LAB:                        12.8   7.14  )-----------( 179      ( 23 Feb 2021 09:32 )             38.7     02-23    143  |  106  |  14  ----------------------------<  129<H>  4.5   |  25  |  0.8    Ca    9.3      23 Feb 2021 09:32  Mg     2.3     02-23    TPro  6.9  /  Alb  3.6  /  TBili  0.4  /  DBili  x   /  AST  23  /  ALT  20  /  AlkPhos  133<H>  02-23    CARDIAC MARKERS ( 22 Feb 2021 19:20 )  x     / <0.01 ng/mL / x     / x     / x          Daily Height in cm: 167.64 (23 Feb 2021 01:15)    Daily   CAPILLARY BLOOD GLUCOSE    LIVER FUNCTIONS - ( 23 Feb 2021 09:32 )  Alb: 3.6 g/dL / Pro: 6.9 g/dL / ALK PHOS: 133 U/L / ALT: 20 U/L / AST: 23 U/L / GGT: x           RADIOLOGY:    Imaging Personally Reviewed:  [ Y ] YES  [ ] NO    HEALTH ISSUES - PROBLEM Dx:  Heel ulcer  Heel ulcer    Medication management  Medication management    Hyperlipidemia, unspecified hyperlipidemia type  Hyperlipidemia, unspecified hyperlipidemia type    Seizure  Seizure    CAD (coronary artery disease)  CAD (coronary artery disease)    Dementia  Dementia    DVT (deep venous thrombosis)  DVT (deep venous thrombosis)      MEDS:  chlorhexidine 4% Liquid 1 Application(s) Topical <User Schedule>  diVALproex  milliGRAM(s) Oral two times a day  donepezil 10 milliGRAM(s) Oral at bedtime  heparin  Infusion. 1000 Unit(s)/Hr IV Continuous <Continuous>  memantine 5 milliGRAM(s) Oral two times a day    
81yo male whose PMH includes CAD (3 stents), and alzheimer's dementia, is  sent to the ER because evidence of a DVT was found on studies done in his doctor's office. Apparently patient's aspirin was stopped for a 2 week period so that a lumbar puncture could be performed.    Today:  Seen at bedside, appeared comfortable.        REVIEW OF SYSTEMS:  Not a reliable historian.      MEDICATIONS  (STANDING):  chlorhexidine 4% Liquid 1 Application(s) Topical <User Schedule>  diVALproex  milliGRAM(s) Oral two times a day  donepezil 10 milliGRAM(s) Oral at bedtime  enoxaparin Injectable 60 milliGRAM(s) SubCutaneous every 12 hours  memantine 5 milliGRAM(s) Oral two times a day        Allergies  No Known Allergies          Vital Signs Last 24 Hrs  T(C): 37 (24 Feb 2021 04:45), Max: 37.2 (23 Feb 2021 21:35)  T(F): 98.6 (24 Feb 2021 04:45), Max: 98.9 (23 Feb 2021 21:35)  HR: 68 (24 Feb 2021 04:45) (68 - 80)  BP: 101/72 (24 Feb 2021 04:45) (101/72 - 136/64)  RR: 16 (24 Feb 2021 04:45) (16 - 16)    PHYSICAL EXAM:  GENERAL: NAD, non-verbal  HEAD:  Atraumatic, Normocephalic  EYES: EOMI, PERRLA, conjunctiva and sclera clear  NERVOUS SYSTEM:  awake not alert  CHEST/LUNG: Clear to percussion bilaterally; No rales, rhonchi, wheezing, or rubs  CV/HEART: Regular rate and rhythm; No murmurs, rubs, or gallops  GI/ABDOMEN: Soft, Nontender, Nondistended; Bowel sounds present  EXTREMITIES:  2+ Peripheral Pulses, No clubbing, cyanosis, or edema      LABS:                        12.1   8.90  )-----------( 162      ( 24 Feb 2021 09:00 )             36.3     02-24    138  |  103  |  12  ----------------------------<  92  4.3   |  27  |  0.8    Ca    9.1      24 Feb 2021 09:00  Mg     2.3     02-24    TPro  6.6  /  Alb  3.3<L>  /  TBili  0.4  /  DBili  x   /  AST  24  /  ALT  20  /  AlkPhos  119<H>  02-24    PTT - ( 24 Feb 2021 09:00 )  PTT:58.5 sec

## 2021-02-24 NOTE — PHYSICAL THERAPY INITIAL EVALUATION ADULT - FOLLOWS COMMANDS/ANSWERS QUESTIONS, REHAB EVAL
Very inconsistently - not answering , but able to tell PT he walks and lives with wife; not following commands for PT assessment

## 2021-02-24 NOTE — DIETITIAN INITIAL EVALUATION ADULT. - PHYSCIAL ASSESSMENT
No wt recorded per EMR from previous admission x1 year ago. Pt appears thin, but unable to perform NFPE as pt was sleeping peacefully at time of assessment.    No edema noted; stage II pressure ulcer (L buttocks), unstageable pressure ulcer (L heel). well nourished/other (specify)

## 2021-02-24 NOTE — PHYSICAL THERAPY INITIAL EVALUATION ADULT - GENERAL OBSERVATIONS, REHAB EVAL
Pt encountered semi-reclined in bed, NAD, +IV, +dressing L heel, ok to be seen by PT as confirmed by RN and pt agreeable. +chart reviewed As discussed with CHAKA Restrepo, pt has been on Heparin and ok now to be seen by PT.

## 2021-02-24 NOTE — PHYSICAL THERAPY INITIAL EVALUATION ADULT - SITTING BALANCE: DYNAMIC
unable to accurately assess - pt resisting PT assistance to sit EOB, leaning back into bed, pulling sheets back over legs

## 2021-02-24 NOTE — CONSULT NOTE ADULT - SUBJECTIVE AND OBJECTIVE BOX
Patient is a 80y old  Male who presents with a chief complaint of     HPI:  (Patient confused, poor historian) 81yo male whose PMH includes CAD (3 stents), and alzheimer's dementia, is  sent to the ER because evidence of a DVT was found on studies done in his doctor's office. Appanetly patient's aspirin was stopped for a 2 week period so that a lumbar puncture could be performed. Patient himself cannot tell me if he has pain, modifying factors or even associated symptoms though he appears comfortable on his stretcher in the ER (22 Feb 2021 22:55)  ct scan showed b/l PE     PAST MEDICAL & SURGICAL HISTORY:  Dementia    Seizure    Hyperlipidemia, unspecified hyperlipidemia type    Coronary artery disease, angina presence unspecified, unspecified vessel or lesion type, unspecified whether native or transplanted heart    No significant past surgical history        FAMILY HISTORY:    Family history: No family cardiovascular system   Occupation:  Alochol: Denied  Smoking: Denied  Drug Use: Denied  Marital Status:           Allergies    No Known Allergies    Intolerances        Home Medications:  donepezil 10 mg oral tablet: 1 tab(s) orally once a day (at bedtime) (22 Feb 2021 18:17)  memantine 5 mg oral tablet: 1 tab(s) orally 2 times a day (22 Feb 2021 18:17)      ROS: as in HPI; All other systems reviewed are negative        PHYSICAL EXAM:  Vital Signs Last 24 Hrs  T(C): 37 (24 Feb 2021 04:45), Max: 37.2 (23 Feb 2021 21:35)  T(F): 98.6 (24 Feb 2021 04:45), Max: 98.9 (23 Feb 2021 21:35)  HR: 68 (24 Feb 2021 04:45) (68 - 80)  BP: 101/72 (24 Feb 2021 04:45) (101/72 - 142/87)  BP(mean): --  RR: 16 (24 Feb 2021 04:45) (16 - 16)  SpO2: --      GENERAL: confused not on distress   HEAD:  Atraumatic, Normocephalic  EYES: EOMI, PERRLA, conjunctiva and sclera clear  ENMT: No tonsillar erythema, exudates, or enlargement; Moist mucous membranes, Good dentition, No lesions  NECK: Supple, No JVD, Normal thyroid  NERVOUS SYSTEM:  Alert & Oriented X3, Good concentration; Motor Strength 5/5 B/L upper and lower extremities; DTRs 2+ intact and symmetric  CHEST/LUNG: Clear to percussion bilaterally; No rales, rhonchi, wheezing, or rubs  HEART: Regular rate and rhythm; No murmurs, rubs, or gallops  ABDOMEN: Soft, Nontender, Nondistended; Bowel sounds present  EXTREMITIES:  2+ Peripheral Pulses, No clubbing, cyanosis, or edema  LYMPH: No lymphadenopathy noted  SKIN: No rashes or lesions    HOSPITAL MEDICATIONS:  MEDICATIONS  (STANDING):  chlorhexidine 4% Liquid 1 Application(s) Topical <User Schedule>  diVALproex  milliGRAM(s) Oral two times a day  donepezil 10 milliGRAM(s) Oral at bedtime  heparin  Infusion. 800 Unit(s)/Hr (8 mL/Hr) IV Continuous <Continuous>  memantine 5 milliGRAM(s) Oral two times a day    MEDICATIONS  (PRN):      LABS:                        12.1   8.90  )-----------( 162      ( 24 Feb 2021 09:00 )             36.3     02-24    138  |  103  |  12  ----------------------------<  92  4.3   |  27  |  0.8    Ca    9.1      24 Feb 2021 09:00  Mg     2.3     02-24    TPro  6.6  /  Alb  3.3<L>  /  TBili  0.4  /  DBili  x   /  AST  24  /  ALT  20  /  AlkPhos  119<H>  02-24    PTT - ( 24 Feb 2021 09:00 )  PTT:58.5 sec              RADIOLOGY: < from: CT Angio Chest PE Protocol w/ IV Cont (02.23.21 @ 14:48) >  Bilateral acute pulmonary emboli involving bilateral proximal lobar branches (series 3/1 24-93).    Mild right heart strain with RV/LV ratio of 1.25 (series 3/65, RV 4.5 cm/LV 3.6 cm). Reflux into hepatic veins...    Since January 11, 2020 interval development of well-circumscribed pleural-based mass contiguous with the posterior right major fissure (Hounsfield units greater than 35)-series 2/60, series 601/44. The possibility of loculated hemorrhagic fluid should be considered in differential diagnosis.    Follow-up CT scan in several weeks time suggested.    < end of copied text >    [ ] Reviewed and interpreted by me    ECHO:    Point of Care Ultrasound Findings;    PFT:

## 2021-02-24 NOTE — DIETITIAN INITIAL EVALUATION ADULT. - OTHER INFO
Pt admitted d/t DVT. CT angio chest (2/23) showed b/l acute pulmonary emboli involving b/l proximal lobar branched; mild R heart strain. Pulmonary consulted- per pulm note, case discussed with IR, no need for catheter directed procedure.

## 2021-02-24 NOTE — DIETITIAN INITIAL EVALUATION ADULT. - ADD RECOMMEND
continue current diet order as tolerated, order Anish supplement BID, order ensure enlive supplement BID, order vitamin C supplement 500 mg q24h, encourage po intake, provide assistance with meals PRN.

## 2021-02-24 NOTE — CHART NOTE - NSCHARTNOTEFT_GEN_A_CORE
Because of the Deep Vein Thrombosis of the Left LE, we need to rule out PE   CTA PE Study ordered,  Since it was a STAT exam and Patient is not consentable, radiology team requested the provider to sign the consent form as it is urgent   Called the family, did not answer.   Therefore signed the form     Called again and Rosa, daughter, answered the phone [phone] at 11:01  Discussed the importance of PE study and she verbalized the understanding and consented verbally for the procedure     As per Rosa, patient has not been receiving his ASA for a while because he was scheduled for a LP procedure and they wanted him off of ASA.   Also had a depth phone conversation regarding patient's current clinical condition, diagnosis, therapy, further options for care, and plan. Patient and family encouraged to contact PA with any further issues.
Called emergency contacts on file; no answer or full voicemails for the numbers listed.
Heparin dose adjusted
pTT stable 58.5, as per normogram no change.   Will keep the heparin at 8units/hr    PTT - ( 24 Feb 2021 09:00 )  PTT:58.5 sec
, patient was on 13units/hr   As per normogram will holding heparin for 1 hour and restart at 11 units/hr

## 2021-02-24 NOTE — DIETITIAN INITIAL EVALUATION ADULT. - RD TO REMAIN AVAILABLE
INTERVENTION: meals and snacks, medical food supplements, vitamin/mineral supplements, coordination of care. ME: RD to monitor diet order, energy intake, body composition, NFPF/yes

## 2021-02-24 NOTE — CONSULT NOTE ADULT - ASSESSMENT
Impression   acute PE   DVT     plan   case discussed with IR Dr Cooper no need for catheter directed procedure   BNP negative   trop negative , patient on RA not hypoxic   continue AC lovenox or Iv heparin for now theraputic and can be convert to oral gagandeep   echo   repeat CE once   case discussed with IR and hospitalist

## 2021-02-24 NOTE — CONSULT NOTE ADULT - ASSESSMENT
INTERVENTIONAL RADIOLOGY CONSULT:     Procedure Requested:     HPI:  (Patient confused, poor historian) 79yo male whose PMH includes CAD (3 stents), and alzheimer's dementia, is  sent to the ER because evidence of a DVT was found on studies done in his doctor's office. Appanetly patient's aspirin was stopped for a 2 week period so that a lumbar puncture could be performed. Patient himself cannot tell me if he has pain, modifying factors or even associated symptoms though he appears comfortable on his stretcher in the ER (22 Feb 2021 22:55)      PAST MEDICAL & SURGICAL HISTORY:  Dementia    Seizure    Hyperlipidemia, unspecified hyperlipidemia type    Coronary artery disease, angina presence unspecified, unspecified vessel or lesion type, unspecified whether native or transplanted heart    No significant past surgical history        MEDICATIONS  (STANDING):  chlorhexidine 4% Liquid 1 Application(s) Topical <User Schedule>  diVALproex  milliGRAM(s) Oral two times a day  donepezil 10 milliGRAM(s) Oral at bedtime  enoxaparin Injectable 60 milliGRAM(s) SubCutaneous every 12 hours  memantine 5 milliGRAM(s) Oral two times a day    MEDICATIONS  (PRN):      Allergies    No Known Allergies    Intolerances        Social History:   Smoking: Yes [ ]  No [ ]   ______pk yrs  ETOH  Yes [ ]  No [ ]  Social [ ]  DRUGS:  Yes [ ]  No [ ]  if so what______________    FAMILY HISTORY:      Physical Exam:   Vital Signs Last 24 Hrs  T(C): 37 (24 Feb 2021 04:45), Max: 37.2 (23 Feb 2021 21:35)  T(F): 98.6 (24 Feb 2021 04:45), Max: 98.9 (23 Feb 2021 21:35)  HR: 68 (24 Feb 2021 04:45) (68 - 80)  BP: 101/72 (24 Feb 2021 04:45) (101/72 - 142/87)  BP(mean): --  RR: 16 (24 Feb 2021 04:45) (16 - 16)  SpO2: --    Labs:                         12.1   8.90  )-----------( 162      ( 24 Feb 2021 09:00 )             36.3     02-24    138  |  103  |  12  ----------------------------<  92  4.3   |  27  |  0.8    Ca    9.1      24 Feb 2021 09:00  Mg     2.3     02-24    TPro  6.6  /  Alb  3.3<L>  /  TBili  0.4  /  DBili  x   /  AST  24  /  ALT  20  /  AlkPhos  119<H>  02-24    PTT - ( 24 Feb 2021 09:00 )  PTT:58.5 sec    Pertinent labs:                      12.1   8.90  )-----------( 162      ( 24 Feb 2021 09:00 )             36.3       02-24    138  |  103  |  12  ----------------------------<  92  4.3   |  27  |  0.8    Ca    9.1      24 Feb 2021 09:00  Mg     2.3     02-24    TPro  6.6  /  Alb  3.3<L>  /  TBili  0.4  /  DBili  x   /  AST  24  /  ALT  20  /  AlkPhos  119<H>  02-24      PTT - ( 24 Feb 2021 09:00 )  PTT:58.5 sec    Radiology & Additional Studies:     Radiology imaging reviewed.       ASSESSMENT AND PLAN:  79yo male whose PMH includes CAD (3 stents), and alzheimer's dementia sent to ER 2/2 DVT found on studies done in his doctor's office.    Imaging showed bilateral PEs and right heart strain.  Left superficial femoral vein DVT.  Pt is hemodynamically stable.  Negative troponin and BNP.  Currently on AC therapy.    No IR intervention indicated at this time.    Thank you for the courtesy of this consult, please call [phone] with any further questions.    INTERVENTIONAL RADIOLOGY CONSULT:     Procedure Requested: Thrombectomy    HPI:  (Patient confused, poor historian) 81yo male whose PMH includes CAD (3 stents), and alzheimer's dementia, is  sent to the ER because evidence of a DVT was found on studies done in his doctor's office. Appanetly patient's aspirin was stopped for a 2 week period so that a lumbar puncture could be performed. Patient himself cannot tell me if he has pain, modifying factors or even associated symptoms though he appears comfortable on his stretcher in the ER (22 Feb 2021 22:55)      PAST MEDICAL & SURGICAL HISTORY:  Dementia    Seizure    Hyperlipidemia, unspecified hyperlipidemia type    Coronary artery disease, angina presence unspecified, unspecified vessel or lesion type, unspecified whether native or transplanted heart    No significant past surgical history        MEDICATIONS  (STANDING):  chlorhexidine 4% Liquid 1 Application(s) Topical <User Schedule>  diVALproex  milliGRAM(s) Oral two times a day  donepezil 10 milliGRAM(s) Oral at bedtime  enoxaparin Injectable 60 milliGRAM(s) SubCutaneous every 12 hours  memantine 5 milliGRAM(s) Oral two times a day    MEDICATIONS  (PRN):      Allergies    No Known Allergies    Intolerances        Social History:   Smoking: Yes [ ]  No [ ]   ______pk yrs  ETOH  Yes [ ]  No [ ]  Social [ ]  DRUGS:  Yes [ ]  No [ ]  if so what______________    FAMILY HISTORY:      Physical Exam:   Vital Signs Last 24 Hrs  T(C): 37 (24 Feb 2021 04:45), Max: 37.2 (23 Feb 2021 21:35)  T(F): 98.6 (24 Feb 2021 04:45), Max: 98.9 (23 Feb 2021 21:35)  HR: 68 (24 Feb 2021 04:45) (68 - 80)  BP: 101/72 (24 Feb 2021 04:45) (101/72 - 142/87)  BP(mean): --  RR: 16 (24 Feb 2021 04:45) (16 - 16)  SpO2: --    Labs:                         12.1   8.90  )-----------( 162      ( 24 Feb 2021 09:00 )             36.3     02-24    138  |  103  |  12  ----------------------------<  92  4.3   |  27  |  0.8    Ca    9.1      24 Feb 2021 09:00  Mg     2.3     02-24    TPro  6.6  /  Alb  3.3<L>  /  TBili  0.4  /  DBili  x   /  AST  24  /  ALT  20  /  AlkPhos  119<H>  02-24    PTT - ( 24 Feb 2021 09:00 )  PTT:58.5 sec    Pertinent labs:                      12.1   8.90  )-----------( 162      ( 24 Feb 2021 09:00 )             36.3       02-24    138  |  103  |  12  ----------------------------<  92  4.3   |  27  |  0.8    Ca    9.1      24 Feb 2021 09:00  Mg     2.3     02-24    TPro  6.6  /  Alb  3.3<L>  /  TBili  0.4  /  DBili  x   /  AST  24  /  ALT  20  /  AlkPhos  119<H>  02-24      PTT - ( 24 Feb 2021 09:00 )  PTT:58.5 sec    Radiology & Additional Studies:     Radiology imaging reviewed.       ASSESSMENT AND PLAN:  81yo male whose PMH includes CAD (3 stents), and alzheimer's dementia sent to ER 2/2 DVT found on studies done in his doctor's office.    Imaging showed bilateral PEs and right heart strain.  Left superficial femoral vein DVT.  Pt is hemodynamically stable.  Negative troponin and BNP.  Currently on AC therapy.    No IR intervention indicated at this time.    Thank you for the courtesy of this consult, please call [phone] with any further questions.

## 2021-02-24 NOTE — DIETITIAN INITIAL EVALUATION ADULT. - PERSON TAUGHT/METHOD
d/c education deferred d/t pt mental status; unable to get in contact with pt family member upon attempt.

## 2021-02-24 NOTE — PHYSICAL THERAPY INITIAL EVALUATION ADULT - ADDITIONAL COMMENTS
Pt is a poor historian 2* confusion - per chart review/CM assessment pt lives in a private house with his wife - 2 steps on outside to enter then pt stays on 1st level. Daughter lives upstairs. Pt ambulates minimally with RW, but has been using W/C more recently. Pt also has SC at home. Family reports sufficient assistance at home for his care.

## 2021-02-24 NOTE — DIETITIAN INITIAL EVALUATION ADULT. - REASON INDICATOR FOR ASSESSMENT
pressure injury stage 2 or >; pt is confused/disoriented and therefore unable to provide nutrition hx. No response from pt daughter, Rosa, @ [phone] upon attempt. Nutrition hx deferred.

## 2021-02-24 NOTE — DIETITIAN INITIAL EVALUATION ADULT. - OTHER CALCULATIONS
wt used: dosing 58.1 kg; Kcal: 9344-0970 (MSJ x 1.2-1.4 AF) pressure ulcers considered; Protein: 70-81 g (1.2-1.4 g/kg) same as above; Fluid: 1 mL/kcal or per LIP

## 2021-02-24 NOTE — PROGRESS NOTE ADULT - ASSESSMENT
81yo male whose PMH includes CAD (3 stents), and alzheimer's dementia, is  sent to the ER because evidence of a DVT was found on studies done in his doctor's office. patient's aspirin was stopped for a 2 week period so that a lumbar puncture could be performed.     1) Acute Left Femoral DVT/Acute Pulmonary Emboli  -CTA PE read as b/l PE with right heart strain  -Switched heparin ggt to Lovenox, likely DC on DOAC  -check ECHO   -Pulmonary and IR consults appreciated; no need for acute intervention     2) Dementia   -home meds     3) CAD  -home meds     4) Seizure   on AEDs twice daily      5) Skin care as per nursing staff  -Wound care consult for reccs     -Will attempt to reach family and clarify need for LP (was to be done with Dr. Thrasher)

## 2021-02-24 NOTE — PHYSICAL THERAPY INITIAL EVALUATION ADULT - LEVEL OF INDEPENDENCE: SUPINE/SIT, REHAB EVAL
Attempted to assist pt to sit EOB - pt resisting PT and RN assistance, pushing back into bed, pulling blanket back over legs

## 2021-02-25 ENCOUNTER — TRANSCRIPTION ENCOUNTER (OUTPATIENT)
Age: 81
End: 2021-02-25

## 2021-02-25 VITALS
OXYGEN SATURATION: 99 % | TEMPERATURE: 98 F | DIASTOLIC BLOOD PRESSURE: 86 MMHG | SYSTOLIC BLOOD PRESSURE: 123 MMHG | RESPIRATION RATE: 16 BRPM | HEART RATE: 78 BPM

## 2021-02-25 LAB
ALBUMIN SERPL ELPH-MCNC: 3.6 G/DL — SIGNIFICANT CHANGE UP (ref 3.5–5.2)
ALP SERPL-CCNC: 130 U/L — HIGH (ref 30–115)
ALT FLD-CCNC: 20 U/L — SIGNIFICANT CHANGE UP (ref 0–41)
ANION GAP SERPL CALC-SCNC: 10 MMOL/L — SIGNIFICANT CHANGE UP (ref 7–14)
AST SERPL-CCNC: 26 U/L — SIGNIFICANT CHANGE UP (ref 0–41)
BASOPHILS # BLD AUTO: 0.02 K/UL — SIGNIFICANT CHANGE UP (ref 0–0.2)
BASOPHILS NFR BLD AUTO: 0.3 % — SIGNIFICANT CHANGE UP (ref 0–1)
BILIRUB SERPL-MCNC: 0.4 MG/DL — SIGNIFICANT CHANGE UP (ref 0.2–1.2)
BUN SERPL-MCNC: 13 MG/DL — SIGNIFICANT CHANGE UP (ref 10–20)
CALCIUM SERPL-MCNC: 9.8 MG/DL — SIGNIFICANT CHANGE UP (ref 8.5–10.1)
CHLORIDE SERPL-SCNC: 101 MMOL/L — SIGNIFICANT CHANGE UP (ref 98–110)
CO2 SERPL-SCNC: 27 MMOL/L — SIGNIFICANT CHANGE UP (ref 17–32)
CREAT SERPL-MCNC: 0.7 MG/DL — SIGNIFICANT CHANGE UP (ref 0.7–1.5)
EOSINOPHIL # BLD AUTO: 0 K/UL — SIGNIFICANT CHANGE UP (ref 0–0.7)
EOSINOPHIL NFR BLD AUTO: 0 % — SIGNIFICANT CHANGE UP (ref 0–8)
GLUCOSE SERPL-MCNC: 128 MG/DL — HIGH (ref 70–99)
HCT VFR BLD CALC: 37.5 % — LOW (ref 42–52)
HGB BLD-MCNC: 12.6 G/DL — LOW (ref 14–18)
IMM GRANULOCYTES NFR BLD AUTO: 0.4 % — HIGH (ref 0.1–0.3)
LYMPHOCYTES # BLD AUTO: 0.82 K/UL — LOW (ref 1.2–3.4)
LYMPHOCYTES # BLD AUTO: 10.9 % — LOW (ref 20.5–51.1)
MCHC RBC-ENTMCNC: 30.9 PG — SIGNIFICANT CHANGE UP (ref 27–31)
MCHC RBC-ENTMCNC: 33.6 G/DL — SIGNIFICANT CHANGE UP (ref 32–37)
MCV RBC AUTO: 91.9 FL — SIGNIFICANT CHANGE UP (ref 80–94)
MONOCYTES # BLD AUTO: 0.36 K/UL — SIGNIFICANT CHANGE UP (ref 0.1–0.6)
MONOCYTES NFR BLD AUTO: 4.8 % — SIGNIFICANT CHANGE UP (ref 1.7–9.3)
NEUTROPHILS # BLD AUTO: 6.26 K/UL — SIGNIFICANT CHANGE UP (ref 1.4–6.5)
NEUTROPHILS NFR BLD AUTO: 83.6 % — HIGH (ref 42.2–75.2)
NRBC # BLD: 0 /100 WBCS — SIGNIFICANT CHANGE UP (ref 0–0)
PLATELET # BLD AUTO: 210 K/UL — SIGNIFICANT CHANGE UP (ref 130–400)
POTASSIUM SERPL-MCNC: 4.9 MMOL/L — SIGNIFICANT CHANGE UP (ref 3.5–5)
POTASSIUM SERPL-SCNC: 4.9 MMOL/L — SIGNIFICANT CHANGE UP (ref 3.5–5)
PROT SERPL-MCNC: 7.2 G/DL — SIGNIFICANT CHANGE UP (ref 6–8)
RBC # BLD: 4.08 M/UL — LOW (ref 4.7–6.1)
RBC # FLD: 12.9 % — SIGNIFICANT CHANGE UP (ref 11.5–14.5)
SARS-COV-2 IGG SERPL QL IA: NEGATIVE — SIGNIFICANT CHANGE UP
SARS-COV-2 IGM SERPL IA-ACNC: <0.1 INDEX — SIGNIFICANT CHANGE UP
SODIUM SERPL-SCNC: 138 MMOL/L — SIGNIFICANT CHANGE UP (ref 135–146)
WBC # BLD: 7.49 K/UL — SIGNIFICANT CHANGE UP (ref 4.8–10.8)
WBC # FLD AUTO: 7.49 K/UL — SIGNIFICANT CHANGE UP (ref 4.8–10.8)

## 2021-02-25 PROCEDURE — 99239 HOSP IP/OBS DSCHRG MGMT >30: CPT

## 2021-02-25 RX ORDER — APIXABAN 2.5 MG/1
2 TABLET, FILM COATED ORAL
Qty: 28 | Refills: 0
Start: 2021-02-25 | End: 2021-03-03

## 2021-02-25 RX ORDER — APIXABAN 2.5 MG/1
10 TABLET, FILM COATED ORAL
Refills: 0 | Status: DISCONTINUED | OUTPATIENT
Start: 2021-02-25 | End: 2021-02-25

## 2021-02-25 RX ADMIN — DIVALPROEX SODIUM 250 MILLIGRAM(S): 500 TABLET, DELAYED RELEASE ORAL at 05:13

## 2021-02-25 RX ADMIN — APIXABAN 10 MILLIGRAM(S): 2.5 TABLET, FILM COATED ORAL at 13:30

## 2021-02-25 RX ADMIN — DIVALPROEX SODIUM 250 MILLIGRAM(S): 500 TABLET, DELAYED RELEASE ORAL at 17:29

## 2021-02-25 RX ADMIN — MEMANTINE HYDROCHLORIDE 5 MILLIGRAM(S): 10 TABLET ORAL at 05:13

## 2021-02-25 RX ADMIN — MEMANTINE HYDROCHLORIDE 5 MILLIGRAM(S): 10 TABLET ORAL at 17:29

## 2021-02-25 NOTE — DISCHARGE NOTE PROVIDER - INSTRUCTIONS
DASH diet (Dietary Approaches to Stop Hypertension) diet – This diet is low in salt and fat. It includes 4 to 5 servings each of fruits and vegetables and 2 to 3 servings of low-fat dairy products per day. This diet can lower your blood pressure, weight, and blood sugar, and improve lipid levels.

## 2021-02-25 NOTE — DISCHARGE NOTE PROVIDER - NSDCCPCAREPLAN_GEN_ALL_CORE_FT
PRINCIPAL DISCHARGE DIAGNOSIS  Diagnosis: DVT (deep venous thrombosis)  Assessment and Plan of Treatment: - With Pulmonary embolism   - Venous duplex scan revealed "Positive deep vein thrombosis in the left femoral vein in the proximal segment."   - CTA Pulmonary Embolismm study revealed "Bilateral acute pulmonary emboli involving bilateral proximal lobar branches (series 3/1 24-93). Mild right heart strain with RV/LV ratio of 1.25 (series 3/65, RV 4.5 cm/LV 3.6 cm). Reflux into hepatic veins"   - Patient was started on Heparin drip and adjusted the rate while monitoring pTT.   - Echocardiogram was performed - Left ventricle is normal in size and function, EF >55%, normal echo.   - Pulmonary and IR teams consulted but does not require any acute intervention as per them.   - Patient will be discharged with PO Eliquis.

## 2021-02-25 NOTE — DISCHARGE NOTE NURSING/CASE MANAGEMENT/SOCIAL WORK - PATIENT PORTAL LINK FT
You can access the FollowMyHealth Patient Portal offered by [facility] by registering at the following website: [email or web address]. By joining [facility]’s FollowMyHealth portal, you will also be able to view your health information using other applications (apps) compatible with our system.

## 2021-02-25 NOTE — DISCHARGE NOTE PROVIDER - CARE PROVIDER_API CALL
SHANNEN JULIO  Internal Medicine  [address] [place] [address]  Phone: [phone]  Fax: [phone]  Follow Up Time: 1-3 days    Keagan Cramer)  Critical Care Medicine; Internal Medicine; Pulmonary Disease  [address] [place] [address], [facility] [address]  [place] [address]  Phone: [phone]  Fax: [phone]  Follow Up Time: 1 week    Landau, Elliot (MD)  Radiology  [address] [place] [address]  Phone: [phone]  Fax: [phone]  Follow Up Time: 2 weeks

## 2021-02-25 NOTE — DISCHARGE NOTE PROVIDER - NSDCMRMEDTOKEN_GEN_ALL_CORE_FT
divalproex sodium 250 mg oral delayed release tablet: 1 tab(s) orally 2 times a day  donepezil 10 mg oral tablet: 1 tab(s) orally once a day (at bedtime)  memantine 5 mg oral tablet: 1 tab(s) orally 2 times a day   apixaban 5 mg oral tablet: 2 tab(s) orally 2 times a day  apixaban 5 mg oral tablet: 1 tab(s) orally 2 times a day   divalproex sodium 250 mg oral delayed release tablet: 1 tab(s) orally 2 times a day  donepezil 10 mg oral tablet: 1 tab(s) orally once a day (at bedtime)  memantine 5 mg oral tablet: 1 tab(s) orally 2 times a day   apixaban 5 mg oral tablet: 2 tab(s) orally 2 times a day  apixaban 5 mg oral tablet: 1 tab(s) orally 2 times a day   divalproex sodium 250 mg oral delayed release tablet: 1 tab(s) orally 2 times a day  donepezil 10 mg oral tablet: 1 tab(s) orally once a day (at bedtime)  Eliquis 5 mg oral tablet: 2 tab(s) orally 2 times a day   Eliquis 5 mg oral tablet: 1 tab(s) orally 2 times a day   memantine 5 mg oral tablet: 1 tab(s) orally 2 times a day  rivaroxaban 15 mg oral tablet: 1 tab(s) orally 2 times a day   rivaroxaban 20 mg oral tablet: 1 tab(s) orally once a day

## 2021-02-25 NOTE — DISCHARGE NOTE PROVIDER - HOSPITAL COURSE
Patient is a 80 year old male with a  past medical history of CAD (3 stents), and alzheimer's dementia, was brought to the ER  on 2/22 because evidence of a DVT was found on studies done in his doctor's office. patient's aspirin was stopped for a 2 week period so that a lumbar puncture could be performed. Venous duplex scan revealed "Positive deep vein thrombosis in the left femoral vein in the proximal segment." CTA Pulmonary Embolismm study revealed "Bilateral acute pulmonary emboli involving bilateral proximal lobar branches (series 3/1 24-93). Mild right heart strain with RV/LV ratio of 1.25 (series 3/65, RV 4.5 cm/LV 3.6 cm). Reflux into hepatic veins" Patient was started on Heparin drip and adjusted the rate while monitoring pTT. Echocardiogram was performed - Left ventricle is normal in size and function, EF >55%, normal echo. Pulmonary and IR teams consulted but does not require any acute intervention as per them. Patient will be discharged with PO Eliquis.  Patient was also seen by wound care team.     Vital Signs Last 24 Hrs  T(C): 36.8 (25 Feb 2021 05:23), Max: 36.8 (25 Feb 2021 05:23)  T(F): 98.2 (25 Feb 2021 05:23), Max: 98.2 (25 Feb 2021 05:23)  HR: 68 (25 Feb 2021 05:23) (68 - 78)  BP: 104/70 (25 Feb 2021 05:23) (104/70 - 134/85)  RR: 16 (25 Feb 2021 05:23) (16 - 16)      CT Angio Chest PE Protocol w/ IV Cont (02.23.21 @ 14:48)   Impression:  Bilateral acute pulmonary emboli involving bilateral proximal lobar branches (series 3/1 24-93).  Mild right heart strain with RV/LV ratio of 1.25 (series 3/65, RV 4.5 cm/LV 3.6 cm). Reflux into hepatic veins...  Since January 11, 2020 interval development of well-circumscribed pleural-based mass contiguous with the posterior right major fissure (Hounsfield units greater than 35)-series 2/60, series 601/44. The possibility of loculated hemorrhagic fluid should be considered in differential diagnosis.  Follow-up CT scan in several weeks time suggested.    VA Duplex Lower Ext Vein Scan, Bilat (02.22.21 @ 21:03)   Impression:  Positive deep vein thrombosis in the left femoral vein in the proximal segment.    Xray Chest 1 View-PORTABLE IMMEDIATE (02.22.21 @ 19:50)   Impression:  Central vascular prominence and right perihilar opacities.       Patient is a 80 year old male with a  past medical history of CAD (3 stents), and alzheimer's dementia, was brought to the ER  on 2/22 because evidence of a DVT was found on studies done in his doctor's office. patient's aspirin was stopped for a 2 week period so that a lumbar puncture could be performed. Venous duplex scan revealed "Positive deep vein thrombosis in the left femoral vein in the proximal segment." CTA Pulmonary Embolismm study revealed "Bilateral acute pulmonary emboli involving bilateral proximal lobar branches (series 3/1 24-93). Mild right heart strain with RV/LV ratio of 1.25 (series 3/65, RV 4.5 cm/LV 3.6 cm). Reflux into hepatic veins" Patient was started on Heparin drip and adjusted the rate while monitoring pTT. Echocardiogram was performed - Left ventricle is normal in size and function, EF >55%, normal echo. Pulmonary and IR teams consulted but does not require any acute intervention as per them. Patient will be discharged with PO Eliquis.  Patient was also seen by wound care team.     Vital Signs Last 24 Hrs  T(C): 36.8 (25 Feb 2021 05:23), Max: 36.8 (25 Feb 2021 05:23)  T(F): 98.2 (25 Feb 2021 05:23), Max: 98.2 (25 Feb 2021 05:23)  HR: 68 (25 Feb 2021 05:23) (68 - 78)  BP: 104/70 (25 Feb 2021 05:23) (104/70 - 134/85)  RR: 16 (25 Feb 2021 05:23) (16 - 16)      CT Angio Chest PE Protocol w/ IV Cont (02.23.21 @ 14:48)   Impression:  Bilateral acute pulmonary emboli involving bilateral proximal lobar branches (series 3/1 24-93).  Mild right heart strain with RV/LV ratio of 1.25 (series 3/65, RV 4.5 cm/LV 3.6 cm). Reflux into hepatic veins...  Since January 11, 2020 interval development of well-circumscribed pleural-based mass contiguous with the posterior right major fissure (Hounsfield units greater than 35)-series 2/60, series 601/44. The possibility of loculated hemorrhagic fluid should be considered in differential diagnosis.  Follow-up CT scan in several weeks time suggested.    VA Duplex Lower Ext Vein Scan, Bilat (02.22.21 @ 21:03)   Impression:  Positive deep vein thrombosis in the left femoral vein in the proximal segment.    Xray Chest 1 View-PORTABLE IMMEDIATE (02.22.21 @ 19:50)   Impression:  Central vascular prominence and right perihilar opacities.      79yo male whose PMH includes CAD (3 stents), and alzheimer's dementia, is  sent to the ER because evidence of a DVT was found on studies done in his doctor's office. patient's aspirin was stopped for a 2 week period so that a lumbar puncture could be performed.     1) Acute Left Femoral DVT/Acute Pulmonary Emboli  -CTA PE read as b/l PE with right heart strain  -Switched heparin ggt to Lovenox, will DC on DOAC  -TTE unremarkable with EF >55%  -Pulmonary and IR consults appreciated; no need for acute intervention     2) Dementia   -home meds   -NPH?  -As per daughter, patient was to have LP because of "excess fluid in spine and brain"; she discussed this with patient's PMD and LP will be delayed until PMD evaluates patient after discharge.      patient stable for DC to home today.

## 2021-02-25 NOTE — DISCHARGE NOTE PROVIDER - PROVIDER TOKENS
PROVIDER:[TOKEN:[[id number]],FOLLOWUP:[1-3 days]],PROVIDER:[TOKEN:[[id number]],FOLLOWUP:[1 week]],PROVIDER:[TOKEN:[[id number]],FOLLOWUP:[2 weeks]]

## 2021-03-03 RX ORDER — APIXABAN 2.5 MG/1
1 TABLET, FILM COATED ORAL
Qty: 60 | Refills: 0
Start: 2021-03-03 | End: 2021-04-01

## 2021-03-04 DIAGNOSIS — I82.409 ACUTE EMBOLISM AND THROMBOSIS OF UNSPECIFIED DEEP VEINS OF UNSPECIFIED LOWER EXTREMITY: ICD-10-CM

## 2021-03-04 DIAGNOSIS — G30.9 ALZHEIMER'S DISEASE, UNSPECIFIED: ICD-10-CM

## 2021-03-04 DIAGNOSIS — Z74.01 BED CONFINEMENT STATUS: ICD-10-CM

## 2021-03-04 DIAGNOSIS — E78.5 HYPERLIPIDEMIA, UNSPECIFIED: ICD-10-CM

## 2021-03-04 DIAGNOSIS — I25.10 ATHEROSCLEROTIC HEART DISEASE OF NATIVE CORONARY ARTERY WITHOUT ANGINA PECTORIS: ICD-10-CM

## 2021-03-04 DIAGNOSIS — G40.909 EPILEPSY, UNSPECIFIED, NOT INTRACTABLE, WITHOUT STATUS EPILEPTICUS: ICD-10-CM

## 2021-03-04 DIAGNOSIS — L89.620 PRESSURE ULCER OF LEFT HEEL, UNSTAGEABLE: ICD-10-CM

## 2021-03-04 DIAGNOSIS — L85.3 XEROSIS CUTIS: ICD-10-CM

## 2021-03-04 DIAGNOSIS — F02.80 DEMENTIA IN OTHER DISEASES CLASSIFIED ELSEWHERE, UNSPECIFIED SEVERITY, WITHOUT BEHAVIORAL DISTURBANCE, PSYCHOTIC DISTURBANCE, MOOD DISTURBANCE, AND ANXIETY: ICD-10-CM

## 2021-03-04 DIAGNOSIS — I82.412 ACUTE EMBOLISM AND THROMBOSIS OF LEFT FEMORAL VEIN: ICD-10-CM

## 2021-03-04 DIAGNOSIS — R91.8 OTHER NONSPECIFIC ABNORMAL FINDING OF LUNG FIELD: ICD-10-CM

## 2021-03-04 DIAGNOSIS — Z95.5 PRESENCE OF CORONARY ANGIOPLASTY IMPLANT AND GRAFT: ICD-10-CM

## 2021-03-04 DIAGNOSIS — I26.09 OTHER PULMONARY EMBOLISM WITH ACUTE COR PULMONALE: ICD-10-CM

## 2021-03-04 RX ORDER — APIXABAN 2.5 MG/1
1 TABLET, FILM COATED ORAL
Qty: 60 | Refills: 0
Start: 2021-03-04 | End: 2021-04-02

## 2021-03-18 RX ORDER — RIVAROXABAN 15 MG-20MG
1 KIT ORAL
Qty: 30 | Refills: 0
Start: 2021-03-18 | End: 2021-04-16

## 2021-03-25 RX ORDER — RIVAROXABAN 15 MG-20MG
1 KIT ORAL
Qty: 42 | Refills: 0
Start: 2021-03-25 | End: 2021-04-14

## 2021-04-18 NOTE — H&P ADULT - ATTENDING COMMENTS
Subjective:     Laly Reyes is a 32 y o  [facility] who presents at [address] [place] [address] with hemorrhoids  The patient states that her pain is 8/10 and that she is uncomfortable sitting and walking due to the pain  She had been having constipation, but more recently had a short bout of diarrhea  She has been using cortisol cream for the hemorrhoids, which provide some relief  She denies bleeding  Objective:    Vitals: Blood pressure 103/72, pulse 92, height 5' 4" (1 626 m), weight 58 1 kg (128 lb), last menstrual period 08/31/2020  Body mass index is 21 97 kg/m²  Physical Exam  Genitourinary:     Rectum: Tenderness and external hemorrhoid present  Comments: Large inflamed hemorrhoid, very tender to palpation         Assessment/Plan:    Problem List Items Addressed This Visit     None      Visit Diagnoses     Hemorrhoids during pregnancy in third trimester    -  Primary    Relevant Medications    shark liver oil-cocoa butter (PREPARATION H) 0 25-3-85 5 % suppository    Misc   Devices ([place]) MISC    Hemorrhoids, unspecified hemorrhoid type        Relevant Medications    hydrocortisone (ANUSOL-HC) 2 5 % rectal cream                Jason Dickey MD  4/18/2021  3:55 AM
78 yr old male presented with symptoms of unresponsiveness.  VITAL SIGNS (Last 24 hrs):  T(C): 35.8 (07-17-19 @ 13:09), Max: 36.5 (07-16-19 @ 19:08)  HR: 67 (07-17-19 @ 13:09) (67 - 68)  BP: 139/73 (07-17-19 @ 13:09) (139/73 - 145/77)  RR: 20 (07-17-19 @ 13:09) (18 - 20)  SpO2: 97% (07-16-19 @ 19:16) (97% - 97%)  Wt(kg): --  Daily Height in cm: 167.64 (16 Jul 2019 19:08)    Daily     I&O's Summary                        10.6   5.36  )-----------( 161      ( 17 Jul 2019 04:30 )             31.8   07-17    138  |  102  |  13  ----------------------------<  99  4.2   |  26  |  1.1    Ca    9.2      17 Jul 2019 04:30  Mg     2.1     07-17    TPro  6.5  /  Alb  3.7  /  TBili  0.3  /  DBili  x   /  AST  14  /  ALT  10  /  AlkPhos  84  07-17  ekg- NSR rate 64/min  o/e  awake but less responsive  very slow and at times was not able to answer questions  wife was doing all the talking  chest--b/l clear  [id number]  abd/gi--soft, bs+   no edema   assessment and plan:  #syncope--unresponsiveness --advancing dementia-- as per neurology-- start donepezil 5mg.  - less likely cardiac as per cardiology, can get holter monitor as outpatient  # Cad s/o stents on aspirin and statin

## 2021-04-26 ENCOUNTER — RX RENEWAL (OUTPATIENT)
Age: 81
End: 2021-04-26

## 2021-05-24 ENCOUNTER — RX RENEWAL (OUTPATIENT)
Age: 81
End: 2021-05-24

## 2021-06-23 PROBLEM — R56.9 UNSPECIFIED CONVULSIONS: Chronic | Status: ACTIVE | Noted: 2021-02-22

## 2021-06-23 PROBLEM — F03.90 UNSPECIFIED DEMENTIA WITHOUT BEHAVIORAL DISTURBANCE: Chronic | Status: ACTIVE | Noted: 2021-02-22

## 2021-06-25 ENCOUNTER — APPOINTMENT (OUTPATIENT)
Dept: NEUROSURGERY | Facility: CLINIC | Age: 81
End: 2021-06-25
Payer: MEDICARE

## 2021-06-25 VITALS — BODY MASS INDEX: 21.2 KG/M2 | WEIGHT: 160 LBS | HEIGHT: 73 IN

## 2021-06-25 PROCEDURE — 99211 OFF/OP EST MAY X REQ PHY/QHP: CPT

## 2021-06-25 NOTE — HISTORY OF PRESENT ILLNESS
[FreeTextEntry1] : patient with NPH\par \par High volume LP recommended weeks ago, was unable to obtain.\par \par Ordered HVLP\par \par Return 1 week after procedure to evaluate

## 2021-06-28 LAB
ALBUMIN SERPL ELPH-MCNC: 4.3 G/DL
ALP BLD-CCNC: 116 U/L
ALT SERPL-CCNC: 17 U/L
ANION GAP SERPL CALC-SCNC: 12 MMOL/L
APTT BLD: 27.2 SEC
AST SERPL-CCNC: 19 U/L
BASOPHILS # BLD AUTO: 0.02 K/UL
BASOPHILS NFR BLD AUTO: 0.4 %
BILIRUB SERPL-MCNC: 0.3 MG/DL
BUN SERPL-MCNC: 16 MG/DL
CALCIUM SERPL-MCNC: 10 MG/DL
CHLORIDE SERPL-SCNC: 101 MMOL/L
CO2 SERPL-SCNC: 27 MMOL/L
CREAT SERPL-MCNC: 1.1 MG/DL
EOSINOPHIL # BLD AUTO: 0.1 K/UL
EOSINOPHIL NFR BLD AUTO: 2 %
GLUCOSE SERPL-MCNC: 116 MG/DL
HCT VFR BLD CALC: 45 %
HGB BLD-MCNC: 14.9 G/DL
IMM GRANULOCYTES NFR BLD AUTO: 0.2 %
INR PPP: 1.1 RATIO
LYMPHOCYTES # BLD AUTO: 1.33 K/UL
LYMPHOCYTES NFR BLD AUTO: 27.2 %
MAN DIFF?: NORMAL
MCHC RBC-ENTMCNC: 31.6 PG
MCHC RBC-ENTMCNC: 33.1 G/DL
MCV RBC AUTO: 95.3 FL
MONOCYTES # BLD AUTO: 0.39 K/UL
MONOCYTES NFR BLD AUTO: 8 %
NEUTROPHILS # BLD AUTO: 3.04 K/UL
NEUTROPHILS NFR BLD AUTO: 62.2 %
PLATELET # BLD AUTO: 140 K/UL
POTASSIUM SERPL-SCNC: 4.8 MMOL/L
PROT SERPL-MCNC: 7.1 G/DL
PT BLD: 12.6 SEC
RBC # BLD: 4.72 M/UL
RBC # FLD: 13.2 %
SODIUM SERPL-SCNC: 140 MMOL/L
WBC # FLD AUTO: 4.89 K/UL

## 2021-07-30 ENCOUNTER — OUTPATIENT (OUTPATIENT)
Dept: OUTPATIENT SERVICES | Facility: HOSPITAL | Age: 81
LOS: 1 days | Discharge: HOME | End: 2021-07-30

## 2021-07-30 DIAGNOSIS — Z11.59 ENCOUNTER FOR SCREENING FOR OTHER VIRAL DISEASES: ICD-10-CM

## 2021-08-02 ENCOUNTER — OUTPATIENT (OUTPATIENT)
Dept: OUTPATIENT SERVICES | Facility: HOSPITAL | Age: 81
LOS: 1 days | Discharge: HOME | End: 2021-08-02
Payer: MEDICARE

## 2021-08-02 ENCOUNTER — RESULT REVIEW (OUTPATIENT)
Age: 81
End: 2021-08-02

## 2021-08-02 DIAGNOSIS — G91.2 (IDIOPATHIC) NORMAL PRESSURE HYDROCEPHALUS: ICD-10-CM

## 2021-08-02 LAB
APPEARANCE CSF: CLEAR — SIGNIFICANT CHANGE UP
COLOR CSF: SIGNIFICANT CHANGE UP
GLUCOSE CSF-MCNC: 55 MG/DL — SIGNIFICANT CHANGE UP (ref 45–75)
NEUTROPHILS # CSF: SIGNIFICANT CHANGE UP % (ref 0–6)
NRBC NFR CSF: 2 /UL — SIGNIFICANT CHANGE UP (ref 0–5)
PROT CSF-MCNC: 33 MG/DL — SIGNIFICANT CHANGE UP (ref 15–45)
RBC # CSF: 3 /UL — SIGNIFICANT CHANGE UP (ref 0–0)
TUBE TYPE: SIGNIFICANT CHANGE UP

## 2021-08-02 PROCEDURE — 62328 DX LMBR SPI PNXR W/FLUOR/CT: CPT

## 2021-08-16 ENCOUNTER — APPOINTMENT (OUTPATIENT)
Dept: NEUROSURGERY | Facility: CLINIC | Age: 81
End: 2021-08-16
Payer: MEDICARE

## 2021-08-16 DIAGNOSIS — G91.2 (IDIOPATHIC) NORMAL PRESSURE HYDROCEPHALUS: ICD-10-CM

## 2021-08-16 PROCEDURE — 99212 OFFICE O/P EST SF 10 MIN: CPT | Mod: 95

## 2021-08-17 PROBLEM — G91.2 NORMAL PRESSURE HYDROCEPHALUS: Status: ACTIVE | Noted: 2020-12-02

## 2021-08-17 NOTE — HISTORY OF PRESENT ILLNESS
[FreeTextEntry1] : Mr. Lambert had the lumbar puncture however his daughter states that it was not high volume since the he was a difficult tap.  She does not believe he improved.  \par \par I discussed with her possibility of trying a lumbar drain under sedation and having him admitted to observe for improvement.  Risks and benefits were discussed.  She wishes to proceed.\par \par This consultation took 10 minutes.

## 2021-08-25 ENCOUNTER — RX RENEWAL (OUTPATIENT)
Age: 81
End: 2021-08-25

## 2021-08-27 ENCOUNTER — LABORATORY RESULT (OUTPATIENT)
Age: 81
End: 2021-08-27

## 2021-08-27 ENCOUNTER — RESULT REVIEW (OUTPATIENT)
Age: 81
End: 2021-08-27

## 2021-08-27 ENCOUNTER — OUTPATIENT (OUTPATIENT)
Dept: OUTPATIENT SERVICES | Facility: HOSPITAL | Age: 81
LOS: 1 days | Discharge: HOME | End: 2021-08-27
Payer: MEDICARE

## 2021-08-27 ENCOUNTER — APPOINTMENT (OUTPATIENT)
Dept: NEUROLOGY | Facility: CLINIC | Age: 81
End: 2021-08-27
Payer: MEDICARE

## 2021-08-27 VITALS
RESPIRATION RATE: 18 BRPM | SYSTOLIC BLOOD PRESSURE: 118 MMHG | TEMPERATURE: 97 F | HEART RATE: 64 BPM | OXYGEN SATURATION: 99 % | WEIGHT: 169.98 LBS | DIASTOLIC BLOOD PRESSURE: 78 MMHG | HEIGHT: 69 IN

## 2021-08-27 DIAGNOSIS — G91.2 (IDIOPATHIC) NORMAL PRESSURE HYDROCEPHALUS: ICD-10-CM

## 2021-08-27 DIAGNOSIS — Z01.818 ENCOUNTER FOR OTHER PREPROCEDURAL EXAMINATION: ICD-10-CM

## 2021-08-27 LAB
ALBUMIN SERPL ELPH-MCNC: 4.3 G/DL — SIGNIFICANT CHANGE UP (ref 3.5–5.2)
ALP SERPL-CCNC: 113 U/L — SIGNIFICANT CHANGE UP (ref 30–115)
ALT FLD-CCNC: 15 U/L — SIGNIFICANT CHANGE UP (ref 0–41)
ANION GAP SERPL CALC-SCNC: 12 MMOL/L — SIGNIFICANT CHANGE UP (ref 7–14)
APTT BLD: 30.8 SEC — SIGNIFICANT CHANGE UP (ref 27–39.2)
AST SERPL-CCNC: 17 U/L — SIGNIFICANT CHANGE UP (ref 0–41)
BASOPHILS # BLD AUTO: 0.03 K/UL — SIGNIFICANT CHANGE UP (ref 0–0.2)
BASOPHILS NFR BLD AUTO: 0.6 % — SIGNIFICANT CHANGE UP (ref 0–1)
BILIRUB SERPL-MCNC: 0.4 MG/DL — SIGNIFICANT CHANGE UP (ref 0.2–1.2)
BLD GP AB SCN SERPL QL: SIGNIFICANT CHANGE UP
BUN SERPL-MCNC: 16 MG/DL — SIGNIFICANT CHANGE UP (ref 10–20)
CALCIUM SERPL-MCNC: 9.7 MG/DL — SIGNIFICANT CHANGE UP (ref 8.5–10.1)
CHLORIDE SERPL-SCNC: 99 MMOL/L — SIGNIFICANT CHANGE UP (ref 98–110)
CO2 SERPL-SCNC: 28 MMOL/L — SIGNIFICANT CHANGE UP (ref 17–32)
CREAT SERPL-MCNC: 1 MG/DL — SIGNIFICANT CHANGE UP (ref 0.7–1.5)
EOSINOPHIL # BLD AUTO: 0.1 K/UL — SIGNIFICANT CHANGE UP (ref 0–0.7)
EOSINOPHIL NFR BLD AUTO: 1.9 % — SIGNIFICANT CHANGE UP (ref 0–8)
GLUCOSE SERPL-MCNC: 74 MG/DL — SIGNIFICANT CHANGE UP (ref 70–99)
HCT VFR BLD CALC: 42.3 % — SIGNIFICANT CHANGE UP (ref 42–52)
HGB BLD-MCNC: 13.7 G/DL — LOW (ref 14–18)
IMM GRANULOCYTES NFR BLD AUTO: 0.2 % — SIGNIFICANT CHANGE UP (ref 0.1–0.3)
INR BLD: 1.09 RATIO — SIGNIFICANT CHANGE UP (ref 0.65–1.3)
LYMPHOCYTES # BLD AUTO: 1.21 K/UL — SIGNIFICANT CHANGE UP (ref 1.2–3.4)
LYMPHOCYTES # BLD AUTO: 22.8 % — SIGNIFICANT CHANGE UP (ref 20.5–51.1)
MCHC RBC-ENTMCNC: 30.6 PG — SIGNIFICANT CHANGE UP (ref 27–31)
MCHC RBC-ENTMCNC: 32.4 G/DL — SIGNIFICANT CHANGE UP (ref 32–37)
MCV RBC AUTO: 94.6 FL — HIGH (ref 80–94)
MONOCYTES # BLD AUTO: 0.38 K/UL — SIGNIFICANT CHANGE UP (ref 0.1–0.6)
MONOCYTES NFR BLD AUTO: 7.2 % — SIGNIFICANT CHANGE UP (ref 1.7–9.3)
NEUTROPHILS # BLD AUTO: 3.58 K/UL — SIGNIFICANT CHANGE UP (ref 1.4–6.5)
NEUTROPHILS NFR BLD AUTO: 67.3 % — SIGNIFICANT CHANGE UP (ref 42.2–75.2)
NRBC # BLD: 0 /100 WBCS — SIGNIFICANT CHANGE UP (ref 0–0)
PLATELET # BLD AUTO: 132 K/UL — SIGNIFICANT CHANGE UP (ref 130–400)
POTASSIUM SERPL-MCNC: 4.3 MMOL/L — SIGNIFICANT CHANGE UP (ref 3.5–5)
POTASSIUM SERPL-SCNC: 4.3 MMOL/L — SIGNIFICANT CHANGE UP (ref 3.5–5)
PROT SERPL-MCNC: 7.3 G/DL — SIGNIFICANT CHANGE UP (ref 6–8)
PROTHROM AB SERPL-ACNC: 12.5 SEC — SIGNIFICANT CHANGE UP (ref 9.95–12.87)
RBC # BLD: 4.47 M/UL — LOW (ref 4.7–6.1)
RBC # FLD: 13 % — SIGNIFICANT CHANGE UP (ref 11.5–14.5)
SODIUM SERPL-SCNC: 139 MMOL/L — SIGNIFICANT CHANGE UP (ref 135–146)
WBC # BLD: 5.31 K/UL — SIGNIFICANT CHANGE UP (ref 4.8–10.8)
WBC # FLD AUTO: 5.31 K/UL — SIGNIFICANT CHANGE UP (ref 4.8–10.8)

## 2021-08-27 PROCEDURE — 93010 ELECTROCARDIOGRAM REPORT: CPT

## 2021-08-27 PROCEDURE — 95816 EEG AWAKE AND DROWSY: CPT

## 2021-08-27 PROCEDURE — 71046 X-RAY EXAM CHEST 2 VIEWS: CPT | Mod: 26

## 2021-08-27 NOTE — H&P PST ADULT - HISTORY OF PRESENT ILLNESS
81 yo male  presents for PAST in preparation for insertion of lumbar draine. As per daughter her father had  lumbar puncture but was "not high volume since the he was a difficult tap". She does not believe he improved at all.    Pt complains of . Denies any chest pain, difficulty breathing, SOB, palpitations, dysuria, URI, or any other infections in the last 2 weeks/1 month. Denies any recent travel, contact, or exposure to any persons with known or suspected COVID-19. Pt also denies COVID testing within the last 2 weeks. Pt advised to self quarantine until day of procedure. Pt is in wheelchair only.    Anesthesia Alert  NO--Difficult Airway  NO--History of neck surgery or radiation  NO--Limited ROM of neck  NO--History of Malignant hyperthermia  NO--Personal or family history of Pseudocholinesterase deficiency.  NO--Prior Anesthesia Complication  NO--Latex Allergy  NO--Loose teeth  NO--History of Rheumatoid Arthritis  NO--ADONAY  NO--Bleeding risk  NO--Other   written and verbal instructions with teach back on chlorhexidine shampoo provided,  pt verbalized understanding with returned demonstration  Patient verbalized understanding of instructions and was given the opportunity to ask questions and have them answered.

## 2021-08-27 NOTE — H&P PST ADULT - BREASTS
Problem: Patient Care Overview  Goal: Plan of Care Review  Outcome: Ongoing (interventions implemented as appropriate)  Flowsheets (Taken 1/3/2020 1100)  Progress: improving  Plan of Care Reviewed With: patient;daughter  Outcome Summary: Pt up in bedside upon OT entry. Just completed PT. R hip pain 6/10. Completed HEP 1x15 with demo and very short rest breaks.  Educated per AE with pt report that she has kit at home. Reviewed use of reacher to doff sock and pt doffed with reacher s/u and donned B socks with sock aide ind after set up. Pain 5/10; pulse 98 and O2 97% upon completion.      No masses; no nipple discharge

## 2021-08-27 NOTE — H&P PST ADULT - NSICDXPASTMEDICALHX_GEN_ALL_CORE_FT
PAST MEDICAL HISTORY:  Coronary artery disease, angina presence unspecified, unspecified vessel or lesion type, unspecified whether native or transplanted heart     Dementia     Gastrointestinal perforation     Hyperlipidemia, unspecified hyperlipidemia type     Pulmonary emboli resolved    Seizure

## 2021-09-04 ENCOUNTER — LABORATORY RESULT (OUTPATIENT)
Age: 81
End: 2021-09-04

## 2021-09-04 ENCOUNTER — OUTPATIENT (OUTPATIENT)
Dept: OUTPATIENT SERVICES | Facility: HOSPITAL | Age: 81
LOS: 1 days | Discharge: HOME | End: 2021-09-04

## 2021-09-04 DIAGNOSIS — Z11.59 ENCOUNTER FOR SCREENING FOR OTHER VIRAL DISEASES: ICD-10-CM

## 2021-09-04 PROBLEM — I26.99 OTHER PULMONARY EMBOLISM WITHOUT ACUTE COR PULMONALE: Chronic | Status: ACTIVE | Noted: 2021-08-27

## 2021-09-04 PROBLEM — K63.1 PERFORATION OF INTESTINE (NONTRAUMATIC): Chronic | Status: ACTIVE | Noted: 2021-08-27

## 2021-09-06 ENCOUNTER — RX RENEWAL (OUTPATIENT)
Age: 81
End: 2021-09-06

## 2021-09-07 ENCOUNTER — INPATIENT (INPATIENT)
Facility: HOSPITAL | Age: 81
LOS: 2 days | Discharge: HOME HEALTH | End: 2021-09-10
Attending: NEUROLOGICAL SURGERY | Admitting: NEUROLOGICAL SURGERY
Payer: MEDICARE

## 2021-09-07 ENCOUNTER — RESULT REVIEW (OUTPATIENT)
Age: 81
End: 2021-09-07

## 2021-09-07 ENCOUNTER — APPOINTMENT (OUTPATIENT)
Dept: NEUROSURGERY | Facility: HOSPITAL | Age: 81
End: 2021-09-07

## 2021-09-07 VITALS — WEIGHT: 160.06 LBS | HEIGHT: 68 IN

## 2021-09-07 LAB
ALBUMIN SERPL ELPH-MCNC: 4.1 G/DL — SIGNIFICANT CHANGE UP (ref 3.5–5.2)
ALP SERPL-CCNC: 117 U/L — HIGH (ref 30–115)
ALT FLD-CCNC: 16 U/L — SIGNIFICANT CHANGE UP (ref 0–41)
ANION GAP SERPL CALC-SCNC: 10 MMOL/L — SIGNIFICANT CHANGE UP (ref 7–14)
APPEARANCE CSF: ABNORMAL
APPEARANCE SPUN FLD: ABNORMAL
APTT BLD: 39.4 SEC — HIGH (ref 27–39.2)
AST SERPL-CCNC: 20 U/L — SIGNIFICANT CHANGE UP (ref 0–41)
BASOPHILS # BLD AUTO: 0.03 K/UL — SIGNIFICANT CHANGE UP (ref 0–0.2)
BASOPHILS NFR BLD AUTO: 0.4 % — SIGNIFICANT CHANGE UP (ref 0–1)
BILIRUB SERPL-MCNC: <0.2 MG/DL — SIGNIFICANT CHANGE UP (ref 0.2–1.2)
BUN SERPL-MCNC: 10 MG/DL — SIGNIFICANT CHANGE UP (ref 10–20)
CALCIUM SERPL-MCNC: 9.4 MG/DL — SIGNIFICANT CHANGE UP (ref 8.5–10.1)
CHLORIDE SERPL-SCNC: 102 MMOL/L — SIGNIFICANT CHANGE UP (ref 98–110)
CK SERPL-CCNC: 65 U/L — SIGNIFICANT CHANGE UP (ref 0–225)
CO2 SERPL-SCNC: 26 MMOL/L — SIGNIFICANT CHANGE UP (ref 17–32)
COLOR CSF: ABNORMAL
CREAT SERPL-MCNC: 0.9 MG/DL — SIGNIFICANT CHANGE UP (ref 0.7–1.5)
EOSINOPHIL # BLD AUTO: 0.23 K/UL — SIGNIFICANT CHANGE UP (ref 0–0.7)
EOSINOPHIL NFR BLD AUTO: 3.2 % — SIGNIFICANT CHANGE UP (ref 0–8)
GLUCOSE CSF-MCNC: 60 MG/DL — SIGNIFICANT CHANGE UP (ref 45–75)
GLUCOSE SERPL-MCNC: 83 MG/DL — SIGNIFICANT CHANGE UP (ref 70–99)
GRAM STN FLD: SIGNIFICANT CHANGE UP
HCT VFR BLD CALC: 42.6 % — SIGNIFICANT CHANGE UP (ref 42–52)
HGB BLD-MCNC: 14.1 G/DL — SIGNIFICANT CHANGE UP (ref 14–18)
IMM GRANULOCYTES NFR BLD AUTO: 0.1 % — SIGNIFICANT CHANGE UP (ref 0.1–0.3)
INR BLD: 1.11 RATIO — SIGNIFICANT CHANGE UP (ref 0.65–1.3)
LYMPHOCYTES # BLD AUTO: 1.76 K/UL — SIGNIFICANT CHANGE UP (ref 1.2–3.4)
LYMPHOCYTES # BLD AUTO: 24.6 % — SIGNIFICANT CHANGE UP (ref 20.5–51.1)
MAGNESIUM SERPL-MCNC: 2.1 MG/DL — SIGNIFICANT CHANGE UP (ref 1.8–2.4)
MCHC RBC-ENTMCNC: 31.6 PG — HIGH (ref 27–31)
MCHC RBC-ENTMCNC: 33.1 G/DL — SIGNIFICANT CHANGE UP (ref 32–37)
MCV RBC AUTO: 95.5 FL — HIGH (ref 80–94)
MONOCYTES # BLD AUTO: 0.63 K/UL — HIGH (ref 0.1–0.6)
MONOCYTES NFR BLD AUTO: 8.8 % — SIGNIFICANT CHANGE UP (ref 1.7–9.3)
NEUTROPHILS # BLD AUTO: 4.49 K/UL — SIGNIFICANT CHANGE UP (ref 1.4–6.5)
NEUTROPHILS # CSF: SIGNIFICANT CHANGE UP % (ref 0–6)
NEUTROPHILS NFR BLD AUTO: 62.9 % — SIGNIFICANT CHANGE UP (ref 42.2–75.2)
NRBC # BLD: 0 /100 WBCS — SIGNIFICANT CHANGE UP (ref 0–0)
NRBC NFR CSF: 0 /UL — SIGNIFICANT CHANGE UP (ref 0–5)
PLATELET # BLD AUTO: 127 K/UL — LOW (ref 130–400)
POTASSIUM SERPL-MCNC: 4.2 MMOL/L — SIGNIFICANT CHANGE UP (ref 3.5–5)
POTASSIUM SERPL-SCNC: 4.2 MMOL/L — SIGNIFICANT CHANGE UP (ref 3.5–5)
PROT CSF-MCNC: 43 MG/DL — SIGNIFICANT CHANGE UP (ref 15–45)
PROT SERPL-MCNC: 7 G/DL — SIGNIFICANT CHANGE UP (ref 6–8)
PROTHROM AB SERPL-ACNC: 12.8 SEC — SIGNIFICANT CHANGE UP (ref 9.95–12.87)
RBC # BLD: 4.46 M/UL — LOW (ref 4.7–6.1)
RBC # CSF: 2335 /UL — SIGNIFICANT CHANGE UP (ref 0–0)
RBC # FLD: 12.2 % — SIGNIFICANT CHANGE UP (ref 11.5–14.5)
SODIUM SERPL-SCNC: 138 MMOL/L — SIGNIFICANT CHANGE UP (ref 135–146)
SPECIMEN SOURCE: SIGNIFICANT CHANGE UP
TROPONIN T SERPL-MCNC: <0.01 NG/ML — SIGNIFICANT CHANGE UP
TUBE TYPE: SIGNIFICANT CHANGE UP
WBC # BLD: 7.15 K/UL — SIGNIFICANT CHANGE UP (ref 4.8–10.8)
WBC # FLD AUTO: 7.15 K/UL — SIGNIFICANT CHANGE UP (ref 4.8–10.8)

## 2021-09-07 PROCEDURE — ZZZZZ: CPT

## 2021-09-07 PROCEDURE — 99222 1ST HOSP IP/OBS MODERATE 55: CPT

## 2021-09-07 PROCEDURE — 88108 CYTOPATH CONCENTRATE TECH: CPT | Mod: 26

## 2021-09-07 PROCEDURE — 62272 THER SPI PNXR DRG CSF: CPT

## 2021-09-07 PROCEDURE — 93010 ELECTROCARDIOGRAM REPORT: CPT

## 2021-09-07 RX ORDER — ONDANSETRON 8 MG/1
4 TABLET, FILM COATED ORAL EVERY 6 HOURS
Refills: 0 | Status: DISCONTINUED | OUTPATIENT
Start: 2021-09-07 | End: 2021-09-10

## 2021-09-07 RX ORDER — VALPROIC ACID (AS SODIUM SALT) 250 MG/5ML
5 SOLUTION, ORAL ORAL
Qty: 0 | Refills: 0 | DISCHARGE

## 2021-09-07 RX ORDER — DONEPEZIL HYDROCHLORIDE 10 MG/1
10 TABLET, FILM COATED ORAL AT BEDTIME
Refills: 0 | Status: DISCONTINUED | OUTPATIENT
Start: 2021-09-07 | End: 2021-09-10

## 2021-09-07 RX ORDER — ACETAMINOPHEN 500 MG
650 TABLET ORAL EVERY 6 HOURS
Refills: 0 | Status: DISCONTINUED | OUTPATIENT
Start: 2021-09-07 | End: 2021-09-10

## 2021-09-07 RX ORDER — SODIUM CHLORIDE 9 MG/ML
1000 INJECTION INTRAMUSCULAR; INTRAVENOUS; SUBCUTANEOUS
Refills: 0 | Status: DISCONTINUED | OUTPATIENT
Start: 2021-09-07 | End: 2021-09-08

## 2021-09-07 RX ORDER — CEFAZOLIN SODIUM 1 G
1000 VIAL (EA) INJECTION EVERY 8 HOURS
Refills: 0 | Status: DISCONTINUED | OUTPATIENT
Start: 2021-09-07 | End: 2021-09-09

## 2021-09-07 RX ORDER — DONEPEZIL HYDROCHLORIDE 10 MG/1
1 TABLET, FILM COATED ORAL
Qty: 0 | Refills: 0 | DISCHARGE

## 2021-09-07 RX ORDER — VALPROIC ACID (AS SODIUM SALT) 250 MG/5ML
250 SOLUTION, ORAL ORAL
Refills: 0 | Status: DISCONTINUED | OUTPATIENT
Start: 2021-09-07 | End: 2021-09-10

## 2021-09-07 RX ORDER — VALPROIC ACID (AS SODIUM SALT) 250 MG/5ML
250 SOLUTION, ORAL ORAL
Refills: 0 | Status: DISCONTINUED | OUTPATIENT
Start: 2021-09-07 | End: 2021-09-07

## 2021-09-07 RX ORDER — SODIUM CHLORIDE 9 MG/ML
1000 INJECTION, SOLUTION INTRAVENOUS
Refills: 0 | Status: DISCONTINUED | OUTPATIENT
Start: 2021-09-07 | End: 2021-09-08

## 2021-09-07 RX ORDER — MEMANTINE HYDROCHLORIDE 10 MG/1
5 TABLET ORAL
Refills: 0 | Status: DISCONTINUED | OUTPATIENT
Start: 2021-09-07 | End: 2021-09-10

## 2021-09-07 RX ORDER — DIVALPROEX SODIUM 500 MG/1
250 TABLET, DELAYED RELEASE ORAL
Refills: 0 | Status: DISCONTINUED | OUTPATIENT
Start: 2021-09-07 | End: 2021-09-07

## 2021-09-07 RX ORDER — MEMANTINE HYDROCHLORIDE 10 MG/1
1 TABLET ORAL
Qty: 0 | Refills: 0 | DISCHARGE

## 2021-09-07 RX ADMIN — Medication 100 MILLIGRAM(S): at 13:54

## 2021-09-07 RX ADMIN — SODIUM CHLORIDE 60 MILLILITER(S): 9 INJECTION INTRAMUSCULAR; INTRAVENOUS; SUBCUTANEOUS at 21:15

## 2021-09-07 RX ADMIN — DONEPEZIL HYDROCHLORIDE 10 MILLIGRAM(S): 10 TABLET, FILM COATED ORAL at 23:06

## 2021-09-07 RX ADMIN — Medication 250 MILLIGRAM(S): at 23:05

## 2021-09-07 RX ADMIN — SODIUM CHLORIDE 60 MILLILITER(S): 9 INJECTION INTRAMUSCULAR; INTRAVENOUS; SUBCUTANEOUS at 11:01

## 2021-09-07 RX ADMIN — Medication 100 MILLIGRAM(S): at 21:41

## 2021-09-07 RX ADMIN — MEMANTINE HYDROCHLORIDE 5 MILLIGRAM(S): 10 TABLET ORAL at 19:19

## 2021-09-07 NOTE — CHART NOTE - NSCHARTNOTEFT_GEN_A_CORE
PACU ANESTHESIA ADMISSION NOTE      Procedure:   Post op diagnosis:      ____  Intubated  TV:______       Rate: ______      FiO2: ______    _x___  Patent Airway    __x__  Full return of protective reflexes    _x___  Full recovery from anesthesia / back to baseline     Vitals:   T:  97.8         R:   13               BP: 112/72                 Sat: 100                  P: 73      Mental Status:  ___x_ Awake   __x___ Alert   _____ Drowsy   _____ Sedated    Nausea/Vomiting:  __x__ NO  ______Yes,   See Post - Op Orders          Pain Scale (0-10):  _____    Treatment: __x__ None    ____ See Post - Op/PCA Orders    Post - Operative Fluids:   ____ Oral   _x___ See Post - Op Orders    Plan: Discharge:   ____Home       _____Floor     _____Critical Care    ___x__  Other:_________________    Comments:

## 2021-09-07 NOTE — PROGRESS NOTE ADULT - SUBJECTIVE AND OBJECTIVE BOX
Post op Note    S/P Placement of Lumbar Drain    Pt seen and examined at bedside. Pt Non verbal. Not expressing any complaints,    Vital Signs Last 24 Hrs  T(C): 36.3 (07 Sep 2021 16:00), Max: 36.4 (07 Sep 2021 09:00)  T(F): 97.3 (07 Sep 2021 16:00), Max: 97.6 (07 Sep 2021 09:00)  HR: 59 (07 Sep 2021 16:00) (57 - 73)  BP: 138/78 (07 Sep 2021 16:00) (66/88 - 205/98)  BP(mean): 103 (07 Sep 2021 16:00) (92 - 112)  RR: 13 (07 Sep 2021 16:00) (10 - 18)  SpO2: 96% (07 Sep 2021 16:00) (96% - 100%)    I&O's Detail    07 Sep 2021 07:01  -  07 Sep 2021 16:53  --------------------------------------------------------  IN:    sodium chloride 0.9%: 420 mL  Total IN: 420 mL    OUT:    Drain (mL): 60 mL  Total OUT: 60 mL    Total NET: 360 mL        I&O's Summary    07 Sep 2021 07:01  -  07 Sep 2021 16:53  --------------------------------------------------------  IN: 420 mL / OUT: 60 mL / NET: 360 mL        REVIEW OF SYSTEMS    [ ] A ten-point review of systems was otherwise negative except as noted.  [X] Due to altered mental status/intubation, subjective information were not able to be obtained from the patient. History was obtained, to the extent possible, from review of the chart and collateral sources of information.      PHYSICAL EXAM:  Neurological:  Awake and Alert  Pupils pinpoint  Tracks  No droop  Does not follow commands  Does not answer question      LABS:                        14.1   7.15  )-----------( 127      ( 07 Sep 2021 11:26 )             42.6     09-07    138  |  102  |  10  ----------------------------<  83  4.2   |  26  |  0.9    Ca    9.4      07 Sep 2021 11:26  Mg     2.1     09-07    TPro  7.0  /  Alb  4.1  /  TBili  <0.2  /  DBili  x   /  AST  20  /  ALT  16  /  AlkPhos  117<H>  09-07    PT/INR - ( 07 Sep 2021 11:26 )   PT: 12.80 sec;   INR: 1.11 ratio    PTT - ( 07 Sep 2021 11:26 )  PTT:39.4 sec    CARDIAC MARKERS ( 07 Sep 2021 11:26 )  x     / <0.01 ng/mL / 65 U/L / x     / x          CSF Analysis: [] N/A  Glucose, CSF: 60 mg/dL (09-07 @ 10:53)  Protein, CSF: 43 mg/dL (09-07 @ 10:53)  RBC Count - Spinal Fluid: 2335 /uL (09-07 @ 10:53)    Allergies    No Known Allergies    Intolerances      MEDICATIONS:  Antibiotics:  ceFAZolin   IVPB 1000 milliGRAM(s) IV Intermittent every 8 hours    Neuro:  acetaminophen   Tablet .. 650 milliGRAM(s) Oral every 6 hours PRN  donepezil 10 milliGRAM(s) Oral at bedtime  memantine 5 milliGRAM(s) Oral two times a day  ondansetron Injectable 4 milliGRAM(s) IV Push every 6 hours PRN  valproic acid 250 milliGRAM(s) Oral two times a day      IVF:  lactated ringers. 1000 milliLiter(s) IV Continuous <Continuous>  multivitamin 1 Tablet(s) Oral daily  sodium chloride 0.9%. 1000 milliLiter(s) IV Continuous <Continuous>      CULTURES:      RADIOLOGY & ADDITIONAL TESTS:      ASSESSMENT:  80y Male s/p    G91.2 / 62272    ^G91.2 / 62272    Handoff    Coronary artery disease, angina presence unspecified, unspecified vessel or lesion type, unspecified whether native or transplanted heart    Hyperlipidemia, unspecified hyperlipidemia type    Seizure    Dementia    Gastrointestinal perforation    Pulmonary emboli    Lumbar drain implantation    No significant past surgical history    SysAdmin_[email or web address]        PLAN:  Continue CSF drainage at 10cc/hr  Neuro checks  PT for NPH eval

## 2021-09-07 NOTE — CONSULT NOTE ADULT - SUBJECTIVE AND OBJECTIVE BOX
NEUROCRITICAL CARE     HPI: 80 year old M with CAD, HLD,       Past Medical and Surgical Hx:  PAST MEDICAL & SURGICAL HISTORY:  Coronary artery disease, angina presence unspecified, unspecified vessel or lesion type, unspecified whether native or transplanted heart  Hyperlipidemia, unspecified hyperlipidemia type  Seizure  Dementia  Gastrointestinal perforation  Pulmonary emboli  resolved    No significant past surgical history        Family Hx:  FAMILY HISTORY:      Medications Current and PRN:  MEDICATIONS  (STANDING):  ceFAZolin   IVPB 1000 milliGRAM(s) IV Intermittent every 8 hours  diVALproex  milliGRAM(s) Oral two times a day  donepezil 10 milliGRAM(s) Oral at bedtime  lactated ringers. 1000 milliLiter(s) (75 mL/Hr) IV Continuous <Continuous>  memantine 5 milliGRAM(s) Oral two times a day  multivitamin 1 Tablet(s) Oral daily  sodium chloride 0.9%. 1000 milliLiter(s) (60 mL/Hr) IV Continuous <Continuous>    MEDICATIONS  (PRN):  acetaminophen   Tablet .. 650 milliGRAM(s) Oral every 6 hours PRN Temp greater or equal to 38.5C (101.3F), Mild Pain (1 - 3)  ondansetron Injectable 4 milliGRAM(s) IV Push every 6 hours PRN Nausea and/or Vomiting      Allergies: No Known Allergies    Vital Signs:  ICU Vital Signs Last 24 Hrs  T(C): 36.1 (07 Sep 2021 12:00), Max: 36.4 (07 Sep 2021 09:00)  T(F): 97 (07 Sep 2021 12:00), Max: 97.6 (07 Sep 2021 09:00)  HR: 62 (07 Sep 2021 15:00) (57 - 73)  BP: 148/79 (07 Sep 2021 15:00) (66/88 - 205/98)  BP(mean): 108 (07 Sep 2021 15:00) (92 - 112)  ABP: --  ABP(mean): --  RR: 12 (07 Sep 2021 15:00) (10 - 18)  SpO2: 98% (07 Sep 2021 15:00) (98% - 100%)      Physical exam:  Constitutional: No acute distress, conversant  Eyes: Anicteric sclerae, moist conjunctivae, see below for CNs  Neck: trachea midline, FROM, supple, no thyromegaly or lymphadenopathy  Cardiovascular: Regular rate and rhythm, no murmurs, rubs, or gallops. No carotid bruits.   Pulmonary: Anterior breath sounds clear bilaterally, no crackles or wheezing. No use of accessory muscles  GI: Abdomen soft, non-distended, non-tender  Extremities: Radial and DP pulses +2, no edema    Neurologic exam:  MSE: minimally verbal, able to follow simple commands intermittently,  CN:  Motor: moves extremities spontaenously   Sensory:    Ventilator:      I/Os:  I&O's Detail    07 Sep 2021 07:01  -  07 Sep 2021 15:48  --------------------------------------------------------  IN:    sodium chloride 0.9%: 360 mL  Total IN: 360 mL    OUT:    Drain (mL): 40 mL  Total OUT: 40 mL    Total NET: 320 mL          Labs:  09-07    138  |  102  |  10  ----------------------------<  83  4.2   |  26  |  0.9    Ca    9.4      07 Sep 2021 11:26  Mg     2.1     09-07    TPro  7.0  /  Alb  4.1  /  TBili  <0.2  /  DBili  x   /  AST  20  /  ALT  16  /  AlkPhos  117<H>  09-07    CBC Full  -  ( 07 Sep 2021 11:26 )  WBC Count : 7.15 K/uL  RBC Count : 4.46 M/uL  Hemoglobin : 14.1 g/dL  Hematocrit : 42.6 %  Platelet Count - Automated : 127 K/uL  Mean Cell Volume : 95.5 fL  Mean Cell Hemoglobin : 31.6 pg  Mean Cell Hemoglobin Concentration : 33.1 g/dL  Auto Neutrophil # : 4.49 K/uL  Auto Lymphocyte # : 1.76 K/uL  Auto Monocyte # : 0.63 K/uL  Auto Eosinophil # : 0.23 K/uL  Auto Basophil # : 0.03 K/uL  Auto Neutrophil % : 62.9 %  Auto Lymphocyte % : 24.6 %  Auto Monocyte % : 8.8 %  Auto Eosinophil % : 3.2 %  Auto Basophil % : 0.4 %    PT/INR - ( 07 Sep 2021 11:26 )   PT: 12.80 sec;   INR: 1.11 ratio         PTT - ( 07 Sep 2021 11:26 )  PTT:39.4 sec  LIVER FUNCTIONS - ( 07 Sep 2021 11:26 )  Alb: 4.1 g/dL / Pro: 7.0 g/dL / ALK PHOS: 117 U/L / ALT: 16 U/L / AST: 20 U/L / GGT: x             No Known Allergies        Radiology:  No new neuroimaging    Assessment: 80 year old male PMHx as stated above s/p lumbar drain placement today     Plan:  #Neuro:  - neurochecks q 1 hour  - lumbar drain in place to drain   - 10 cc drainage every hour via lumbar drain  - ancef will remain until lumbar drain is removed   - PT/OT tomorrow  - chemical prophylaxis to be started once cleared by neurosurgery   - EVD management as per neurosurgery   - Keep normotensive  - Keep SaO2 > 95%  - Strict I/Os  - Daily weights  - Keep euvolemic  - Monitor lytes, replete as needed  - SCD's while in bed  - Keep normothermic      Code status:  Full code  Disposition:  ICU      CHAKA Velásquez  Department: Neuro ICU  Please feel free to contact for any questions or concerns. Thank you. Case discussed with Neurocritical Care attending. Please see attending attestation below.  Extension: #[phone]   NEUROCRITICAL CARE     HPI: 80 year old M with CAD, HLD, dementia, seizure, PE presented for an elective lumbar drain procedure with neurosurgery team.       Past Medical and Surgical Hx:  PAST MEDICAL & SURGICAL HISTORY:  Coronary artery disease, angina presence unspecified, unspecified vessel or lesion type, unspecified whether native or transplanted heart  Hyperlipidemia, unspecified hyperlipidemia type  Seizure  Dementia  Gastrointestinal perforation  Pulmonary emboli  resolved    No significant past surgical history        Medications Current and PRN:  MEDICATIONS  (STANDING):  ceFAZolin   IVPB 1000 milliGRAM(s) IV Intermittent every 8 hours  diVALproex  milliGRAM(s) Oral two times a day  donepezil 10 milliGRAM(s) Oral at bedtime  lactated ringers. 1000 milliLiter(s) (75 mL/Hr) IV Continuous <Continuous>  memantine 5 milliGRAM(s) Oral two times a day  multivitamin 1 Tablet(s) Oral daily  sodium chloride 0.9%. 1000 milliLiter(s) (60 mL/Hr) IV Continuous <Continuous>    MEDICATIONS  (PRN):  acetaminophen   Tablet .. 650 milliGRAM(s) Oral every 6 hours PRN Temp greater or equal to 38.5C (101.3F), Mild Pain (1 - 3)  ondansetron Injectable 4 milliGRAM(s) IV Push every 6 hours PRN Nausea and/or Vomiting      Allergies: No Known Allergies    Vital Signs:  ICU Vital Signs Last 24 Hrs  T(C): 36.1 (07 Sep 2021 12:00), Max: 36.4 (07 Sep 2021 09:00)  T(F): 97 (07 Sep 2021 12:00), Max: 97.6 (07 Sep 2021 09:00)  HR: 62 (07 Sep 2021 15:00) (57 - 73)  BP: 148/79 (07 Sep 2021 15:00) (66/88 - 205/98)  BP(mean): 108 (07 Sep 2021 15:00) (92 - 112)  ABP: --  ABP(mean): --  RR: 12 (07 Sep 2021 15:00) (10 - 18)  SpO2: 98% (07 Sep 2021 15:00) (98% - 100%)    *exam limited as patient is now post-op from lumbar drain placement   Physical exam:  Constitutional: No acute distress, minimally verbal  Cardiovascular: Regular rate and rhythm, no murmurs, rubs, or gallops.   Pulmonary: Anterior breath sounds clear bilaterally, no crackles or wheezing. No use of accessory muscles  GI: Abdomen soft, non-tender  Extremities: Radial and DP pulses +2, no edema    Neurologic exam:  MSE: minimally verbal, able to follow simple commands intermittently,  Motor: moves extremities spontaneously   Sensory: sensation intact throughout         I/Os:  I&O's Detail    07 Sep 2021 07:01  -  07 Sep 2021 15:48  --------------------------------------------------------  IN:    sodium chloride 0.9%: 360 mL  Total IN: 360 mL    OUT:    Drain (mL): 40 mL  Total OUT: 40 mL    Total NET: 320 mL          Labs:  09-07    138  |  102  |  10  ----------------------------<  83  4.2   |  26  |  0.9    Ca    9.4      07 Sep 2021 11:26  Mg     2.1     09-07    TPro  7.0  /  Alb  4.1  /  TBili  <0.2  /  DBili  x   /  AST  20  /  ALT  16  /  AlkPhos  117<H>  09-07    CBC Full  -  ( 07 Sep 2021 11:26 )  WBC Count : 7.15 K/uL  RBC Count : 4.46 M/uL  Hemoglobin : 14.1 g/dL  Hematocrit : 42.6 %  Platelet Count - Automated : 127 K/uL  Mean Cell Volume : 95.5 fL  Mean Cell Hemoglobin : 31.6 pg  Mean Cell Hemoglobin Concentration : 33.1 g/dL  Auto Neutrophil # : 4.49 K/uL  Auto Lymphocyte # : 1.76 K/uL  Auto Monocyte # : 0.63 K/uL  Auto Eosinophil # : 0.23 K/uL  Auto Basophil # : 0.03 K/uL  Auto Neutrophil % : 62.9 %  Auto Lymphocyte % : 24.6 %  Auto Monocyte % : 8.8 %  Auto Eosinophil % : 3.2 %  Auto Basophil % : 0.4 %    PT/INR - ( 07 Sep 2021 11:26 )   PT: 12.80 sec;   INR: 1.11 ratio         PTT - ( 07 Sep 2021 11:26 )  PTT:39.4 sec  LIVER FUNCTIONS - ( 07 Sep 2021 11:26 )  Alb: 4.1 g/dL / Pro: 7.0 g/dL / ALK PHOS: 117 U/L / ALT: 16 U/L / AST: 20 U/L / GGT: x             No Known Allergies        Radiology:  No new neuroimaging    Assessment: 80 year old male PMHx as stated above s/p lumbar drain placement today     Plan:  #Neuro:  - neurochecks q 1 hour  - lumbar drain in place to drain   - 10 cc drainage every hour via lumbar drain  - ancef will remain until lumbar drain is removed   - PT/OT tomorrow  - chemical prophylaxis to be started once cleared by neurosurgery   - EVD management as per neurosurgery   - Keep normotensive  - Keep SaO2 > 95%  - Strict I/Os  - Daily weights  - Keep euvolemic  - Monitor lytes, replete as needed  - SCD's while in bed  - Keep normothermic      Code status:  Full code  Disposition:  ICU      CHAKA Velásquez  Department: Neuro ICU  Please feel free to contact for any questions or concerns. Thank you. Case discussed with Neurocritical Care attending. Please see attending attestation below.  Extension: #[phone]   NEUROCRITICAL CARE     HPI: 80 year old M with CAD, HLD, dementia, seizure (on valproic acid), PE presented for an elective lumbar drain procedure on 9/7/2021. After further conversation with the patient's daughter, she explained to me that over the past year, her father has been having progressive leg weakness which led him to be unable to walk unless there was assistance. The patient also has history of urinary incontinence. Patient was diagnosed with normal pressure hydrocephalus to improve symptoms.       Past Medical and Surgical Hx:  PAST MEDICAL & SURGICAL HISTORY:  Coronary artery disease, angina presence unspecified, unspecified vessel or lesion type, unspecified whether native or transplanted heart  Hyperlipidemia, unspecified hyperlipidemia type  Seizure  Dementia  Gastrointestinal perforation  Pulmonary emboli  resolved    No significant past surgical history        Medications Current and PRN:  MEDICATIONS  (STANDING):  ceFAZolin   IVPB 1000 milliGRAM(s) IV Intermittent every 8 hours  diVALproex  milliGRAM(s) Oral two times a day  donepezil 10 milliGRAM(s) Oral at bedtime  lactated ringers. 1000 milliLiter(s) (75 mL/Hr) IV Continuous <Continuous>  memantine 5 milliGRAM(s) Oral two times a day  multivitamin 1 Tablet(s) Oral daily  sodium chloride 0.9%. 1000 milliLiter(s) (60 mL/Hr) IV Continuous <Continuous>    MEDICATIONS  (PRN):  acetaminophen   Tablet .. 650 milliGRAM(s) Oral every 6 hours PRN Temp greater or equal to 38.5C (101.3F), Mild Pain (1 - 3)  ondansetron Injectable 4 milliGRAM(s) IV Push every 6 hours PRN Nausea and/or Vomiting      Allergies: No Known Allergies    Vital Signs:  ICU Vital Signs Last 24 Hrs  T(C): 36.1 (07 Sep 2021 12:00), Max: 36.4 (07 Sep 2021 09:00)  T(F): 97 (07 Sep 2021 12:00), Max: 97.6 (07 Sep 2021 09:00)  HR: 62 (07 Sep 2021 15:00) (57 - 73)  BP: 148/79 (07 Sep 2021 15:00) (66/88 - 205/98)  BP(mean): 108 (07 Sep 2021 15:00) (92 - 112)  ABP: --  ABP(mean): --  RR: 12 (07 Sep 2021 15:00) (10 - 18)  SpO2: 98% (07 Sep 2021 15:00) (98% - 100%)    *exam limited as patient is now post-op from lumbar drain placement and dementia  Physical exam:  Constitutional: No acute distress, minimally verbal  Cardiovascular: Regular rate and rhythm, no murmurs, rubs, or gallops.   Pulmonary: Anterior breath sounds clear bilaterally, no crackles or wheezing. No use of accessory muscles  GI: Abdomen soft, non-tender  Extremities: Radial and DP pulses +2, no edema    Neurologic exam:  MSE: minimally verbal, able to follow simple commands intermittently  CN: pupils 3mm in size, round, equally reactive to light b/l  Motor: moves extremities spontaneously   Sensory: sensation intact throughout         I/Os:  I&O's Detail    07 Sep 2021 07:01  -  07 Sep 2021 15:48  --------------------------------------------------------  IN:    sodium chloride 0.9%: 360 mL  Total IN: 360 mL    OUT:    Drain (mL): 40 mL  Total OUT: 40 mL    Total NET: 320 mL          Labs:  09-07    138  |  102  |  10  ----------------------------<  83  4.2   |  26  |  0.9    Ca    9.4      07 Sep 2021 11:26  Mg     2.1     09-07    TPro  7.0  /  Alb  4.1  /  TBili  <0.2  /  DBili  x   /  AST  20  /  ALT  16  /  AlkPhos  117<H>  09-07    CBC Full  -  ( 07 Sep 2021 11:26 )  WBC Count : 7.15 K/uL  RBC Count : 4.46 M/uL  Hemoglobin : 14.1 g/dL  Hematocrit : 42.6 %  Platelet Count - Automated : 127 K/uL  Mean Cell Volume : 95.5 fL  Mean Cell Hemoglobin : 31.6 pg  Mean Cell Hemoglobin Concentration : 33.1 g/dL  Auto Neutrophil # : 4.49 K/uL  Auto Lymphocyte # : 1.76 K/uL  Auto Monocyte # : 0.63 K/uL  Auto Eosinophil # : 0.23 K/uL  Auto Basophil # : 0.03 K/uL  Auto Neutrophil % : 62.9 %  Auto Lymphocyte % : 24.6 %  Auto Monocyte % : 8.8 %  Auto Eosinophil % : 3.2 %  Auto Basophil % : 0.4 %    PT/INR - ( 07 Sep 2021 11:26 )   PT: 12.80 sec;   INR: 1.11 ratio         PTT - ( 07 Sep 2021 11:26 )  PTT:39.4 sec  LIVER FUNCTIONS - ( 07 Sep 2021 11:26 )  Alb: 4.1 g/dL / Pro: 7.0 g/dL / ALK PHOS: 117 U/L / ALT: 16 U/L / AST: 20 U/L / GGT: x             No Known Allergies        Radiology:  No new neuroimaging    Assessment: 80 year old male PMHx as stated above s/p lumbar drain placement today     Plan:  #Neuro:  - neurochecks q 1 hour  - continue valproic acid 250 BID via syrup formulation for seizure   - 10 cc drainage every hour via lumbar drain  - ancef will remain until lumbar drain is removed   - PT/OT tomorrow  - chemical prophylaxis to be started once cleared by neurosurgery   - EVD management as per neurosurgery   - Keep normotensive  - Keep SaO2 > 95%  - Strict I/Os  - Daily weights  - Keep euvolemic  - Monitor lytes, replete as needed  - SCD's while in bed  - Keep normothermic      Code status:  Full code  Disposition:  ICU      CHAKA Velásquez  Department: Neuro ICU  Please feel free to contact for any questions or concerns. Thank you. Case discussed with Neurocritical Care attending. Please see attending attestation below.  Extension: #[phone]

## 2021-09-08 DIAGNOSIS — Z02.9 ENCOUNTER FOR ADMINISTRATIVE EXAMINATIONS, UNSPECIFIED: ICD-10-CM

## 2021-09-08 LAB
ALBUMIN SERPL ELPH-MCNC: 4 G/DL — SIGNIFICANT CHANGE UP (ref 3.5–5.2)
ALP SERPL-CCNC: 135 U/L — HIGH (ref 30–115)
ALT FLD-CCNC: 14 U/L — SIGNIFICANT CHANGE UP (ref 0–41)
ANION GAP SERPL CALC-SCNC: 13 MMOL/L — SIGNIFICANT CHANGE UP (ref 7–14)
AST SERPL-CCNC: 18 U/L — SIGNIFICANT CHANGE UP (ref 0–41)
BILIRUB SERPL-MCNC: 0.3 MG/DL — SIGNIFICANT CHANGE UP (ref 0.2–1.2)
BUN SERPL-MCNC: 8 MG/DL — LOW (ref 10–20)
CALCIUM SERPL-MCNC: 9.2 MG/DL — SIGNIFICANT CHANGE UP (ref 8.5–10.1)
CHLORIDE SERPL-SCNC: 98 MMOL/L — SIGNIFICANT CHANGE UP (ref 98–110)
CO2 SERPL-SCNC: 25 MMOL/L — SIGNIFICANT CHANGE UP (ref 17–32)
COVID-19 SPIKE DOMAIN AB INTERP: NEGATIVE — SIGNIFICANT CHANGE UP
COVID-19 SPIKE DOMAIN ANTIBODY RESULT: 0.4 U/ML — SIGNIFICANT CHANGE UP
CREAT SERPL-MCNC: 0.9 MG/DL — SIGNIFICANT CHANGE UP (ref 0.7–1.5)
GLUCOSE SERPL-MCNC: 80 MG/DL — SIGNIFICANT CHANGE UP (ref 70–99)
HCT VFR BLD CALC: 39 % — LOW (ref 42–52)
HGB BLD-MCNC: 13 G/DL — LOW (ref 14–18)
MAGNESIUM SERPL-MCNC: 2 MG/DL — SIGNIFICANT CHANGE UP (ref 1.8–2.4)
MCHC RBC-ENTMCNC: 31.3 PG — HIGH (ref 27–31)
MCHC RBC-ENTMCNC: 33.3 G/DL — SIGNIFICANT CHANGE UP (ref 32–37)
MCV RBC AUTO: 93.8 FL — SIGNIFICANT CHANGE UP (ref 80–94)
NON-GYNECOLOGICAL CYTOLOGY STUDY: SIGNIFICANT CHANGE UP
NRBC # BLD: 0 /100 WBCS — SIGNIFICANT CHANGE UP (ref 0–0)
PHOSPHATE SERPL-MCNC: 3.1 MG/DL — SIGNIFICANT CHANGE UP (ref 2.1–4.9)
PLATELET # BLD AUTO: 136 K/UL — SIGNIFICANT CHANGE UP (ref 130–400)
POTASSIUM SERPL-MCNC: 4.7 MMOL/L — SIGNIFICANT CHANGE UP (ref 3.5–5)
POTASSIUM SERPL-SCNC: 4.7 MMOL/L — SIGNIFICANT CHANGE UP (ref 3.5–5)
PROT SERPL-MCNC: 6.8 G/DL — SIGNIFICANT CHANGE UP (ref 6–8)
RBC # BLD: 4.16 M/UL — LOW (ref 4.7–6.1)
RBC # FLD: 12.3 % — SIGNIFICANT CHANGE UP (ref 11.5–14.5)
SARS-COV-2 IGG+IGM SERPL QL IA: 0.4 U/ML — SIGNIFICANT CHANGE UP
SARS-COV-2 IGG+IGM SERPL QL IA: NEGATIVE — SIGNIFICANT CHANGE UP
SODIUM SERPL-SCNC: 136 MMOL/L — SIGNIFICANT CHANGE UP (ref 135–146)
WBC # BLD: 6.28 K/UL — SIGNIFICANT CHANGE UP (ref 4.8–10.8)
WBC # FLD AUTO: 6.28 K/UL — SIGNIFICANT CHANGE UP (ref 4.8–10.8)

## 2021-09-08 PROCEDURE — 99291 CRITICAL CARE FIRST HOUR: CPT

## 2021-09-08 RX ORDER — HEPARIN SODIUM 5000 [USP'U]/ML
5000 INJECTION INTRAVENOUS; SUBCUTANEOUS EVERY 12 HOURS
Refills: 0 | Status: DISCONTINUED | OUTPATIENT
Start: 2021-09-08 | End: 2021-09-10

## 2021-09-08 RX ADMIN — Medication 250 MILLIGRAM(S): at 06:37

## 2021-09-08 RX ADMIN — Medication 100 MILLIGRAM(S): at 22:30

## 2021-09-08 RX ADMIN — MEMANTINE HYDROCHLORIDE 5 MILLIGRAM(S): 10 TABLET ORAL at 06:36

## 2021-09-08 RX ADMIN — Medication 250 MILLIGRAM(S): at 18:11

## 2021-09-08 RX ADMIN — DONEPEZIL HYDROCHLORIDE 10 MILLIGRAM(S): 10 TABLET, FILM COATED ORAL at 22:30

## 2021-09-08 RX ADMIN — Medication 100 MILLIGRAM(S): at 14:00

## 2021-09-08 RX ADMIN — SODIUM CHLORIDE 60 MILLILITER(S): 9 INJECTION INTRAMUSCULAR; INTRAVENOUS; SUBCUTANEOUS at 06:30

## 2021-09-08 RX ADMIN — Medication 100 MILLIGRAM(S): at 06:00

## 2021-09-08 RX ADMIN — MEMANTINE HYDROCHLORIDE 5 MILLIGRAM(S): 10 TABLET ORAL at 18:11

## 2021-09-08 RX ADMIN — Medication 1 TABLET(S): at 12:01

## 2021-09-08 NOTE — PROGRESS NOTE ADULT - SUBJECTIVE AND OBJECTIVE BOX
SUMMARY:    OVERNIGHT EVENTS:     ADMISSION SCORES:   GCS: HH: MF: NIHSS: ICH Score:    SEDATION SCORES:  RASS: CAM-ICU:     REVIEW OF SYSTEMS:    VITALS: [] Reviewed    IMAGING/DATA: [] Reviewed    IVF FLUIDS/MEDICATIONS: [] Reviewed    ALLERGIES: Allergies    No Known Allergies    Intolerances        DEVICES:   [] Restraints [] PIVs [] ET tube [] central line [] PICC [] arterial line [] valencia [] NGT/OGT [] EVD [] LD [] JESSY/HMV [] LiCOX [] ICP monitor [] Trach [] PEG [] Chest Tube [] other:    EXAMINATION:  General: No acute distress  HEENT: Anicteric sclerae  Cardiac: [id number]  Lungs: Clear  Abdomen: Soft, non-tender, +BS  Extremities: No c/c/e  Skin/Incision Site: Clean, dry and intact  Neurologic: Awake, alert, fully oriented, follows commands, PERRL, VFFtc, EOMI, face symmetric, tongue midline, no drift, full strength              ICU Vital Signs Last 24 Hrs  T(C): 36.1 (08 Sep 2021 07:00), Max: 36.7 (08 Sep 2021 03:00)  T(F): 97 (08 Sep 2021 07:00), Max: 98 (08 Sep 2021 03:00)  HR: 67 (08 Sep 2021 07:00) (57 - 74)  BP: 120/75 (08 Sep 2021 07:00) (66/88 - 205/98)  BP(mean): 93 (08 Sep 2021 07:00) (83 - 113)  ABP: --  ABP(mean): --  RR: 18 (08 Sep 2021 07:00) (10 - 20)  SpO2: 100% (08 Sep 2021 07:00) (96% - 100%)      09-07-21 @ 07:01  -  09-08-21 @ 07:00  --------------------------------------------------------  IN: 1920 mL / OUT: 380 mL / NET: 1540 mL            acetaminophen   Tablet .. 650 milliGRAM(s) Oral every 6 hours PRN  ceFAZolin   IVPB 1000 milliGRAM(s) IV Intermittent every 8 hours  donepezil 10 milliGRAM(s) Oral at bedtime  lactated ringers. 1000 milliLiter(s) (75 mL/Hr) IV Continuous <Continuous>  memantine 5 milliGRAM(s) Oral two times a day  multivitamin 1 Tablet(s) Oral daily  ondansetron Injectable 4 milliGRAM(s) IV Push every 6 hours PRN  sodium chloride 0.9%. 1000 milliLiter(s) (60 mL/Hr) IV Continuous <Continuous>  valproic  acid Syrup 250 milliGRAM(s) Oral two times a day      LABS:  Na: 138 (09-07 @ 11:26)  K: 4.2 (09-07 @ 11:26)  Cl: 102 (09-07 @ 11:26)  CO2: 26 (09-07 @ 11:26)  BUN: 10 (09-07 @ 11:26)  Cr: 0.9 (09-07 @ 11:26)  Glu: 83(09-07 @ 11:26)    Hgb: 13.0 (09-08 @ 06:30), 14.1 (09-07 @ 11:26)  Hct: 39.0 (09-08 @ 06:30), 42.6 (09-07 @ 11:26)  WBC: 6.28 (09-08 @ 06:30), 7.15 (09-07 @ 11:26)  Plt: 136 (09-08 @ 06:30), 127 (09-07 @ 11:26)    INR: 1.11 09-07-21 @ 11:26  PTT: 39.4 09-07-21 @ 11:26          LIVER FUNCTIONS - ( 07 Sep 2021 11:26 )  Alb: 4.1 g/dL / Pro: 7.0 g/dL / ALK PHOS: 117 U/L / ALT: 16 U/L / AST: 20 U/L / GGT: x               Culture - CSF with Gram Stain (collected 09-07-21 @ 10:53)  Source: .CSF  Gram Stain (09-07-21 @ 17:57):    polymorphonuclear leukocytes seen    No organisms seen    by cytocentrifuge  Preliminary Report (09-08-21 @ 06:43):    No growth       SUMMARY:    OVERNIGHT EVENTS: naeo    SEDATION SCORES:  RASS: CAM-ICU:     REVIEW OF SYSTEMS:    VITALS: [] Reviewed    IMAGING/DATA: [] Reviewed    IVF FLUIDS/MEDICATIONS: [] Reviewed    ALLERGIES: Allergies    No Known Allergies    Intolerances        DEVICES:   [] Restraints [] PIVs [] ET tube [] central line [] PICC [] arterial line [] valencia [] NGT/OGT [] EVD [] LD [] JESSY/HMV [] LiCOX [] ICP monitor [] Trach [] PEG [] Chest Tube [] other:    EXAMINATION:  General: No acute distress  HEENT: Anicteric sclerae  Cardiac: [id number]  Lungs: Clear  Abdomen: Soft, non-tender, +BS  Extremities: No c/c/e  Skin/Incision Site: Clean, dry and intact  Neurologic:               ICU Vital Signs Last 24 Hrs  T(C): 36.1 (08 Sep 2021 07:00), Max: 36.7 (08 Sep 2021 03:00)  T(F): 97 (08 Sep 2021 07:00), Max: 98 (08 Sep 2021 03:00)  HR: 67 (08 Sep 2021 07:00) (57 - 74)  BP: 120/75 (08 Sep 2021 07:00) (66/88 - 205/98)  BP(mean): 93 (08 Sep 2021 07:00) (83 - 113)  ABP: --  ABP(mean): --  RR: 18 (08 Sep 2021 07:00) (10 - 20)  SpO2: 100% (08 Sep 2021 07:00) (96% - 100%)      09-07-21 @ 07:01  -  09-08-21 @ 07:00  --------------------------------------------------------  IN: 1920 mL / OUT: 380 mL / NET: 1540 mL            acetaminophen   Tablet .. 650 milliGRAM(s) Oral every 6 hours PRN  ceFAZolin   IVPB 1000 milliGRAM(s) IV Intermittent every 8 hours  donepezil 10 milliGRAM(s) Oral at bedtime  lactated ringers. 1000 milliLiter(s) (75 mL/Hr) IV Continuous <Continuous>  memantine 5 milliGRAM(s) Oral two times a day  multivitamin 1 Tablet(s) Oral daily  ondansetron Injectable 4 milliGRAM(s) IV Push every 6 hours PRN  sodium chloride 0.9%. 1000 milliLiter(s) (60 mL/Hr) IV Continuous <Continuous>  valproic  acid Syrup 250 milliGRAM(s) Oral two times a day      LABS:  Na: 138 (09-07 @ 11:26)  K: 4.2 (09-07 @ 11:26)  Cl: 102 (09-07 @ 11:26)  CO2: 26 (09-07 @ 11:26)  BUN: 10 (09-07 @ 11:26)  Cr: 0.9 (09-07 @ 11:26)  Glu: 83(09-07 @ 11:26)    Hgb: 13.0 (09-08 @ 06:30), 14.1 (09-07 @ 11:26)  Hct: 39.0 (09-08 @ 06:30), 42.6 (09-07 @ 11:26)  WBC: 6.28 (09-08 @ 06:30), 7.15 (09-07 @ 11:26)  Plt: 136 (09-08 @ 06:30), 127 (09-07 @ 11:26)    INR: 1.11 09-07-21 @ 11:26  PTT: 39.4 09-07-21 @ 11:26          LIVER FUNCTIONS - ( 07 Sep 2021 11:26 )  Alb: 4.1 g/dL / Pro: 7.0 g/dL / ALK PHOS: 117 U/L / ALT: 16 U/L / AST: 20 U/L / GGT: x               Culture - CSF with Gram Stain (collected 09-07-21 @ 10:53)  Source: .CSF  Gram Stain (09-07-21 @ 17:57):    polymorphonuclear leukocytes seen    No organisms seen    by cytocentrifuge  Preliminary Report (09-08-21 @ 06:43):    No growth       SUMMARY: 80 year old M with CAD, HLD, dementia, seizure (on valproic acid), PE presented for an elective lumbar drain procedure on 9/7/2021. After further conversation with the patient's daughter, she explained to me that over the past year, her father has been having progressive leg weakness which led him to be unable to walk unless there was assistance. The patient also has history of urinary incontinence. Patient was diagnosed with normal pressure hydrocephalus to improve symptoms.     OVERNIGHT EVENTS: naeo    REVIEW OF SYSTEMS:    VITALS: [] Reviewed    IMAGING/DATA: [] Reviewed    IVF FLUIDS/MEDICATIONS: [] Reviewed    ALLERGIES: Allergies    No Known Allergies    Intolerances        DEVICES:   [] Restraints [] PIVs [] ET tube [] central line [] PICC [] arterial line [] valencia [] NGT/OGT [] EVD [X] LD [] JESSY/HMV [] LiCOX [] ICP monitor [] Trach [] PEG [] Chest Tube [] other:    EXAMINATION:  General: No acute distress  HEENT: Anicteric sclerae  Cardiac: [id number]  Lungs: Clear  Abdomen: Soft, non-tender, +BS  Extremities: No c/c/e  Skin/Incision Site: Clean, dry and intact  Neurologic:               ICU Vital Signs Last 24 Hrs  T(C): 36.1 (08 Sep 2021 07:00), Max: 36.7 (08 Sep 2021 03:00)  T(F): 97 (08 Sep 2021 07:00), Max: 98 (08 Sep 2021 03:00)  HR: 67 (08 Sep 2021 07:00) (57 - 74)  BP: 120/75 (08 Sep 2021 07:00) (66/88 - 205/98)  BP(mean): 93 (08 Sep 2021 07:00) (83 - 113)  ABP: --  ABP(mean): --  RR: 18 (08 Sep 2021 07:00) (10 - 20)  SpO2: 100% (08 Sep 2021 07:00) (96% - 100%)      09-07-21 @ 07:01  -  09-08-21 @ 07:00  --------------------------------------------------------  IN: 1920 mL / OUT: 380 mL / NET: 1540 mL            acetaminophen   Tablet .. 650 milliGRAM(s) Oral every 6 hours PRN  ceFAZolin   IVPB 1000 milliGRAM(s) IV Intermittent every 8 hours  donepezil 10 milliGRAM(s) Oral at bedtime  lactated ringers. 1000 milliLiter(s) (75 mL/Hr) IV Continuous <Continuous>  memantine 5 milliGRAM(s) Oral two times a day  multivitamin 1 Tablet(s) Oral daily  ondansetron Injectable 4 milliGRAM(s) IV Push every 6 hours PRN  sodium chloride 0.9%. 1000 milliLiter(s) (60 mL/Hr) IV Continuous <Continuous>  valproic  acid Syrup 250 milliGRAM(s) Oral two times a day      LABS:  Na: 138 (09-07 @ 11:26)  K: 4.2 (09-07 @ 11:26)  Cl: 102 (09-07 @ 11:26)  CO2: 26 (09-07 @ 11:26)  BUN: 10 (09-07 @ 11:26)  Cr: 0.9 (09-07 @ 11:26)  Glu: 83(09-07 @ 11:26)    Hgb: 13.0 (09-08 @ 06:30), 14.1 (09-07 @ 11:26)  Hct: 39.0 (09-08 @ 06:30), 42.6 (09-07 @ 11:26)  WBC: 6.28 (09-08 @ 06:30), 7.15 (09-07 @ 11:26)  Plt: 136 (09-08 @ 06:30), 127 (09-07 @ 11:26)    INR: 1.11 09-07-21 @ 11:26  PTT: 39.4 09-07-21 @ 11:26          LIVER FUNCTIONS - ( 07 Sep 2021 11:26 )  Alb: 4.1 g/dL / Pro: 7.0 g/dL / ALK PHOS: 117 U/L / ALT: 16 U/L / AST: 20 U/L / GGT: x               Culture - CSF with Gram Stain (collected 09-07-21 @ 10:53)  Source: .CSF  Gram Stain (09-07-21 @ 17:57):    polymorphonuclear leukocytes seen    No organisms seen    by cytocentrifuge  Preliminary Report (09-08-21 @ 06:43):    No growth       SUMMARY: 80 year old M with CAD, HLD, dementia, seizure (on valproic acid), PE presented for an elective lumbar drain procedure on 9/7/2021. After further conversation with the patient's daughter, she explained to me that over the past year, her father has been having progressive leg weakness which led him to be unable to walk unless there was assistance. The patient also has history of urinary incontinence. Patient was diagnosed with normal pressure hydrocephalus to improve symptoms.     OVERNIGHT EVENTS: naeo    REVIEW OF SYSTEMS:    VITALS: [] Reviewed    IMAGING/DATA: [] Reviewed    IVF FLUIDS/MEDICATIONS: [] Reviewed    ALLERGIES: Allergies    No Known Allergies    Intolerances        DEVICES:   [] Restraints [] PIVs [] ET tube [] central line [] PICC [] arterial line [] valencia [] NGT/OGT [] EVD [X] LD [] JESSY/HMV [] LiCOX [] ICP monitor [] Trach [] PEG [] Chest Tube [] other:    EXAMINATION:  General: No acute distress  HEENT: Anicteric sclerae  Cardiac: [id number]  Lungs: Clear  Abdomen: Soft, non-tender, +BS  Extremities: No c/c/e  Skin/Incision Site: Clean, dry and intact    Neurologic exam:  MSE: minimally verbal, x1 to self; able to follow simple commands intermittently  CN: pupils 3mm in size, round, equally reactive to light b/l  Motor: moves extremities spontaneously   Sensory: sensation intact throughout                 ICU Vital Signs Last 24 Hrs  T(C): 36.1 (08 Sep 2021 07:00), Max: 36.7 (08 Sep 2021 03:00)  T(F): 97 (08 Sep 2021 07:00), Max: 98 (08 Sep 2021 03:00)  HR: 67 (08 Sep 2021 07:00) (57 - 74)  BP: 120/75 (08 Sep 2021 07:00) (66/88 - 205/98)  BP(mean): 93 (08 Sep 2021 07:00) (83 - 113)  ABP: --  ABP(mean): --  RR: 18 (08 Sep 2021 07:00) (10 - 20)  SpO2: 100% (08 Sep 2021 07:00) (96% - 100%)      09-07-21 @ 07:01  -  09-08-21 @ 07:00  --------------------------------------------------------  IN: 1920 mL / OUT: 380 mL / NET: 1540 mL            acetaminophen   Tablet .. 650 milliGRAM(s) Oral every 6 hours PRN  ceFAZolin   IVPB 1000 milliGRAM(s) IV Intermittent every 8 hours  donepezil 10 milliGRAM(s) Oral at bedtime  lactated ringers. 1000 milliLiter(s) (75 mL/Hr) IV Continuous <Continuous>  memantine 5 milliGRAM(s) Oral two times a day  multivitamin 1 Tablet(s) Oral daily  ondansetron Injectable 4 milliGRAM(s) IV Push every 6 hours PRN  sodium chloride 0.9%. 1000 milliLiter(s) (60 mL/Hr) IV Continuous <Continuous>  valproic  acid Syrup 250 milliGRAM(s) Oral two times a day      LABS:  Na: 138 (09-07 @ 11:26)  K: 4.2 (09-07 @ 11:26)  Cl: 102 (09-07 @ 11:26)  CO2: 26 (09-07 @ 11:26)  BUN: 10 (09-07 @ 11:26)  Cr: 0.9 (09-07 @ 11:26)  Glu: 83(09-07 @ 11:26)    Hgb: 13.0 (09-08 @ 06:30), 14.1 (09-07 @ 11:26)  Hct: 39.0 (09-08 @ 06:30), 42.6 (09-07 @ 11:26)  WBC: 6.28 (09-08 @ 06:30), 7.15 (09-07 @ 11:26)  Plt: 136 (09-08 @ 06:30), 127 (09-07 @ 11:26)    INR: 1.11 09-07-21 @ 11:26  PTT: 39.4 09-07-21 @ 11:26          LIVER FUNCTIONS - ( 07 Sep 2021 11:26 )  Alb: 4.1 g/dL / Pro: 7.0 g/dL / ALK PHOS: 117 U/L / ALT: 16 U/L / AST: 20 U/L / GGT: x               Culture - CSF with Gram Stain (collected 09-07-21 @ 10:53)  Source: .CSF  Gram Stain (09-07-21 @ 17:57):    polymorphonuclear leukocytes seen    No organisms seen    by cytocentrifuge  Preliminary Report (09-08-21 @ 06:43):    No growth

## 2021-09-08 NOTE — PROGRESS NOTE ADULT - ASSESSMENT
Assessment: 80 year old male PMHx as stated above s/p lumbar drain placement today     Plan:  #Neuro:  - neurochecks q 1 hour  - continue valproic acid 250 BID via syrup formulation for seizure   - 10 cc drainage every hour via lumbar drain  - ancef will remain until lumbar drain is removed   - PT/OT tomorrow  - chemical prophylaxis to be started once cleared by neurosurgery   - EVD management as per neurosurgery   - Keep normotensive  - Keep SaO2 > 95%  - Strict I/Os  - Daily weights  - Keep euvolemic  - Monitor lytes, replete as needed  - SCD's while in bed  - Keep normothermic      Code status:  Full code  Disposition:  ICU Assessment: 80 year old male PMHx as stated above s/p lumbar drain placement today     Plan:  #Neuro:  - neurochecks q 4 hour  - continue valproic acid 250 BID via syrup formulation for seizure   - 10 cc drainage every hour via lumbar drain  - ancef will remain until lumbar drain is removed   - PT/OT tomorrow  - chemical prophylaxis to be started once cleared by neurosurgery   - EVD management as per neurosurgery   - Keep normotensive  - Keep SaO2 > 95%  - Strict I/Os  - Daily weights  - Keep euvolemic  - Monitor lytes, replete as needed  - SCD's while in bed  - Keep normothermic      Code status:  Full code  Disposition:  ICU

## 2021-09-08 NOTE — PHYSICAL THERAPY INITIAL EVALUATION ADULT - LEVEL OF INDEPENDENCE: SUPINE/SIT, REHAB EVAL
Unable to get from supine to completely sitting upright as pt was pushing backwards. Pt also has lumbar drain to drain CSF. PT will make another attempt when pt is more stable./dependent (less than 25% patients effort)

## 2021-09-08 NOTE — PHYSICAL THERAPY INITIAL EVALUATION ADULT - IMPAIRMENTS FOUND, PT EVAL
aerobic capacity/endurance/ergonomics and body mechanics/gait, locomotion, and balance/gross motor/integumentary integrity/joint integrity and mobility/muscle strength/poor safety awareness/ROM

## 2021-09-08 NOTE — PATIENT PROFILE ADULT - FALL HARM RISK
and cognitive ability    patient came from home and intervention initiated by daughter for surgical procedure/surgery

## 2021-09-08 NOTE — PATIENT PROFILE ADULT - LANGUAGE ASSISTANCE NEEDED
even with [place] interpretation patient is still oriented to name and is nonverbal. not answering the interpretator./Yes-Patient/Caregiver accepts free interpretation services...

## 2021-09-08 NOTE — PROGRESS NOTE ADULT - SUBJECTIVE AND OBJECTIVE BOX
Subjective: 80yMale with a pmhx of G91.2 / [id number]    ^G91.2 / 62272    Handoff    Coronary artery disease, angina presence unspecified, unspecified vessel or lesion type, unspecified whether native or transplanted heart    Hyperlipidemia, unspecified hyperlipidemia type    Seizure    Dementia    Gastrointestinal perforation    Pulmonary emboli    Lumbar drain implantation    No significant past surgical history    SysAdmin_VstLnk      POD#1  S/P Placement of Lumbar Drain    Pt seen and examined at bedside.  Pt has dementia and is Non verbal at baseline.  Not following commands. Repeats same word repetitively.  LD drained 10cc hourly.     Allergies    No Known Allergies    Intolerances        Vital Signs Last 24 Hrs  T(C): 36.2 (08 Sep 2021 07:30), Max: 36.7 (08 Sep 2021 03:00)  T(F): 97.1 (08 Sep 2021 07:30), Max: 98 (08 Sep 2021 03:00)  HR: 76 (08 Sep 2021 09:25) (58 - 76)  BP: 107/68 (08 Sep 2021 09:25) (107/68 - 153/75)  BP(mean): 93 (08 Sep 2021 07:30) (83 - 113)  RR: 17 (08 Sep 2021 09:25) (10 - 20)  SpO2: 100% (08 Sep 2021 09:25) (96% - 100%)      acetaminophen   Tablet .. 650 milliGRAM(s) Oral every 6 hours PRN  ceFAZolin   IVPB 1000 milliGRAM(s) IV Intermittent every 8 hours  donepezil 10 milliGRAM(s) Oral at bedtime  lactated ringers. 1000 milliLiter(s) IV Continuous <Continuous>  memantine 5 milliGRAM(s) Oral two times a day  multivitamin 1 Tablet(s) Oral daily  ondansetron Injectable 4 milliGRAM(s) IV Push every 6 hours PRN  sodium chloride 0.9%. 1000 milliLiter(s) IV Continuous <Continuous>  valproic  acid Syrup 250 milliGRAM(s) Oral two times a day        09-07-21 @ 07:01  -  09-08-21 @ 07:00  --------------------------------------------------------  IN: 1920 mL / OUT: 380 mL / NET: 1540 mL    09-08-21 @ 07:01  -  09-08-21 @ 11:18  --------------------------------------------------------  IN: 0 mL / OUT: 40 mL / NET: -40 mL        REVIEW OF SYSTEMS    [x ] A ten-point review of systems was otherwise negative except as noted.  [ ] Due to altered mental status/intubation, subjective information were not able to be obtained from the patient. History was obtained, to the extent possible, from review of the chart and collateral sources of information.      Neuro Exam:  Awake, alert,   facial motions symmetric  Pupils reactive  tracking  Motor: MAEx4 minimally in bed  LD dressing intact, no drainage around site        CBC Full  -  ( 08 Sep 2021 06:30 )  WBC Count : 6.28 K/uL  RBC Count : 4.16 M/uL  Hemoglobin : 13.0 g/dL  Hematocrit : 39.0 %  Platelet Count - Automated : 136 K/uL  Mean Cell Volume : 93.8 fL  Mean Cell Hemoglobin : 31.3 pg  Mean Cell Hemoglobin Concentration : 33.3 g/dL  Auto Neutrophil # : x  Auto Lymphocyte # : x  Auto Monocyte # : x  Auto Eosinophil # : x  Auto Basophil # : x  Auto Neutrophil % : x  Auto Lymphocyte % : x  Auto Monocyte % : x  Auto Eosinophil % : x  Auto Basophil % : x    09-08    136  |  98  |  8<L>  ----------------------------<  80  4.7   |  25  |  0.9    Ca    9.2      08 Sep 2021 06:30  Phos  3.1     09-08  Mg     2.0     09-08    TPro  6.8  /  Alb  4.0  /  TBili  0.3  /  DBili  x   /  AST  18  /  ALT  14  /  AlkPhos  135<H>  09-08    PT/INR - ( 07 Sep 2021 11:26 )   PT: 12.80 sec;   INR: 1.11 ratio         PTT - ( 07 Sep 2021 11:26 )  PTT:39.4 sec    I&O's Detail    07 Sep 2021 07:01  -  08 Sep 2021 07:00  --------------------------------------------------------  IN:    IV PiggyBack: 100 mL    Oral Fluid: 620 mL    sodium chloride 0.9%: 1200 mL  Total IN: 1920 mL    OUT:    Drain (mL): 200 mL    Voided (mL): 180 mL  Total OUT: 380 mL    Total NET: 1540 mL      08 Sep 2021 07:01  -  08 Sep 2021 11:18  --------------------------------------------------------  IN:  Total IN: 0 mL    OUT:    Drain (mL): 40 mL  Total OUT: 40 mL    Total NET: -40 mL        I&O's Summary    07 Sep 2021 07:01  -  08 Sep 2021 07:00  --------------------------------------------------------  IN: 1920 mL / OUT: 380 mL / NET: 1540 mL    08 Sep 2021 07:01  -  08 Sep 2021 11:18  --------------------------------------------------------  IN: 0 mL / OUT: 40 mL / NET: -40 mL          Assessment/Plan:   Continue CSF drainage at 10cc/hr  Neuro checks  PT for NPH eval  NCC management in ICU  d/w attg

## 2021-09-08 NOTE — PHYSICAL THERAPY INITIAL EVALUATION ADULT - ADDITIONAL COMMENTS
As per chart, pt lives with spouse in pvt house with 2 steps to enter and pt may ambulate a small amount with min assist.

## 2021-09-09 LAB
ANION GAP SERPL CALC-SCNC: 14 MMOL/L — SIGNIFICANT CHANGE UP (ref 7–14)
ANION GAP SERPL CALC-SCNC: 9 MMOL/L — SIGNIFICANT CHANGE UP (ref 7–14)
APPEARANCE CSF: CLEAR — SIGNIFICANT CHANGE UP
APPEARANCE SPUN FLD: ABNORMAL
APTT BLD: 30.9 SEC — SIGNIFICANT CHANGE UP (ref 27–39.2)
BUN SERPL-MCNC: 7 MG/DL — LOW (ref 10–20)
BUN SERPL-MCNC: 8 MG/DL — LOW (ref 10–20)
CA-I BLD-SCNC: 1.23 MMOL/L — SIGNIFICANT CHANGE UP (ref 1.15–1.33)
CALCIUM SERPL-MCNC: 9.2 MG/DL — SIGNIFICANT CHANGE UP (ref 8.5–10.1)
CALCIUM SERPL-MCNC: 9.3 MG/DL — SIGNIFICANT CHANGE UP (ref 8.5–10.1)
CHLORIDE SERPL-SCNC: 96 MMOL/L — LOW (ref 98–110)
CHLORIDE SERPL-SCNC: 99 MMOL/L — SIGNIFICANT CHANGE UP (ref 98–110)
CO2 SERPL-SCNC: 22 MMOL/L — SIGNIFICANT CHANGE UP (ref 17–32)
CO2 SERPL-SCNC: 25 MMOL/L — SIGNIFICANT CHANGE UP (ref 17–32)
COLOR CSF: SIGNIFICANT CHANGE UP
CREAT SERPL-MCNC: 0.9 MG/DL — SIGNIFICANT CHANGE UP (ref 0.7–1.5)
CREAT SERPL-MCNC: 1 MG/DL — SIGNIFICANT CHANGE UP (ref 0.7–1.5)
GLUCOSE CSF-MCNC: 55 MG/DL — SIGNIFICANT CHANGE UP (ref 45–75)
GLUCOSE SERPL-MCNC: 84 MG/DL — SIGNIFICANT CHANGE UP (ref 70–99)
GLUCOSE SERPL-MCNC: 89 MG/DL — SIGNIFICANT CHANGE UP (ref 70–99)
GRAM STN FLD: SIGNIFICANT CHANGE UP
HCT VFR BLD CALC: 40.7 % — LOW (ref 42–52)
HGB BLD-MCNC: 13.8 G/DL — LOW (ref 14–18)
INR BLD: 1.08 RATIO — SIGNIFICANT CHANGE UP (ref 0.65–1.3)
MAGNESIUM SERPL-MCNC: 2 MG/DL — SIGNIFICANT CHANGE UP (ref 1.8–2.4)
MCHC RBC-ENTMCNC: 31.4 PG — HIGH (ref 27–31)
MCHC RBC-ENTMCNC: 33.9 G/DL — SIGNIFICANT CHANGE UP (ref 32–37)
MCV RBC AUTO: 92.5 FL — SIGNIFICANT CHANGE UP (ref 80–94)
NEUTROPHILS # CSF: SIGNIFICANT CHANGE UP % (ref 0–6)
NRBC # BLD: 0 /100 WBCS — SIGNIFICANT CHANGE UP (ref 0–0)
NRBC NFR CSF: 0 /UL — SIGNIFICANT CHANGE UP (ref 0–5)
OSMOLALITY SERPL: 282 MOS/KG — SIGNIFICANT CHANGE UP (ref 280–301)
PHOSPHATE SERPL-MCNC: 2.9 MG/DL — SIGNIFICANT CHANGE UP (ref 2.1–4.9)
PLATELET # BLD AUTO: 128 K/UL — LOW (ref 130–400)
POTASSIUM SERPL-MCNC: 4.4 MMOL/L — SIGNIFICANT CHANGE UP (ref 3.5–5)
POTASSIUM SERPL-MCNC: 4.9 MMOL/L — SIGNIFICANT CHANGE UP (ref 3.5–5)
POTASSIUM SERPL-SCNC: 4.4 MMOL/L — SIGNIFICANT CHANGE UP (ref 3.5–5)
POTASSIUM SERPL-SCNC: 4.9 MMOL/L — SIGNIFICANT CHANGE UP (ref 3.5–5)
PROT CSF-MCNC: 46 MG/DL — HIGH (ref 15–45)
PROTHROM AB SERPL-ACNC: 12.4 SEC — SIGNIFICANT CHANGE UP (ref 9.95–12.87)
RBC # BLD: 4.4 M/UL — LOW (ref 4.7–6.1)
RBC # CSF: 29 /UL — SIGNIFICANT CHANGE UP (ref 0–0)
RBC # FLD: 12.3 % — SIGNIFICANT CHANGE UP (ref 11.5–14.5)
SODIUM SERPL-SCNC: 130 MMOL/L — LOW (ref 135–146)
SODIUM SERPL-SCNC: 135 MMOL/L — SIGNIFICANT CHANGE UP (ref 135–146)
SPECIMEN SOURCE: SIGNIFICANT CHANGE UP
TUBE TYPE: SIGNIFICANT CHANGE UP
WBC # BLD: 6.11 K/UL — SIGNIFICANT CHANGE UP (ref 4.8–10.8)
WBC # FLD AUTO: 6.11 K/UL — SIGNIFICANT CHANGE UP (ref 4.8–10.8)

## 2021-09-09 PROCEDURE — 99291 CRITICAL CARE FIRST HOUR: CPT

## 2021-09-09 RX ADMIN — Medication 250 MILLIGRAM(S): at 06:08

## 2021-09-09 RX ADMIN — MEMANTINE HYDROCHLORIDE 5 MILLIGRAM(S): 10 TABLET ORAL at 06:08

## 2021-09-09 RX ADMIN — HEPARIN SODIUM 5000 UNIT(S): 5000 INJECTION INTRAVENOUS; SUBCUTANEOUS at 06:12

## 2021-09-09 RX ADMIN — HEPARIN SODIUM 5000 UNIT(S): 5000 INJECTION INTRAVENOUS; SUBCUTANEOUS at 17:42

## 2021-09-09 RX ADMIN — MEMANTINE HYDROCHLORIDE 5 MILLIGRAM(S): 10 TABLET ORAL at 17:42

## 2021-09-09 RX ADMIN — DONEPEZIL HYDROCHLORIDE 10 MILLIGRAM(S): 10 TABLET, FILM COATED ORAL at 22:01

## 2021-09-09 RX ADMIN — Medication 100 MILLIGRAM(S): at 06:09

## 2021-09-09 RX ADMIN — Medication 250 MILLIGRAM(S): at 17:42

## 2021-09-09 RX ADMIN — Medication 1 TABLET(S): at 12:35

## 2021-09-09 NOTE — PROGRESS NOTE ADULT - ASSESSMENT
Assessment: 80 year old male PMHx as stated above s/p lumbar drain placement for nph r/o    Plan:  #Neuro:  - neurochecks q 4 hour  - continue valproic acid 250 BID via syrup formulation for seizure   - 10 cc drainage every hour via lumbar drain--> discontinued and removed  - PT/OT  - chemical prophylaxis to be started once cleared by neurosurgery   - Keep normotensive  - Keep SaO2 > 95%  - Strict I/Os  - Daily weights  - Keep euvolemic; hyponatremia w/u, and gentle NS  - Monitor lytes, replete as needed  - SCD's while in bed  - Keep normothermic      Code status:  Full code  Disposition:  nsgy floor

## 2021-09-09 NOTE — PROGRESS NOTE ADULT - SUBJECTIVE AND OBJECTIVE BOX
Subjective: 80yMale with a pmhx of G91.2 / 62272    ^G91.2 / 62272    Handoff    Coronary artery disease, angina presence unspecified, unspecified vessel or lesion type, unspecified whether native or transplanted heart    Hyperlipidemia, unspecified hyperlipidemia type    Seizure    Dementia    Gastrointestinal perforation    Pulmonary emboli    Lumbar drain implantation    No significant past surgical history    SysAdmin_VstLnk      POD#2  S/P Placement of Lumbar Drain    Pt seen and examined at bedside.  Pt has dementia and is Non verbal at baseline.  Not following commands. Repeats same word repetitively.  LD drained 10cc hourly.     Allergies    No Known Allergies    Intolerances        Vital Signs Last 24 Hrs  T(C): 37 (09 Sep 2021 08:00), Max: 37.2 (08 Sep 2021 12:00)  T(F): 98.6 (09 Sep 2021 08:00), Max: 98.9 (08 Sep 2021 12:00)  HR: 60 (09 Sep 2021 10:00) (54 - 74)  BP: 113/61 (09 Sep 2021 10:00) (96/51 - 139/72)  BP(mean): 79 (09 Sep 2021 10:00) (70 - 105)  RR: 14 (09 Sep 2021 10:00) (10 - 20)  SpO2: 100% (09 Sep 2021 10:00) (100% - 100%)      MEDICATIONS  (STANDING):  ceFAZolin   IVPB 1000 milliGRAM(s) IV Intermittent every 8 hours  donepezil 10 milliGRAM(s) Oral at bedtime  heparin   Injectable 5000 Unit(s) SubCutaneous every 12 hours  memantine 5 milliGRAM(s) Oral two times a day  multivitamin 1 Tablet(s) Oral daily  valproic  acid Syrup 250 milliGRAM(s) Oral two times a day    MEDICATIONS  (PRN):  acetaminophen   Tablet .. 650 milliGRAM(s) Oral every 6 hours PRN Temp greater or equal to 38.5C (101.3F), Mild Pain (1 - 3)  ondansetron Injectable 4 milliGRAM(s) IV Push every 6 hours PRN Nausea and/or Vomiting        I&O's Summary    08 Sep 2021 07:01  -  09 Sep 2021 07:00  --------------------------------------------------------  IN: 950 mL / OUT: 2140 mL / NET: -1190 mL    09 Sep 2021 07:01  -  09 Sep 2021 11:30  --------------------------------------------------------  IN: 0 mL / OUT: 175 mL / NET: -175 mL            REVIEW OF SYSTEMS    [x ] A ten-point review of systems was otherwise negative except as noted.  [ ] Due to altered mental status/intubation, subjective information were not able to be obtained from the patient. History was obtained, to the extent possible, from review of the chart and collateral sources of information.      Neuro Exam:  Awake, alert, not follow commands  facial motions symmetric  Pupils reactive  tracking  Motor: MAEx4 with stiffness   LD dressing intact, no drainage around site                            13.8   6.11  )-----------( 128      ( 09 Sep 2021 04:30 )             40.7     09-09    130<L>  |  96<L>  |  7<L>  ----------------------------<  89  4.4   |  25  |  1.0    Ca    9.2      09 Sep 2021 04:30  Phos  2.9     09-09  Mg     2.0     09-09    TPro  6.8  /  Alb  4.0  /  TBili  0.3  /  DBili  x   /  AST  18  /  ALT  14  /  AlkPhos  135<H>  09-08          Assessment/Plan:   lumbar drain d/c today with sterile fashion  CSF send for cultures and sensitivity   transfer to floor  PT/OT  d/w attg

## 2021-09-09 NOTE — PROGRESS NOTE ADULT - SUBJECTIVE AND OBJECTIVE BOX
SUMMARY: 80 year old M with CAD, HLD, dementia, seizure (on valproic acid), PE presented for an elective lumbar drain procedure on 9/7/2021. After further conversation with the patient's daughter, she explained to me that over the past year, her father has been having progressive leg weakness which led him to be unable to walk unless there was assistance. The patient also has history of urinary incontinence. Patient was diagnosed with normal pressure hydrocephalus to improve symptoms.     OVERNIGHT EVENTS: naeo    REVIEW OF SYSTEMS:    VITALS: [] Reviewed    IMAGING/DATA: [] Reviewed    IVF FLUIDS/MEDICATIONS: [] Reviewed    ALLERGIES: Allergies    No Known Allergies    Intolerances        DEVICES:   [] Restraints [] PIVs [] ET tube [] central line [] PICC [] arterial line [] valencia [] NGT/OGT [] EVD [X] LD [] JESSY/HMV [] LiCOX [] ICP monitor [] Trach [] PEG [] Chest Tube [] other:    EXAMINATION:  General: No acute distress  HEENT: Anicteric sclerae  Cardiac: [id number]  Lungs: Clear  Abdomen: Soft, non-tender, +BS  Extremities: No c/c/e  Skin/Incision Site: Clean, dry and intact  Neurologic: minimally verbal, able to follow simple commands intermittently  CN: pupils 3mm in size, round, equally reactive to light b/l  Motor: moves extremities spontaneously   Sensory: sensation intact throughout                   ICU Vital Signs Last 24 Hrs  T(C): 37 (09 Sep 2021 08:00), Max: 37 (08 Sep 2021 21:00)  T(F): 98.6 (09 Sep 2021 08:00), Max: 98.6 (08 Sep 2021 21:00)  HR: 60 (09 Sep 2021 10:00) (54 - 74)  BP: 113/61 (09 Sep 2021 10:00) (96/51 - 139/72)  BP(mean): 79 (09 Sep 2021 10:00) (70 - 105)  ABP: --  ABP(mean): --  RR: 14 (09 Sep 2021 10:00) (10 - 20)  SpO2: 100% (09 Sep 2021 10:00) (100% - 100%)      09-08-21 @ 07:01  -  09-09-21 @ 07:00  --------------------------------------------------------  IN: 950 mL / OUT: 2140 mL / NET: -1190 mL    09-09-21 @ 07:01  -  09-09-21 @ 12:24  --------------------------------------------------------  IN: 0 mL / OUT: 175 mL / NET: -175 mL            acetaminophen   Tablet .. 650 milliGRAM(s) Oral every 6 hours PRN  donepezil 10 milliGRAM(s) Oral at bedtime  heparin   Injectable 5000 Unit(s) SubCutaneous every 12 hours  memantine 5 milliGRAM(s) Oral two times a day  multivitamin 1 Tablet(s) Oral daily  ondansetron Injectable 4 milliGRAM(s) IV Push every 6 hours PRN  valproic  acid Syrup 250 milliGRAM(s) Oral two times a day      LABS:  Na: 130 (09-09 @ 04:30), 136 (09-08 @ 06:30), 138 (09-07 @ 11:26)  K: 4.4 (09-09 @ 04:30), 4.7 (09-08 @ 06:30), 4.2 (09-07 @ 11:26)  Cl: 96 (09-09 @ 04:30), 98 (09-08 @ 06:30), 102 (09-07 @ 11:26)  CO2: 25 (09-09 @ 04:30), 25 (09-08 @ 06:30), 26 (09-07 @ 11:26)  BUN: 7 (09-09 @ 04:30), 8 (09-08 @ 06:30), 10 (09-07 @ 11:26)  Cr: 1.0 (09-09 @ 04:30), 0.9 (09-08 @ 06:30), 0.9 (09-07 @ 11:26)  Glu: 89(09-09 @ 04:30), 80(09-08 @ 06:30), 83(09-07 @ 11:26)    Hgb: 13.8 (09-09 @ 04:30), 13.0 (09-08 @ 06:30), 14.1 (09-07 @ 11:26)  Hct: 40.7 (09-09 @ 04:30), 39.0 (09-08 @ 06:30), 42.6 (09-07 @ 11:26)  WBC: 6.11 (09-09 @ 04:30), 6.28 (09-08 @ 06:30), 7.15 (09-07 @ 11:26)  Plt: 128 (09-09 @ 04:30), 136 (09-08 @ 06:30), 127 (09-07 @ 11:26)    INR: 1.08 09-09-21 @ 04:30, 1.11 09-07-21 @ 11:26  PTT: 30.9 09-09-21 @ 04:30, 39.4 09-07-21 @ 11:26          LIVER FUNCTIONS - ( 08 Sep 2021 06:30 )  Alb: 4.0 g/dL / Pro: 6.8 g/dL / ALK PHOS: 135 U/L / ALT: 14 U/L / AST: 18 U/L / GGT: x

## 2021-09-09 NOTE — PATIENT PROFILE ADULT - BILL PAYMENT
Pt has develop a small slightly raised red rash to right wrist/forearm. Benadryl given (SEE MAR). Pt hemodynamically stable. Dr. Annie Monte informed, no order received, okay for patient to go home. no

## 2021-09-10 ENCOUNTER — TRANSCRIPTION ENCOUNTER (OUTPATIENT)
Age: 81
End: 2021-09-10

## 2021-09-10 VITALS
HEART RATE: 89 BPM | RESPIRATION RATE: 18 BRPM | TEMPERATURE: 98 F | DIASTOLIC BLOOD PRESSURE: 75 MMHG | SYSTOLIC BLOOD PRESSURE: 120 MMHG

## 2021-09-10 LAB
CRYPTOC AG CSF-ACNC: NEGATIVE — SIGNIFICANT CHANGE UP
CSF PCR RESULT: SIGNIFICANT CHANGE UP
CULTURE RESULTS: SIGNIFICANT CHANGE UP
LABORATORY COMMENT REPORT: SIGNIFICANT CHANGE UP
NIGHT BLUE STAIN TISS: SIGNIFICANT CHANGE UP
SOURCE HSV 1/2: SIGNIFICANT CHANGE UP
SPECIMEN SOURCE: SIGNIFICANT CHANGE UP
SPECIMEN SOURCE: SIGNIFICANT CHANGE UP
TSH SERPL-MCNC: 2.32 UIU/ML — SIGNIFICANT CHANGE UP (ref 0.27–4.2)

## 2021-09-10 RX ORDER — ASPIRIN/CALCIUM CARB/MAGNESIUM 324 MG
1 TABLET ORAL
Qty: 0 | Refills: 0 | DISCHARGE

## 2021-09-10 RX ORDER — ACETAMINOPHEN 500 MG
2 TABLET ORAL
Qty: 0 | Refills: 0 | DISCHARGE
Start: 2021-09-10

## 2021-09-10 RX ADMIN — MEMANTINE HYDROCHLORIDE 5 MILLIGRAM(S): 10 TABLET ORAL at 05:32

## 2021-09-10 RX ADMIN — HEPARIN SODIUM 5000 UNIT(S): 5000 INJECTION INTRAVENOUS; SUBCUTANEOUS at 05:33

## 2021-09-10 RX ADMIN — Medication 1 TABLET(S): at 13:50

## 2021-09-10 RX ADMIN — Medication 250 MILLIGRAM(S): at 05:32

## 2021-09-10 NOTE — ADVANCED PRACTICE NURSE CONSULT - RECOMMEDATIONS
Plan:  Discontinue pressure injury perimeter             Continue skin barrier to B/l heel          Continue skin barrier cream to b/l buttock       Pressure ulcer preventive  measures     skin care   Assess wound and inform primary provider of any changes   Case discussed with primary   Wound/ ostomy specialist  to f/u as needed   Offloading: [x ] Frequent position changes [ ] Devices/Equipment  Cleansing: [ ] Saline [x ] Soap/Water [ ] Other: ______  Topicals: [x ] Barrier Cream [ ] Antimicrobial [ ] Enzymatic Wound Debridement  Dressings: [ ] Dry, sterile [ ] Foam [ ] Absorbant Pads [ ] Collagenase

## 2021-09-10 NOTE — DISCHARGE NOTE NURSING/CASE MANAGEMENT/SOCIAL WORK - NSDCVIVACCINE_GEN_ALL_CORE_FT
influenza, injectable, quadrivalent, preservative free; 14-Oct-2018 12:11; Shara Carreno (RN); [employer]; 499DE (Exp. Date: 30-Jun-2019); IntraMuscular; Deltoid Right.; 0.5 milliLiter(s); VIS (VIS Published: 07-Aug-2015, VIS Presented: 14-Oct-2018);

## 2021-09-10 NOTE — DISCHARGE NOTE PROVIDER - NSDCFUADDINST_GEN_ALL_CORE_FT
- Upon discharge,  please call to schedule a follow up with Dr. Hudson in 1-2 weeks.  - Upon discharge, please call to make a follow up appointment with your primary care provider to discuss your recent hospitalization/operation.  - Keep dressings dry for 48 hours after which you may remove dressing and cleanse with soap and water in the shower (no scrubbing). Leave the steri strips (white tape) on your incisions, they will fall off on their own. Run water and soap over incision sites and pat dry (no scrubbing). No submerging your incision sites in water (i.e. no swimming or baths) for 2-3 weeks and avoid exposing the area to jets/streams of water.   - You can resume your normal activities as tolerated, but avoid heavy (>15lb.) lifting and strenuous exercise for 4-6 weeks.    - ***You were prescribed narcotic pain medications, take these only as needed.  Your pain should subside over the next few days.  While taking narcotic pain medications, you should not drive or operate heavy machinery.  If you were prescribed Percocet (oxycodone-acetaminophen) and are taking it with other medications containing acetaminophen (Tylenol), reduce your dose of percocet so that you  do not exceed 3,000 mg of acetaminophen per day.  - Narcotic pain medicine tends to cause constipation, you have can take an over-the-counter stool softener 3 times a day to help prevent that. Hold the stool softener if you start to have loose stools.  - If you experience fevers, chills, increasing abdominal pain, nausea, vomiting, inability to pass stool or gas, bleeding, or any other acute symptoms, please call your doctor and report to the emergency room immediately for further management.

## 2021-09-10 NOTE — PROGRESS NOTE ADULT - ASSESSMENT
80M with dementia, wheel chair bound, minimally verbal POD#3 s/p LD trial     PLAN   - DC home today with family   - SW following   - Na autocorrected, no further BMP   - Pain control as needed   - Discussed case with attending

## 2021-09-10 NOTE — DISCHARGE NOTE PROVIDER - HOSPITAL COURSE
79 yo male  presents for PAST in preparation for insertion of lumbar drain. As per daughter her father had  lumbar puncture but was "not high volume since the he was a difficult tap". She does not believe he improved at all. Denies any chest pain, difficulty breathing, SOB, palpitations, dysuria, URI, or any other infections in the last 2 weeks/1 month. Denies any recent travel, contact, or exposure to any persons with known or suspected COVID-19. Pt also denies COVID testing within the last 2 weeks. Pt advised to self quarantine until day of procedure. Pt is in wheelchair only.    Patient underwent Lumbar Drain Trial with Dr. Hudson 9.7.2021. Patient tolerated procedure well, extubated in the OR, and transferred to PACU under Neuro ICU care / Neurosurgery. Patient has remained at baseline, with no improvement in mental or physical status as monitored and assessed by physical therapy. Lumbar drain was dc'd 9.9.2021, with no complications and CSF was sent. Patient has been tolerating normal diet. Patient is cleared for discharge home with family, as family fully participates in his care. Patient is to follow up with Dr. Hudson in 2 weeks for post op appointment.

## 2021-09-10 NOTE — DISCHARGE NOTE NURSING/CASE MANAGEMENT/SOCIAL WORK - PATIENT PORTAL LINK FT
You can access the FollowMyHealth Patient Portal offered by [facility] by registering at the following website: [email or web address]. By joining [facility]’s FollowMyHealth portal, you will also be able to view your health information using other applications (apps) compatible with our system.

## 2021-09-10 NOTE — PROGRESS NOTE ADULT - SUBJECTIVE AND OBJECTIVE BOX
NEUROSURGERY NOTE  KERWIN HERNANDEZ   09-10-21 @ 09:34    PAST 24HR EVENTS:  POD# 3 s/p LD trial  Patient seen and examined at bedside, at baseline demented, wheelchair bound, not following commands.   Patient denies any HA, N,V. Patient plan for DC home today with family.    HPI: 80y Male     PHYSICAL EXAM:  Awake, not following commands   Ox1 (self)   PERRL, Tracks with eyes   MAEx4   Incision - C/D/I     Vital Signs Last 24 Hrs  T(C): 35.7 (10 Sep 2021 05:37), Max: 36.6 (09 Sep 2021 13:00)  T(F): 96.3 (10 Sep 2021 05:37), Max: 97.9 (09 Sep 2021 13:00)  HR: 54 (10 Sep 2021 05:37) (54 - 61)  BP: 118/73 (10 Sep 2021 05:37) (103/66 - 132/84)  BP(mean): 90 (09 Sep 2021 13:00) (79 - 103)  RR: 16 (10 Sep 2021 05:37) (12 - 16)  SpO2: 100% (09 Sep 2021 13:00) (100% - 100%)  I&O's Detail    09 Sep 2021 07:01  -  10 Sep 2021 07:00  --------------------------------------------------------  IN:  Total IN: 0 mL    OUT:    Drain (mL): 25 mL    Voided (mL): 650 mL  Total OUT: 675 mL    Total NET: -675 mL    I&O's Summary    09 Sep 2021 07:01  -  10 Sep 2021 07:00  --------------------------------------------------------  IN: 0 mL / OUT: 675 mL / NET: -675 mL      MEDS:   acetaminophen   Tablet .. 650 milliGRAM(s) Oral every 6 hours PRN  donepezil 10 milliGRAM(s) Oral at bedtime  heparin   Injectable 5000 Unit(s) SubCutaneous every 12 hours  memantine 5 milliGRAM(s) Oral two times a day  multivitamin 1 Tablet(s) Oral daily  ondansetron Injectable 4 milliGRAM(s) IV Push every 6 hours PRN  valproic  acid Syrup 250 milliGRAM(s) Oral two times a day      LABS:                        13.8   6.11  )-----------( 128      ( 09 Sep 2021 04:30 )             40.7     09-09    135  |  99  |  8<L>  ----------------------------<  84  4.9   |  22  |  0.9    Ca    9.3      09 Sep 2021 21:30  Phos  2.9     09-09  Mg     2.0     09-09      PT/INR - ( 09 Sep 2021 04:30 )   PT: 12.40 sec;   INR: 1.08 ratio         PTT - ( 09 Sep 2021 04:30 )  PTT:30.9 sec    RADIOLOGY:

## 2021-09-10 NOTE — DISCHARGE NOTE PROVIDER - NSDCMRMEDTOKEN_GEN_ALL_CORE_FT
acetaminophen 325 mg oral tablet: 2 tab(s) orally every 6 hours, As needed, Temp greater or equal to 38.5C (101.3F), Mild Pain (1 - 3)  donepezil 10 mg oral tablet: 1 tab(s) orally once a day (at bedtime)  memantine 5 mg oral tablet: 1 tab(s) orally 2 times a day  valproic acid 250 mg/5 mL oral liquid: 5 milliliter(s) orally 2 times a day (dose in the morning and at bedtime)

## 2021-09-10 NOTE — DISCHARGE NOTE PROVIDER - NSDCFUADDAPPT_GEN_ALL_CORE_FT
Please call to schedule an appointment with Dr. Hudson in 2 weeks for f/u appointment, and to follow up for CSF results.

## 2021-09-10 NOTE — DISCHARGE NOTE PROVIDER - CARE PROVIDER_API CALL
Ary Hudson)  Neurosurgery  [address], [address]  [place] [address]  Phone: [phone]  Fax: [phone]  Follow Up Time: 2 weeks

## 2021-09-12 LAB
CULTURE RESULTS: SIGNIFICANT CHANGE UP
SPECIMEN SOURCE: SIGNIFICANT CHANGE UP

## 2021-09-14 DIAGNOSIS — G91.2 (IDIOPATHIC) NORMAL PRESSURE HYDROCEPHALUS: ICD-10-CM

## 2021-09-14 DIAGNOSIS — Z99.3 DEPENDENCE ON WHEELCHAIR: ICD-10-CM

## 2021-09-14 DIAGNOSIS — I25.10 ATHEROSCLEROTIC HEART DISEASE OF NATIVE CORONARY ARTERY WITHOUT ANGINA PECTORIS: ICD-10-CM

## 2021-09-14 DIAGNOSIS — E87.1 HYPO-OSMOLALITY AND HYPONATREMIA: ICD-10-CM

## 2021-09-14 DIAGNOSIS — Z86.711 PERSONAL HISTORY OF PULMONARY EMBOLISM: ICD-10-CM

## 2021-09-14 DIAGNOSIS — E78.5 HYPERLIPIDEMIA, UNSPECIFIED: ICD-10-CM

## 2021-09-14 DIAGNOSIS — F03.90 UNSPECIFIED DEMENTIA, UNSPECIFIED SEVERITY, WITHOUT BEHAVIORAL DISTURBANCE, PSYCHOTIC DISTURBANCE, MOOD DISTURBANCE, AND ANXIETY: ICD-10-CM

## 2021-09-14 DIAGNOSIS — R32 UNSPECIFIED URINARY INCONTINENCE: ICD-10-CM

## 2021-09-14 DIAGNOSIS — G40.909 EPILEPSY, UNSPECIFIED, NOT INTRACTABLE, WITHOUT STATUS EPILEPTICUS: ICD-10-CM

## 2021-09-14 DIAGNOSIS — G93.89 OTHER SPECIFIED DISORDERS OF BRAIN: ICD-10-CM

## 2021-09-14 LAB — VDRL CSF-TITR: SIGNIFICANT CHANGE UP

## 2021-09-15 LAB — B BURGDOR DNA SPEC QL NAA+PROBE: NEGATIVE — SIGNIFICANT CHANGE UP

## 2021-09-20 LAB
WNV RNA SPEC QL NAA+PROBE: SIGNIFICANT CHANGE UP
WNV RNA SPEC QL NAA+PROBE: SIGNIFICANT CHANGE UP

## 2021-09-29 LAB
14-3-3 AG CSF-MCNC: NEGATIVE — SIGNIFICANT CHANGE UP
14-3-3 AG CSF-MCNC: SIGNIFICANT CHANGE UP

## 2021-10-11 ENCOUNTER — APPOINTMENT (OUTPATIENT)
Dept: NEUROSURGERY | Facility: CLINIC | Age: 81
End: 2021-10-11

## 2021-10-18 ENCOUNTER — RX RENEWAL (OUTPATIENT)
Age: 81
End: 2021-10-18

## 2021-10-30 LAB
CULTURE RESULTS: SIGNIFICANT CHANGE UP
SPECIMEN SOURCE: SIGNIFICANT CHANGE UP

## 2021-11-01 ENCOUNTER — APPOINTMENT (OUTPATIENT)
Dept: NEUROSURGERY | Facility: CLINIC | Age: 81
End: 2021-11-01
Payer: MEDICARE

## 2021-11-01 DIAGNOSIS — F03.90 UNSPECIFIED DEMENTIA W/OUT BEHAVIORAL DISTURBANCE: ICD-10-CM

## 2021-11-01 PROCEDURE — ZZZZZ: CPT

## 2021-11-02 PROBLEM — F03.90 DEMENTIA: Status: ACTIVE | Noted: 2020-03-09

## 2021-12-03 ENCOUNTER — RX RENEWAL (OUTPATIENT)
Age: 81
End: 2021-12-03

## 2022-01-15 ENCOUNTER — RX RENEWAL (OUTPATIENT)
Age: 82
End: 2022-01-15

## 2022-01-15 RX ORDER — DIVALPROEX SODIUM 250 MG/1
250 TABLET, DELAYED RELEASE ORAL
Qty: 90 | Refills: 0 | Status: ACTIVE | COMMUNITY
Start: 2020-08-26 | End: 1900-01-01

## 2022-03-08 ENCOUNTER — EMERGENCY (EMERGENCY)
Facility: HOSPITAL | Age: 82
LOS: 0 days | Discharge: HOME | End: 2022-03-08
Attending: EMERGENCY MEDICINE | Admitting: EMERGENCY MEDICINE
Payer: MEDICARE

## 2022-03-08 VITALS
RESPIRATION RATE: 16 BRPM | DIASTOLIC BLOOD PRESSURE: 89 MMHG | SYSTOLIC BLOOD PRESSURE: 146 MMHG | HEART RATE: 58 BPM | OXYGEN SATURATION: 100 %

## 2022-03-08 VITALS
SYSTOLIC BLOOD PRESSURE: 133 MMHG | OXYGEN SATURATION: 97 % | RESPIRATION RATE: 18 BRPM | WEIGHT: 149.91 LBS | TEMPERATURE: 97 F | DIASTOLIC BLOOD PRESSURE: 90 MMHG | HEIGHT: 68 IN | HEART RATE: 55 BPM

## 2022-03-08 DIAGNOSIS — R56.9 UNSPECIFIED CONVULSIONS: ICD-10-CM

## 2022-03-08 DIAGNOSIS — I82.402 ACUTE EMBOLISM AND THROMBOSIS OF UNSPECIFIED DEEP VEINS OF LEFT LOWER EXTREMITY: ICD-10-CM

## 2022-03-08 DIAGNOSIS — I25.10 ATHEROSCLEROTIC HEART DISEASE OF NATIVE CORONARY ARTERY WITHOUT ANGINA PECTORIS: ICD-10-CM

## 2022-03-08 DIAGNOSIS — I26.99 OTHER PULMONARY EMBOLISM WITHOUT ACUTE COR PULMONALE: ICD-10-CM

## 2022-03-08 DIAGNOSIS — E78.5 HYPERLIPIDEMIA, UNSPECIFIED: ICD-10-CM

## 2022-03-08 DIAGNOSIS — M79.89 OTHER SPECIFIED SOFT TISSUE DISORDERS: ICD-10-CM

## 2022-03-08 DIAGNOSIS — K63.1 PERFORATION OF INTESTINE (NONTRAUMATIC): ICD-10-CM

## 2022-03-08 DIAGNOSIS — F03.90 UNSPECIFIED DEMENTIA WITHOUT BEHAVIORAL DISTURBANCE: ICD-10-CM

## 2022-03-08 LAB
ALBUMIN SERPL ELPH-MCNC: 4 G/DL — SIGNIFICANT CHANGE UP (ref 3.5–5.2)
ALP SERPL-CCNC: 102 U/L — SIGNIFICANT CHANGE UP (ref 30–115)
ALT FLD-CCNC: 12 U/L — SIGNIFICANT CHANGE UP (ref 0–41)
ANION GAP SERPL CALC-SCNC: 10 MMOL/L — SIGNIFICANT CHANGE UP (ref 7–14)
APTT BLD: 35.3 SEC — SIGNIFICANT CHANGE UP (ref 27–39.2)
AST SERPL-CCNC: 21 U/L — SIGNIFICANT CHANGE UP (ref 0–41)
BASOPHILS # BLD AUTO: 0.03 K/UL — SIGNIFICANT CHANGE UP (ref 0–0.2)
BASOPHILS NFR BLD AUTO: 0.6 % — SIGNIFICANT CHANGE UP (ref 0–1)
BILIRUB SERPL-MCNC: 0.3 MG/DL — SIGNIFICANT CHANGE UP (ref 0.2–1.2)
BUN SERPL-MCNC: 16 MG/DL — SIGNIFICANT CHANGE UP (ref 10–20)
CALCIUM SERPL-MCNC: 9.7 MG/DL — SIGNIFICANT CHANGE UP (ref 8.5–10.1)
CHLORIDE SERPL-SCNC: 103 MMOL/L — SIGNIFICANT CHANGE UP (ref 98–110)
CO2 SERPL-SCNC: 27 MMOL/L — SIGNIFICANT CHANGE UP (ref 17–32)
CREAT SERPL-MCNC: 1 MG/DL — SIGNIFICANT CHANGE UP (ref 0.7–1.5)
EGFR: 76 ML/MIN/1.73M2 — SIGNIFICANT CHANGE UP
EOSINOPHIL # BLD AUTO: 0.16 K/UL — SIGNIFICANT CHANGE UP (ref 0–0.7)
EOSINOPHIL NFR BLD AUTO: 3.1 % — SIGNIFICANT CHANGE UP (ref 0–8)
GLUCOSE SERPL-MCNC: 97 MG/DL — SIGNIFICANT CHANGE UP (ref 70–99)
HCT VFR BLD CALC: 41.3 % — LOW (ref 42–52)
HGB BLD-MCNC: 13.7 G/DL — LOW (ref 14–18)
IMM GRANULOCYTES NFR BLD AUTO: 0.2 % — SIGNIFICANT CHANGE UP (ref 0.1–0.3)
INR BLD: 1.09 RATIO — SIGNIFICANT CHANGE UP (ref 0.65–1.3)
LYMPHOCYTES # BLD AUTO: 2.15 K/UL — SIGNIFICANT CHANGE UP (ref 1.2–3.4)
LYMPHOCYTES # BLD AUTO: 42.3 % — SIGNIFICANT CHANGE UP (ref 20.5–51.1)
MCHC RBC-ENTMCNC: 31.6 PG — HIGH (ref 27–31)
MCHC RBC-ENTMCNC: 33.2 G/DL — SIGNIFICANT CHANGE UP (ref 32–37)
MCV RBC AUTO: 95.4 FL — HIGH (ref 80–94)
MONOCYTES # BLD AUTO: 0.49 K/UL — SIGNIFICANT CHANGE UP (ref 0.1–0.6)
MONOCYTES NFR BLD AUTO: 9.6 % — HIGH (ref 1.7–9.3)
NEUTROPHILS # BLD AUTO: 2.24 K/UL — SIGNIFICANT CHANGE UP (ref 1.4–6.5)
NEUTROPHILS NFR BLD AUTO: 44.2 % — SIGNIFICANT CHANGE UP (ref 42.2–75.2)
NRBC # BLD: 0 /100 WBCS — SIGNIFICANT CHANGE UP (ref 0–0)
PLATELET # BLD AUTO: 125 K/UL — LOW (ref 130–400)
POTASSIUM SERPL-MCNC: 4.8 MMOL/L — SIGNIFICANT CHANGE UP (ref 3.5–5)
POTASSIUM SERPL-SCNC: 4.8 MMOL/L — SIGNIFICANT CHANGE UP (ref 3.5–5)
PROT SERPL-MCNC: 7.1 G/DL — SIGNIFICANT CHANGE UP (ref 6–8)
PROTHROM AB SERPL-ACNC: 12.5 SEC — SIGNIFICANT CHANGE UP (ref 9.95–12.87)
RBC # BLD: 4.33 M/UL — LOW (ref 4.7–6.1)
RBC # FLD: 12.5 % — SIGNIFICANT CHANGE UP (ref 11.5–14.5)
SODIUM SERPL-SCNC: 140 MMOL/L — SIGNIFICANT CHANGE UP (ref 135–146)
WBC # BLD: 5.08 K/UL — SIGNIFICANT CHANGE UP (ref 4.8–10.8)
WBC # FLD AUTO: 5.08 K/UL — SIGNIFICANT CHANGE UP (ref 4.8–10.8)

## 2022-03-08 PROCEDURE — 71045 X-RAY EXAM CHEST 1 VIEW: CPT | Mod: 26

## 2022-03-08 PROCEDURE — 99284 EMERGENCY DEPT VISIT MOD MDM: CPT

## 2022-03-08 PROCEDURE — 93970 EXTREMITY STUDY: CPT | Mod: 26

## 2022-03-08 PROCEDURE — 93010 ELECTROCARDIOGRAM REPORT: CPT

## 2022-03-08 RX ORDER — APIXABAN 2.5 MG/1
1 TABLET, FILM COATED ORAL
Qty: 60 | Refills: 0
Start: 2022-03-08 | End: 2022-04-06

## 2022-03-08 NOTE — ED ADULT NURSE NOTE - OBJECTIVE STATEMENT
Pt demented, unable to answer specific questions, pt denies pain, pt in ED for possible blood clot as per wife, pt has PMH of PE's and use to utilize blood thinners, no longer uses blood thinners, no swelling or pain noted to b/l lower extremities, skin warm and dry, no SOB, unlabored breathing, equal rise & fall, no N/V/D.

## 2022-03-08 NOTE — ED PROVIDER NOTE - PROGRESS NOTE DETAILS
spoke with daughter Rosa . patient with hx of DVT, taken off eliquis when had surgical procedure in november but never restarted. will dc home with vascular follow up  and restart eliquis

## 2022-03-08 NOTE — ED PROVIDER NOTE - OBJECTIVE STATEMENT
80 year old M with CAD, HLD, dementia, seizure (on valproic acid), dvt 80 year old M with CAD, HLD, dementia, seizure, dvt on daily asa presents to the Ed with significant other for DVT. patient with swelling to left knee as per wife. no recent falls or trauma. no fevers or leg swelling. no redness or rashes. patient poor historian. patient had home visit , had duplex dx with dvt. no sob, cp

## 2022-03-08 NOTE — ED PROVIDER NOTE - PATIENT PORTAL LINK FT
You can access the FollowMyHealth Patient Portal offered by [facility] by registering at the following website: [email or web address]. By joining [facility]’s FollowMyHealth portal, you will also be able to view your health information using other applications (apps) compatible with our system.

## 2022-03-08 NOTE — ED PROVIDER NOTE - CARE PROVIDER_API CALL
Marin Bello)  Surgery; Vascular Surgery  [address] [place] [address]  Phone: [phone]  Fax: [phone]  Follow Up Time:

## 2022-03-08 NOTE — ED PROVIDER NOTE - ATTENDING CONTRIBUTION TO CARE
I personally evaluated patient. I agree with the findings and plan with all documentation on chart except as documented  in my note.    80 year old M with CAD, HLD, dementia, seizure, dvt on daily asa presents to the Ed with significant other for DVT. No CP, SOb, or palpitations. Patient had outpatient DVt study. He is not on anticoagulatiion at this time. Patient has dementia but is at baseline. He denies any complaints at all and was spoken to in English and [place]i by me. VS reviewed. Neuro exam non-focal and patient n/v intact.    Work up consistent with DVT to leg. Labs reviewed. Daughter and wife spoken to and patient to be started on anticoagulation with Eliquis. Patient is a candidate to attempt outpatient management. Supportive care and home care discussed in detail. Wife/daughter aware they may have to return for re-evaluation and possible admission if outpatient treatment fails. Strict return precautions discussed.   Patient has proper follow up.  All results discussed and patient aware they may require further work up.  Proper follow up ensured. Limitations of ED work up discussed.  Medications administered and prescribed/OTC home meds discussed.  All questions and concerns from family addressed. Understanding of instructions verbalized.

## 2022-03-08 NOTE — ED PROVIDER NOTE - IV ALTEPLASE ADMIN OUTSIDE HIDDEN
Public Health Nurse provided resources for [facility] of phone number for their Public Health Department for home visiting services as well as their contact number for WIC.  PHN discussed importance of car seat check and provided list of where to obtain car seat checks in [facility].  Also informed family they could inquire specifically with [facility] for having car seat checked.  Patient declined any questions or concerns at this time.   show

## 2022-03-08 NOTE — ED PROVIDER NOTE - CLINICAL SUMMARY MEDICAL DECISION MAKING FREE TEXT BOX
80 year old M with CAD, HLD, dementia, seizure, dvt on daily asa presents to the Ed with significant other for DVT. No CP, SOb, or palpitations. Patient had outpatient DVt study. He is not on anticoagulatiion at this time. Patient has dementia but is at baseline. He denies any complaints at all and was spoken to in English and [place]ian by me. VS reviewed. Neuro exam non-focal and patient n/v intact.    Work up consistent with DVT to leg. Labs reviewed. Daughter and wife spoken to and patient to be started on anticoagulation with Eliquis. Patient is a candidate to attempt outpatient management. Supportive care and home care discussed in detail. Wife/daughter aware they may have to return for re-evaluation and possible admission if outpatient treatment fails. Strict return precautions discussed.   Patient has proper follow up.  All results discussed and patient aware they may require further work up.  Proper follow up ensured. Limitations of ED work up discussed.  Medications administered and prescribed/OTC home meds discussed.  All questions and concerns from family addressed. Understanding of instructions verbalized.

## 2022-03-09 RX ORDER — MEMANTINE HYDROCHLORIDE 5 MG/1
5 TABLET, FILM COATED ORAL
Qty: 60 | Refills: 11 | Status: ACTIVE | COMMUNITY
Start: 2020-12-02 | End: 1900-01-01

## 2022-03-30 ENCOUNTER — APPOINTMENT (OUTPATIENT)
Dept: VASCULAR SURGERY | Facility: CLINIC | Age: 82
End: 2022-03-30

## 2022-04-18 ENCOUNTER — APPOINTMENT (OUTPATIENT)
Dept: NEUROLOGY | Facility: CLINIC | Age: 82
End: 2022-04-18

## 2022-04-22 ENCOUNTER — RX RENEWAL (OUTPATIENT)
Age: 82
End: 2022-04-22

## 2022-04-22 RX ORDER — VALPROIC ACID 250 MG/5ML
250 SOLUTION ORAL
Qty: 1419 | Refills: 0 | Status: ACTIVE | COMMUNITY
Start: 2020-12-02 | End: 1900-01-01

## 2022-05-20 ENCOUNTER — RX RENEWAL (OUTPATIENT)
Age: 82
End: 2022-05-20

## 2022-11-06 NOTE — H&P ADULT - [facility]
Subjective   History of Present Illness  47-year-old female well-known to this emergency department with complaints of nausea, vomiting, headache.  Patient states she has long history of migraine headaches states this feels very similar.  Patient denies any fever, chills, body aches, neck stiffness.    History provided by:  Patient   used: No    Headache  Pain location:  Generalized  Quality:  Sharp and stabbing  Radiates to:  Does not radiate  Severity at highest:  8/10  Onset quality:  Gradual  Duration:  2 days  Timing:  Intermittent  Progression:  Worsening  Chronicity:  New  Similar to prior headaches: no    Context: not activity, not exposure to bright light, not coughing, not eating, not exposure to cold air and not loud noise    Relieved by:  Nothing  Worsened by:  Nothing  Ineffective treatments:  None tried  Associated symptoms: nausea and vomiting    Associated symptoms: no abdominal pain, no back pain, no blurred vision, no cough, no dizziness, no drainage, no eye pain, no facial pain, no fatigue, no fever, no focal weakness, no myalgias, no neck pain, no neck stiffness, no numbness, no photophobia, no seizures, no sinus pressure, no swollen glands, no visual change and no weakness    Risk factors: no anger, no family hx of SAH and does not have insomnia        Review of Systems   Constitutional: Negative.  Negative for chills, diaphoresis, fatigue and fever.   HENT: Negative for postnasal drip and sinus pressure.    Eyes: Negative.  Negative for blurred vision, photophobia and pain.   Respiratory: Negative.  Negative for cough.    Cardiovascular: Negative.  Negative for chest pain and leg swelling.   Gastrointestinal: Positive for nausea and vomiting. Negative for abdominal distention, abdominal pain, anal bleeding and blood in stool.   Endocrine: Negative.  Negative for heat intolerance, polydipsia and polyphagia.   Musculoskeletal: Negative for back pain, myalgias, neck pain and  neck stiffness.   Skin: Negative.  Negative for color change and rash.   Neurological: Positive for headaches. Negative for dizziness, focal weakness, seizures, weakness and numbness.   Psychiatric/Behavioral: Negative.  Negative for agitation, confusion, decreased concentration, dysphoric mood, hallucinations, sleep disturbance and suicidal ideas. The patient is not nervous/anxious and is not hyperactive.    All other systems reviewed and are negative.      Past Medical History:   Diagnosis Date   • Abdominal pain    • Abdominal swelling    • Hoyos's disease    • Bipolar 1 disorder (HCC)    • Brain tumor (benign) (HCC)    • Brain tumor (HCC)     R Frontal Lobe per pt   • Brain tumor (HCC) 2014   • Constipation    • DDD (degenerative disc disease), cervical 05/29/2017   • DDD (degenerative disc disease), cervical    • Diarrhea    • Fibromyalgia    • IBS (irritable bowel syndrome)    • Migraine    • Nausea & vomiting    • PONV (postoperative nausea and vomiting)    • PTSD (post-traumatic stress disorder)    • Rectal bleeding        Allergies   Allergen Reactions   • Ativan [Lorazepam] Hallucinations     confusion   • Sulfa Antibiotics Shortness Of Breath and Swelling   • Sulfa Antibiotics Anaphylaxis   • Reglan [Metoclopramide] Angioedema   • Compazine [Prochlorperazine Edisylate] Hives   • Demerol [Meperidine] Hives   • Droperidol Itching   • Metoclopramide Swelling   • Toradol [Ketorolac Tromethamine] Hives and Itching   • Toradol [Ketorolac Tromethamine] Hives   • Zofran [Ondansetron Hcl] Rash   • Zosyn [Piperacillin Sod-Tazobactam So] Hives       Past Surgical History:   Procedure Laterality Date   • ANAL SCOPE N/A 7/28/2016    Procedure: ANAL SCOPE;  Surgeon: Kael Lopez MD;  Location: [facility] OR;  Service:    • APPENDECTOMY     • COLONOSCOPY N/A 6/30/2016    Procedure: COLONOSCOPY  CPTCODE:71781;  Surgeon: Jose Antonio Belle III, MD;  Location: [facility] OR;  Service:    • COLONOSCOPY N/A 7/7/2016     Procedure: COLONOSCOPY (90143) CPT;  Surgeon: Jose Antonio Belle III, MD;  Location: [facility] OR;  Service:    • CYSTOSCOPY RETROGRADE PYELOGRAM Bilateral 2017    Procedure: CYSTOSCOPY RETROGRADE PYELOGRAM;  Surgeon: Mu Blunt MD;  Location: [facility] OR;  Service:    • ENDOSCOPY N/A 2016    Procedure: ESOPHAGOGASTRODUODENOSCOPY WITH BIOPSY  CPTCODE:43957;  Surgeon: Jose Antonio Belle III, MD;  Location: [facility] OR;  Service:    • HEMORRHOIDECTOMY N/A 2016    Procedure: HEMORRHOID STAPLING;  Surgeon: Kael Lopez MD;  Location: [facility] OR;  Service:    • HYSTERECTOMY     • KNEE SURGERY     • LAPAROSCOPIC SALPINGOOPHERECTOMY     • PORTACATH PLACEMENT N/A 2017    Procedure: INSERTION OF PORTACATH;  Surgeon: Celso Arredondo MD;  Location: [facility] OR;  Service:    • SHOULDER SURGERY      3 times       Family History   Problem Relation Age of Onset   • Crohn's disease Other    • Hypertension Other    • Diabetes Other    • Irritable bowel syndrome Other    • No Known Problems Father    • No Known Problems Mother        Social History     Socioeconomic History   • Marital status:    Tobacco Use   • Smoking status: Former     Packs/day: 1.00     Years: 20.00     Pack years: 20.00     Types: Cigarettes     Quit date: 2015     Years since quittin.0   • Smokeless tobacco: Never   Vaping Use   • Vaping Use: Never used   Substance and Sexual Activity   • Alcohol use: No   • Drug use: Yes     Types: Marijuana     Comment: for pain   • Sexual activity: Defer     Birth control/protection: Surgical           Objective   Physical Exam  Vitals and nursing note reviewed.   Constitutional:       General: She is not in acute distress.     Appearance: Normal appearance. She is normal weight. She is not ill-appearing, toxic-appearing or diaphoretic.   HENT:      Head: Normocephalic and atraumatic.      Right Ear: Tympanic membrane, ear canal and external ear normal. There is no impacted  cerumen.      Left Ear: Tympanic membrane, ear canal and external ear normal. There is no impacted cerumen.      Nose: Nose normal. No congestion or rhinorrhea.      Mouth/Throat:      Mouth: Mucous membranes are moist.      Pharynx: Oropharynx is clear. No oropharyngeal exudate or posterior oropharyngeal erythema.   Eyes:      General: No scleral icterus.        Right eye: No discharge.         Left eye: No discharge.      Extraocular Movements: Extraocular movements intact.      Conjunctiva/sclera: Conjunctivae normal.      Pupils: Pupils are equal, round, and reactive to light.   Neck:      Vascular: No carotid bruit.   Cardiovascular:      Rate and Rhythm: Normal rate and regular rhythm.      Pulses: Normal pulses.      Heart sounds: Normal heart sounds. No murmur heard.    No friction rub. No gallop.   Pulmonary:      Effort: Pulmonary effort is normal. No respiratory distress.      Breath sounds: Normal breath sounds. No stridor. No wheezing, rhonchi or rales.   Chest:      Chest wall: No tenderness.   Abdominal:      General: Abdomen is flat. Bowel sounds are normal. There is no distension.      Palpations: There is no mass.      Tenderness: There is no abdominal tenderness. There is no right CVA tenderness, guarding or rebound.      Hernia: No hernia is present.   Musculoskeletal:         General: No swelling, tenderness, deformity or signs of injury. Normal range of motion.      Cervical back: Normal range of motion and neck supple. No rigidity or tenderness.      Right lower leg: No edema.      Left lower leg: No edema.   Lymphadenopathy:      Cervical: No cervical adenopathy.   Skin:     General: Skin is warm and dry.      Capillary Refill: Capillary refill takes less than 2 seconds.      Coloration: Skin is not jaundiced or pale.      Findings: No bruising, erythema, lesion or rash.   Neurological:      General: No focal deficit present.      Mental Status: She is alert and oriented to person, place, and  time. Mental status is at baseline.      Cranial Nerves: No cranial nerve deficit.      Sensory: No sensory deficit.      Motor: No weakness.      Coordination: Coordination normal.      Gait: Gait normal.      Deep Tendon Reflexes: Reflexes normal.   Psychiatric:         Mood and Affect: Mood normal.         Behavior: Behavior normal.         Thought Content: Thought content normal.         Procedures           ED Course                                           MDM    Final diagnoses:   Migraine without aura and with status migrainosus, not intractable       ED Disposition  ED Disposition     ED Disposition   Discharge    Condition   Stable    Comment   --             Mabel Patterson, APRDAVID  [address] [id number] [phone]    Call in 1 day           Medication List      No changes were made to your prescriptions during this visit.          Mark Stubbs PA-C  11/06/22 [id number]     No

## 2022-12-29 ENCOUNTER — APPOINTMENT (OUTPATIENT)
Dept: NEUROLOGY | Facility: CLINIC | Age: 82
End: 2022-12-29

## 2023-02-14 ENCOUNTER — INPATIENT (INPATIENT)
Facility: HOSPITAL | Age: 83
LOS: 13 days | Discharge: HOME HEALTH | DRG: 853 | End: 2023-02-28
Attending: INTERNAL MEDICINE | Admitting: INTERNAL MEDICINE
Payer: MEDICARE

## 2023-02-14 VITALS
SYSTOLIC BLOOD PRESSURE: 171 MMHG | OXYGEN SATURATION: 100 % | DIASTOLIC BLOOD PRESSURE: 91 MMHG | HEIGHT: 69 IN | RESPIRATION RATE: 18 BRPM | HEART RATE: 71 BPM | WEIGHT: 238.1 LBS | TEMPERATURE: 99 F

## 2023-02-14 LAB
ALBUMIN SERPL ELPH-MCNC: 3 G/DL — LOW (ref 3.5–5.2)
ALP SERPL-CCNC: 115 U/L — SIGNIFICANT CHANGE UP (ref 30–115)
ALT FLD-CCNC: 8 U/L — SIGNIFICANT CHANGE UP (ref 0–41)
ANION GAP SERPL CALC-SCNC: 10 MMOL/L — SIGNIFICANT CHANGE UP (ref 7–14)
APTT BLD: 37.5 SEC — SIGNIFICANT CHANGE UP (ref 27–39.2)
AST SERPL-CCNC: 23 U/L — SIGNIFICANT CHANGE UP (ref 0–41)
BASOPHILS # BLD AUTO: 0.02 K/UL — SIGNIFICANT CHANGE UP (ref 0–0.2)
BASOPHILS NFR BLD AUTO: 0.2 % — SIGNIFICANT CHANGE UP (ref 0–1)
BILIRUB SERPL-MCNC: 0.2 MG/DL — SIGNIFICANT CHANGE UP (ref 0.2–1.2)
BUN SERPL-MCNC: 10 MG/DL — SIGNIFICANT CHANGE UP (ref 10–20)
CALCIUM SERPL-MCNC: 8.8 MG/DL — SIGNIFICANT CHANGE UP (ref 8.4–10.5)
CHLORIDE SERPL-SCNC: 99 MMOL/L — SIGNIFICANT CHANGE UP (ref 98–110)
CO2 SERPL-SCNC: 26 MMOL/L — SIGNIFICANT CHANGE UP (ref 17–32)
CREAT SERPL-MCNC: 0.6 MG/DL — LOW (ref 0.7–1.5)
EGFR: 96 ML/MIN/1.73M2 — SIGNIFICANT CHANGE UP
EOSINOPHIL # BLD AUTO: 0 K/UL — SIGNIFICANT CHANGE UP (ref 0–0.7)
EOSINOPHIL NFR BLD AUTO: 0 % — SIGNIFICANT CHANGE UP (ref 0–8)
FLUAV AG NPH QL: SIGNIFICANT CHANGE UP
FLUBV AG NPH QL: SIGNIFICANT CHANGE UP
GAS PNL BLDV: SIGNIFICANT CHANGE UP
GLUCOSE SERPL-MCNC: 99 MG/DL — SIGNIFICANT CHANGE UP (ref 70–99)
HCT VFR BLD CALC: 39.6 % — LOW (ref 42–52)
HGB BLD-MCNC: 12.9 G/DL — LOW (ref 14–18)
IMM GRANULOCYTES NFR BLD AUTO: 0.3 % — SIGNIFICANT CHANGE UP (ref 0.1–0.3)
INR BLD: 2.08 RATIO — HIGH (ref 0.65–1.3)
LACTATE SERPL-SCNC: 3.7 MMOL/L — HIGH (ref 0.7–2)
LYMPHOCYTES # BLD AUTO: 1.15 K/UL — LOW (ref 1.2–3.4)
LYMPHOCYTES # BLD AUTO: 13.4 % — LOW (ref 20.5–51.1)
MCHC RBC-ENTMCNC: 31.2 PG — HIGH (ref 27–31)
MCHC RBC-ENTMCNC: 32.6 G/DL — SIGNIFICANT CHANGE UP (ref 32–37)
MCV RBC AUTO: 95.7 FL — HIGH (ref 80–94)
MONOCYTES # BLD AUTO: 0.52 K/UL — SIGNIFICANT CHANGE UP (ref 0.1–0.6)
MONOCYTES NFR BLD AUTO: 6 % — SIGNIFICANT CHANGE UP (ref 1.7–9.3)
NEUTROPHILS # BLD AUTO: 6.89 K/UL — HIGH (ref 1.4–6.5)
NEUTROPHILS NFR BLD AUTO: 80.1 % — HIGH (ref 42.2–75.2)
NRBC # BLD: 0 /100 WBCS — SIGNIFICANT CHANGE UP (ref 0–0)
PCO2 BLDV: 52 MMHG — SIGNIFICANT CHANGE UP (ref 42–55)
PH BLDV: 7.35 — SIGNIFICANT CHANGE UP (ref 7.32–7.43)
PLATELET # BLD AUTO: 245 K/UL — SIGNIFICANT CHANGE UP (ref 130–400)
PO2 BLDV: 24 MMHG — SIGNIFICANT CHANGE UP
POTASSIUM SERPL-MCNC: 4.9 MMOL/L — SIGNIFICANT CHANGE UP (ref 3.5–5)
POTASSIUM SERPL-SCNC: 4.9 MMOL/L — SIGNIFICANT CHANGE UP (ref 3.5–5)
PROT SERPL-MCNC: 6.9 G/DL — SIGNIFICANT CHANGE UP (ref 6–8)
PROTHROM AB SERPL-ACNC: 24.2 SEC — HIGH (ref 9.95–12.87)
RBC # BLD: 4.14 M/UL — LOW (ref 4.7–6.1)
RBC # FLD: 13 % — SIGNIFICANT CHANGE UP (ref 11.5–14.5)
RSV RNA NPH QL NAA+NON-PROBE: SIGNIFICANT CHANGE UP
SARS-COV-2 RNA SPEC QL NAA+PROBE: SIGNIFICANT CHANGE UP
SODIUM SERPL-SCNC: 135 MMOL/L — SIGNIFICANT CHANGE UP (ref 135–146)
TROPONIN T SERPL-MCNC: 0.07 NG/ML — CRITICAL HIGH
WBC # BLD: 8.61 K/UL — SIGNIFICANT CHANGE UP (ref 4.8–10.8)
WBC # FLD AUTO: 8.61 K/UL — SIGNIFICANT CHANGE UP (ref 4.8–10.8)

## 2023-02-14 PROCEDURE — 93010 ELECTROCARDIOGRAM REPORT: CPT

## 2023-02-14 PROCEDURE — 71045 X-RAY EXAM CHEST 1 VIEW: CPT | Mod: 26

## 2023-02-14 RX ORDER — METRONIDAZOLE 500 MG
500 TABLET ORAL ONCE
Refills: 0 | Status: COMPLETED | OUTPATIENT
Start: 2023-02-14 | End: 2023-02-14

## 2023-02-14 RX ORDER — ACETAMINOPHEN 500 MG
710 TABLET ORAL ONCE
Refills: 0 | Status: COMPLETED | OUTPATIENT
Start: 2023-02-14 | End: 2023-02-14

## 2023-02-14 RX ORDER — SODIUM CHLORIDE 9 MG/ML
1000 INJECTION, SOLUTION INTRAVENOUS ONCE
Refills: 0 | Status: COMPLETED | OUTPATIENT
Start: 2023-02-14 | End: 2023-02-14

## 2023-02-14 RX ORDER — CEFEPIME 1 G/1
2000 INJECTION, POWDER, FOR SOLUTION INTRAMUSCULAR; INTRAVENOUS ONCE
Refills: 0 | Status: COMPLETED | OUTPATIENT
Start: 2023-02-14 | End: 2023-02-14

## 2023-02-14 RX ORDER — VANCOMYCIN HCL 1 G
1000 VIAL (EA) INTRAVENOUS ONCE
Refills: 0 | Status: COMPLETED | OUTPATIENT
Start: 2023-02-14 | End: 2023-02-14

## 2023-02-14 RX ADMIN — Medication 100 MILLIGRAM(S): at 22:06

## 2023-02-14 RX ADMIN — SODIUM CHLORIDE 1000 MILLILITER(S): 9 INJECTION, SOLUTION INTRAVENOUS at 23:48

## 2023-02-14 RX ADMIN — Medication 710 MILLIGRAM(S): at 23:45

## 2023-02-14 RX ADMIN — CEFEPIME 100 MILLIGRAM(S): 1 INJECTION, POWDER, FOR SOLUTION INTRAMUSCULAR; INTRAVENOUS at 20:55

## 2023-02-14 RX ADMIN — Medication 250 MILLIGRAM(S): at 22:33

## 2023-02-14 RX ADMIN — SODIUM CHLORIDE 1000 MILLILITER(S): 9 INJECTION, SOLUTION INTRAVENOUS at 22:33

## 2023-02-14 RX ADMIN — Medication 710 MILLIGRAM(S): at 21:07

## 2023-02-14 NOTE — ED PROVIDER NOTE - CARE PLAN
Principal Discharge DX:	Sepsis  Secondary Diagnosis:	Infected decubitus ulcer   1 Principal Discharge DX:	Sepsis  Secondary Diagnosis:	Infected decubitus ulcer  Secondary Diagnosis:	Elevated troponin

## 2023-02-14 NOTE — ED PROVIDER NOTE - NS ED ATTENDING STATEMENT MOD
This was a shared visit with the [facility]. I reviewed and verified the documentation and independently performed the documented:

## 2023-02-14 NOTE — ED ADULT TRIAGE NOTE - CHIEF COMPLAINT QUOTE
as per EMS pt has pressure ulcers and the visiting nurse has not been there in a month. dementia at baseline
as per EMS pt has pressure ulcers and the visiting nurse has not been there in a month. dementia at baseline

## 2023-02-14 NOTE — ED PROVIDER NOTE - OBJECTIVE STATEMENT
81 yo bedbound hx of dementia, has recent lapse in at home nursing care, was visited by home NP today and sent in for cough and worsening decub wounds.

## 2023-02-14 NOTE — ED ADULT TRIAGE NOTE - MODE OF ARRIVAL
Quality 226: Preventive Care And Screening: Tobacco Use: Screening And Cessation Intervention: Patient screened for tobacco use and is an ex/non-smoker Quality 130: Documentation Of Current Medications In The Medical Record: Current Medications Documented Detail Level: Detailed EMS Quality 110: Preventive Care And Screening: Influenza Immunization: Influenza Immunization not Administered because Patient Refused. Quality 431: Preventive Care And Screening: Unhealthy Alcohol Use - Screening: Patient screened for unhealthy alcohol use using a single question and scores less than 2 times per year Ambulance

## 2023-02-14 NOTE — ED PROVIDER NOTE - NS ED ROS FT
CONSTITUTIONAL: see hpi  SKIN: see hpi  HEAD: Negatve   EYES: Negatve   ENT: Negatve   NECK: Negatve   CARD:Negatve   RESP: cough -dry  ABD: Negatve   EXT: Negatve  LYMPH: Negatve   NEURO: Negatve   PSYCH: Negatve I am unable to obtain a comprehensive history, review of systems, past medical history, and/or physical exam due to constraints imposed by the urgency of the patient's clinical condition and/or mental status.

## 2023-02-14 NOTE — ED PROVIDER NOTE - CONSIDERATION OF ADMISSION OBSERVATION
pt will need hospitalization for infection, wound care, and set up of more reliable home care vs long term health care facility placement Consideration of Admission/Observation

## 2023-02-14 NOTE — ED PROVIDER NOTE - PHYSICAL EXAMINATION
VITAL SIGNS: I have reviewed nursing notes and confirm.  CONSTITUTIONAL: elderly frail, chronically ill appearing, in nad  SKIN: Skin exam is warm and dry, no acute rash.  stage 3/4 decub ulcers to LEs w purulent dc. no crepitus or cellulitis. see nursing notes.   HEAD: Normocephalic; atraumatic.  EYES: PERRL, EOM intact; conjunctiva and sclera clear.  ENT: No nasal discharge; airway clear.   NECK: Supple; non tender.  CARD:+ S1, S2   RESP: No wheezes, rales or rhonchi.  ABD: Normal bowel sounds; soft; non-distended;   EXT: Normal ROM. No cyanosis or edema.  LYMPH: No acute adenopathy.  NEURO: Alert. not following commands. non verbal.

## 2023-02-15 DIAGNOSIS — A41.9 SEPSIS, UNSPECIFIED ORGANISM: ICD-10-CM

## 2023-02-15 LAB
ALBUMIN SERPL ELPH-MCNC: 2.4 G/DL — LOW (ref 3.5–5.2)
ALP SERPL-CCNC: 92 U/L — SIGNIFICANT CHANGE UP (ref 30–115)
ALT FLD-CCNC: 8 U/L — SIGNIFICANT CHANGE UP (ref 0–41)
ANION GAP SERPL CALC-SCNC: 11 MMOL/L — SIGNIFICANT CHANGE UP (ref 7–14)
AST SERPL-CCNC: 21 U/L — SIGNIFICANT CHANGE UP (ref 0–41)
BASE EXCESS BLDV CALC-SCNC: 2.8 MMOL/L — SIGNIFICANT CHANGE UP (ref -2–3)
BILIRUB SERPL-MCNC: 0.3 MG/DL — SIGNIFICANT CHANGE UP (ref 0.2–1.2)
BUN SERPL-MCNC: 10 MG/DL — SIGNIFICANT CHANGE UP (ref 10–20)
CA-I SERPL-SCNC: 1.12 MMOL/L — LOW (ref 1.15–1.33)
CALCIUM SERPL-MCNC: 8.3 MG/DL — LOW (ref 8.4–10.5)
CHLORIDE SERPL-SCNC: 101 MMOL/L — SIGNIFICANT CHANGE UP (ref 98–110)
CO2 SERPL-SCNC: 24 MMOL/L — SIGNIFICANT CHANGE UP (ref 17–32)
CREAT SERPL-MCNC: 0.6 MG/DL — LOW (ref 0.7–1.5)
EGFR: 96 ML/MIN/1.73M2 — SIGNIFICANT CHANGE UP
GAS PNL BLDV: 130 MMOL/L — LOW (ref 136–145)
GAS PNL BLDV: SIGNIFICANT CHANGE UP
GLUCOSE SERPL-MCNC: 94 MG/DL — SIGNIFICANT CHANGE UP (ref 70–99)
HCO3 BLDV-SCNC: 28 MMOL/L — SIGNIFICANT CHANGE UP (ref 22–29)
HCT VFR BLD CALC: 36.9 % — LOW (ref 42–52)
HCT VFR BLDA CALC: 32 % — LOW (ref 39–51)
HGB BLD CALC-MCNC: 10.5 G/DL — LOW (ref 12.6–17.4)
HGB BLD-MCNC: 12.3 G/DL — LOW (ref 14–18)
LACTATE BLDV-MCNC: 2.4 MMOL/L — HIGH (ref 0.5–2)
MCHC RBC-ENTMCNC: 31.2 PG — HIGH (ref 27–31)
MCHC RBC-ENTMCNC: 33.3 G/DL — SIGNIFICANT CHANGE UP (ref 32–37)
MCV RBC AUTO: 93.7 FL — SIGNIFICANT CHANGE UP (ref 80–94)
NRBC # BLD: 0 /100 WBCS — SIGNIFICANT CHANGE UP (ref 0–0)
PCO2 BLDV: 44 MMHG — SIGNIFICANT CHANGE UP (ref 42–55)
PH BLDV: 7.41 — SIGNIFICANT CHANGE UP (ref 7.32–7.43)
PLATELET # BLD AUTO: 156 K/UL — SIGNIFICANT CHANGE UP (ref 130–400)
PO2 BLDV: 32 MMHG — SIGNIFICANT CHANGE UP
POTASSIUM BLDV-SCNC: 5.8 MMOL/L — HIGH (ref 3.5–5.1)
POTASSIUM SERPL-MCNC: 4.4 MMOL/L — SIGNIFICANT CHANGE UP (ref 3.5–5)
POTASSIUM SERPL-SCNC: 4.4 MMOL/L — SIGNIFICANT CHANGE UP (ref 3.5–5)
PROT SERPL-MCNC: 6 G/DL — SIGNIFICANT CHANGE UP (ref 6–8)
RBC # BLD: 3.94 M/UL — LOW (ref 4.7–6.1)
RBC # FLD: 13 % — SIGNIFICANT CHANGE UP (ref 11.5–14.5)
SAO2 % BLDV: 54.6 % — SIGNIFICANT CHANGE UP
SODIUM SERPL-SCNC: 136 MMOL/L — SIGNIFICANT CHANGE UP (ref 135–146)
TROPONIN T SERPL-MCNC: 0.03 NG/ML — CRITICAL HIGH
TROPONIN T SERPL-MCNC: 0.04 NG/ML — CRITICAL HIGH
TSH SERPL-MCNC: 4.24 UIU/ML — HIGH (ref 0.27–4.2)
WBC # BLD: 8.51 K/UL — SIGNIFICANT CHANGE UP (ref 4.8–10.8)
WBC # FLD AUTO: 8.51 K/UL — SIGNIFICANT CHANGE UP (ref 4.8–10.8)

## 2023-02-15 PROCEDURE — 80048 BASIC METABOLIC PNL TOTAL CA: CPT

## 2023-02-15 PROCEDURE — 71045 X-RAY EXAM CHEST 1 VIEW: CPT

## 2023-02-15 PROCEDURE — 0225U NFCT DS DNA&RNA 21 SARSCOV2: CPT

## 2023-02-15 PROCEDURE — 88311 DECALCIFY TISSUE: CPT

## 2023-02-15 PROCEDURE — 81001 URINALYSIS AUTO W/SCOPE: CPT

## 2023-02-15 PROCEDURE — 80053 COMPREHEN METABOLIC PANEL: CPT

## 2023-02-15 PROCEDURE — 85027 COMPLETE CBC AUTOMATED: CPT

## 2023-02-15 PROCEDURE — 83605 ASSAY OF LACTIC ACID: CPT

## 2023-02-15 PROCEDURE — 92526 ORAL FUNCTION THERAPY: CPT | Mod: GN

## 2023-02-15 PROCEDURE — 93010 ELECTROCARDIOGRAM REPORT: CPT | Mod: 76

## 2023-02-15 PROCEDURE — 93306 TTE W/DOPPLER COMPLETE: CPT | Mod: 26

## 2023-02-15 PROCEDURE — 99222 1ST HOSP IP/OBS MODERATE 55: CPT

## 2023-02-15 PROCEDURE — 93970 EXTREMITY STUDY: CPT

## 2023-02-15 PROCEDURE — 93005 ELECTROCARDIOGRAM TRACING: CPT

## 2023-02-15 PROCEDURE — 83735 ASSAY OF MAGNESIUM: CPT

## 2023-02-15 PROCEDURE — 87635 SARS-COV-2 COVID-19 AMP PRB: CPT

## 2023-02-15 PROCEDURE — 86140 C-REACTIVE PROTEIN: CPT

## 2023-02-15 PROCEDURE — 85025 COMPLETE CBC W/AUTO DIFF WBC: CPT

## 2023-02-15 PROCEDURE — 93306 TTE W/DOPPLER COMPLETE: CPT

## 2023-02-15 PROCEDURE — 84484 ASSAY OF TROPONIN QUANT: CPT

## 2023-02-15 PROCEDURE — 84443 ASSAY THYROID STIM HORMONE: CPT

## 2023-02-15 PROCEDURE — 82962 GLUCOSE BLOOD TEST: CPT

## 2023-02-15 PROCEDURE — 87086 URINE CULTURE/COLONY COUNT: CPT

## 2023-02-15 PROCEDURE — 87186 SC STD MICRODIL/AGAR DIL: CPT

## 2023-02-15 PROCEDURE — 87077 CULTURE AEROBIC IDENTIFY: CPT

## 2023-02-15 PROCEDURE — 36415 COLL VENOUS BLD VENIPUNCTURE: CPT

## 2023-02-15 PROCEDURE — 80202 ASSAY OF VANCOMYCIN: CPT

## 2023-02-15 PROCEDURE — 92610 EVALUATE SWALLOWING FUNCTION: CPT | Mod: GN

## 2023-02-15 PROCEDURE — 87070 CULTURE OTHR SPECIMN AEROBIC: CPT

## 2023-02-15 PROCEDURE — 74177 CT ABD & PELVIS W/CONTRAST: CPT

## 2023-02-15 PROCEDURE — 88304 TISSUE EXAM BY PATHOLOGIST: CPT

## 2023-02-15 PROCEDURE — 87040 BLOOD CULTURE FOR BACTERIA: CPT

## 2023-02-15 PROCEDURE — 85652 RBC SED RATE AUTOMATED: CPT

## 2023-02-15 RX ORDER — CEFTRIAXONE 500 MG/1
2000 INJECTION, POWDER, FOR SOLUTION INTRAMUSCULAR; INTRAVENOUS EVERY 24 HOURS
Refills: 0 | Status: DISCONTINUED | OUTPATIENT
Start: 2023-02-15 | End: 2023-02-15

## 2023-02-15 RX ORDER — ACETAMINOPHEN 500 MG
650 TABLET ORAL EVERY 6 HOURS
Refills: 0 | Status: DISCONTINUED | OUTPATIENT
Start: 2023-02-15 | End: 2023-02-18

## 2023-02-15 RX ORDER — DONEPEZIL HYDROCHLORIDE 10 MG/1
10 TABLET, FILM COATED ORAL AT BEDTIME
Refills: 0 | Status: DISCONTINUED | OUTPATIENT
Start: 2023-02-15 | End: 2023-02-18

## 2023-02-15 RX ORDER — VANCOMYCIN HCL 1 G
750 VIAL (EA) INTRAVENOUS EVERY 12 HOURS
Refills: 0 | Status: DISCONTINUED | OUTPATIENT
Start: 2023-02-15 | End: 2023-02-18

## 2023-02-15 RX ORDER — VALPROIC ACID (AS SODIUM SALT) 250 MG/5ML
250 SOLUTION, ORAL ORAL
Refills: 0 | Status: DISCONTINUED | OUTPATIENT
Start: 2023-02-15 | End: 2023-02-15

## 2023-02-15 RX ORDER — MEMANTINE HYDROCHLORIDE 10 MG/1
5 TABLET ORAL
Refills: 0 | Status: DISCONTINUED | OUTPATIENT
Start: 2023-02-15 | End: 2023-02-18

## 2023-02-15 RX ORDER — ENOXAPARIN SODIUM 100 MG/ML
40 INJECTION SUBCUTANEOUS EVERY 24 HOURS
Refills: 0 | Status: DISCONTINUED | OUTPATIENT
Start: 2023-02-15 | End: 2023-02-18

## 2023-02-15 RX ORDER — VALPROIC ACID (AS SODIUM SALT) 250 MG/5ML
250 SOLUTION, ORAL ORAL
Refills: 0 | Status: DISCONTINUED | OUTPATIENT
Start: 2023-02-15 | End: 2023-02-16

## 2023-02-15 RX ORDER — CEFTRIAXONE 500 MG/1
2000 INJECTION, POWDER, FOR SOLUTION INTRAMUSCULAR; INTRAVENOUS EVERY 24 HOURS
Refills: 0 | Status: DISCONTINUED | OUTPATIENT
Start: 2023-02-15 | End: 2023-02-16

## 2023-02-15 RX ORDER — SODIUM CHLORIDE 9 MG/ML
1000 INJECTION, SOLUTION INTRAVENOUS
Refills: 0 | Status: DISCONTINUED | OUTPATIENT
Start: 2023-02-15 | End: 2023-02-18

## 2023-02-15 RX ADMIN — SODIUM CHLORIDE 75 MILLILITER(S): 9 INJECTION, SOLUTION INTRAVENOUS at 20:09

## 2023-02-15 RX ADMIN — CEFTRIAXONE 100 MILLIGRAM(S): 500 INJECTION, POWDER, FOR SOLUTION INTRAMUSCULAR; INTRAVENOUS at 21:49

## 2023-02-15 RX ADMIN — Medication 52.5 MILLIGRAM(S): at 20:24

## 2023-02-15 RX ADMIN — ENOXAPARIN SODIUM 40 MILLIGRAM(S): 100 INJECTION SUBCUTANEOUS at 21:49

## 2023-02-15 NOTE — CONSULT NOTE ADULT - SUBJECTIVE AND OBJECTIVE BOX
KERWIN HERNANDEZ [id number]  82y Male      HPI:  Hx obtained from chart as pt is non verbal.   Patient is a 80 year old M with CAD, HLD, dementia, seizure (on valproic acid), PE, bed bound, bilateral sacral ulcers, ulcers of b/l heels, non verbal. Pt presents from home due to worsening pressure ulcers which now have discharge. Pt was seen by visiting nurse and was referred to the ED. In the ED, pt had a temp of 101, wbc with left shift. Pt was started on metronidazole, cefepime and vancomycin in the ED. Spoke to daughter who verified home meds.    (15 Feb 2023 01:18)    SURGICAL CONSULT REQUESTED FOR DEBRIDEMENT OF MULTIPLE DECUBITI    PAST MEDICAL & SURGICAL HISTORY:  Coronary artery disease, angina presence unspecified, unspecified vessel or lesion type, unspecified whether native or transplanted heart      Hyperlipidemia, unspecified hyperlipidemia type      Seizure      Dementia      Gastrointestinal perforation      Pulmonary emboli  resolved      No significant past surgical history            MEDICATIONS  (STANDING):  acetaminophen     Tablet .. 650 milliGRAM(s) Oral every 6 hours  donepezil 10 milliGRAM(s) Oral at bedtime  memantine 5 milliGRAM(s) Oral two times a day  valproate sodium  IVPB 250 milliGRAM(s) IV Intermittent two times a day    MEDICATIONS  (PRN):      Allergies    No Known Allergies    Intolerances        REVIEW OF SYSTEMS    [ ] A ten-point review of systems was otherwise negative except as noted.  [ x] Due to altered mental status/intubation, subjective information were not able to be obtained from the patient. History was obtained, to the extent possible, from review of the chart and collateral sources of information.      Vital Signs Last 24 Hrs  T(C): 37.1 (15 Feb 2023 14:36), Max: 38.8 (14 Feb 2023 18:49)  T(F): 98.8 (15 Feb 2023 14:36), Max: 101.8 (14 Feb 2023 18:49)  HR: 88 (15 Feb 2023 14:36) (81 - 180)  BP: 112/73 (15 Feb 2023 14:36) (104/73 - 135/99)  BP(mean): 88 (15 Feb 2023 14:36) (81 - 88)  RR: 16 (15 Feb 2023 14:36) (14 - 20)  SpO2: 99% (15 Feb 2023 14:36) (98% - 100%)    Parameters below as of 15 Feb 2023 14:36  Patient On (Oxygen Delivery Method): room air        PHYSICAL EXAM:  GENERAL: NAD, nonverbal  SAcrum:/extr: +st iv sacral/right hip/left heal ulcer;       LABS:  Labs:  CAPILLARY BLOOD GLUCOSE                              12.3   8.51  )-----------( 156      ( 15 Feb 2023 06:29 )             36.9       Auto Immature Granulocyte %: 0.3 % (02-14-23 @ 20:45)  Auto Neutrophil %: 80.1 % (02-14-23 @ 20:45)    02-15    136  |  101  |  10  ----------------------------<  94  4.4   |  24  |  0.6<L>      Calcium, Total Serum: 8.3 mg/dL (02-15-23 @ 06:29)      LFTs:             6.0  | 0.3  | 21       ------------------[92      ( 15 Feb 2023 06:29 )  2.4  | x    | 8           Lipase:x      Amylase:x         Blood Gas Venous - Lactate: 2.40 mmol/L (02-14-23 @ 22:06)  Lactate, Blood: 3.7 mmol/L (02-14-23 @ 21:29)      Coags:     24.20  ----< 2.08    ( 14 Feb 2023 21:29 )     37.5        CARDIAC MARKERS ( 15 Feb 2023 10:49 )  x     / 0.03 ng/mL / x     / x     / x      CARDIAC MARKERS ( 15 Feb 2023 06:29 )  x     / 0.04 ng/mL / x     / x     / x      CARDIAC MARKERS ( 14 Feb 2023 21:29 )  x     / 0.07 ng/mL / x     / x     / x                      RADIOLOGY & ADDITIONAL STUDIES:

## 2023-02-15 NOTE — PATIENT PROFILE ADULT - FUNCTIONAL ASSESSMENT - DAILY ACTIVITY SECTION LABEL
ER Documentation


Chief Complaint


Chief Complaint





RIGHT POSSIBLE FOREIGH BODY





HPI


24-year-old male presents to the emergency department complaining of possible 


foreign body to his right eye.  He states he was working with some trash and he 


felt something go into his right eye yesterday.  He reports constant irritation.


 He reports some mild whitish discharge.  He denies any changes in his vision, 


diplopia, severe pain, fevers, chills, or other symptoms at this time.





ROS


All systems reviewed and are negative except as per history of present illness.





Medications


Home Meds


Active Scripts


Ciprofloxacin Opht* (Ciloxan*) 0.3%-3.5 Opht Oint, 1 APPLIC BOTH EYES TID, #2 


BOTTLE


   Prov:YANETH FERNANDES PA-C         8/2/19





Allergies


Allergies:  


Coded Allergies:  


     No Known Drug Allergies (Verified  Allergy, Unknown, 8/2/19)





PMhx/Soc


Medical and Surgical Hx:  pt denies Medical Hx, pt denies Surgical Hx





Physical Exam


Vitals





Vital Signs


  Date      Temp  Pulse  Resp  B/P (MAP)   Pulse Ox  O2          O2 Flow    FiO2


Time                                                 Delivery    Rate


    8/2/19           77    18                    99  Room Air


     18:53


    8/2/19  97.2     79    18      139/79        99


     16:01                           (99)





Physical Exam


Const:   No acute distress


Head:   Atraumatic 


Eyes:    Bilateral conjunctival injection.  No obvious foreign body.  No obvious


visual acuity deficits.


ENT:    Normal External Ears, Nose and Mouth.


Neck:               Full range of motion. No meningismus.


Resp:   No respiratory distress.


Skin:   No petechiae or rashes


Back:   No midline or flank tenderness


Ext:    No cyanosis, or edema


Neur:   Awake and alert


Psych:    Normal Mood and Affect


Results 24 hrs





Current Medications


 Medications
   Dose
          Sig/Jairon
       Start Time
   Status  Last


 (Trade)       Ordered        Route
 PRN     Stop Time              Admin
Dose


                              Reason                                Admin


 Fluorescein    1 strip        ONCE  ONCE
    8/2/19        DC       



Sodium
                       RIGHT EYE
     18:30
 8/2/19


(Fluor-I-Stri                                18:31


p)








Procedures/MDM


24-year-old male presents emergency department with signs and symptoms most 


consistent with bilateral irritant conjunctivitis.  Patient may also have 


component of bacterial conjunctivitis due to secondary infectious process.





 Eye Exam w/ Wood's Lamp - bilateral:


 Visual Acuity:      See nursing notes


 Visual Fields:      Intact in all four quadrants bilaterally


 Lac ducts/glands:   No swelling


 Lids w/ evertion:   Normal, no foreign body


 Conj/Corn:      Clear, negative Fluorescein/Jose Elias's


 Anterior Chamber:   Clear


 


Ophthalmologic Assessment: Patient's ocular symptoms have stabilized while they 


have been evaluated in the department and are appropriate for outpatient work 


up.


No evidence of ruptured globe, retinal detachment, acute angle closure glaucoma,


or deep space infection.


   Plan for 24 hour ophthalmologic follow up.





Departure


Diagnosis:  


   Primary Impression:  


   Bilateral conjunctivitis


Condition:  Fair


Patient Instructions:  Conjunctivitis Caused by Irritation


Referrals:  


[facility]


Hours: Mon - Fri


9:00 AM - 5:00 PM





Additional Instructions:  


Specialist:Usted tiene dena condicin mdica que requiere que michelle a un 


especialista dentro de los prximos 1-2 nieto.POR FAVOR,CON BUSTOS SEGUIMIENTO DE 


PRIMARIA PHSICIAN refferal. SI USTED NO TIENE UN MDICO GENERAL Y / O USTED NO 


PUEDE PAGAR shaheen a un mdico,los siguientes aguilar RECURSOS sido suministrado a 


usted. ES BUSTOS RESPONSABILIDAD PARA SER VISTOS POR EL ESPECIALISTA: OPTHALMOLOGY











YANETH FERNANDES PA-C        Aug 5, 2019 13:35 .

## 2023-02-15 NOTE — CONSULT NOTE ADULT - ASSESSMENT
Patient is a 80 year old M with CAD, HLD, dementia, seizure (on valproic acid), PE, bed bound, bilateral sacral ulcers, ulcers of b/l heels, non verbal. Pt presents from home due to worsening pressure ulcers which now have discharge. Patient febrile He is non verbal. He has advanced dementia. Possible afib. Rate controlled. Patient on ac. Continue if candidate, Trop mildly elevated . Demand ischemia, Medical rx. Define goals care. Prognosis poor

## 2023-02-15 NOTE — ED ADULT NURSE NOTE - FINAL NURSING ELECTRONIC SIGNATURE
bp today of 140/82 is close to goal stay on amlodipine 2.5 mg once daily and propranolol 80 mg once daily. If any issues before next appt call. 15-Feb-2023 01:23

## 2023-02-15 NOTE — CONSULT NOTE ADULT - SUBJECTIVE AND OBJECTIVE BOX
Patient is a 82y old  Male who presents with a chief complaint of infected decubitus ulcers (15 Feb 2023 11:54)      REVIEW OF SYSTEMS: Total of twelve systems have been reviewed with patient and found to be negative unless mentioned in HPI        PAST MEDICAL & SURGICAL HISTORY:  Coronary artery disease, angina presence unspecified, unspecified vessel or lesion type, unspecified whether native or transplanted heart  Hyperlipidemia, unspecified hyperlipidemia type  Seizure  Dementia  Gastrointestinal perforation  Pulmonary emboli  resolved  No significant past surgical history      SOCIAL HISTORY  Alcohol: Does not drink  Tobacco: Does not smoke  Illicit substance use: None      FAMILY HISTORY: Non contributory to the present illness        ALLERGIES: No Known Allergies        Vital Signs Last 24 Hrs  T(C): 37.1 (15 Feb 2023 14:36), Max: 38.8 (14 Feb 2023 18:49)  T(F): 98.8 (15 Feb 2023 14:36), Max: 101.8 (14 Feb 2023 18:49)  HR: 88 (15 Feb 2023 14:36) (81 - 180)  BP: 112/73 (15 Feb 2023 14:36) (104/73 - 135/99)  BP(mean): 88 (15 Feb 2023 14:36) (81 - 88)  RR: 16 (15 Feb 2023 14:36) (14 - 20)  SpO2: 99% (15 Feb 2023 14:36) (98% - 100%)    Parameters below as of 15 Feb 2023 14:36  Patient On (Oxygen Delivery Method): room air          PHYSICAL EXAM:  GENERAL: Not in distress   CHEST/LUNG:  Aire ntry bilaterally  HEART: s1 and s2 present  ABDOMEN:  Nontender and  Nondistended  EXTREMITIES: No pedal  edema  CNS: Awake and Alert      LABS:                        12.3   8.51  )-----------( 156      ( 15 Feb 2023 06:29 )             36.9     02-15    136  |  101  |  10  ----------------------------<  94  4.4   |  24  |  0.6<L>    Ca    8.3<L>      15 Feb 2023 06:29    TPro  6.0  /  Alb  2.4<L>  /  TBili  0.3  /  DBili  x   /  AST  21  /  ALT  8   /  AlkPhos  92  02-15    PT/INR - ( 14 Feb 2023 21:29 )   PT: 24.20 sec;   INR: 2.08 ratio         PTT - ( 14 Feb 2023 21:29 )  PTT:37.5 sec      MEDICATIONS  (STANDING):  acetaminophen     Tablet .. 650 milliGRAM(s) Oral every 6 hours  donepezil 10 milliGRAM(s) Oral at bedtime  memantine 5 milliGRAM(s) Oral two times a day  valproate sodium  IVPB 250 milliGRAM(s) IV Intermittent two times a day    MEDICATIONS  (PRN):          RADIOLOGY & ADDITIONAL TESTS:               Patient is a 82y old Male with CAD, HLD, dementia, seizure (on valproic acid), PE, bed bound, bilateral sacral ulcers, ulcers of b/l heels, non verbal. brought in to the ER from home for evaluation of worsening pressure ulcers which now have discharge. Pt was seen by visiting nurse and was referred to the ER. On admission, he found to have ever, tachycardia.  He has started on Flagyl, cefepime and vancomycin, and the ID consult requested to assist with further evaluation and antibiotic management.      REVIEW OF SYSTEMS: Unable to obtain due to mental status unless mentioned in HPI        PAST MEDICAL & SURGICAL HISTORY:  Coronary artery disease, angina presence unspecified, unspecified vessel or lesion type, unspecified whether native or transplanted heart  Hyperlipidemia, unspecified hyperlipidemia type  Seizure  Dementia  Gastrointestinal perforation  Pulmonary emboli  resolved  No significant past surgical history      SOCIAL HISTORY  Alcohol: Does not drink  Tobacco: Does not smoke  Illicit substance use: None      FAMILY HISTORY: Non contributory to the present illness      ALLERGIES: No Known Allergies      Vital Signs Last 24 Hrs  T(C): 37.1 (15 Feb 2023 14:36), Max: 38.8 (14 Feb 2023 18:49)  T(F): 98.8 (15 Feb 2023 14:36), Max: 101.8 (14 Feb 2023 18:49)  HR: 88 (15 Feb 2023 14:36) (81 - 180)  BP: 112/73 (15 Feb 2023 14:36) (104/73 - 135/99)  BP(mean): 88 (15 Feb 2023 14:36) (81 - 88)  RR: 16 (15 Feb 2023 14:36) (14 - 20)  SpO2: 99% (15 Feb 2023 14:36) (98% - 100%)    Parameters below as of 15 Feb 2023 14:36  Patient On (Oxygen Delivery Method): room air        PHYSICAL EXAM:  GENERAL: Not in distress   CHEST/LUNG:  Not using accessory muscles   HEART: s1 and s2 present  ABDOMEN:  Nontender and  Nondistended  EXTREMITIES: contracted, left heel bandage in placed   CNS: Non verbal      LABS:                        12.3   8.51  )-----------( 156      ( 15 Feb 2023 06:29 )             36.9       02-15    136  |  101  |  10  ----------------------------<  94  4.4   |  24  |  0.6<L>    Ca    8.3<L>      15 Feb 2023 06:29    TPro  6.0  /  Alb  2.4<L>  /  TBili  0.3  /  DBili  x   /  AST  21  /  ALT  8   /  AlkPhos  92  02-15    PT/INR - ( 14 Feb 2023 21:29 )   PT: 24.20 sec;   INR: 2.08 ratio      PTT - ( 14 Feb 2023 21:29 )  PTT:37.5 sec      MEDICATIONS  (STANDING):    acetaminophen     Tablet .. 650 milliGRAM(s) Oral every 6 hours  donepezil 10 milliGRAM(s) Oral at bedtime  memantine 5 milliGRAM(s) Oral two times a day  valproate sodium  IVPB 250 milliGRAM(s) IV Intermittent two times a day    MEDICATIONS  (PRN):        RADIOLOGY & ADDITIONAL TESTS:    2/14/23 : Xray Chest 1 View-PORTABLE IMMEDIATE (02.14.23 @ 20:00) No radiographic evidence of acute cardiopulmonary disease.        MICROBIOLOGY DATA:    Flu With COVID-19 By MECCA (02.14.23 @ 20:33)   SARS-CoV-2 Result: NotDetec   Influenza A Result: NotDetec   Influenza B Result: NotDetec   Resp Syn Virus Result: NotDetec

## 2023-02-15 NOTE — H&P ADULT - ASSESSMENT
Pt is a 83 YO M who is admitted for the treatment of infected decubitus ulcers.    #decubitus ulcers  # Heel ulcers bilaterally - wrapped  s/p cefepime / vancomycin & metronidazole in the ED   cont cefepime pending ID eval for abx management  Surgery consult pending for debridement  IVF  f/u blood cultures  f/u urine analysis    #Elevated troponin  #Afib? new onset?  EKG, Afib @89BPM,  non ischemic however poor baseline  on anticoagulation  rate controlled  cardio consult pending  check TSH  check echo  trend cardiac enzymes    #Dementia - advanced  cont memantine and donepezil   as per daughter pt can eat solid foods without dysphagia however will place aspiration precautions     #hx of seizure  valproic acid    #Hx of LE DVT  cont eliquis      DVT ppx: on eliquis  GI ppx: Diet

## 2023-02-15 NOTE — CONSULT NOTE ADULT - SUBJECTIVE AND OBJECTIVE BOX
CARDIOLOGY CONSULT NOTE     CHIEF COMPLAINT/REASON FOR CONSULT:    HPI:  Hx obtained from chart as pt is non verbal.   Patient is a 80 year old M with CAD, HLD, dementia, seizure (on valproic acid), PE, bed bound, bilateral sacral ulcers, ulcers of b/l heels, non verbal. Pt presents from home due to worsening pressure ulcers which now have discharge. Pt was seen by visiting nurse and was referred to the ED. In the ED, pt had a temp of 101, wbc with left shift. Pt was started on metronidazole, cefepime and vancomycin in the ED. Spoke to daughter who verified home meds.    (15 Feb 2023 01:18)      PAST MEDICAL & SURGICAL HISTORY:  Coronary artery disease, angina presence unspecified, unspecified vessel or lesion type, unspecified whether native or transplanted heart      Hyperlipidemia, unspecified hyperlipidemia type      Seizure      Dementia      Gastrointestinal perforation      Pulmonary emboli  resolved      No significant past surgical history          Cardiac Risks:   [ ]HTN, [ ] DM, [ ] Smoking, [ ] FH,  [ ] Lipids        MEDICATIONS:  MEDICATIONS  (STANDING):  acetaminophen     Tablet .. 650 milliGRAM(s) Oral every 6 hours  donepezil 10 milliGRAM(s) Oral at bedtime  memantine 5 milliGRAM(s) Oral two times a day  valproate sodium  IVPB 250 milliGRAM(s) IV Intermittent two times a day      FAMILY HISTORY:      SOCIAL HISTORY:      Allergies    No Known Allergies      	    REVIEW OF SYSTEMS:  non verbal  	    [ ] All others negative	  [ ] Unable to obtain    PHYSICAL EXAM:  T(C): 35.3 (02-15-23 @ 07:05), Max: 38.8 (02-14-23 @ 18:49)  HR: 81 (02-15-23 @ 07:05) (81 - 110)  BP: 104/73 (02-15-23 @ 07:05) (104/73 - 135/99)  RR: 14 (02-15-23 @ 07:05) (14 - 20)  SpO2: 98% (02-15-23 @ 07:05) (98% - 100%)  Wt(kg): --  I&O's Summary      Appearance: Normal	  Psychiatry: A & O x 3, Mood & affect appropriate  HEENT:   Normal oral mucosa, PERRL, EOMI	  Lymphatic: No lymphadenopathy  Cardiovascular: Normal S1 S2,RRR, No JVD, i/vi jason  Respiratory: Lungs clear to auscultation	  Gastrointestinal:  Soft, Non-tender, + BS	  Skin: No rashes, No ecchymoses, No cyanosis	  Neurologic: Non-focal  Extremities: Normal range of motion, No clubbing, cyanosis or edema  Vascular: Peripheral pulses palpable 2+ bilaterally      ECG:  	< from: 12 Lead ECG (02.15.23 @ 01:03) >  Diagnosis Line Atrial fibrillation with premature ventricular or aberrantlyconducted  complexes  Possible Inferior infarct , age undetermined  Possible Anterolateral infarct , age undetermined  Abnormal ECG    Confirmed by RADHA GARCIA MD ([id number]) on 2/15/2023 11:54:02 AM    < end of copied text >      	  LABS:	 	    CARDIAC MARKERS:                                    12.3   8.51  )-----------( 156      ( 15 Feb 2023 06:29 )             36.9     02-15    136  |  101  |  10  ----------------------------<  94  4.4   |  24  |  0.6<L>    Ca    8.3<L>      15 Feb 2023 06:29    TPro  6.0  /  Alb  2.4<L>  /  TBili  0.3  /  DBili  x   /  AST  21  /  ALT  8   /  AlkPhos  92  02-15    PT/INR - ( 14 Feb 2023 21:29 )   PT: 24.20 sec;   INR: 2.08 ratio         PTT - ( 14 Feb 2023 21:29 )  PTT:37.5 sec

## 2023-02-15 NOTE — H&P ADULT - HISTORY OF PRESENT ILLNESS
Hx obtained from chart as pt is non verbal.   Patient is a 80 year old M with CAD, HLD, dementia, seizure (on valproic acid), PE, bed bound, bilateral sacral ulcers, ulcers of b/l heels, non verbal. Pt presents from home due to worsening pressure ulcers which now have discharge. Pt was seen by visiting nurse and was referred to the ED. In the ED, pt had a temp of 101, wbc with left shift. Pt was started on metronidazole, cefepime and vancomycin in the ED. Spoke to daughter who verified home meds.

## 2023-02-15 NOTE — H&P ADULT - NSHPPHYSICALEXAM_GEN_ALL_CORE
PHYSICAL EXAM:    CONSTITUTIONAL: NAD, saturating at >90% on RA.   ENMT: EOMI, PERRLA,  neck supple, No JVD  RESPIRATORY: Clear to auscultation bilaterally; No rales, rhonchi, wheezing, or rubs  CARDIOVASCULAR: Regular rate and rhythm; No murmurs, rubs, or gallops, negative edema  GASTROINTESTINAL: Soft, Nontender, Nondistended; Bowel sounds present  EXTREMITIES:  2+ Peripheral Pulses, No clubbing, cyanosis, contacted upper b/l exremities  PSYCH: Alert & Oriented X3, denies suicidal or homicidal ideation, denies auditory or visual hallucinations   NEURO: A&O X0, non verbal, does not follow commands.   SKIN: B/L decubitus ulcers over b/l buttocks with discharge.

## 2023-02-15 NOTE — H&P ADULT - NSHPLABSRESULTS_GEN_ALL_CORE
12.9   8.61  )-----------( 245      ( 14 Feb 2023 20:45 )             39.6       02-14    135  |  99  |  10  ----------------------------<  99  4.9   |  26  |  0.6<L>    Ca    8.8      14 Feb 2023 20:45    TPro  6.9  /  Alb  3.0<L>  /  TBili  0.2  /  DBili  x   /  AST  23  /  ALT  8   /  AlkPhos  115  02-14                  PT/INR - ( 14 Feb 2023 21:29 )   PT: 24.20 sec;   INR: 2.08 ratio         PTT - ( 14 Feb 2023 21:29 )  PTT:37.5 sec    Lactate Trend  02-14 @ 21:29 Lactate:3.7       CARDIAC MARKERS ( 14 Feb 2023 21:29 )  x     / 0.07 ng/mL / x     / x     / x

## 2023-02-15 NOTE — CONSULT NOTE ADULT - ASSESSMENT
Pt is a 81 YO M who is admitted for the treatment of infected decubitus ulcers.    #decubitus ulcers; sacrum, B/L heels, right hip    Case D/W Dr. Gu:  - will tentatively schedule for OR on Friday 2/17 for debridement   - hold Eliquis if possible    -cardio F/U for OR clearance  - cont abx as per ID  - will follow Pt is a 83 YO M who is admitted for the treatment of infected decubitus ulcers.    #decubitus ulcers; sacrum, B/L heels, right hip    Case D/W Dr. Gu:  - will tentatively schedule for OR on Friday 2/17 for debridement   - hold Eliquis if possible; otherwise may start therapeutic lovenox, which would need to be held the day before surgery    -cardio F/U for OR clearance  - cont abx as per ID  - will follow

## 2023-02-15 NOTE — CONSULT NOTE ADULT - ATTENDING COMMENTS
above noted discussed case with surgical resident and PA, pt with multiple necrotic ulcers needs debridment after medical clearance

## 2023-02-15 NOTE — CHART NOTE - NSCHARTNOTEFT_GEN_A_CORE
Pt seen and examined, nonverbal, comfortable resting in bed.  Agree with HP note and plan of care.     Admitted today after MN, by nocturnist.     SIRS Likely sacral pressure ulcer infection, ruled out sepsis    Multiple pressure ulcers various stages, right heel DTI, left heel with blackish discharge, sacral pressure ulcer  Bed bound/Nonverbal   elevated troponin due to demand ischemia.   Severe PCM  Dementia- NPO pending   Seizure disorder   Hx/o PE/Atrial fibrillation prior to arrival on  eliquis    AP  - IV abx, iv fluids, pending cultures, may consider further imagine of sacrum  - will check ESR and crp in am .   - SLP eval, npo for now   - troponin trend improving, likely demand ischemia.   -will tentatively schedule for OR on Friday 2/17 for debridement   - cardio F/U for OR clearance      MEDICATIONS  (STANDING):  acetaminophen     Tablet .. 650 milliGRAM(s) Oral every 6 hours  cefTRIAXone Injectable. 2000 milliGRAM(s) IV Push every 24 hours  dextrose 5% + sodium chloride 0.9%. 1000 milliLiter(s) (75 mL/Hr) IV Continuous <Continuous>  donepezil 10 milliGRAM(s) Oral at bedtime  enoxaparin Injectable 40 milliGRAM(s) SubCutaneous every 24 hours  memantine 5 milliGRAM(s) Oral two times a day  valproate sodium  IVPB 250 milliGRAM(s) IV Intermittent two times a day  vancomycin  IVPB 750 milliGRAM(s) IV Intermittent every 12 hours    MEDICATIONS  (PRN): Pt seen and examined, nonverbal, comfortable resting in bed.  Agree with HP note and plan of care.     Admitted today after MN, by nocturnist.     SIRS Likely R hip pressure ulcer infection, ruled out sepsis    Multiple pressure ulcers various stages, right heel DTI, left heel with blackish discharge, sacral pressure ulcer, R hip pressure ulcer with purulent discharge.   Bed bound/Nonverbal   elevated troponin due to demand ischemia.   Severe PCM  Dementia- NPO   Seizure disorder   Hx/o PE/Atrial fibrillation prior to arrival on  eliquis    AP  - IV abx, iv fluids, pending cultures, may consider further imagine of sacrum  - will check ESR and crp in am .   - SLP eval, npo for now   - troponin trend improving, likely demand ischemia.   -will tentatively schedule for OR on Friday 2/17 for debridement   - cardio F/U for OR clearance  - DVT px with lovenox   - hold eliquis for now pending SLP eval     MEDICATIONS  (STANDING):  acetaminophen     Tablet .. 650 milliGRAM(s) Oral every 6 hours  cefTRIAXone Injectable. 2000 milliGRAM(s) IV Push every 24 hours  dextrose 5% + sodium chloride 0.9%. 1000 milliLiter(s) (75 mL/Hr) IV Continuous <Continuous>  donepezil 10 milliGRAM(s) Oral at bedtime  enoxaparin Injectable 40 milliGRAM(s) SubCutaneous every 24 hours  memantine 5 milliGRAM(s) Oral two times a day  valproate sodium  IVPB 250 milliGRAM(s) IV Intermittent two times a day  vancomycin  IVPB 750 milliGRAM(s) IV Intermittent every 12 hours    MEDICATIONS  (PRN): Pt seen and examined, nonverbal, comfortable resting in bed.  Agree with HP note and plan of care.     Admitted today after MN, by nocturnist.     Sepsis not c/w severe sepsis  Likely R hip pressure ulcer infection  Multiple pressure ulcers various stages, right heel DTI, left heel with blackish discharge, sacral pressure ulcer, R hip pressure ulcer with purulent discharge.   Bed bound/Nonverbal   elevated troponin due to demand ischemia.   Severe PCM  Dementia- NPO   Seizure disorder   Hx/o PE/Atrial fibrillation prior to arrival on  eliquis    AP  - IV abx, iv fluids, pending cultures, may consider further imagine of sacrum  - will check ESR and crp in am .   - SLP eval, npo for now   - troponin trend improving, likely demand ischemia.   -will tentatively schedule for OR on Friday 2/17 for debridement   - cardio F/U for OR clearance  - DVT px with lovenox   - hold eliquis for now pending SLP eval     MEDICATIONS  (STANDING):  acetaminophen     Tablet .. 650 milliGRAM(s) Oral every 6 hours  cefTRIAXone Injectable. 2000 milliGRAM(s) IV Push every 24 hours  dextrose 5% + sodium chloride 0.9%. 1000 milliLiter(s) (75 mL/Hr) IV Continuous <Continuous>  donepezil 10 milliGRAM(s) Oral at bedtime  enoxaparin Injectable 40 milliGRAM(s) SubCutaneous every 24 hours  memantine 5 milliGRAM(s) Oral two times a day  valproate sodium  IVPB 250 milliGRAM(s) IV Intermittent two times a day  vancomycin  IVPB 750 milliGRAM(s) IV Intermittent every 12 hours    MEDICATIONS  (PRN):

## 2023-02-15 NOTE — ED ADULT NURSE NOTE - CHIEF COMPLAINT QUOTE
as per EMS pt has pressure ulcers and the visiting nurse has not been there in a month. dementia at baseline

## 2023-02-15 NOTE — PATIENT PROFILE ADULT - FALL HARM RISK - HARM RISK INTERVENTIONS
Assistance with ambulation/Assistance OOB with selected safe patient handling equipment/Communicate Risk of Fall with Harm to all staff/Discuss with provider need for PT consult/Monitor gait and stability/Reinforce activity limits and safety measures with patient and family/Tailored Fall Risk Interventions/Visual Cue: Yellow wristband and red socks/Bed in lowest position, wheels locked, appropriate side rails in place/Call bell, personal items and telephone in reach/Instruct patient to call for assistance before getting out of bed or chair/Non-slip footwear when patient is out of bed/[place] to call system/Physically safe environment - no spills, clutter or unnecessary equipment/Purposeful Proactive Rounding/Room/bathroom lighting operational, light cord in reach

## 2023-02-16 LAB
-  STAPHYLOCOCCUS EPIDERMIDIS, METHICILLIN RESISTANT: SIGNIFICANT CHANGE UP
ALBUMIN SERPL ELPH-MCNC: 2.2 G/DL — LOW (ref 3.5–5.2)
ALP SERPL-CCNC: 84 U/L — SIGNIFICANT CHANGE UP (ref 30–115)
ALT FLD-CCNC: 7 U/L — SIGNIFICANT CHANGE UP (ref 0–41)
ANION GAP SERPL CALC-SCNC: 9 MMOL/L — SIGNIFICANT CHANGE UP (ref 7–14)
AST SERPL-CCNC: 19 U/L — SIGNIFICANT CHANGE UP (ref 0–41)
BASOPHILS # BLD AUTO: 0.02 K/UL — SIGNIFICANT CHANGE UP (ref 0–0.2)
BASOPHILS NFR BLD AUTO: 0.3 % — SIGNIFICANT CHANGE UP (ref 0–1)
BILIRUB SERPL-MCNC: <0.2 MG/DL — SIGNIFICANT CHANGE UP (ref 0.2–1.2)
BUN SERPL-MCNC: 8 MG/DL — LOW (ref 10–20)
CALCIUM SERPL-MCNC: 8.3 MG/DL — LOW (ref 8.4–10.5)
CHLORIDE SERPL-SCNC: 104 MMOL/L — SIGNIFICANT CHANGE UP (ref 98–110)
CO2 SERPL-SCNC: 24 MMOL/L — SIGNIFICANT CHANGE UP (ref 17–32)
CREAT SERPL-MCNC: 0.6 MG/DL — LOW (ref 0.7–1.5)
CRP SERPL-MCNC: 126 MG/L — HIGH
EGFR: 96 ML/MIN/1.73M2 — SIGNIFICANT CHANGE UP
EOSINOPHIL # BLD AUTO: 0.05 K/UL — SIGNIFICANT CHANGE UP (ref 0–0.7)
EOSINOPHIL NFR BLD AUTO: 0.6 % — SIGNIFICANT CHANGE UP (ref 0–8)
ERYTHROCYTE [SEDIMENTATION RATE] IN BLOOD: 60 MM/HR — HIGH (ref 0–10)
GLUCOSE BLDC GLUCOMTR-MCNC: 116 MG/DL — HIGH (ref 70–99)
GLUCOSE BLDC GLUCOMTR-MCNC: 138 MG/DL — HIGH (ref 70–99)
GLUCOSE BLDC GLUCOMTR-MCNC: 182 MG/DL — HIGH (ref 70–99)
GLUCOSE BLDC GLUCOMTR-MCNC: 87 MG/DL — SIGNIFICANT CHANGE UP (ref 70–99)
GLUCOSE SERPL-MCNC: 103 MG/DL — HIGH (ref 70–99)
GRAM STN FLD: SIGNIFICANT CHANGE UP
HCT VFR BLD CALC: 33 % — LOW (ref 42–52)
HGB BLD-MCNC: 11.1 G/DL — LOW (ref 14–18)
IMM GRANULOCYTES NFR BLD AUTO: 0.4 % — HIGH (ref 0.1–0.3)
LACTATE SERPL-SCNC: 2 MMOL/L — SIGNIFICANT CHANGE UP (ref 0.7–2)
LYMPHOCYTES # BLD AUTO: 1.1 K/UL — LOW (ref 1.2–3.4)
LYMPHOCYTES # BLD AUTO: 13.9 % — LOW (ref 20.5–51.1)
MCHC RBC-ENTMCNC: 31.2 PG — HIGH (ref 27–31)
MCHC RBC-ENTMCNC: 33.6 G/DL — SIGNIFICANT CHANGE UP (ref 32–37)
MCV RBC AUTO: 92.7 FL — SIGNIFICANT CHANGE UP (ref 80–94)
METHOD TYPE: SIGNIFICANT CHANGE UP
MONOCYTES # BLD AUTO: 0.75 K/UL — HIGH (ref 0.1–0.6)
MONOCYTES NFR BLD AUTO: 9.5 % — HIGH (ref 1.7–9.3)
NEUTROPHILS # BLD AUTO: 5.96 K/UL — SIGNIFICANT CHANGE UP (ref 1.4–6.5)
NEUTROPHILS NFR BLD AUTO: 75.3 % — HIGH (ref 42.2–75.2)
NRBC # BLD: 0 /100 WBCS — SIGNIFICANT CHANGE UP (ref 0–0)
PLATELET # BLD AUTO: 195 K/UL — SIGNIFICANT CHANGE UP (ref 130–400)
POTASSIUM SERPL-MCNC: 3.6 MMOL/L — SIGNIFICANT CHANGE UP (ref 3.5–5)
POTASSIUM SERPL-SCNC: 3.6 MMOL/L — SIGNIFICANT CHANGE UP (ref 3.5–5)
PROT SERPL-MCNC: 5.6 G/DL — LOW (ref 6–8)
RBC # BLD: 3.56 M/UL — LOW (ref 4.7–6.1)
RBC # FLD: 12.9 % — SIGNIFICANT CHANGE UP (ref 11.5–14.5)
SODIUM SERPL-SCNC: 137 MMOL/L — SIGNIFICANT CHANGE UP (ref 135–146)
WBC # BLD: 7.91 K/UL — SIGNIFICANT CHANGE UP (ref 4.8–10.8)
WBC # FLD AUTO: 7.91 K/UL — SIGNIFICANT CHANGE UP (ref 4.8–10.8)

## 2023-02-16 PROCEDURE — 99232 SBSQ HOSP IP/OBS MODERATE 35: CPT

## 2023-02-16 RX ORDER — CEFEPIME 1 G/1
INJECTION, POWDER, FOR SOLUTION INTRAMUSCULAR; INTRAVENOUS
Refills: 0 | Status: DISCONTINUED | OUTPATIENT
Start: 2023-02-16 | End: 2023-02-18

## 2023-02-16 RX ORDER — MEMANTINE HYDROCHLORIDE 10 MG/1
5 TABLET ORAL DAILY
Refills: 0 | Status: DISCONTINUED | OUTPATIENT
Start: 2023-02-16 | End: 2023-02-16

## 2023-02-16 RX ORDER — METRONIDAZOLE 500 MG
500 TABLET ORAL EVERY 8 HOURS
Refills: 0 | Status: DISCONTINUED | OUTPATIENT
Start: 2023-02-16 | End: 2023-02-18

## 2023-02-16 RX ORDER — VALPROIC ACID (AS SODIUM SALT) 250 MG/5ML
250 SOLUTION, ORAL ORAL
Refills: 0 | Status: DISCONTINUED | OUTPATIENT
Start: 2023-02-16 | End: 2023-02-18

## 2023-02-16 RX ORDER — ACETAMINOPHEN 500 MG
650 TABLET ORAL ONCE
Refills: 0 | Status: COMPLETED | OUTPATIENT
Start: 2023-02-16 | End: 2023-02-16

## 2023-02-16 RX ORDER — VALPROIC ACID (AS SODIUM SALT) 250 MG/5ML
250 SOLUTION, ORAL ORAL EVERY 12 HOURS
Refills: 0 | Status: DISCONTINUED | OUTPATIENT
Start: 2023-02-16 | End: 2023-02-16

## 2023-02-16 RX ORDER — DONEPEZIL HYDROCHLORIDE 10 MG/1
10 TABLET, FILM COATED ORAL AT BEDTIME
Refills: 0 | Status: DISCONTINUED | OUTPATIENT
Start: 2023-02-16 | End: 2023-02-16

## 2023-02-16 RX ORDER — CEFEPIME 1 G/1
1000 INJECTION, POWDER, FOR SOLUTION INTRAMUSCULAR; INTRAVENOUS EVERY 8 HOURS
Refills: 0 | Status: DISCONTINUED | OUTPATIENT
Start: 2023-02-16 | End: 2023-02-18

## 2023-02-16 RX ORDER — CEFEPIME 1 G/1
1000 INJECTION, POWDER, FOR SOLUTION INTRAMUSCULAR; INTRAVENOUS ONCE
Refills: 0 | Status: COMPLETED | OUTPATIENT
Start: 2023-02-16 | End: 2023-02-16

## 2023-02-16 RX ADMIN — Medication 650 MILLIGRAM(S): at 18:54

## 2023-02-16 RX ADMIN — Medication 100 MILLIGRAM(S): at 21:44

## 2023-02-16 RX ADMIN — CEFEPIME 100 MILLIGRAM(S): 1 INJECTION, POWDER, FOR SOLUTION INTRAMUSCULAR; INTRAVENOUS at 21:45

## 2023-02-16 RX ADMIN — CEFEPIME 100 MILLIGRAM(S): 1 INJECTION, POWDER, FOR SOLUTION INTRAMUSCULAR; INTRAVENOUS at 06:27

## 2023-02-16 RX ADMIN — Medication 650 MILLIGRAM(S): at 21:44

## 2023-02-16 RX ADMIN — Medication 650 MILLIGRAM(S): at 15:30

## 2023-02-16 RX ADMIN — CEFEPIME 100 MILLIGRAM(S): 1 INJECTION, POWDER, FOR SOLUTION INTRAMUSCULAR; INTRAVENOUS at 14:31

## 2023-02-16 RX ADMIN — MEMANTINE HYDROCHLORIDE 5 MILLIGRAM(S): 10 TABLET ORAL at 18:23

## 2023-02-16 RX ADMIN — ENOXAPARIN SODIUM 40 MILLIGRAM(S): 100 INJECTION SUBCUTANEOUS at 21:44

## 2023-02-16 RX ADMIN — Medication 52.5 MILLIGRAM(S): at 07:30

## 2023-02-16 RX ADMIN — SODIUM CHLORIDE 75 MILLILITER(S): 9 INJECTION, SOLUTION INTRAVENOUS at 14:29

## 2023-02-16 RX ADMIN — Medication 650 MILLIGRAM(S): at 23:45

## 2023-02-16 RX ADMIN — Medication 650 MILLIGRAM(S): at 14:28

## 2023-02-16 RX ADMIN — Medication 100 MILLIGRAM(S): at 14:31

## 2023-02-16 RX ADMIN — Medication 250 MILLIGRAM(S): at 06:31

## 2023-02-16 RX ADMIN — Medication 650 MILLIGRAM(S): at 22:06

## 2023-02-16 RX ADMIN — Medication 250 MILLIGRAM(S): at 18:23

## 2023-02-16 RX ADMIN — CEFEPIME 100 MILLIGRAM(S): 1 INJECTION, POWDER, FOR SOLUTION INTRAMUSCULAR; INTRAVENOUS at 00:21

## 2023-02-16 RX ADMIN — Medication 650 MILLIGRAM(S): at 18:23

## 2023-02-16 RX ADMIN — DONEPEZIL HYDROCHLORIDE 10 MILLIGRAM(S): 10 TABLET, FILM COATED ORAL at 21:44

## 2023-02-16 RX ADMIN — Medication 100 MILLIGRAM(S): at 06:31

## 2023-02-16 NOTE — PROGRESS NOTE ADULT - ATTENDING COMMENTS
above noted discussed case with surgical resident dressing changed  necrotic ulcers same needs debridmenrt after clearance

## 2023-02-16 NOTE — PROGRESS NOTE ADULT - ASSESSMENT
Pt is a 83 YO M who is admitted for the treatment of infected decubitus ulcers.    #decubitus ulcers; sacrum, B/L heels, right hip    Case D/W Dr. Gu:  -will tentatively schedule for OR onSaturday 2/18 for debridement pending C discussion with family  - hold Eliquis if possible; may start therapeutic lovenox, which would need to be held the night before surgery  - Cardio F/U for OR clearance  - cont abx as per ID  - will follow

## 2023-02-16 NOTE — PROGRESS NOTE ADULT - ASSESSMENT
SIRS Likely R hip pressure ulcer infection, ruled out sepsis    Multiple pressure ulcers various stages, right heel DTI, left heel with blackish discharge, sacral pressure ulcer, R hip pressure ulcer with purulent discharge.   -- IV abx, vancomycin, cefepime and flagyl per ID, iv fluids, pending cultures  - surgery consulted, possibly debridement on 2/17/23.  and wound care nursing consulted , plan for - ID following   - pt has moderate cardiac risk given GSCRI 0f 1.3%, can proceed with debridement if risk greater than benefit.   - blood cx NGTD     Elevated troponin due to demand ischemia, ruled ou ACS   - trop 0.07- 0.04  -0.03 stable  - continue with iv fluids    Severe PCM  - puree TL with ensure clear     Dementia with dysphagia  - ensure clear TID   - Bed bound/NonverbaL  - feeding with assistance/ fall precautions   - resume donepezil 10 mg po bid , memantine 5 mg po daily     Seizure disorder   - IV aed, valproic acid change back to 250 mg po bid.     Hx/o PE prior to arrival on eliquis in 2021   - hold eliquis for now     CAD/HLD  - given age/dementia not a candidate for statins.      - DVT px with lovenox   - hold eliquis for now pending SLP eval     Guarded prognosis, d/w wife, will fill out MOLST IN AM, DNR/DNI no feeding tube.   Wife was taking care of patient at home, managing wounds, pt is a retired     Wife declined hospice at this time.      Sepsis Likely R hip pressure ulcer infection   Multiple pressure ulcers various stages, right heel DTI, left heel with blackish discharge, sacral pressure ulcer, R hip pressure ulcer with purulent discharge.   -- IV abx, vancomycin, cefepime and flagyl per ID, iv fluids, pending cultures  - surgery consulted, possibly debridement on 2/17/23.  and wound care nursing consulted , plan for - ID following   - pt has moderate cardiac risk given GSCRI 0f 1.3%, can proceed with debridement if risk greater than benefit.   - blood cx NGTD     Elevated troponin due to demand ischemia, ruled ou ACS   - trop 0.07- 0.04  -0.03 stable  - continue with iv fluids    Severe PCM  - puree TL with ensure clear     Dementia with dysphagia  - ensure clear TID   - Bed bound/NonverbaL  - feeding with assistance/ fall precautions   - resume donepezil 10 mg po bid , memantine 5 mg po daily     Seizure disorder   - IV aed, valproic acid change back to 250 mg po bid.     Hx/o PE prior to arrival on eliquis in 2021   - hold eliquis for now     CAD/HLD  - given age/dementia not a candidate for statins.      - DVT px with lovenox   - hold eliquis for now pending SLP eval     Guarded prognosis, d/w wife, will fill out MOLST IN AM, DNR/DNI no feeding tube.   Wife was taking care of patient at home, managing wounds, pt is a retired     Wife declined hospice at this time.

## 2023-02-16 NOTE — PROGRESS NOTE ADULT - SUBJECTIVE AND OBJECTIVE BOX
GENERAL SURGERY PROGRESS NOTE      Events of past 24 hours:  BERNARDO        ROS otherwise negative except per subjective and HPI      Vital Signs Last 24 Hrs  T(C): 37.3 (16 Feb 2023 14:15), Max: 37.3 (16 Feb 2023 14:15)  T(F): 99.2 (16 Feb 2023 14:15), Max: 99.2 (16 Feb 2023 14:15)  HR: 92 (16 Feb 2023 14:15) (87 - 92)  BP: 105/55 (16 Feb 2023 14:15) (105/55 - 119/61)  BP(mean): --  RR: 16 (16 Feb 2023 14:15) (16 - 16)  SpO2: 96% (16 Feb 2023 14:15) (96% - 100%)    Parameters below as of 15 Feb 2023 21:15  Patient On (Oxygen Delivery Method): room air        Diet, NPO after Midnight:      NPO Start Date: 16-Feb-2023,   NPO Start Time: 23:59  Except Medications  With Ice Chips/Sips of Water (02-16-23 @ 18:11) [Active]  Diet, Regular:   Pureed (PUREED)  Supplement Feeding Modality:  Oral  Ensure Clear Cans or Servings Per Day:  1       Frequency:  Three Times a day (02-16-23 @ 18:11) [Active]          I&O's Detail    16 Feb 2023 07:01  -  16 Feb 2023 19:40  --------------------------------------------------------  IN:    Oral Fluid: 140 mL  Total IN: 140 mL    OUT:    Voided (mL): 0 mL  Total OUT: 0 mL    Total NET: 140 mL      MEDICATIONS:   MEDICATIONS  (STANDING):  acetaminophen     Tablet .. 650 milliGRAM(s) Oral every 6 hours  cefepime   IVPB      cefepime   IVPB 1000 milliGRAM(s) IV Intermittent every 8 hours  dextrose 5% + sodium chloride 0.9%. 1000 milliLiter(s) (75 mL/Hr) IV Continuous <Continuous>  donepezil 10 milliGRAM(s) Oral at bedtime  enoxaparin Injectable 40 milliGRAM(s) SubCutaneous every 24 hours  memantine 5 milliGRAM(s) Oral two times a day  metroNIDAZOLE  IVPB 500 milliGRAM(s) IV Intermittent every 8 hours  valproic  acid Syrup 250 milliGRAM(s) Oral two times a day  vancomycin  IVPB 750 milliGRAM(s) IV Intermittent every 12 hours    MEDICATIONS  (PRN):        LAB/STUDIES:                        11.1   7.91  )-----------( 195      ( 16 Feb 2023 08:15 )             33.0     02-16    137  |  104  |  8<L>  ----------------------------<  103<H>  3.6   |  24  |  0.6<L>    Ca    8.3<L>      16 Feb 2023 08:15    TPro  5.6<L>  /  Alb  2.2<L>  /  TBili  <0.2  /  DBili  x   /  AST  19  /  ALT  7   /  AlkPhos  84  02-16    PT/INR - ( 14 Feb 2023 21:29 )   PT: 24.20 sec;   INR: 2.08 ratio         PTT - ( 14 Feb 2023 21:29 )  PTT:37.5 sec  LIVER FUNCTIONS - ( 16 Feb 2023 08:15 )  Alb: 2.2 g/dL / Pro: 5.6 g/dL / ALK PHOS: 84 U/L / ALT: 7 U/L / AST: 19 U/L / GGT: x             CARDIAC MARKERS ( 15 Feb 2023 10:49 )  x     / 0.03 ng/mL / x     / x     / x      CARDIAC MARKERS ( 15 Feb 2023 06:29 )  x     / 0.04 ng/mL / x     / x     / x      CARDIAC MARKERS ( 14 Feb 2023 21:29 )  x     / 0.07 ng/mL / x     / x     / x              Culture - Blood (collected 14 Feb 2023 20:45)  Source: .Blood Blood-Peripheral  Preliminary Report (16 Feb 2023 03:01):    No growth to date.    Culture - Blood (collected 14 Feb 2023 20:45)  Source: .Blood Blood-Peripheral  Preliminary Report (16 Feb 2023 03:01):    No growth to date.

## 2023-02-16 NOTE — PHARMACOTHERAPY INTERVENTION NOTE - COMMENTS
As per policy, rescheduled the vancomycin trough level from 2/16 @2000 to 2/17 @ 0430 as the patient had already received their evening vancomycin dose prior to the level being collected.    Ian Burr PharmD  Clinical Pharmacy Specialist, Infectious Diseases  Tele-Antimicrobial Stewardship Program (Tele-ASP)  Tele-ASP Phone: [phone]

## 2023-02-16 NOTE — PROGRESS NOTE ADULT - SUBJECTIVE AND OBJECTIVE BOX
KERWIN Hines    Subjective/Interval History   nonverbal, not in acute distress.     ROS  -unable to obtain noverbal     PHYSICAL EXAM  Vital Signs Last 24 Hrs  T(C): 37.3 (16 Feb 2023 14:15), Max: 37.3 (16 Feb 2023 14:15)  T(F): 99.2 (16 Feb 2023 14:15), Max: 99.2 (16 Feb 2023 14:15)  HR: 92 (16 Feb 2023 14:15) (87 - 92)  BP: 105/55 (16 Feb 2023 14:15) (105/55 - 119/61)  BP(mean): --  RR: 16 (16 Feb 2023 14:15) (16 - 16)  SpO2: 96% (16 Feb 2023 14:15) (96% - 100%)    Parameters below as of 15 Feb 2023 21:15  Patient On (Oxygen Delivery Method): room air      GA :  Nonverbal, demented.    HEENT: PERRL  NECK: no JVD, no thyromegaly   CVS: S1 S2 no murmur no rubs no gallop  RESP: CTAB no wheeze, no rhonchi no rales  ABD: Soft, NT, ND, tympanic, no rebound or guarding   : No [place], No CVA tenderness   EXT; no pedal edema, no cyanosis  MSK: No ML spinal tenderness, normal ROM   NEURO: AAOX3, no new focal deficits     LABS/ IMAGING                        11.1   7.91  )-----------( 195      ( 16 Feb 2023 08:15 )             33.0     CARDIAC MARKERS ( 15 Feb 2023 10:49 )  x     / 0.03 ng/mL / x     / x     / x      CARDIAC MARKERS ( 15 Feb 2023 06:29 )  x     / 0.04 ng/mL / x     / x     / x      CARDIAC MARKERS ( 14 Feb 2023 21:29 )  x     / 0.07 ng/mL / x     / x     / x          02-16    137  |  104  |  8<L>  ----------------------------<  103<H>  3.6   |  24  |  0.6<L>    Ca    8.3<L>      16 Feb 2023 08:15    TPro  5.6<L>  /  Alb  2.2<L>  /  TBili  <0.2  /  DBili  x   /  AST  19  /  ALT  7   /  AlkPhos  84  02-16    CAPILLARY BLOOD GLUCOSE      POCT Blood Glucose.: 138 mg/dL (16 Feb 2023 16:31)  POCT Blood Glucose.: 87 mg/dL (16 Feb 2023 11:33)  POCT Blood Glucose.: 116 mg/dL (16 Feb 2023 08:44)      LIVER FUNCTIONS - ( 16 Feb 2023 08:15 )  Alb: 2.2 g/dL / Pro: 5.6 g/dL / ALK PHOS: 84 U/L / ALT: 7 U/L / AST: 19 U/L / GGT: x             Culture - Blood (collected 14 Feb 2023 20:45)  Source: .Blood Blood-Peripheral  Preliminary Report (16 Feb 2023 03:01):    No growth to date.    Culture - Blood (collected 14 Feb 2023 20:45)  Source: .Blood Blood-Peripheral  Preliminary Report (16 Feb 2023 03:01):    No growth to date.           KERWIN Hines    Subjective/Interval History   nonverbal, not in acute distress.     ROS  -unable to obtain noverbal     PHYSICAL EXAM  Vital Signs Last 24 Hrs  T(C): 37.3 (16 Feb 2023 14:15), Max: 37.3 (16 Feb 2023 14:15)  T(F): 99.2 (16 Feb 2023 14:15), Max: 99.2 (16 Feb 2023 14:15)  HR: 92 (16 Feb 2023 14:15) (87 - 92)  BP: 105/55 (16 Feb 2023 14:15) (105/55 - 119/61)  BP(mean): --  RR: 16 (16 Feb 2023 14:15) (16 - 16)  SpO2: 96% (16 Feb 2023 14:15) (96% - 100%)    Parameters below as of 15 Feb 2023 21:15  Patient On (Oxygen Delivery Method): room air      GA :  Nonverbal, demented.    HEENT: PERRL  NECK: no JVD, no thyromegaly   CVS: S1 S2 no murmur no rubs no gallop  RESP: CTAB no wheeze, no rhonchi no rales  ABD: Soft, NT, ND, tympanic, no rebound or guarding   : No [place], No CVA tenderness   EXT; no pedal edema, no cyanosis  SKIN: multiple pressure ulcers at different stages , R hip pressure ulcer foul smelling, erythematous, with purulent discharge.  NEURO: demented unable to perform.     LABS/ IMAGING                        11.1   7.91  )-----------( 195      ( 16 Feb 2023 08:15 )             33.0     CARDIAC MARKERS ( 15 Feb 2023 10:49 )  x     / 0.03 ng/mL / x     / x     / x      CARDIAC MARKERS ( 15 Feb 2023 06:29 )  x     / 0.04 ng/mL / x     / x     / x      CARDIAC MARKERS ( 14 Feb 2023 21:29 )  x     / 0.07 ng/mL / x     / x     / x          02-16    137  |  104  |  8<L>  ----------------------------<  103<H>  3.6   |  24  |  0.6<L>    Ca    8.3<L>      16 Feb 2023 08:15    TPro  5.6<L>  /  Alb  2.2<L>  /  TBili  <0.2  /  DBili  x   /  AST  19  /  ALT  7   /  AlkPhos  84  02-16    CAPILLARY BLOOD GLUCOSE      POCT Blood Glucose.: 138 mg/dL (16 Feb 2023 16:31)  POCT Blood Glucose.: 87 mg/dL (16 Feb 2023 11:33)  POCT Blood Glucose.: 116 mg/dL (16 Feb 2023 08:44)      LIVER FUNCTIONS - ( 16 Feb 2023 08:15 )  Alb: 2.2 g/dL / Pro: 5.6 g/dL / ALK PHOS: 84 U/L / ALT: 7 U/L / AST: 19 U/L / GGT: x             Culture - Blood (collected 14 Feb 2023 20:45)  Source: .Blood Blood-Peripheral  Preliminary Report (16 Feb 2023 03:01):    No growth to date.    Culture - Blood (collected 14 Feb 2023 20:45)  Source: .Blood Blood-Peripheral  Preliminary Report (16 Feb 2023 03:01):    No growth to date.           Detail Level: Detailed

## 2023-02-17 LAB
ANION GAP SERPL CALC-SCNC: 7 MMOL/L — SIGNIFICANT CHANGE UP (ref 7–14)
APPEARANCE UR: CLEAR — SIGNIFICANT CHANGE UP
BASOPHILS # BLD AUTO: 0.03 K/UL — SIGNIFICANT CHANGE UP (ref 0–0.2)
BASOPHILS NFR BLD AUTO: 0.4 % — SIGNIFICANT CHANGE UP (ref 0–1)
BILIRUB UR-MCNC: ABNORMAL
BUN SERPL-MCNC: 8 MG/DL — LOW (ref 10–20)
CALCIUM SERPL-MCNC: 8.2 MG/DL — LOW (ref 8.4–10.5)
CHLORIDE SERPL-SCNC: 105 MMOL/L — SIGNIFICANT CHANGE UP (ref 98–110)
CO2 SERPL-SCNC: 25 MMOL/L — SIGNIFICANT CHANGE UP (ref 17–32)
COD CRY URNS QL: ABNORMAL
COLOR SPEC: YELLOW — SIGNIFICANT CHANGE UP
CREAT SERPL-MCNC: 0.6 MG/DL — LOW (ref 0.7–1.5)
CULTURE RESULTS: SIGNIFICANT CHANGE UP
DIFF PNL FLD: ABNORMAL
EGFR: 96 ML/MIN/1.73M2 — SIGNIFICANT CHANGE UP
EOSINOPHIL # BLD AUTO: 0.09 K/UL — SIGNIFICANT CHANGE UP (ref 0–0.7)
EOSINOPHIL NFR BLD AUTO: 1.1 % — SIGNIFICANT CHANGE UP (ref 0–8)
EPI CELLS # UR: ABNORMAL /HPF
GLUCOSE BLDC GLUCOMTR-MCNC: 81 MG/DL — SIGNIFICANT CHANGE UP (ref 70–99)
GLUCOSE BLDC GLUCOMTR-MCNC: 92 MG/DL — SIGNIFICANT CHANGE UP (ref 70–99)
GLUCOSE SERPL-MCNC: 93 MG/DL — SIGNIFICANT CHANGE UP (ref 70–99)
GLUCOSE UR QL: NEGATIVE MG/DL — SIGNIFICANT CHANGE UP
GRAM STN FLD: SIGNIFICANT CHANGE UP
HCT VFR BLD CALC: 32 % — LOW (ref 42–52)
HGB BLD-MCNC: 10.8 G/DL — LOW (ref 14–18)
IMM GRANULOCYTES NFR BLD AUTO: 0.3 % — SIGNIFICANT CHANGE UP (ref 0.1–0.3)
KETONES UR-MCNC: 15
LEUKOCYTE ESTERASE UR-ACNC: NEGATIVE — SIGNIFICANT CHANGE UP
LYMPHOCYTES # BLD AUTO: 1.79 K/UL — SIGNIFICANT CHANGE UP (ref 1.2–3.4)
LYMPHOCYTES # BLD AUTO: 22.8 % — SIGNIFICANT CHANGE UP (ref 20.5–51.1)
MAGNESIUM SERPL-MCNC: 1.9 MG/DL — SIGNIFICANT CHANGE UP (ref 1.8–2.4)
MCHC RBC-ENTMCNC: 31.2 PG — HIGH (ref 27–31)
MCHC RBC-ENTMCNC: 33.8 G/DL — SIGNIFICANT CHANGE UP (ref 32–37)
MCV RBC AUTO: 92.5 FL — SIGNIFICANT CHANGE UP (ref 80–94)
MONOCYTES # BLD AUTO: 0.79 K/UL — HIGH (ref 0.1–0.6)
MONOCYTES NFR BLD AUTO: 10.1 % — HIGH (ref 1.7–9.3)
NEUTROPHILS # BLD AUTO: 5.14 K/UL — SIGNIFICANT CHANGE UP (ref 1.4–6.5)
NEUTROPHILS NFR BLD AUTO: 65.3 % — SIGNIFICANT CHANGE UP (ref 42.2–75.2)
NITRITE UR-MCNC: NEGATIVE — SIGNIFICANT CHANGE UP
NRBC # BLD: 0 /100 WBCS — SIGNIFICANT CHANGE UP (ref 0–0)
ORGANISM # SPEC MICROSCOPIC CNT: SIGNIFICANT CHANGE UP
ORGANISM # SPEC MICROSCOPIC CNT: SIGNIFICANT CHANGE UP
PH UR: 5.5 — SIGNIFICANT CHANGE UP (ref 5–8)
PLATELET # BLD AUTO: 209 K/UL — SIGNIFICANT CHANGE UP (ref 130–400)
POTASSIUM SERPL-MCNC: 3.7 MMOL/L — SIGNIFICANT CHANGE UP (ref 3.5–5)
POTASSIUM SERPL-SCNC: 3.7 MMOL/L — SIGNIFICANT CHANGE UP (ref 3.5–5)
PROT UR-MCNC: 30 MG/DL
RBC # BLD: 3.46 M/UL — LOW (ref 4.7–6.1)
RBC # FLD: 12.9 % — SIGNIFICANT CHANGE UP (ref 11.5–14.5)
RBC CASTS # UR COMP ASSIST: ABNORMAL /HPF
SODIUM SERPL-SCNC: 137 MMOL/L — SIGNIFICANT CHANGE UP (ref 135–146)
SP GR SPEC: 1.02 — SIGNIFICANT CHANGE UP (ref 1.01–1.03)
SPECIMEN SOURCE: SIGNIFICANT CHANGE UP
UROBILINOGEN FLD QL: 1 MG/DL
VANCOMYCIN TROUGH SERPL-MCNC: 4.9 UG/ML — LOW (ref 5–10)
WBC # BLD: 7.86 K/UL — SIGNIFICANT CHANGE UP (ref 4.8–10.8)
WBC # FLD AUTO: 7.86 K/UL — SIGNIFICANT CHANGE UP (ref 4.8–10.8)
WBC UR QL: SIGNIFICANT CHANGE UP /HPF

## 2023-02-17 PROCEDURE — 99232 SBSQ HOSP IP/OBS MODERATE 35: CPT

## 2023-02-17 PROCEDURE — 74177 CT ABD & PELVIS W/CONTRAST: CPT | Mod: 26

## 2023-02-17 PROCEDURE — 93010 ELECTROCARDIOGRAM REPORT: CPT

## 2023-02-17 RX ADMIN — MEMANTINE HYDROCHLORIDE 5 MILLIGRAM(S): 10 TABLET ORAL at 17:31

## 2023-02-17 RX ADMIN — DONEPEZIL HYDROCHLORIDE 10 MILLIGRAM(S): 10 TABLET, FILM COATED ORAL at 21:03

## 2023-02-17 RX ADMIN — CEFEPIME 100 MILLIGRAM(S): 1 INJECTION, POWDER, FOR SOLUTION INTRAMUSCULAR; INTRAVENOUS at 06:25

## 2023-02-17 RX ADMIN — Medication 650 MILLIGRAM(S): at 06:25

## 2023-02-17 RX ADMIN — Medication 250 MILLIGRAM(S): at 06:25

## 2023-02-17 RX ADMIN — CEFEPIME 100 MILLIGRAM(S): 1 INJECTION, POWDER, FOR SOLUTION INTRAMUSCULAR; INTRAVENOUS at 13:22

## 2023-02-17 RX ADMIN — Medication 100 MILLIGRAM(S): at 21:02

## 2023-02-17 RX ADMIN — Medication 650 MILLIGRAM(S): at 00:18

## 2023-02-17 RX ADMIN — Medication 650 MILLIGRAM(S): at 17:31

## 2023-02-17 RX ADMIN — Medication 250 MILLIGRAM(S): at 07:55

## 2023-02-17 RX ADMIN — MEMANTINE HYDROCHLORIDE 5 MILLIGRAM(S): 10 TABLET ORAL at 06:25

## 2023-02-17 RX ADMIN — Medication 250 MILLIGRAM(S): at 17:32

## 2023-02-17 RX ADMIN — Medication 100 MILLIGRAM(S): at 06:26

## 2023-02-17 RX ADMIN — Medication 650 MILLIGRAM(S): at 06:50

## 2023-02-17 RX ADMIN — ENOXAPARIN SODIUM 40 MILLIGRAM(S): 100 INJECTION SUBCUTANEOUS at 22:59

## 2023-02-17 RX ADMIN — CEFEPIME 100 MILLIGRAM(S): 1 INJECTION, POWDER, FOR SOLUTION INTRAMUSCULAR; INTRAVENOUS at 21:02

## 2023-02-17 RX ADMIN — Medication 100 MILLIGRAM(S): at 13:23

## 2023-02-17 NOTE — DIETITIAN INITIAL EVALUATION ADULT - PERTINENT LABORATORY DATA
02-17    137  |  105  |  8<L>  ----------------------------<  93  3.7   |  25  |  0.6<L>    Ca    8.2<L>      17 Feb 2023 06:54    TPro  5.6<L>  /  Alb  2.2<L>  /  TBili  <0.2  /  DBili  x   /  AST  19  /  ALT  7   /  AlkPhos  84  02-16  POCT Blood Glucose.: 81 mg/dL (02-17-23 @ 10:40)

## 2023-02-17 NOTE — PROGRESS NOTE ADULT - SUBJECTIVE AND OBJECTIVE BOX
Subjective/Objective:     HPI-Cardiology/Events/Updates  Pt evaluated at bedside. Unable to obtain Hx and HPI as Pt nonverbal with Hx of dementia. Hx and HPI obtained from primary team and chart. Cardiology recalled for cardiovascular risk stratification for possible sacral ulcers debridement under local anesthesia. Radiology tests and hospital records, were reviewed, as well as previous notes on this patient.       MEDICATIONS  (STANDING):  acetaminophen     Tablet .. 650 milliGRAM(s) Oral every 6 hours  cefepime   IVPB      cefepime   IVPB 1000 milliGRAM(s) IV Intermittent every 8 hours  dextrose 5% + sodium chloride 0.9%. 1000 milliLiter(s) (75 mL/Hr) IV Continuous <Continuous>  donepezil 10 milliGRAM(s) Oral at bedtime  enoxaparin Injectable 40 milliGRAM(s) SubCutaneous every 24 hours  memantine 5 milliGRAM(s) Oral two times a day  metroNIDAZOLE  IVPB 500 milliGRAM(s) IV Intermittent every 8 hours  valproic  acid Syrup 250 milliGRAM(s) Oral two times a day  vancomycin  IVPB 750 milliGRAM(s) IV Intermittent every 12 hours    MEDICATIONS  (PRN):          Vital Signs Last 24 Hrs  T(C): 35.9 (17 Feb 2023 13:58), Max: 37.8 (16 Feb 2023 21:09)  T(F): 96.7 (17 Feb 2023 13:58), Max: 100.1 (16 Feb 2023 21:09)  HR: 85 (17 Feb 2023 13:58) (74 - 98)  BP: 127/65 (17 Feb 2023 13:58) (100/60 - 127/65)  BP(mean): --  RR: 18 (17 Feb 2023 13:58) (18 - 18)  SpO2: 100% (17 Feb 2023 13:58) (96% - 100%)    Parameters below as of 17 Feb 2023 13:58  Patient On (Oxygen Delivery Method): room air      I&O's Detail    16 Feb 2023 07:01  -  17 Feb 2023 07:00  --------------------------------------------------------  IN:    Oral Fluid: 140 mL  Total IN: 140 mL    OUT:    Indwelling Catheter - Urethral (mL): 550 mL    Voided (mL): 0 mL  Total OUT: 550 mL    Total NET: -410 mL            EKG/ TELEM:  < from: 12 Lead ECG (02.17.23 @ 11:36) >  Normal sinus rhythm  Low voltage QRS  Nonspecific ST and T wave abnormality  Abnormal ECG    < end of copied text >    LABS:                          10.8   7.86  )-----------( 209      ( 17 Feb 2023 06:54 )             32.0       17 Feb 2023 06:54    137    |  105    |  8<L>   ----------------------------<  93     3.7     |  25     |  0.6<L>  16 Feb 2023 08:15    137    |  104    |  8<L>   ----------------------------<  103<H>  3.6     |  24     |  0.6<L>    Ca    8.2<L>      17 Feb 2023 06:54  Ca    8.3<L>      16 Feb 2023 08:15  Mg     1.9       17 Feb 2023 11:31    TPro  5.6<L>  /  Alb  2.2<L>  /  TBili  <0.2   /  DBili  x      /  AST  19     /  ALT  7      /  AlkPhos  84     16 Feb 2023 08:15        Diagnostic testing:  < from: Xray Chest 1 View-PORTABLE IMMEDIATE (02.14.23 @ 20:00) >  Impression:    No radiographic evidence of acute cardiopulmonary disease.        --- End of Report ---    < end of copied text >        < from: TTE Echo Complete w/o Contrast w/ Doppler (02.15.23 @ 12:29) >    Summary:   1. Technically limited study.   2. Sclerotic aortic valve with decreased opening.   3.Patient uncooperative . Images not interpretable.      < end of copied text >

## 2023-02-17 NOTE — DIETITIAN INITIAL EVALUATION ADULT - ORAL NUTRITION SUPPLEMENTS
Ensure HIGH PROTEIN 2x/day to optimize pro/kcal intake (350kcal, 20 g pro/serving)   Magic Cup 1x/daily with lunch

## 2023-02-17 NOTE — DIETITIAN INITIAL EVALUATION ADULT - DIET TYPE
per SLP evaluation 2/16, diet already limited and patient with poor PO intake, would consider removing DASH/TLC restriction to optimize/pureed/thin liquids

## 2023-02-17 NOTE — PROGRESS NOTE ADULT - ASSESSMENT
Patient is a 82y old Male with CAD, HLD, dementia, seizure (on valproic acid), PE, bed bound, bilateral sacral ulcers, ulcers of b/l heels, non verbal. brought in to the ER from home for evaluation of worsening pressure ulcers which now have discharge. Pt was seen by visiting nurse and was referred to the ER. On admission, he found to have ever, tachycardia.  He has started on Flagyl, cefepime and vancomycin, and the ID consult requested to assist with further evaluation and antibiotic management.    # Sepsis ( Fever + tachycardia)  - Blood Cx 2/14 Staph epi, 1 of 2 sets - likely contaminant     # Infected Left sacral  decubitus ulcer  # B/L heel  pressure ulcer with eschar     Recommendations  - planned for OR today -- will follow-up cx  - continue vancomycin 750 mg q 12 hours   -- check level prior to next dose (goal 10-15)   - continue cefepime 1g q 8 hours   - continue flagyl 500 mg q 8 hours       Please call or message on [employer] Teams if with any questions.  Spectra [phone]     Patient is a 82y old Male with CAD, HLD, dementia, seizure (on valproic acid), PE, bed bound, bilateral sacral ulcers, ulcers of b/l heels, non verbal. brought in to the ER from home for evaluation of worsening pressure ulcers which now have discharge. Pt was seen by visiting nurse and was referred to the ER. On admission, he found to have ever, tachycardia.  He has started on Flagyl, cefepime and vancomycin, and the ID consult requested to assist with further evaluation and antibiotic management.    # Sepsis on admission ( Fever + tachycardia)  - Blood Cx 2/14 Staph epi, 1 of 2 sets - likely contaminant     # Infected Left sacral  decubitus ulcer  # B/L heel  pressure ulcer with eschar     Recommendations  - planned for OR today -- will follow-up cx  - continue vancomycin 750 mg q 12 hours   -- check level prior to next dose (goal 10-15)   - continue cefepime 1g q 8 hours   - continue flagyl 500 mg q 8 hours       Please call or message on [employer] Teams if with any questions.  Spectra [phone]

## 2023-02-17 NOTE — DIETITIAN INITIAL EVALUATION ADULT - OTHER INFO
82y old Male with CAD, HLD, dementia, seizure (on valproic acid), PE, bed bound, bilateral sacral ulcers, ulcers of b/l heels, non verbal. brought in to the ER from home for evaluation of worsening pressure ulcers which now have discharge.   planned for OR today -- will follow-up cx

## 2023-02-17 NOTE — DIETITIAN INITIAL EVALUATION ADULT - ENERGY INTAKE
poor PO intake through admission  NPO today for OR  meeting </=50% estimated energy needs x2 days during admission

## 2023-02-17 NOTE — DIETITIAN INITIAL EVALUATION ADULT - NSFNSGIIOFT_GEN_A_CORE
previous weights per EMR review  72.6kg 9/7/21  58.1kg 2/23/21 ^ height obtained from previous admission in 2021  previous weights per EMR review  72.6kg 9/7/21  58.1kg 2/23/21  unknown UBW more recently, EMR weights from 2 years ago

## 2023-02-17 NOTE — CHART NOTE - NSCHARTNOTEFT_GEN_A_CORE
Called for + blood culture, GPC in clusters, anaerobic bottle. Antibiotic regimen reviewed, would not change current regimen at this time

## 2023-02-17 NOTE — DIETITIAN INITIAL EVALUATION ADULT - NSFNSNUTRCHEWSWALLOWFT_GEN_A_CORE
Heightened aspiration risk given poor mobility, poor cognition, inability to perform oral care independently. Therefore aspiration precautions should be maintained. Pt benefits from liquid wash in order to improve oral transit time and reduce lingual residue  SLP evaluation 16-Feb-2023 recommendations for puree/thin via tsp

## 2023-02-17 NOTE — PROGRESS NOTE ADULT - SUBJECTIVE AND OBJECTIVE BOX
KERWIN HERNANDEZ  82y, Male  Allergy: No Known Allergies      LOS  2d    CHIEF COMPLAINT: infected decubitus ulcers (2023 19:40)      INTERVAL EVENTS/HPI  - No acute events overnight  - T(F): , Max: 100.1 (23 @ 21:09)  - Denies any worsening symptoms  - Tolerating medication  - WBC Count: 7.86 (23 @ 06:54)  WBC Count: 7.91 (23 @ 08:15)     - Creatinine, Serum: 0.6 (23 @ 08:15)       ROS  unable to obtain history secondary to patient's mental status and/or sedation      VITALS:  T(F): 96.9, Max: 100.1 (23 @ 21:09)  HR: 74  BP: 100/60  RR: 18Vital Signs Last 24 Hrs  T(C): 36.1 (2023 05:06), Max: 37.8 (2023 21:09)  T(F): 96.9 (2023 05:06), Max: 100.1 (2023 21:09)  HR: 74 (2023 05:06) (74 - 98)  BP: 100/60 (2023 05:06) (100/60 - 119/61)  BP(mean): --  RR: 18 (2023 05:06) (16 - 18)  SpO2: 96% (2023 05:06) (96% - 98%)    Parameters below as of 2023 21:09  Patient On (Oxygen Delivery Method): room air        PHYSICAL EXAM:  Gen: NAD, resting in bed  HEENT: Normocephalic, atraumatic  Neck: supple, no lymphadenopathy  CV: Regular rate & regular rhythm  Lungs: decreased BS at bases, no fremitus  Abdomen: Soft, BS present  Ext: Warm, well perfused  Neuro: non focal, awake  Skin: stage IV sacral Ulcer and right hip ulcer with necrotic tissue; pressure heel ulcers with eschar  Lines: no phlebitis    FH: Non-contributory  Social Hx: Non-contributory    TESTS & MEASUREMENTS:                        10.8   7.86  )-----------( 209      ( 2023 06:54 )             32.0     02-16    137  |  104  |  8<L>  ----------------------------<  103<H>  3.6   |  24  |  0.6<L>    Ca    8.3<L>      2023 08:15    TPro  5.6<L>  /  Alb  2.2<L>  /  TBili  <0.2  /  DBili  x   /  AST  19  /  ALT  7   /  AlkPhos  84        LIVER FUNCTIONS - ( 2023 08:15 )  Alb: 2.2 g/dL / Pro: 5.6 g/dL / ALK PHOS: 84 U/L / ALT: 7 U/L / AST: 19 U/L / GGT: x           Urinalysis Basic - ( 2023 00:25 )    Color: Yellow / Appearance: Clear / S.025 / pH: x  Gluc: x / Ketone: 15  / Bili: Small / Urobili: 1.0 mg/dL   Blood: x / Protein: 30 mg/dL / Nitrite: Negative   Leuk Esterase: Negative / RBC: 3-5 /HPF / WBC 3-5 /HPF   Sq Epi: x / Non Sq Epi: Occasional /HPF / Bacteria: x        Culture - Blood (collected 23 @ 20:45)  Source: .Blood Blood-Peripheral  Preliminary Report (23 @ 03:01):    No growth to date.    Culture - Blood (collected 23 @ 20:45)  Source: .Blood Blood-Peripheral  Gram Stain (23 @ 21:47):    Growth in anaerobic bottle: Gram Positive Cocci in Clusters  Preliminary Report (23 @ 21:47):    Growth in anaerobic bottle: Gram Positive Cocci in Clusters    ***Blood Panel PCR results on this specimen are available    approximately 3 hours after the Gram stain result.***    Gram stain, PCR, and/or culture results may not always    correspond due todifference in methodologies.    ************************************************************    This PCR assay was performed by multiplex PCR. This    Assay tests for 66 bacterial and resistance gene targets.    Please refer to the [facility] testdirectory    at [email or web address] for details.  Organism: Blood Culture PCR (23 @ 23:13)  Organism: Blood Culture PCR (23 @ 23:13)      -  Staphylococcus epidermidis, Methicillin resistant: Detec      Method Type: PCR        Lactate, Blood: 2.0 mmol/L (23 @ 08:15)  Blood Gas Venous - Lactate: 2.40 mmol/L (23 @ 22:06)  Lactate, Blood: 3.7 mmol/L (23 @ 21:29)      INFECTIOUS DISEASES TESTING      INFLAMMATORY MARKERS  Sedimentation Rate, Erythrocyte: 60 mm/Hr (23 @ 08:15)  C-Reactive Protein, Serum: 126 mg/L (23 @ 08:15)      RADIOLOGY & ADDITIONAL TESTS:  I have personally reviewed the last available Chest xray  CXR      CT      CARDIOLOGY TESTING  12 Lead ECG:   Ventricular Rate 89 BPM    Atrial Rate 220 BPM    QRS Duration 58 ms    Q-T Interval 342 ms    QTC Calculation(Bazett) 416 ms    R Axis -19 degrees    T Axis -18 degrees    Diagnosis Line Atrial fibrillation with premature ventricular or aberrantlyconducted  complexes  Possible Inferior infarct , age undetermined  Possible Anterolateral infarct , age undetermined  Abnormal ECG    Confirmed by RADHA GARCIA MD ([id number]) on 2/15/2023 11:54:02 AM (02-15-23 @ 01:03)  12 Lead ECG:   Ventricular Rate 83 BPM    Atrial Rate 250 BPM    QRS Duration 66 ms    Q-T Interval 342 ms    QTC Calculation(Bazett) 401 ms    R Axis -17 degrees    T Axis -24 degrees    Diagnosis Line Accelerated Junctional rhythm Baseline artifact  Possible Inferior infarct , age undetermined  Possible Anterolateral infarct , age undetermined  Abnormal ECG    Confirmed by RADHA GARCIA MD ([id number]) on 2/15/2023 11:53:11 AM (02-15-23 @ 01:01)      MEDICATIONS  acetaminophen     Tablet .. 650 Oral every 6 hours  cefepime   IVPB     cefepime   IVPB 1000 IV Intermittent every 8 hours  dextrose 5% + sodium chloride 0.9%. 1000 IV Continuous <Continuous>  donepezil 10 Oral at bedtime  enoxaparin Injectable 40 SubCutaneous every 24 hours  memantine 5 Oral two times a day  metroNIDAZOLE  IVPB 500 IV Intermittent every 8 hours  valproic  acid Syrup 250 Oral two times a day  vancomycin  IVPB 750 IV Intermittent every 12 hours      WEIGHT  Weight (kg): 54.4 (02-15-23 @ 17:28)  Creatinine, Serum: 0.6 mg/dL (23 @ 08:15)      ANTIBIOTICS:  cefepime   IVPB      cefepime   IVPB 1000 milliGRAM(s) IV Intermittent every 8 hours  metroNIDAZOLE  IVPB 500 milliGRAM(s) IV Intermittent every 8 hours  vancomycin  IVPB 750 milliGRAM(s) IV Intermittent every 12 hours      All available historical records have been reviewed

## 2023-02-17 NOTE — DIETITIAN INITIAL EVALUATION ADULT - ORAL INTAKE PTA/DIET HISTORY
regular diet/thin liquids prior to admission per daughter report unable to obtain subjective information patient with other services at time of visit   *regular diet/thin liquids prior to admission per daughter report

## 2023-02-17 NOTE — DIETITIAN INITIAL EVALUATION ADULT - OTHER CALCULATIONS
using ABW 54.4kg with consideration for age, wt, BMI, wound healing  ENERGY: 6620-4684 kcal/day (MSJ x 1.2-1.5)  PROTEIN: 65-82 g/day ( 1.2-1.5g/kg)  FLUIDS: 1360 (25mL/kg)

## 2023-02-17 NOTE — DIETITIAN INITIAL EVALUATION ADULT - ADD RECOMMEND
RECOMMEND: zinc 220mg/daily x10-14d, ascorbic acid 500mg/daily, MV w/ minerals daily for wound healing if medically feasible    Patient at HIGH nutrition risk   will follow up within 3-5days  [place] Jose, RD  [id number] or via TEAMS

## 2023-02-17 NOTE — CHART NOTE - NSCHARTNOTEFT_GEN_A_CORE
[facility]      PA called by RN for patient with monitor showing Vtach vs Sinus Tach w/ significant artifact.  Patient seen at bedside no change in condition. Monitor reviewed significant artifact noted due to patients positioning and tremor with movement.  Stat EKG ordered. Mg level pending.  Sinus rhythm noted on EKG with significant artifact.   Dr Scott aware.

## 2023-02-17 NOTE — PROGRESS NOTE ADULT - ASSESSMENT
#This consult serves only as a perioperative cardiac risk stratification and evaluation to predict 30-day cardiac complications risk and mortality.  The decision to proceed with the surgery/procedure is made by the performing physician and the patient.     ECG: NS with nonspecific ST-TW changes  If on AC can hold the day before procedure    - Currently no active cardiac conditions. No signs of ischemia, ADHF,   - Poor functional capacity, METS<4  - At intermediate risk for perioperative major adverse cardiac events for low risk procedure  - RCRI: 1(6%) for MACE periop  - No further cardiac workup is indicated at this time  - All other workup per primary team

## 2023-02-17 NOTE — DIETITIAN INITIAL EVALUATION ADULT - NS FNS DIET ORDER
Diet, NPO:   NPO for Procedure/Test     NPO Start Date: 17-Feb-2023,   NPO Start Time: 11:20  Except Medications  With Ice Chips/Sips of Water (02-17-23 @ 11:20)  Diet, NPO after Midnight:      NPO Start Date: 17-Feb-2023,   NPO Start Time: 23:59 (02-17-23 @ 10:43)  Diet, DASH/TLC:   Sodium & Cholesterol Restricted (02-17-23 @ 10:43)   Diet, NPO:   NPO for Procedure/Test     NPO Start Date: 17-Feb-2023,   NPO Start Time: 11:20  Except Medications  With Ice Chips/Sips of Water (02-17-23 @ 11:20)  Diet, NPO after Midnight:      NPO Start Date: 17-Feb-2023,   NPO Start Time: 23:59 (02-17-23 @ 10:43)  Diet, DASH/TLC:   Sodium & Cholesterol Restricted (02-17-23 @ 10:43)

## 2023-02-17 NOTE — DIETITIAN INITIAL EVALUATION ADULT - PERTINENT MEDS FT
MEDICATIONS  (STANDING):  acetaminophen     Tablet .. 650 milliGRAM(s) Oral every 6 hours  cefepime   IVPB      cefepime   IVPB 1000 milliGRAM(s) IV Intermittent every 8 hours  dextrose 5% + sodium chloride 0.9%. 1000 milliLiter(s) (75 mL/Hr) IV Continuous <Continuous>  donepezil 10 milliGRAM(s) Oral at bedtime  enoxaparin Injectable 40 milliGRAM(s) SubCutaneous every 24 hours  memantine 5 milliGRAM(s) Oral two times a day  metroNIDAZOLE  IVPB 500 milliGRAM(s) IV Intermittent every 8 hours  valproic  acid Syrup 250 milliGRAM(s) Oral two times a day  vancomycin  IVPB 750 milliGRAM(s) IV Intermittent every 12 hours    MEDICATIONS  (PRN):   MEDICATIONS  (STANDING):  acetaminophen     Tablet .. 650 milliGRAM(s) Oral every 6 hours  cefepime   IVPB      cefepime   IVPB 1000 milliGRAM(s) IV Intermittent every 8 hours  dextrose 5% + sodium chloride 0.9%. 1000 milliLiter(s) (75 mL/Hr) IV Continuous <Continuous>  donepezil 10 milliGRAM(s) Oral at bedtime  enoxaparin Injectable 40 milliGRAM(s) SubCutaneous every 24 hours  memantine 5 milliGRAM(s) Oral two times a day  metroNIDAZOLE  IVPB 500 milliGRAM(s) IV Intermittent every 8 hours  valproic  acid Syrup 250 milliGRAM(s) Oral two times a day  vancomycin  IVPB 750 milliGRAM(s) IV Intermittent every 12 hours

## 2023-02-17 NOTE — PROGRESS NOTE ADULT - SUBJECTIVE AND OBJECTIVE BOX
DIAGNOSIS:   HOSPITAL DAY #:    STATUS POST:    POST OPERATIVE DAY #:     Vital Signs Last 24 Hrs  T(C): 37 (17 Feb 2023 21:00), Max: 37 (17 Feb 2023 21:00)  T(F): 98.6 (17 Feb 2023 21:00), Max: 98.6 (17 Feb 2023 21:00)  HR: 50 (17 Feb 2023 21:00) (50 - 85)  BP: 113/676 (17 Feb 2023 21:00) (100/60 - 127/65)  BP(mean): --  RR: 18 (17 Feb 2023 21:00) (18 - 18)  SpO2: 99% (17 Feb 2023 21:00) (96% - 100%)    Parameters below as of 17 Feb 2023 13:58  Patient On (Oxygen Delivery Method): room air        SUBJECTIVE: Pt seen    Pain: YES  [ ]   NO [ ]   Nausea: [ ] YES [ ] NO           Vomiting: [ ] YES [ ] NO  Flatus: [ ] YES [ ] NO             Bowel Movement: [ ] YES [ ] NO     Void: [ ]YES [ ]No      JESSY DRAINAGE: SIGNIFICANT [ ]   NOT SIGNIFICANT [ ]   NOT APPLICABLE [ ]  YES [ ] NO    General Appearance: Appears well, NAD  Neck: Supple  Chest: Equal expansion bilaterally, equal breath sounds  CV: Pulse regular presently  Abdomen: Soft [x ] YES [ ]NO  DISTENDED [ ] YES [x ] NO TENDERNESS [ ]YES [x ]NO  INCISIONS: HEALING WELL [ ] YES  [ ] NO ERYTHEMA [ ] YES [ ] NO DRAINAGE [ ] YES  [ ] NO  Extremities: Grossly symmetric, CALF TENDERNESS [ ] YES  [ ] NO  dressing changed multiple necrotic ulcer    LABS:                        10.8   7.86  )-----------( 209      ( 17 Feb 2023 06:54 )             32.0     02-17    137  |  105  |  8<L>  ----------------------------<  93  3.7   |  25  |  0.6<L>    Ca    8.2<L>      17 Feb 2023 06:54  Mg     1.9     02-17    TPro  5.6<L>  /  Alb  2.2<L>  /  TBili  <0.2  /  DBili  x   /  AST  19  /  ALT  7   /  AlkPhos  84  02-16            ASSESSMENT: for surgery when stable    GOOD POST OP COURSE [ ]  YES  [ ] NO  CONDITION IMPROVING  []  YES  [ ]  NO          PLAN:    CONTINUE PRESENT MANAGEMENT  [ ] YES  [ ] NO

## 2023-02-17 NOTE — PROGRESS NOTE ADULT - ASSESSMENT
Sepsis Likely R hip pressure ulcer infection   Multiple pressure ulcers various stages, right heel DTI, left heel with blackish discharge, sacral pressure ulcer, R hip pressure ulcer with purulent discharge.   -- IV abx, vancomycin, cefepime and flagyl per ID, iv fluids, pending cultures  - surgery consulted, possibly debridement on 2/17/23.  and wound care nursing consulted , plan for - ID following   - pt has moderate cardiac risk given GSCRI 0f 1.3%, can proceed with debridement if risk greater than benefit.   - blood cx NGTD     Elevated troponin due to demand ischemia, ruled ou ACS   - trop 0.07- 0.04  -0.03 stable  - continue with iv fluids    Severe PCM  - puree TL with ensure clear     Dementia with dysphagia  - ensure clear TID   - Bed bound/NonverbaL  - feeding with assistance/ fall precautions   - resume donepezil 10 mg po bid , memantine 5 mg po daily     Seizure disorder   - IV aed, valproic acid change back to 250 mg po bid.     Hx/o PE prior to arrival on eliquis in 2021   - hold eliquis for now     CAD/HLD  - given age/dementia not a candidate for statins.      - DVT px with lovenox   - hold eliquis for now pending SLP eval     Guarded prognosis, d/w wife, will fill out MOLST IN AM, DNR/DNI no feeding tube.   Wife was taking care of patient at home, managing wounds, pt is a retired     Wife declined hospice at this time.      Sepsis Likely R hip pressure ulcer infection   Multiple pressure ulcers various stages, right heel DTI, left heel with blackish discharge, sacral pressure ulcer, R hip pressure ulcer with purulent discharge.   -- IV abx, vancomycin, cefepime and flagyl per ID, D3 abx iv fluids, pending cultures  - surgery consulted, possibly debridement on 2/18/23.  and wound care nursing consulted , plan for - ID following   - pt has moderate cardiac risk given GSCRI 0f 1.3%, can proceed with debridement if risk greater than benefit.   - blood cx NGTD one bottle, one bottle GPC (likely contamination  - cardiology preop evaluation rec noted.   - NPO after MN, obtain CT AP iv contrast     Elevated troponin due to demand ischemia, ruled out ACS   - trop 0.07- 0.04  -0.03 stable  - continue with iv fluids    Severe PCM  - puree TL with ensure TID      Dementia with dysphagia (advance)   - ensure  TID   - Bed bound/NonverbaL  - feeding with assistance/ fall precautions   - resume donepezil 10 mg po bid , memantine 5 mg po daily   - previous CTH global cerebral volume loss    Seizure disorder   - IV aed, valproic acid change back to 250 mg po bid.     Hx/o PE prior to arrival on eliquis in 2021   - hold eliquis for now     CAD/HLD  - given age/dementia not a candidate for statins.      - DVT px with lovenox, scd (hold am dose)    - hold eliquis for now pending SLP eval     Guarded prognosis, d/w wife, will fill out MOLST IN AM, DNR/DNI no feeding tube.   Wife was taking care of patient at home, managing wounds, pt is a retired     Wife declined hospice at this time.

## 2023-02-17 NOTE — DIETITIAN INITIAL EVALUATION ADULT - NSFNSPHYEXAMSKINFT_GEN_A_CORE
# Infected Left sacral  decubitus ulcer  # B/L heel  pressure ulcer with eschar     L and R heel unstageable  L medial foot DTI  sacrum unstageable  L hip DTI  R hip unstageable  per flow sheets ^

## 2023-02-17 NOTE — PROGRESS NOTE ADULT - SUBJECTIVE AND OBJECTIVE BOX
KERWINDAMIEN [id number]    Subjective/Interval History       ROS    PHYSICAL EXAM  Vital Signs Last 24 Hrs  T(C): 35.9 (2023 13:58), Max: 37.8 (2023 21:09)  T(F): 96.7 (2023 13:58), Max: 100.1 (2023 21:09)  HR: 85 (2023 13:58) (74 - 98)  BP: 127/65 (2023 13:58) (100/60 - 127/65)  BP(mean): --  RR: 18 (2023 13:58) (18 - 18)  SpO2: 100% (2023 13:58) (96% - 100%)    Parameters below as of 2023 13:58  Patient On (Oxygen Delivery Method): room air      GA : AAOX3, NAD   HEENT: PERRLA, EOMI  NECK: no JVD, no thyromegaly   CVS: S1 S2 no murmur no rubs no gallop  RESP: CTAB no wheeze, no rhonchi no rales  ABD: Soft, NT, ND, tympanic, no rebound or guarding   : No [place], No CVA tenderness   EXT; no pedal edema, no cyanosis  MSK: No ML spinal tenderness, normal ROM   NEURO: AAOX3, no new focal deficits     LABS/ IMAGING                        10.8   7.86  )-----------( 209      ( 2023 06:54 )             32.0         02-    137  |  105  |  8<L>  ----------------------------<  93  3.7   |  25  |  0.6<L>    Ca    8.2<L>      2023 06:54  Mg     1.9     -    TPro  5.6<L>  /  Alb  2.2<L>  /  TBili  <0.2  /  DBili  x   /  AST  19  /  ALT  7   /  AlkPhos  84  02-16    CAPILLARY BLOOD GLUCOSE      POCT Blood Glucose.: 81 mg/dL (2023 10:40)  POCT Blood Glucose.: 92 mg/dL (2023 07:37)  POCT Blood Glucose.: 182 mg/dL (2023 20:53)    Urinalysis Basic - ( 2023 00:25 )    Color: Yellow / Appearance: Clear / S.025 / pH: x  Gluc: x / Ketone: 15  / Bili: Small / Urobili: 1.0 mg/dL   Blood: x / Protein: 30 mg/dL / Nitrite: Negative   Leuk Esterase: Negative / RBC: 3-5 /HPF / WBC 3-5 /HPF   Sq Epi: x / Non Sq Epi: Occasional /HPF / Bacteria: x      LIVER FUNCTIONS - ( 2023 08:15 )  Alb: 2.2 g/dL / Pro: 5.6 g/dL / ALK PHOS: 84 U/L / ALT: 7 U/L / AST: 19 U/L / GGT: x             Culture - Blood (collected 2023 20:45)  Source: .Blood Blood-Peripheral  Gram Stain (2023 12:17):    Growth in anaerobic bottle: Gram Positive Cocci in Clusters  Preliminary Report (2023 12:17):    Growth in anaerobic bottle: Gram Positive Cocci in Clusters    Culture - Blood (collected 2023 20:45)  Source: .Blood Blood-Peripheral  Gram Stain (2023 21:47):    Growth in anaerobic bottle: Gram Positive Cocci in Clusters  Final Report (2023 14:50):    Growth in anaerobic bottle: Staphylococcus epidermidis    Coagulase Negative Staphylococci isolated from a single blood culture set    may represent contamination.    Contact the Microbiology Department at [phone] if susceptibility    testing is clinically indicated.    ***Blood Panel PCR results on this specimen are available    approximately 3 hours after the Gram stain result.***    Gram stain, PCR, and/or culture results may not always    correspond due to difference in methodologies.    ************************************************************    This PCR assay was performed by multiplex PCR. This    Assay tests for 66 bacterial and resistance gene targets.    Please refer to the [facility] test directory    at [email or web address] for details.  Organism: Blood Culture PCR (2023 14:50)  Organism: Blood Culture PCR (2023 14:50)           KERWIN [id number]    Subjective/Interval History   nonverbal lying in bed     ROS  - unable to obtain due to dementia/non verbal     PHYSICAL EXAM  Vital Signs Last 24 Hrs  T(C): 35.9 (2023 13:58), Max: 37.8 (2023 21:09)  T(F): 96.7 (2023 13:58), Max: 100.1 (2023 21:09)  HR: 85 (2023 13:58) (74 - 98)  BP: 127/65 (2023 13:58) (100/60 - 127/65)  BP(mean): --  RR: 18 (2023 13:58) (18 - 18)  SpO2: 100% (2023 13:58) (96% - 100%)    Parameters below as of 2023 13:58  Patient On (Oxygen Delivery Method): room air    GA: nonverbal, demented, NAD.    HEENT: PERRL  NECK: no JVD, no thyromegaly   CVS: S1 S2 no murmur no rubs no gallop  RESP: CTAB no wheeze, no rhonchi no rales  ABD: Soft, NT, ND, tympanic, no rebound or guarding   : No [place], No CVA tenderness   EXT; no pedal edema, no cyanosis  SKIN: Right hip pressure ulcer foul smelling unstageable, pressure ulcers sacral, b/l heels  NEURO demented, unable to perform.     LABS/ IMAGING                        10.8   7.86  )-----------( 209      ( 2023 06:54 )             32.0       -    137  |  105  |  8<L>  ----------------------------<  93  3.7   |  25  |  0.6<L>    Ca    8.2<L>      2023 06:54  Mg     1.9         TPro  5.6<L>  /  Alb  2.2<L>  /  TBili  <0.2  /  DBili  x   /  AST  19  /  ALT  7   /  AlkPhos  84  02-16    CAPILLARY BLOOD GLUCOSE      POCT Blood Glucose.: 81 mg/dL (2023 10:40)  POCT Blood Glucose.: 92 mg/dL (2023 07:37)  POCT Blood Glucose.: 182 mg/dL (2023 20:53)    Urinalysis Basic - ( 2023 00:25 )    Color: Yellow / Appearance: Clear / S.025 / pH: x  Gluc: x / Ketone: 15  / Bili: Small / Urobili: 1.0 mg/dL   Blood: x / Protein: 30 mg/dL / Nitrite: Negative   Leuk Esterase: Negative / RBC: 3-5 /HPF / WBC 3-5 /HPF   Sq Epi: x / Non Sq Epi: Occasional /HPF / Bacteria: x      LIVER FUNCTIONS - ( 2023 08:15 )  Alb: 2.2 g/dL / Pro: 5.6 g/dL / ALK PHOS: 84 U/L / ALT: 7 U/L / AST: 19 U/L / GGT: x             Culture - Blood (collected 2023 20:45)  Source: .Blood Blood-Peripheral  Gram Stain (2023 12:17):    Growth in anaerobic bottle: Gram Positive Cocci in Clusters  Preliminary Report (2023 12:17):    Growth in anaerobic bottle: Gram Positive Cocci in Clusters    Culture - Blood (collected 2023 20:45)  Source: .Blood Blood-Peripheral  Gram Stain (2023 21:47):    Growth in anaerobic bottle: Gram Positive Cocci in Clusters  Final Report (2023 14:50):    Growth in anaerobic bottle: Staphylococcus epidermidis    Coagulase Negative Staphylococci isolated from a single blood culture set    may represent contamination.    Contact the Microbiology Department at [phone] if susceptibility    testing is clinically indicated.    ***Blood Panel PCR results on this specimen are available    approximately 3 hours after the Gram stain result.***    Gram stain, PCR, and/or culture results may not always    correspond due to difference in methodologies.    ************************************************************    This PCR assay was performed by multiplex PCR. This    Assay tests for 66 bacterial and resistance gene targets.    Please refer to the [facility] test directory    at [email or web address] for details.  Organism: Blood Culture PCR (2023 14:50)  Organism: Blood Culture PCR (2023 14:50)

## 2023-02-18 ENCOUNTER — TRANSCRIPTION ENCOUNTER (OUTPATIENT)
Age: 83
End: 2023-02-18

## 2023-02-18 LAB
ANION GAP SERPL CALC-SCNC: 9 MMOL/L — SIGNIFICANT CHANGE UP (ref 7–14)
BASOPHILS # BLD AUTO: 0.04 K/UL — SIGNIFICANT CHANGE UP (ref 0–0.2)
BASOPHILS NFR BLD AUTO: 0.5 % — SIGNIFICANT CHANGE UP (ref 0–1)
BUN SERPL-MCNC: 5 MG/DL — LOW (ref 10–20)
CALCIUM SERPL-MCNC: 7.9 MG/DL — LOW (ref 8.4–10.5)
CHLORIDE SERPL-SCNC: 102 MMOL/L — SIGNIFICANT CHANGE UP (ref 98–110)
CO2 SERPL-SCNC: 24 MMOL/L — SIGNIFICANT CHANGE UP (ref 17–32)
CREAT SERPL-MCNC: 0.5 MG/DL — LOW (ref 0.7–1.5)
CULTURE RESULTS: SIGNIFICANT CHANGE UP
EGFR: 102 ML/MIN/1.73M2 — SIGNIFICANT CHANGE UP
EOSINOPHIL # BLD AUTO: 0.06 K/UL — SIGNIFICANT CHANGE UP (ref 0–0.7)
EOSINOPHIL NFR BLD AUTO: 0.7 % — SIGNIFICANT CHANGE UP (ref 0–8)
GLUCOSE BLDC GLUCOMTR-MCNC: 100 MG/DL — HIGH (ref 70–99)
GLUCOSE BLDC GLUCOMTR-MCNC: 112 MG/DL — HIGH (ref 70–99)
GLUCOSE BLDC GLUCOMTR-MCNC: 91 MG/DL — SIGNIFICANT CHANGE UP (ref 70–99)
GLUCOSE SERPL-MCNC: 91 MG/DL — SIGNIFICANT CHANGE UP (ref 70–99)
GRAM STN FLD: SIGNIFICANT CHANGE UP
HCT VFR BLD CALC: 32 % — LOW (ref 42–52)
HGB BLD-MCNC: 10.7 G/DL — LOW (ref 14–18)
IMM GRANULOCYTES NFR BLD AUTO: 0.6 % — HIGH (ref 0.1–0.3)
LYMPHOCYTES # BLD AUTO: 1.14 K/UL — LOW (ref 1.2–3.4)
LYMPHOCYTES # BLD AUTO: 14.2 % — LOW (ref 20.5–51.1)
MCHC RBC-ENTMCNC: 31 PG — SIGNIFICANT CHANGE UP (ref 27–31)
MCHC RBC-ENTMCNC: 33.4 G/DL — SIGNIFICANT CHANGE UP (ref 32–37)
MCV RBC AUTO: 92.8 FL — SIGNIFICANT CHANGE UP (ref 80–94)
MONOCYTES # BLD AUTO: 0.63 K/UL — HIGH (ref 0.1–0.6)
MONOCYTES NFR BLD AUTO: 7.9 % — SIGNIFICANT CHANGE UP (ref 1.7–9.3)
NEUTROPHILS # BLD AUTO: 6.09 K/UL — SIGNIFICANT CHANGE UP (ref 1.4–6.5)
NEUTROPHILS NFR BLD AUTO: 76.1 % — HIGH (ref 42.2–75.2)
NRBC # BLD: 0 /100 WBCS — SIGNIFICANT CHANGE UP (ref 0–0)
ORGANISM # SPEC MICROSCOPIC CNT: SIGNIFICANT CHANGE UP
ORGANISM # SPEC MICROSCOPIC CNT: SIGNIFICANT CHANGE UP
PLATELET # BLD AUTO: 230 K/UL — SIGNIFICANT CHANGE UP (ref 130–400)
POTASSIUM SERPL-MCNC: 3.7 MMOL/L — SIGNIFICANT CHANGE UP (ref 3.5–5)
POTASSIUM SERPL-SCNC: 3.7 MMOL/L — SIGNIFICANT CHANGE UP (ref 3.5–5)
RBC # BLD: 3.45 M/UL — LOW (ref 4.7–6.1)
RBC # FLD: 13.2 % — SIGNIFICANT CHANGE UP (ref 11.5–14.5)
SARS-COV-2 RNA SPEC QL NAA+PROBE: SIGNIFICANT CHANGE UP
SODIUM SERPL-SCNC: 135 MMOL/L — SIGNIFICANT CHANGE UP (ref 135–146)
VANCOMYCIN TROUGH SERPL-MCNC: <4 UG/ML — LOW (ref 5–10)
WBC # BLD: 8.01 K/UL — SIGNIFICANT CHANGE UP (ref 4.8–10.8)
WBC # FLD AUTO: 8.01 K/UL — SIGNIFICANT CHANGE UP (ref 4.8–10.8)

## 2023-02-18 PROCEDURE — 88304 TISSUE EXAM BY PATHOLOGIST: CPT | Mod: 26

## 2023-02-18 PROCEDURE — 88311 DECALCIFY TISSUE: CPT | Mod: 26

## 2023-02-18 PROCEDURE — 99232 SBSQ HOSP IP/OBS MODERATE 35: CPT

## 2023-02-18 RX ORDER — CEFEPIME 1 G/1
1000 INJECTION, POWDER, FOR SOLUTION INTRAMUSCULAR; INTRAVENOUS EVERY 8 HOURS
Refills: 0 | Status: DISCONTINUED | OUTPATIENT
Start: 2023-02-19 | End: 2023-02-21

## 2023-02-18 RX ORDER — CEFEPIME 1 G/1
INJECTION, POWDER, FOR SOLUTION INTRAMUSCULAR; INTRAVENOUS
Refills: 0 | Status: DISCONTINUED | OUTPATIENT
Start: 2023-02-18 | End: 2023-02-21

## 2023-02-18 RX ORDER — SODIUM CHLORIDE 9 MG/ML
1000 INJECTION, SOLUTION INTRAVENOUS
Refills: 0 | Status: DISCONTINUED | OUTPATIENT
Start: 2023-02-18 | End: 2023-02-21

## 2023-02-18 RX ORDER — CEFEPIME 1 G/1
1000 INJECTION, POWDER, FOR SOLUTION INTRAMUSCULAR; INTRAVENOUS ONCE
Refills: 0 | Status: COMPLETED | OUTPATIENT
Start: 2023-02-18 | End: 2023-02-18

## 2023-02-18 RX ORDER — VANCOMYCIN HCL 1 G
750 VIAL (EA) INTRAVENOUS EVERY 12 HOURS
Refills: 0 | Status: DISCONTINUED | OUTPATIENT
Start: 2023-02-18 | End: 2023-02-21

## 2023-02-18 RX ORDER — HYDROMORPHONE HYDROCHLORIDE 2 MG/ML
0.5 INJECTION INTRAMUSCULAR; INTRAVENOUS; SUBCUTANEOUS
Refills: 0 | Status: DISCONTINUED | OUTPATIENT
Start: 2023-02-18 | End: 2023-02-18

## 2023-02-18 RX ORDER — SODIUM CHLORIDE 9 MG/ML
1000 INJECTION, SOLUTION INTRAVENOUS
Refills: 0 | Status: DISCONTINUED | OUTPATIENT
Start: 2023-02-18 | End: 2023-02-18

## 2023-02-18 RX ORDER — DONEPEZIL HYDROCHLORIDE 10 MG/1
10 TABLET, FILM COATED ORAL AT BEDTIME
Refills: 0 | Status: DISCONTINUED | OUTPATIENT
Start: 2023-02-18 | End: 2023-02-28

## 2023-02-18 RX ORDER — VALPROIC ACID (AS SODIUM SALT) 250 MG/5ML
250 SOLUTION, ORAL ORAL
Refills: 0 | Status: DISCONTINUED | OUTPATIENT
Start: 2023-02-18 | End: 2023-02-28

## 2023-02-18 RX ORDER — MEMANTINE HYDROCHLORIDE 10 MG/1
5 TABLET ORAL
Refills: 0 | Status: DISCONTINUED | OUTPATIENT
Start: 2023-02-18 | End: 2023-02-28

## 2023-02-18 RX ORDER — OXYCODONE HYDROCHLORIDE 5 MG/1
5 TABLET ORAL ONCE
Refills: 0 | Status: DISCONTINUED | OUTPATIENT
Start: 2023-02-18 | End: 2023-02-18

## 2023-02-18 RX ORDER — ENOXAPARIN SODIUM 100 MG/ML
40 INJECTION SUBCUTANEOUS EVERY 24 HOURS
Refills: 0 | Status: CANCELLED | OUTPATIENT
Start: 2023-02-19 | End: 2023-02-18

## 2023-02-18 RX ORDER — METRONIDAZOLE 500 MG
500 TABLET ORAL EVERY 8 HOURS
Refills: 0 | Status: DISCONTINUED | OUTPATIENT
Start: 2023-02-18 | End: 2023-02-21

## 2023-02-18 RX ORDER — ACETAMINOPHEN 500 MG
1000 TABLET ORAL ONCE
Refills: 0 | Status: DISCONTINUED | OUTPATIENT
Start: 2023-02-18 | End: 2023-02-18

## 2023-02-18 RX ADMIN — Medication 250 MILLIGRAM(S): at 11:47

## 2023-02-18 RX ADMIN — CEFEPIME 100 MILLIGRAM(S): 1 INJECTION, POWDER, FOR SOLUTION INTRAMUSCULAR; INTRAVENOUS at 05:40

## 2023-02-18 RX ADMIN — MEMANTINE HYDROCHLORIDE 5 MILLIGRAM(S): 10 TABLET ORAL at 19:47

## 2023-02-18 RX ADMIN — Medication 250 MILLIGRAM(S): at 19:47

## 2023-02-18 RX ADMIN — Medication 250 MILLIGRAM(S): at 05:39

## 2023-02-18 RX ADMIN — SODIUM CHLORIDE 150 MILLILITER(S): 9 INJECTION, SOLUTION INTRAVENOUS at 18:05

## 2023-02-18 RX ADMIN — Medication 100 MILLIGRAM(S): at 13:42

## 2023-02-18 RX ADMIN — Medication 650 MILLIGRAM(S): at 07:44

## 2023-02-18 RX ADMIN — CEFEPIME 100 MILLIGRAM(S): 1 INJECTION, POWDER, FOR SOLUTION INTRAMUSCULAR; INTRAVENOUS at 14:53

## 2023-02-18 RX ADMIN — Medication 650 MILLIGRAM(S): at 11:53

## 2023-02-18 RX ADMIN — Medication 650 MILLIGRAM(S): at 11:47

## 2023-02-18 RX ADMIN — DONEPEZIL HYDROCHLORIDE 10 MILLIGRAM(S): 10 TABLET, FILM COATED ORAL at 22:12

## 2023-02-18 RX ADMIN — MEMANTINE HYDROCHLORIDE 5 MILLIGRAM(S): 10 TABLET ORAL at 05:39

## 2023-02-18 RX ADMIN — Medication 100 MILLIGRAM(S): at 22:12

## 2023-02-18 RX ADMIN — Medication 250 MILLIGRAM(S): at 20:27

## 2023-02-18 RX ADMIN — SODIUM CHLORIDE 75 MILLILITER(S): 9 INJECTION, SOLUTION INTRAVENOUS at 07:30

## 2023-02-18 RX ADMIN — CEFEPIME 100 MILLIGRAM(S): 1 INJECTION, POWDER, FOR SOLUTION INTRAMUSCULAR; INTRAVENOUS at 19:46

## 2023-02-18 RX ADMIN — Medication 100 MILLIGRAM(S): at 05:39

## 2023-02-18 RX ADMIN — Medication 650 MILLIGRAM(S): at 05:39

## 2023-02-18 RX ADMIN — Medication 250 MILLIGRAM(S): at 00:20

## 2023-02-18 NOTE — CHART NOTE - NSCHARTNOTEFT_GEN_A_CORE
PACU ANESTHESIA ADMISSION NOTE      Procedure: debridement of multiple ulcers    ____  Intubated  TV:______       Rate: ______      FiO2: ______    ___x_  Patent Airway    __x__  Full return of protective reflexes    __x__  Full recovery from anesthesia / back to baseline     Vitals:   T:     98.3 axillary      R:  20           BP:   145/94              Sat:        98         P: 95  Mental Status:  ___x_ Awake   _____ Alert   _____ Drowsy   _____ Sedated    Nausea/Vomiting:  __x__ NO  ______Yes,   See Post - Op Orders          Pain Scale (0-10):  _0____    Treatment: ____ None    ____ See Post - Op/PCA Orders    Post - Operative Fluids:   ____ Oral   ___x_ See Post - Op Orders    Plan: Discharge:   ___Home       _x____Floor     _____Critical Care    _____  Other:_________________    Comments:

## 2023-02-18 NOTE — CHART NOTE - NSCHARTNOTEFT_GEN_A_CORE
Received a call from Surgery   Debridement completed   Patient tolerated procedure well   Keep the dressing on for 3 days even if it gets saturated   Give Intravenous Fluids   ID Consult follow up   Follow up OR culture results

## 2023-02-18 NOTE — BRIEF OPERATIVE NOTE - NSICDXBRIEFPOSTOP_GEN_ALL_CORE_FT
POST-OP DIAGNOSIS:  Decubitus ulcer of sacral region, stage 4 18-Feb-2023 17:57:18  Dionicio Crowder

## 2023-02-18 NOTE — PROGRESS NOTE ADULT - SUBJECTIVE AND OBJECTIVE BOX
KERWIN [id number]    Subjective/Interval History       ROS    PHYSICAL EXAM  Vital Signs Last 24 Hrs  T(C): 36.1 (2023 14:31), Max: 37.2 (2023 05:00)  T(F): 97 (2023 14:31), Max: 99 (2023 05:00)  HR: 98 (2023 14:31) (50 - 98)  BP: 104/64 (2023 14:31) (104/64 - 114/68)  BP(mean): --  RR: 18 (2023 14:31) (18 - 18)  SpO2: 99% (2023 14:31) (99% - 99%)    Parameters below as of 2023 05:00  Patient On (Oxygen Delivery Method): room air      GA : AAOX3, NAD   HEENT: PERRLA, EOMI  NECK: no JVD, no thyromegaly   CVS: S1 S2 no murmur no rubs no gallop  RESP: CTAB no wheeze, no rhonchi no rales  ABD: Soft, NT, ND, tympanic, no rebound or guarding   : No [place], No CVA tenderness   EXT; no pedal edema, no cyanosis  MSK: No ML spinal tenderness, normal ROM   NEURO: AAOX3, no new focal deficits     LABS/ IMAGING                        10.7   8.01  )-----------( 230      ( 2023 07:43 )             32.0         02-18    135  |  102  |  5<L>  ----------------------------<  91  3.7   |  24  |  0.5<L>    Ca    7.9<L>      2023 07:43  Mg     1.9     02-17      CAPILLARY BLOOD GLUCOSE      POCT Blood Glucose.: 100 mg/dL (2023 11:40)  POCT Blood Glucose.: 91 mg/dL (2023 07:50)    Urinalysis Basic - ( 2023 00:25 )    Color: Yellow / Appearance: Clear / S.025 / pH: x  Gluc: x / Ketone: 15  / Bili: Small / Urobili: 1.0 mg/dL   Blood: x / Protein: 30 mg/dL / Nitrite: Negative   Leuk Esterase: Negative / RBC: 3-5 /HPF / WBC 3-5 /HPF   Sq Epi: x / Non Sq Epi: Occasional /HPF / Bacteria: x               KERWIN [id number]    Subjective/Interval History   pt not in acute distress, nonverbal     ROS  -nonverbal     PHYSICAL EXAM  Vital Signs Last 24 Hrs  T(C): 36.1 (2023 14:31), Max: 37.2 (2023 05:00)  T(F): 97 (2023 14:31), Max: 99 (2023 05:00)  HR: 98 (2023 14:31) (50 - 98)  BP: 104/64 (2023 14:31) (104/64 - 114/68)  BP(mean): --  RR: 18 (2023 14:31) (18 - 18)  SpO2: 99% (2023 14:31) (99% - 99%)    Parameters below as of 2023 05:00  Patient On (Oxygen Delivery Method): room air      GA: nonverbal, demented, NAD.    HEENT: PERRL  NECK: no JVD, no thyromegaly   CVS: S1 S2 no murmur no rubs no gallop  RESP: CTAB no wheeze, no rhonchi no rales  ABD: Soft, NT, ND, tympanic, no rebound or guarding   : No [place], No CVA tenderness   EXT; no pedal edema, no cyanosis  SKIN: Right hip pressure ulcer foul smelling unstageable, pressure ulcers sacral, b/l heels  NEURO demented, unable to perform    LABS/ IMAGING                        10.7   8.01  )-----------( 230      ( 2023 07:43 )             32.0             135  |  102  |  5<L>  ----------------------------<  91  3.7   |  24  |  0.5<L>    Ca    7.9<L>      2023 07:43  Mg     1.9     -      CAPILLARY BLOOD GLUCOSE      POCT Blood Glucose.: 100 mg/dL (2023 11:40)  POCT Blood Glucose.: 91 mg/dL (2023 07:50)    Urinalysis Basic - ( 2023 00:25 )    Color: Yellow / Appearance: Clear / S.025 / pH: x  Gluc: x / Ketone: 15  / Bili: Small / Urobili: 1.0 mg/dL   Blood: x / Protein: 30 mg/dL / Nitrite: Negative   Leuk Esterase: Negative / RBC: 3-5 /HPF / WBC 3-5 /HPF   Sq Epi: x / Non Sq Epi: Occasional /HPF / Bacteria: x

## 2023-02-18 NOTE — PROGRESS NOTE ADULT - ASSESSMENT
MEDICATIONS  (STANDING):  acetaminophen     Tablet .. 650 milliGRAM(s) Oral every 6 hours  bisacodyl Suppository 10 milliGRAM(s) Rectal once  cefepime   IVPB      cefepime   IVPB 1000 milliGRAM(s) IV Intermittent every 8 hours  dextrose 5% + sodium chloride 0.9%. 1000 milliLiter(s) (75 mL/Hr) IV Continuous <Continuous>  donepezil 10 milliGRAM(s) Oral at bedtime  memantine 5 milliGRAM(s) Oral two times a day  metroNIDAZOLE  IVPB 500 milliGRAM(s) IV Intermittent every 8 hours  valproic  acid Syrup 250 milliGRAM(s) Oral two times a day  vancomycin  IVPB 750 milliGRAM(s) IV Intermittent every 12 hours    MEDICATIONS  (PRN):    Sepsis Likely R hip pressure ulcer infection   Multiple pressure ulcers various stages, right heel DTI, left heel with blackish discharge, sacral pressure ulcer, R hip pressure ulcer with purulent discharge.   -- IV abx, vancomycin, cefepime and flagyl per ID, D3 abx iv fluids, pending cultures  - surgery consulted, possibly debridement on 2/18/23.  and wound care nursing consulted , plan for - ID following   - pt has moderate cardiac risk given GSCRI 0f 1.3%, can proceed with debridement if risk greater than benefit.   - blood cx NGTD one bottle, one bottle GPC (likely contamination  - cardiology preop evaluation rec noted.   - NPO after MN, obtain CT AP iv contrast     Elevated troponin due to demand ischemia, ruled out ACS   - trop 0.07- 0.04  -0.03 stable  - continue with iv fluids    Severe PCM  - puree TL with ensure TID      Dementia with dysphagia (advance)   - ensure  TID   - Bed bound/NonverbaL  - feeding with assistance/ fall precautions   - resume donepezil 10 mg po bid , memantine 5 mg po daily   - previous CTH global cerebral volume loss    Seizure disorder   - IV aed, valproic acid change back to 250 mg po bid.     Hx/o PE prior to arrival on eliquis in 2021   - hold eliquis for now     CAD/HLD  - given age/dementia not a candidate for statins.      - DVT px with lovenox, scd (hold am dose)    - hold eliquis for now pending SLP eval     Guarded prognosis, d/w wife, will fill out MOLST IN AM, DNR/DNI no feeding tube.   Wife was taking care of patient at home, managing wounds, pt is a retired     Wife declined hospice at this time.    MEDICATIONS  (STANDING):    MEDICATIONS  (PRN):      Sepsis Likely R hip pressure ulcer infection   Multiple pressure ulcers various stages, right heel DTI, left heel with blackish discharge, sacral pressure ulcer, R hip pressure ulcer with purulent discharge.   -- IV abx, vancomycin, cefepime and flagyl per ID, D4/7  abx iv fluids, pending cultures  - surgery consulted, possibly debridement today.  and wound care nursing consulted , plan for - ID following   - pt has moderate cardiac risk given GSCRI 0f 1.3%, can proceed with debridement if risk greater than benefit.   - blood cx staph epidermidis likely contamination   - cardiology preop evaluation rec noted.   - NPO currently,  CT AP iv contrast no abscess or osteomyelitis noted.     Elevated troponin due to demand ischemia, ruled out ACS   - trop 0.07- 0.04  -0.03 stable  - continue with iv fluids    Severe PCM  - puree TL with ensure TID      Dementia with dysphagia (advance)   - ensure  TID   - Bed bound/NonverbaL  - feeding with assistance/ fall precautions   - resume donepezil 10 mg po bid , memantine 5 mg po daily   - previous CTH global cerebral volume loss    Seizure disorder   - IV aed, valproic acid change back to 250 mg po bid.     Hx/o PE prior to arrival on eliquis in 2021   - hold eliquis for now     CAD/HLD  - given age/dementia not a candidate for statins.      - DVT px with lovenox, scd (hold am dose)    - hold eliquis for now pending SLP eval     Guarded prognosis, d/w wife, will fill out MOLST IN AM, DNR/DNI no feeding tube.   Wife was taking care of patient at home, managing wounds, pt is a retired     Wife declined hospice at this time.    MEDICATIONS  (STANDING):    MEDICATIONS  (PRN):      Sepsis Likely R hip pressure ulcer infection   Multiple pressure ulcers various stages, right heel DTI, left heel with blackish discharge, sacral pressure ulcer, R hip pressure ulcer with purulent discharge.   -- IV abx, vancomycin, cefepime and flagyl per ID, D4/7  abx iv fluids, pending cultures  - surgery consulted, possibly debridement today.  and wound care nursing consulted , plan for - ID following   - pt has moderate cardiac risk given GSCRI 0f 1.3%, can proceed with debridement if risk greater than benefit.   - blood cx staph epidermidis likely contamination   - cardiology preop evaluation rec noted.   - NPO currently,  CT AP iv contrast no abscess or osteomyelitis noted.     Elevated troponin due to demand ischemia, ruled out ACS   - trop 0.07- 0.04  -0.03 stable  - continue with iv fluids    Severe PCM  - puree TL with ensure TID      Dementia with dysphagia (advance) ([place])   - ensure  TID   - Bed bound/NonverbaL  - feeding with assistance/ fall precautions   - resume donepezil 10 mg po bid , memantine 5 mg po daily   - previous CTH global cerebral volume loss    Seizure disorder   - valproic acid oral solution 250 mg bid     Hx/o PE prior to arrival on eliquis in 2021   - hold eliquis for now     CAD/HLD  - given age/dementia not a candidate for statins.      - DVT px with lovenox, scd (hold am dose)    - hold eliquis for now pending SLP eval     Guarded prognosis, d/w wife, will fill out MOLST IN AM, DNR/DNI no feeding tube.   Wife was taking care of patient at home, managing wounds, pt is a retired     Wife declined hospice at this time.

## 2023-02-18 NOTE — BRIEF OPERATIVE NOTE - SPECIMENS
sacral wound culture, sacral soft tissue, sacral bone biopsy, right hip wound culture, right hip soft tissue, right hip greater trochanter bone biopsy, left calcaneal wound culture, left calcaneal soft tissue, left calcaneal bone biopsy

## 2023-02-18 NOTE — BRIEF OPERATIVE NOTE - NSICDXBRIEFPREOP_GEN_ALL_CORE_FT
PRE-OP DIAGNOSIS:  Decubitus ulcer of sacral region, stage 4 18-Feb-2023 17:57:11  Dionicio Crowder

## 2023-02-18 NOTE — BRIEF OPERATIVE NOTE - OPERATION/FINDINGS
Debridement of multiple decubitus ulcers.  Sacral ulcer, right hip ulcer, left heel ulcer.  Cultures, tissue specimen, and bone specimen sent from each wound.  Packed with bone wax, fibrillar and gauze dressing.

## 2023-02-19 LAB
-  AMPICILLIN/SULBACTAM: SIGNIFICANT CHANGE UP
-  CEFAZOLIN: SIGNIFICANT CHANGE UP
-  CLINDAMYCIN: SIGNIFICANT CHANGE UP
-  ERYTHROMYCIN: SIGNIFICANT CHANGE UP
-  GENTAMICIN: SIGNIFICANT CHANGE UP
-  OXACILLIN: SIGNIFICANT CHANGE UP
-  PENICILLIN: SIGNIFICANT CHANGE UP
-  RIFAMPIN: SIGNIFICANT CHANGE UP
-  TETRACYCLINE: SIGNIFICANT CHANGE UP
-  TRIMETHOPRIM/SULFAMETHOXAZOLE: SIGNIFICANT CHANGE UP
-  VANCOMYCIN: SIGNIFICANT CHANGE UP
CULTURE RESULTS: SIGNIFICANT CHANGE UP
GLUCOSE BLDC GLUCOMTR-MCNC: 128 MG/DL — HIGH (ref 70–99)
GLUCOSE BLDC GLUCOMTR-MCNC: 132 MG/DL — HIGH (ref 70–99)
GLUCOSE BLDC GLUCOMTR-MCNC: 88 MG/DL — SIGNIFICANT CHANGE UP (ref 70–99)
GLUCOSE BLDC GLUCOMTR-MCNC: 97 MG/DL — SIGNIFICANT CHANGE UP (ref 70–99)
METHOD TYPE: SIGNIFICANT CHANGE UP
ORGANISM # SPEC MICROSCOPIC CNT: SIGNIFICANT CHANGE UP
ORGANISM # SPEC MICROSCOPIC CNT: SIGNIFICANT CHANGE UP
SPECIMEN SOURCE: SIGNIFICANT CHANGE UP

## 2023-02-19 PROCEDURE — 76937 US GUIDE VASCULAR ACCESS: CPT | Mod: 26

## 2023-02-19 PROCEDURE — 99497 ADVNCD CARE PLAN 30 MIN: CPT | Mod: 25

## 2023-02-19 PROCEDURE — 99233 SBSQ HOSP IP/OBS HIGH 50: CPT

## 2023-02-19 PROCEDURE — 36415 COLL VENOUS BLD VENIPUNCTURE: CPT

## 2023-02-19 RX ORDER — APIXABAN 2.5 MG/1
2.5 TABLET, FILM COATED ORAL EVERY 12 HOURS
Refills: 0 | Status: DISCONTINUED | OUTPATIENT
Start: 2023-02-19 | End: 2023-02-28

## 2023-02-19 RX ADMIN — Medication 250 MILLIGRAM(S): at 07:37

## 2023-02-19 RX ADMIN — MEMANTINE HYDROCHLORIDE 5 MILLIGRAM(S): 10 TABLET ORAL at 05:14

## 2023-02-19 RX ADMIN — APIXABAN 2.5 MILLIGRAM(S): 2.5 TABLET, FILM COATED ORAL at 17:48

## 2023-02-19 RX ADMIN — Medication 100 MILLIGRAM(S): at 06:15

## 2023-02-19 RX ADMIN — Medication 250 MILLIGRAM(S): at 17:48

## 2023-02-19 RX ADMIN — Medication 100 MILLIGRAM(S): at 13:10

## 2023-02-19 RX ADMIN — CEFEPIME 100 MILLIGRAM(S): 1 INJECTION, POWDER, FOR SOLUTION INTRAMUSCULAR; INTRAVENOUS at 21:06

## 2023-02-19 RX ADMIN — Medication 250 MILLIGRAM(S): at 05:13

## 2023-02-19 RX ADMIN — CEFEPIME 100 MILLIGRAM(S): 1 INJECTION, POWDER, FOR SOLUTION INTRAMUSCULAR; INTRAVENOUS at 13:11

## 2023-02-19 RX ADMIN — CEFEPIME 100 MILLIGRAM(S): 1 INJECTION, POWDER, FOR SOLUTION INTRAMUSCULAR; INTRAVENOUS at 05:14

## 2023-02-19 RX ADMIN — DONEPEZIL HYDROCHLORIDE 10 MILLIGRAM(S): 10 TABLET, FILM COATED ORAL at 21:08

## 2023-02-19 RX ADMIN — MEMANTINE HYDROCHLORIDE 5 MILLIGRAM(S): 10 TABLET ORAL at 17:48

## 2023-02-19 RX ADMIN — Medication 100 MILLIGRAM(S): at 21:08

## 2023-02-19 NOTE — PROGRESS NOTE ADULT - ASSESSMENT
MEDICATIONS  (STANDING):    MEDICATIONS  (PRN):    Sepsis Likely R hip pressure ulcer infection , not c/w severe sepsis s/p debridement of sacral, left heel, and right hip pressure ulcers.   Multiple pressure ulcers various stages, right heel DTI, left heel with blackish discharge, sacral pressure ulcer, R hip pressure ulcer with purulent discharge.   - IV abx, vancomycin, cefepime and flagyl per ID, D4/7  abx iv fluids,     and wound care nursing consulted , plan for - ID following   - blood cx staph epidermidis likely contamination, wound cx pending (bone cx)   - cardiology preop evaluation rec noted.   - NPO currently,  CT AP iv contrast no abscess or osteomyelitis noted.     Elevated troponin due to demand ischemia, ruled out ACS   - trop 0.07- 0.04  -0.03 stable  - continue with iv fluids    Severe PCM  - puree TL with ensure TID      Dementia with dysphagia (advance) (transfers, wheelchair bound)   - ensure  TID, puree diet   - Bed bound/NonverbaL  - feeding with assistance/ fall precautions   - resume donepezil 10 mg po bid , memantine 5 mg po daily   - previous CTH global cerebral volume loss    Seizure disorder   - IV aed, valproic acid change back to 250 mg po bid.     Hx/o PE prior to arrival on eliquis in 2021   - resume eliquis 2.5 mg bid dosing     CAD/HLD  - given age/dementia not a candidate for statins.      DVT px  - resume eliquis.     DNR/DNI     Wife would like to resume current home health (Nursing and PT)

## 2023-02-19 NOTE — PROCEDURE NOTE - NSICDXPROCEDURE_GEN_ALL_CORE_FT
PROCEDURES:  US peripheral veins 19-Feb-2023 12:35:12  Cricket Rangel  Venipuncture in adult 19-Feb-2023 12:35:19  Cricket Rangel   Patient Reported Weight (Optional - Include Units): 190

## 2023-02-19 NOTE — GOALS OF CARE CONVERSATION - ADVANCED CARE PLANNING - CONVERSATION DETAILS
Pt is to be DNR/DNI, Senait Lambert is HCP, no feeding tube, okay to be admitted to hospital, trial of iv fluids, ok with abx.

## 2023-02-19 NOTE — PROGRESS NOTE ADULT - SUBJECTIVE AND OBJECTIVE BOX
Stable. KERWIN [id number]    Subjective/Interval History   nonverbal, not in acute distress   awake     ROS  - unable to obtain due to dementia.     PHYSICAL EXAM  Vital Signs Last 24 Hrs  T(C): 36.5 (19 Feb 2023 03:00), Max: 36.8 (18 Feb 2023 17:49)  T(F): 97.7 (19 Feb 2023 03:00), Max: 98.3 (18 Feb 2023 17:49)  HR: 93 (19 Feb 2023 03:00) (92 - 98)  BP: 127/70 (19 Feb 2023 03:00) (104/64 - 148/-)  BP(mean): 84 (18 Feb 2023 19:00) (84 - 88)  RR: 19 (19 Feb 2023 03:00) (18 - 21)  SpO2: 99% (19 Feb 2023 03:00) (97% - 99%)    Parameters below as of 19 Feb 2023 03:00  Patient On (Oxygen Delivery Method): room air      GA :  Demented, awake, NAD   HEENT: PERRL  NECK: no JVD, no thyromegaly   CVS: S1 S2 no murmur no rubs no gallop  RESP: CTAB no wheeze, no rhonchi no rales  ABD: Soft, NT, ND, tympanic, no rebound or guarding   : No [place], No CVA tenderness   EXT; no pedal edema, no cyanosis, contractures all  SKIN: Right hip pressure ulcer foul smelling unstageable, pressure ulcers sacral, b/l heels  NEURO: demented, unable to perform.      LABS/ IMAGING                        10.7   8.01  )-----------( 230      ( 18 Feb 2023 07:43 )             32.0         02-18    135  |  102  |  5<L>  ----------------------------<  91  3.7   |  24  |  0.5<L>    Ca    7.9<L>      18 Feb 2023 07:43      CAPILLARY BLOOD GLUCOSE      POCT Blood Glucose.: 88 mg/dL (19 Feb 2023 11:27)  POCT Blood Glucose.: 97 mg/dL (19 Feb 2023 07:27)  POCT Blood Glucose.: 112 mg/dL (18 Feb 2023 22:39)

## 2023-02-20 LAB
ANION GAP SERPL CALC-SCNC: 9 MMOL/L — SIGNIFICANT CHANGE UP (ref 7–14)
BASOPHILS # BLD AUTO: 0.03 K/UL — SIGNIFICANT CHANGE UP (ref 0–0.2)
BASOPHILS NFR BLD AUTO: 0.5 % — SIGNIFICANT CHANGE UP (ref 0–1)
BUN SERPL-MCNC: 4 MG/DL — LOW (ref 10–20)
CALCIUM SERPL-MCNC: 7.5 MG/DL — LOW (ref 8.4–10.5)
CHLORIDE SERPL-SCNC: 102 MMOL/L — SIGNIFICANT CHANGE UP (ref 98–110)
CO2 SERPL-SCNC: 24 MMOL/L — SIGNIFICANT CHANGE UP (ref 17–32)
CREAT SERPL-MCNC: 0.5 MG/DL — LOW (ref 0.7–1.5)
EGFR: 102 ML/MIN/1.73M2 — SIGNIFICANT CHANGE UP
EOSINOPHIL # BLD AUTO: 0.1 K/UL — SIGNIFICANT CHANGE UP (ref 0–0.7)
EOSINOPHIL NFR BLD AUTO: 1.6 % — SIGNIFICANT CHANGE UP (ref 0–8)
GLUCOSE BLDC GLUCOMTR-MCNC: 156 MG/DL — HIGH (ref 70–99)
GLUCOSE SERPL-MCNC: 130 MG/DL — HIGH (ref 70–99)
HCT VFR BLD CALC: 26.8 % — LOW (ref 42–52)
HGB BLD-MCNC: 9.2 G/DL — LOW (ref 14–18)
IMM GRANULOCYTES NFR BLD AUTO: 0.3 % — SIGNIFICANT CHANGE UP (ref 0.1–0.3)
LYMPHOCYTES # BLD AUTO: 1.07 K/UL — LOW (ref 1.2–3.4)
LYMPHOCYTES # BLD AUTO: 16.7 % — LOW (ref 20.5–51.1)
MAGNESIUM SERPL-MCNC: 1.6 MG/DL — LOW (ref 1.8–2.4)
MCHC RBC-ENTMCNC: 31.4 PG — HIGH (ref 27–31)
MCHC RBC-ENTMCNC: 34.3 G/DL — SIGNIFICANT CHANGE UP (ref 32–37)
MCV RBC AUTO: 91.5 FL — SIGNIFICANT CHANGE UP (ref 80–94)
MONOCYTES # BLD AUTO: 0.65 K/UL — HIGH (ref 0.1–0.6)
MONOCYTES NFR BLD AUTO: 10.2 % — HIGH (ref 1.7–9.3)
NEUTROPHILS # BLD AUTO: 4.53 K/UL — SIGNIFICANT CHANGE UP (ref 1.4–6.5)
NEUTROPHILS NFR BLD AUTO: 70.7 % — SIGNIFICANT CHANGE UP (ref 42.2–75.2)
NRBC # BLD: 0 /100 WBCS — SIGNIFICANT CHANGE UP (ref 0–0)
PLATELET # BLD AUTO: 228 K/UL — SIGNIFICANT CHANGE UP (ref 130–400)
POTASSIUM SERPL-MCNC: 3.1 MMOL/L — LOW (ref 3.5–5)
POTASSIUM SERPL-SCNC: 3.1 MMOL/L — LOW (ref 3.5–5)
RBC # BLD: 2.93 M/UL — LOW (ref 4.7–6.1)
RBC # FLD: 13.2 % — SIGNIFICANT CHANGE UP (ref 11.5–14.5)
SODIUM SERPL-SCNC: 135 MMOL/L — SIGNIFICANT CHANGE UP (ref 135–146)
VANCOMYCIN TROUGH SERPL-MCNC: 9.3 UG/ML — SIGNIFICANT CHANGE UP (ref 5–10)
WBC # BLD: 6.4 K/UL — SIGNIFICANT CHANGE UP (ref 4.8–10.8)
WBC # FLD AUTO: 6.4 K/UL — SIGNIFICANT CHANGE UP (ref 4.8–10.8)

## 2023-02-20 PROCEDURE — 99232 SBSQ HOSP IP/OBS MODERATE 35: CPT

## 2023-02-20 RX ORDER — ACETAMINOPHEN 500 MG
650 TABLET ORAL EVERY 6 HOURS
Refills: 0 | Status: DISCONTINUED | OUTPATIENT
Start: 2023-02-20 | End: 2023-02-28

## 2023-02-20 RX ORDER — POTASSIUM CHLORIDE 20 MEQ
20 PACKET (EA) ORAL
Refills: 0 | Status: COMPLETED | OUTPATIENT
Start: 2023-02-20 | End: 2023-02-20

## 2023-02-20 RX ORDER — MAGNESIUM SULFATE 500 MG/ML
2 VIAL (ML) INJECTION ONCE
Refills: 0 | Status: COMPLETED | OUTPATIENT
Start: 2023-02-20 | End: 2023-02-20

## 2023-02-20 RX ADMIN — SODIUM CHLORIDE 50 MILLILITER(S): 9 INJECTION, SOLUTION INTRAVENOUS at 09:42

## 2023-02-20 RX ADMIN — APIXABAN 2.5 MILLIGRAM(S): 2.5 TABLET, FILM COATED ORAL at 17:26

## 2023-02-20 RX ADMIN — CEFEPIME 100 MILLIGRAM(S): 1 INJECTION, POWDER, FOR SOLUTION INTRAMUSCULAR; INTRAVENOUS at 14:13

## 2023-02-20 RX ADMIN — Medication 250 MILLIGRAM(S): at 17:27

## 2023-02-20 RX ADMIN — APIXABAN 2.5 MILLIGRAM(S): 2.5 TABLET, FILM COATED ORAL at 05:37

## 2023-02-20 RX ADMIN — Medication 250 MILLIGRAM(S): at 17:26

## 2023-02-20 RX ADMIN — Medication 100 MILLIGRAM(S): at 05:37

## 2023-02-20 RX ADMIN — Medication 25 GRAM(S): at 11:16

## 2023-02-20 RX ADMIN — Medication 50 MILLIEQUIVALENT(S): at 15:39

## 2023-02-20 RX ADMIN — CEFEPIME 100 MILLIGRAM(S): 1 INJECTION, POWDER, FOR SOLUTION INTRAMUSCULAR; INTRAVENOUS at 05:10

## 2023-02-20 RX ADMIN — CEFEPIME 100 MILLIGRAM(S): 1 INJECTION, POWDER, FOR SOLUTION INTRAMUSCULAR; INTRAVENOUS at 21:42

## 2023-02-20 RX ADMIN — DONEPEZIL HYDROCHLORIDE 10 MILLIGRAM(S): 10 TABLET, FILM COATED ORAL at 21:42

## 2023-02-20 RX ADMIN — MEMANTINE HYDROCHLORIDE 5 MILLIGRAM(S): 10 TABLET ORAL at 05:37

## 2023-02-20 RX ADMIN — Medication 250 MILLIGRAM(S): at 06:45

## 2023-02-20 RX ADMIN — Medication 100 MILLIGRAM(S): at 14:13

## 2023-02-20 RX ADMIN — Medication 100 MILLIGRAM(S): at 21:42

## 2023-02-20 RX ADMIN — Medication 250 MILLIGRAM(S): at 05:38

## 2023-02-20 RX ADMIN — Medication 50 MILLIEQUIVALENT(S): at 11:17

## 2023-02-20 RX ADMIN — MEMANTINE HYDROCHLORIDE 5 MILLIGRAM(S): 10 TABLET ORAL at 17:26

## 2023-02-20 NOTE — PROGRESS NOTE ADULT - SUBJECTIVE AND OBJECTIVE BOX
KERWINDAMIEN [id number]    Subjective/Interval History       ROS    PHYSICAL EXAM  Vital Signs Last 24 Hrs  T(C): 36.3 (20 Feb 2023 05:19), Max: 36.7 (19 Feb 2023 21:39)  T(F): 97.3 (20 Feb 2023 05:19), Max: 98 (19 Feb 2023 21:39)  HR: 91 (20 Feb 2023 05:19) (91 - 105)  BP: 110/56 (20 Feb 2023 05:19) (110/56 - 135/85)  BP(mean): --  RR: 18 (20 Feb 2023 05:19) (18 - 18)  SpO2: 100% (20 Feb 2023 05:19) (100% - 100%)      GA : AAOX3, NAD   HEENT: PERRLA, EOMI  NECK: no JVD, no thyromegaly   CVS: S1 S2 no murmur no rubs no gallop  RESP: CTAB no wheeze, no rhonchi no rales  ABD: Soft, NT, ND, tympanic, no rebound or guarding   : No [place], No CVA tenderness   EXT; no pedal edema, no cyanosis  MSK: No ML spinal tenderness, normal ROM   NEURO: AAOX3, no new focal deficits     LABS/ IMAGING                        9.2    6.40  )-----------( 228      ( 20 Feb 2023 06:44 )             26.8         02-20    135  |  102  |  4<L>  ----------------------------<  130<H>  3.1<L>   |  24  |  0.5<L>    Ca    7.5<L>      20 Feb 2023 06:44  Mg     1.6     02-20      CAPILLARY BLOOD GLUCOSE      POCT Blood Glucose.: 156 mg/dL (20 Feb 2023 07:28)  POCT Blood Glucose.: 132 mg/dL (19 Feb 2023 20:55)  POCT Blood Glucose.: 128 mg/dL (19 Feb 2023 16:40)          Culture - Other (collected 18 Feb 2023 17:28)  Source: .Other RIGHT HEEL  Preliminary Report (20 Feb 2023 11:55):    Rare Staphylococcus aureus    Culture - Other (collected 18 Feb 2023 17:15)  Source: .Other None  Preliminary Report (20 Feb 2023 10:55):    No growth    Culture - Other (collected 18 Feb 2023 16:51)  Source: .Other None  Preliminary Report (20 Feb 2023 07:39):    No growth           KERWIN [id number]    Subjective/Interval History   -demented not in acute distress     ROS  - unable to obtain due to dementia     PHYSICAL EXAM  Vital Signs Last 24 Hrs  T(C): 36.3 (20 Feb 2023 05:19), Max: 36.7 (19 Feb 2023 21:39)  T(F): 97.3 (20 Feb 2023 05:19), Max: 98 (19 Feb 2023 21:39)  HR: 91 (20 Feb 2023 05:19) (91 - 105)  BP: 110/56 (20 Feb 2023 05:19) (110/56 - 135/85)  BP(mean): --  RR: 18 (20 Feb 2023 05:19) (18 - 18)  SpO2: 100% (20 Feb 2023 05:19) (100% - 100%)    GA :  Demented, awake, NAD   HEENT: PERRL  NECK: no JVD, no thyromegaly   CVS: S1 S2 no murmur no rubs no gallop  RESP: CTAB no wheeze, no rhonchi no rales  ABD: Soft, NT, ND, tympanic, no rebound or guarding   : No [place], No CVA tenderness   EXT; no pedal edema, no cyanosis, contractures all  SKIN: Right hip pressure ulcer, pressure ulcers sacral, left heel s/p debridement and dressings   NEURO: demented, unable to perform.     LABS/ IMAGING                        9.2    6.40  )-----------( 228      ( 20 Feb 2023 06:44 )             26.8         02-20    135  |  102  |  4<L>  ----------------------------<  130<H>  3.1<L>   |  24  |  0.5<L>    Ca    7.5<L>      20 Feb 2023 06:44  Mg     1.6     02-20      CAPILLARY BLOOD GLUCOSE      POCT Blood Glucose.: 156 mg/dL (20 Feb 2023 07:28)  POCT Blood Glucose.: 132 mg/dL (19 Feb 2023 20:55)  POCT Blood Glucose.: 128 mg/dL (19 Feb 2023 16:40)          Culture - Other (collected 18 Feb 2023 17:28)  Source: .Other RIGHT HEEL  Preliminary Report (20 Feb 2023 11:55):    Rare Staphylococcus aureus    Culture - Other (collected 18 Feb 2023 17:15)  Source: .Other None  Preliminary Report (20 Feb 2023 10:55):    No growth    Culture - Other (collected 18 Feb 2023 16:51)  Source: .Other None  Preliminary Report (20 Feb 2023 07:39):    No growth

## 2023-02-20 NOTE — PROGRESS NOTE ADULT - ASSESSMENT
MEDICATIONS  (STANDING):    MEDICATIONS  (PRN):    Sepsis Likely R hip pressure ulcer infection , not c/w severe sepsis s/p debridement of sacral, left heel, and right hip pressure ulcers.   Multiple pressure ulcers various stages, right heel DTI, left heel with blackish discharge, sacral pressure ulcer, R hip pressure ulcer with purulent discharge.   - IV abx, vancomycin, cefepime and flagyl per ID, D4/7  abx iv fluids,     and wound care nursing consulted , plan for - ID following   - blood cx staph epidermidis likely contamination, wound cx pending (bone cx)   - cardiology preop evaluation rec noted.   - NPO currently,  CT AP iv contrast no abscess or osteomyelitis noted.     Elevated troponin due to demand ischemia, ruled out ACS   - trop 0.07- 0.04  -0.03 stable  - continue with iv fluids    Severe PCM  - puree TL with ensure TID      Dementia with dysphagia (advance) (transfers, wheelchair bound)   - ensure  TID, puree diet   - Bed bound/NonverbaL  - feeding with assistance/ fall precautions   - resume donepezil 10 mg po bid , memantine 5 mg po daily   - previous CTH global cerebral volume loss    Seizure disorder   - IV aed, valproic acid change back to 250 mg po bid.     Hx/o PE prior to arrival on eliquis in 2021   - resume eliquis 2.5 mg bid dosing     CAD/HLD  - given age/dementia not a candidate for statins.      DVT px  - resume eliquis.     DNR/DNI     Wife would like to resume current home health (Nursing and PT)    MEDICATIONS  (STANDING):    MEDICATIONS  (PRN):    Sepsis Likely R hip pressure ulcer infection , not c/w severe sepsis s/p debridement of sacral, left heel, and right hip pressure ulcers.   Multiple pressure ulcers various stages, right heel DTI, left heel with blackish discharge, sacral pressure ulcer, R hip pressure ulcer with purulent discharge.  - IV abx, vancomycin, cefepime and flagyl per ID, D5/7  abx iv fluids,     and wound care nursing consulted , plan for - ID following   - surgical wound check in am   - blood cx staph epidermidis likely contamination, wound cx pending (bone cx), repeat blood culture -     CT AP iv contrast no abscess or osteomyelitis noted.     Elevated troponin due to demand ischemia, ruled out ACS   - trop 0.07- 0.04  -0.03 stable  - continue with iv fluids    Severe PCM  - puree TL with ensure TID  , 1:1 feeding     Dementia with dysphagia (advance) (transfers, wheelchair bound)   - ensure  TID, puree diet   - Bed bound/NonverbaL  - feeding with assistance/ fall precautions   - resume donepezil 10 mg po bid , memantine 5 mg po daily   - previous CTH global cerebral volume loss    Seizure disorder   - IV aed, valproic acid change back to 250 mg po bid.     Hx/o PE prior to arrival on eliquis in 2021   - resume eliquis 2.5 mg bid dosing     CAD/HLD  - given age/dementia not a candidate for statins.      DVT px  - resume eliquis.     DNR/DNI     Wife would like to resume current home health (Nursing and PT)    MEDICATIONS  (STANDING):    MEDICATIONS  (PRN):    Sepsis Likely R hip pressure ulcer infection , not c/w severe sepsis s/p debridement of sacral, left heel, and right hip pressure ulcers.   Multiple pressure ulcers various stages, right heel DTI, left heel with blackish discharge, sacral pressure ulcer, R hip pressure ulcer with purulent discharge.  - IV abx, vancomycin, cefepime and flagyl per ID, D5/7  abx iv fluids,     and wound care nursing consulted , plan for - ID following   - surgical wound check in am   - blood cx staph epidermidis likely contamination, wound cx pending (bone cx), repeat blood culture -     CT AP iv contrast no abscess or osteomyelitis noted.     Elevated troponin due to demand ischemia, ruled out ACS   - trop 0.07- 0.04  -0.03 stable  - continue with iv fluids    Severe PCM  - puree TL with ensure TID  , 1:1 feeding     Dementia with dysphagia (advance) (transfers, wheelchair bound)   - ensure  TID, puree diet   - Bed bound/NonverbaL  - feeding with assistance/ fall precautions   - resume donepezil 10 mg po bid , memantine 5 mg po daily   - previous CTH global cerebral volume loss    Hypokalemia  - repleted and monitor   - check mag in am     Seizure disorder   - IV aed, valproic acid change back to 250 mg po bid.     Hx/o PE prior to arrival on eliquis in 2021   - resume eliquis 2.5 mg bid dosing     CAD/HLD  - given age/dementia not a candidate for statins.      DVT px  - resume eliquis.     DNR/DNI     Wife would like to resume current home health (Nursing and PT)

## 2023-02-21 LAB
-  AMPICILLIN/SULBACTAM: SIGNIFICANT CHANGE UP
-  CEFAZOLIN: SIGNIFICANT CHANGE UP
-  CLINDAMYCIN: SIGNIFICANT CHANGE UP
-  DAPTOMYCIN: SIGNIFICANT CHANGE UP
-  ERYTHROMYCIN: SIGNIFICANT CHANGE UP
-  GENTAMICIN: SIGNIFICANT CHANGE UP
-  LINEZOLID: SIGNIFICANT CHANGE UP
-  OXACILLIN: SIGNIFICANT CHANGE UP
-  PENICILLIN: SIGNIFICANT CHANGE UP
-  RIFAMPIN: SIGNIFICANT CHANGE UP
-  TETRACYCLINE: SIGNIFICANT CHANGE UP
-  TRIMETHOPRIM/SULFAMETHOXAZOLE: SIGNIFICANT CHANGE UP
-  VANCOMYCIN: SIGNIFICANT CHANGE UP
ANION GAP SERPL CALC-SCNC: 7 MMOL/L — SIGNIFICANT CHANGE UP (ref 7–14)
BUN SERPL-MCNC: 5 MG/DL — LOW (ref 10–20)
CALCIUM SERPL-MCNC: 7.7 MG/DL — LOW (ref 8.4–10.5)
CHLORIDE SERPL-SCNC: 102 MMOL/L — SIGNIFICANT CHANGE UP (ref 98–110)
CO2 SERPL-SCNC: 24 MMOL/L — SIGNIFICANT CHANGE UP (ref 17–32)
CREAT SERPL-MCNC: 0.6 MG/DL — LOW (ref 0.7–1.5)
CULTURE RESULTS: SIGNIFICANT CHANGE UP
CULTURE RESULTS: SIGNIFICANT CHANGE UP
EGFR: 96 ML/MIN/1.73M2 — SIGNIFICANT CHANGE UP
GLUCOSE SERPL-MCNC: 103 MG/DL — HIGH (ref 70–99)
MAGNESIUM SERPL-MCNC: 2.1 MG/DL — SIGNIFICANT CHANGE UP (ref 1.8–2.4)
METHOD TYPE: SIGNIFICANT CHANGE UP
POTASSIUM SERPL-MCNC: 4.4 MMOL/L — SIGNIFICANT CHANGE UP (ref 3.5–5)
POTASSIUM SERPL-SCNC: 4.4 MMOL/L — SIGNIFICANT CHANGE UP (ref 3.5–5)
SODIUM SERPL-SCNC: 133 MMOL/L — LOW (ref 135–146)
SPECIMEN SOURCE: SIGNIFICANT CHANGE UP
SPECIMEN SOURCE: SIGNIFICANT CHANGE UP

## 2023-02-21 PROCEDURE — 99232 SBSQ HOSP IP/OBS MODERATE 35: CPT

## 2023-02-21 RX ORDER — SODIUM CHLORIDE 9 MG/ML
1000 INJECTION, SOLUTION INTRAVENOUS ONCE
Refills: 0 | Status: COMPLETED | OUTPATIENT
Start: 2023-02-21 | End: 2023-02-21

## 2023-02-21 RX ADMIN — APIXABAN 2.5 MILLIGRAM(S): 2.5 TABLET, FILM COATED ORAL at 05:38

## 2023-02-21 RX ADMIN — SODIUM CHLORIDE 1000 MILLILITER(S): 9 INJECTION, SOLUTION INTRAVENOUS at 21:20

## 2023-02-21 RX ADMIN — Medication 250 MILLIGRAM(S): at 05:38

## 2023-02-21 RX ADMIN — MEMANTINE HYDROCHLORIDE 5 MILLIGRAM(S): 10 TABLET ORAL at 05:37

## 2023-02-21 RX ADMIN — CEFEPIME 100 MILLIGRAM(S): 1 INJECTION, POWDER, FOR SOLUTION INTRAMUSCULAR; INTRAVENOUS at 14:26

## 2023-02-21 RX ADMIN — CEFEPIME 100 MILLIGRAM(S): 1 INJECTION, POWDER, FOR SOLUTION INTRAMUSCULAR; INTRAVENOUS at 05:37

## 2023-02-21 RX ADMIN — DONEPEZIL HYDROCHLORIDE 10 MILLIGRAM(S): 10 TABLET, FILM COATED ORAL at 21:20

## 2023-02-21 RX ADMIN — Medication 100 MILLIGRAM(S): at 14:25

## 2023-02-21 RX ADMIN — APIXABAN 2.5 MILLIGRAM(S): 2.5 TABLET, FILM COATED ORAL at 18:41

## 2023-02-21 RX ADMIN — Medication 650 MILLIGRAM(S): at 22:08

## 2023-02-21 RX ADMIN — Medication 1 TABLET(S): at 18:40

## 2023-02-21 RX ADMIN — Medication 650 MILLIGRAM(S): at 21:20

## 2023-02-21 RX ADMIN — MEMANTINE HYDROCHLORIDE 5 MILLIGRAM(S): 10 TABLET ORAL at 18:42

## 2023-02-21 RX ADMIN — Medication 100 MILLIGRAM(S): at 05:37

## 2023-02-21 RX ADMIN — Medication 250 MILLIGRAM(S): at 18:43

## 2023-02-21 RX ADMIN — Medication 100 MILLIGRAM(S): at 18:39

## 2023-02-21 NOTE — CHART NOTE - NSCHARTNOTEFT_GEN_A_CORE
Vital Signs Last 24 Hrs  T(C): 38.5 (21 Feb 2023 20:31), Max: 38.5 (21 Feb 2023 20:31)  T(F): 101.3 (21 Feb 2023 20:31), Max: 101.3 (21 Feb 2023 20:31)  HR: 106 (21 Feb 2023 20:31) (88 - 110)  BP: 85/45 (21 Feb 2023 20:31) (85/45 - 135/82)  BP(mean): --  RR: 18 (21 Feb 2023 20:31) (16 - 18)  SpO2: 98% (21 Feb 2023 20:31) (95% - 98%)    Parameters below as of 21 Feb 2023 04:51  Patient On (Oxygen Delivery Method): room air    Patient being tx for sepsis, recent debridement of decubitus ulcer, BP of 84/45 w/ temp of 101.3.  ECHO 2018 71%  Will give stat bolus of IVF

## 2023-02-21 NOTE — CHART NOTE - NSCHARTNOTEFT_GEN_A_CORE
Registered Dietitian Follow-Up  Patient Profile Reviewed                           Yes [x]   No []  Nutrition History Previously Obtained        Yes [x]  No []      Pertinent Medical Interventions:  Sepsis Likely R hip pressure ulcer infection , not c/w severe sepsis s/p debridement of sacral, left heel, and right hip pressure ulcers.   Multiple pressure ulcers various stages, right heel DTI, left heel with blackish discharge, sacral pressure ulcer, R hip pressure ulcer with purulent discharge.  Dementia with dysphagia (advance) (transfers, wheelchair bound)   - ensure  TID, puree diet   - Bed bound/Nonverbal    Nutrition Interval History:   SLP -2023  Puree/thins. 1:1 feeds  </=50% meal trays since diet advancement  Nutrient Intake: Patient meeting overall <50% estimated energy needs x6days during admission     Diet order:   Diet, Pureed:   Supplement Feeding Modality:  Oral  Ensure Enlive Cans or Servings Per Day:  1       Frequency:  Three Times a day (23 @ 11:09) [Active]    Anthropometrics:  Height (cm): 175.3 (23 @ 16:00)  Weight (kg): 54.4 (23 @ 16:00)  BMI (kg/m2): 17.7 (23 @ 16:00)  ^ height obtained from previous admission in   current admission weights (kg):   Daily Weight in k.3 (-), Weight in k.2 (-), Weight in k.5 ()  -- fluid shifts     OTHER WEIGHTS:   72.6kg 21  58.1kg 21  unknown UBW more recently, EMR weights from 2 years ago    MEDICATIONS  (STANDING):  apixaban 2.5 milliGRAM(s) Oral every 12 hours  bisacodyl Suppository 10 milliGRAM(s) Rectal once  cefepime   IVPB      cefepime   IVPB 1000 milliGRAM(s) IV Intermittent every 8 hours  dextrose 5% + sodium chloride 0.9%. 1000 milliLiter(s) (50 mL/Hr) IV Continuous <Continuous>  donepezil 10 milliGRAM(s) Oral at bedtime  memantine 5 milliGRAM(s) Oral two times a day  metroNIDAZOLE  IVPB 500 milliGRAM(s) IV Intermittent every 8 hours  valproic  acid Syrup 250 milliGRAM(s) Oral two times a day  vancomycin  IVPB 750 milliGRAM(s) IV Intermittent every 12 hours    MEDICATIONS  (PRN):  acetaminophen     Tablet .. 650 milliGRAM(s) Oral every 6 hours PRN Temp greater or equal to 38C (100.4F)    Pertinent Labs:  @ 06:50: Na 133<L>, BUN 5<L>, Cr 0.6<L>, <H>, K+ 4.4, Phos --, Mg 2.1, Alk Phos --, ALT/SGPT --, AST/SGOT --, HbA1c --    Physical Findings:  - Cognition: nonverbal, disoriented x4  - GI function: Last BM 2/16 x2  - Tubes: n/a  - Oral/Mouth cavity: puree/thins  - Skin:   L and R heel unstageable  L medial foot DTI  sacrum unstageable  L hip DTI  R hip unstageable  per flow sheets ^  - Edema: right foot; scrotum 2+     Nutrition Requirements:   Weight Used: using ABW 54.4kg with consideration for age, wt, BMI, wound healing  ENERGY: 1073-2174 kcal/day (MSJ x 1.2-1.5)  PROTEIN: 65-82 g/day ( 1.2-1.5g/kg)  FLUIDS: 1360 (25mL/kg)    [x] Previous Nutrition Diagnosis:            [x] Ongoing          [] Resolved  #1 Increased Nutrient Needs  Goal/Expected Outcome:  meet >/=75% est energy needs within 3-5 days    Nutrition Intervention: Meals and Snacks, Medical Food Supplement, Vitamin Supplement, Nutrition Related Medication, Coordination of Care  Indicator/Monitoring:  Monitor diet order, energy intake, food and nutrient intake, body composition, weight    Recommendations:  pureed; thin liquids; per SLP evaluation. diet already limited and patient with poor PO intake keep liberalized  Ensure HIGH PROTEIN 3x/day to optimize pro/kcal intake (350kcal, 20 g pro/serving)   1:1, high aspiration risk please maintain aspiration precautions  RECOMMEND: zinc 220mg/daily x10-14d, ascorbic acid 500mg/daily, MV w/ minerals daily for wound healing if medically feasible    Patient at HIGH nutrition risk   will follow up within 3-5days  Dave Garcia RD  [id number] or via TEAM Registered Dietitian Follow-Up  Patient Profile Reviewed                           Yes [x]   No []  Nutrition History Previously Obtained        Yes []  No [x]      Pertinent Medical Interventions:  Sepsis Likely R hip pressure ulcer infection , not c/w severe sepsis s/p debridement of sacral, left heel, and right hip pressure ulcers.   Multiple pressure ulcers various stages, right heel DTI, left heel with blackish discharge, sacral pressure ulcer, R hip pressure ulcer with purulent discharge.  Dementia with dysphagia (advance) (transfers, wheelchair bound)   - ensure TID, puree diet   - Bed bound/Nonverbal    Nutrition History prior to admission 23 obtained from wife:  patient typically has a fair appetite at home though as of 1 year ago he became bedbound and began to have difficulty chewing/swallowing which subsequently changed the textures of food he was eating. though his appetite was fair, his PO intake was inadequate secondary to higher energy demand and overall clinical status subsequently resulted in weight loss   - wife feeds patient at home, eats soft/moist foods that are easy to chew. eats meats mashed up, tuna sandwiches  - supplements with ensure daily, takes gummy vitamin D  - no known food allergies, no Yars[employer] restrictions  </=75% estimated energy needs >/=1 month     Nutrition Interval History:   SLP -2023  Puree/thins. 1:1 feeds.   when wife comes at lunch and dinner patient eats well, has >75% of meal trays with her feeding him  bfast patient usually consumes </=50% of  on average has 50% of ensure supplements daily (+525kcal, 30g protein total)  Nutrient Intake: Patient meeting overall >75 estimated energy needs x6days during admission     Diet order:   Diet, Pureed:   Supplement Feeding Modality:  Oral  Ensure Enlive Cans or Servings Per Day:  1       Frequency:  Three Times a day (23 @ 11:09) [Active]    Anthropometrics:  Height (cm): 175.3 (23 @ 16:00)  Weight (kg): 54.4 (23 @ 16:00)  BMI (kg/m2): 17.7 (23 @ 16:00)  ^ height obtained from previous admission in   reported UBW 68.2kg 1 year ago   -- 20.2% weight loss within 1 year clinically significant     current admission weights (kg):   Daily Weight in k.3 (), Weight in k.2 (-), Weight in k.5 ()  -- fluid shifts?    previous admission weights (kg):   72.6kg 21    MEDICATIONS  (STANDING):  apixaban 2.5 milliGRAM(s) Oral every 12 hours  bisacodyl Suppository 10 milliGRAM(s) Rectal once  cefepime   IVPB      cefepime   IVPB 1000 milliGRAM(s) IV Intermittent every 8 hours  dextrose 5% + sodium chloride 0.9%. 1000 milliLiter(s) (50 mL/Hr) IV Continuous <Continuous>  donepezil 10 milliGRAM(s) Oral at bedtime  memantine 5 milliGRAM(s) Oral two times a day  metroNIDAZOLE  IVPB 500 milliGRAM(s) IV Intermittent every 8 hours  valproic  acid Syrup 250 milliGRAM(s) Oral two times a day  vancomycin  IVPB 750 milliGRAM(s) IV Intermittent every 12 hours    MEDICATIONS  (PRN):  acetaminophen     Tablet .. 650 milliGRAM(s) Oral every 6 hours PRN Temp greater or equal to 38C (100.4F)    Pertinent Labs:  @ 06:50: Na 133<L>, BUN 5<L>, Cr 0.6<L>, <H>, K+ 4.4, Phos --, Mg 2.1, Alk Phos --, ALT/SGPT --, AST/SGOT --, HbA1c --    Physical Findings: NFPE: Severe temple depletion, severe buccal depletion, severe tricep depletion   - Cognition: nonverbal, disoriented x4  - GI function: Last BM 2/16 x2  - Tubes: n/a  - Oral/Mouth cavity: puree/thins  - Skin:   L and R heel unstageable  L medial foot DTI  sacrum unstageable  L hip DTI  R hip unstageable  per flow sheets ^  - Edema: right foot; scrotum 2+     Nutrition Requirements:   Weight Used: using ABW 54.4kg with consideration for age, wt, BMI, wound healing  ENERGY: 1632-1904kcal/day (30-35kcal/kg)  PROTEIN: 65-82 g/day ( 1.2-1.5g/kg)  FLUIDS: 1360 (25mL/kg)    [x] Previous Nutrition Diagnosis:            [x] Ongoing          [] Resolved  #1 Increased Nutrient Needs  ADDED Malnutrition, (severe) related to chronic illness, as evidence by severe muscle/fat depletion, meeting </=75% est energy needs for >/=1mo, severe body fat/muscle mass depletion, >20% wt loss x 1yr  Goal/Expected Outcome:  meet >/=75% est energy needs within 3 days    Nutrition Intervention: Meals and Snacks, Medical Food Supplement, Vitamin Supplement, Nutrition Related Medication, Coordination of Care  Indicator/Monitoring:  Monitor diet order, energy intake, food and nutrient intake, body composition, weight    Recommendations:  pureed; thin liquids; per SLP evaluation. diet already limited and patient with poor PO intake keep liberalized  continue Ensure HIGH PROTEIN 3x/day to optimize pro/kcal intake (350kcal, 20 g pro/serving)   1:1, high aspiration risk please maintain aspiration precautions  RECOMMEND: zinc 220mg/daily x10-14d, ascorbic acid 500mg/daily, MV w/ minerals daily for wound healing if medically feasible    Patient at HIGH nutrition risk   will follow up within 3days  Dave Garcia RD  [id number] or via TEAM Registered Dietitian Follow-Up  Patient Profile Reviewed                           Yes [x]   No []  Nutrition History Previously Obtained        Yes []  No [x]      Pertinent Medical Interventions:  Sepsis Likely R hip pressure ulcer infection , not c/w severe sepsis s/p debridement of sacral, left heel, and right hip pressure ulcers.   Multiple pressure ulcers various stages, right heel DTI, left heel with blackish discharge, sacral pressure ulcer, R hip pressure ulcer with purulent discharge.  Dementia with dysphagia (advance) (transfers, wheelchair bound)   - ensure TID, puree diet   - Bed bound/Nonverbal    Nutrition History prior to admission 23 obtained from wife:  patient typically has a fair appetite at home though as of 1 year ago he became bedbound and began to have difficulty chewing/swallowing which subsequently changed the textures of food he was eating. though his appetite was fair, his PO intake was inadequate secondary to higher energy demand and overall clinical status subsequently resulted in weight loss   - wife feeds patient at home, eats soft/moist foods that are easy to chew. eats meats mashed up, tuna sandwiches  - supplements with ensure daily, takes gummy vitamin D  - no known food allergies, no [employer] restrictions  </=75% estimated energy needs >/=1 month     Nutrition Interval History:   SLP -2023  Puree/thins. 1:1 feeds.   when wife comes at lunch and dinner patient eats well, has >75% of meal trays with her feeding him  bfast patient usually consumes </=50% of  on average has 50% of ensure supplements daily (+525kcal, 30g protein total)  Nutrient Intake: Patient meeting overall >75 estimated energy needs x6days during admission     Diet order:   Diet, Pureed:   Supplement Feeding Modality:  Oral  Ensure Enlive Cans or Servings Per Day:  1       Frequency:  Three Times a day (23 @ 11:09) [Active]    Anthropometrics:  Height (cm): 175.3 (23 @ 16:00)  Weight (kg): 54.4 (23 @ 16:00)  BMI (kg/m2): 17.7 (23 @ 16:00)  ^ height obtained from previous admission in   reported UBW 68.2kg 1 year ago   -- 20.2% weight loss within 1 year clinically significant     current admission weights (kg):   Daily Weight in k.3 (), Weight in k.2 (-), Weight in k.5 ()  -- fluid shifts?    previous admission weights (kg):   72.6kg 21    MEDICATIONS  (STANDING):  apixaban 2.5 milliGRAM(s) Oral every 12 hours  bisacodyl Suppository 10 milliGRAM(s) Rectal once  cefepime   IVPB      cefepime   IVPB 1000 milliGRAM(s) IV Intermittent every 8 hours  dextrose 5% + sodium chloride 0.9%. 1000 milliLiter(s) (50 mL/Hr) IV Continuous <Continuous>  donepezil 10 milliGRAM(s) Oral at bedtime  memantine 5 milliGRAM(s) Oral two times a day  metroNIDAZOLE  IVPB 500 milliGRAM(s) IV Intermittent every 8 hours  valproic  acid Syrup 250 milliGRAM(s) Oral two times a day  vancomycin  IVPB 750 milliGRAM(s) IV Intermittent every 12 hours    MEDICATIONS  (PRN):  acetaminophen     Tablet .. 650 milliGRAM(s) Oral every 6 hours PRN Temp greater or equal to 38C (100.4F)    Pertinent Labs:  @ 06:50: Na 133<L>, BUN 5<L>, Cr 0.6<L>, <H>, K+ 4.4, Phos --, Mg 2.1, Alk Phos --, ALT/SGPT --, AST/SGOT --, HbA1c --    Physical Findings: NFPE: Severe temple depletion, severe buccal depletion, severe tricep depletion   - Cognition: nonverbal, disoriented x4  - GI function: Last BM 2/16 x2  - Tubes: n/a  - Oral/Mouth cavity: puree/thins  - Skin:   L and R heel unstageable  L medial foot DTI  sacrum unstageable  L hip DTI  R hip unstageable  per flow sheets ^  - Edema: right foot; scrotum 2+     Nutrition Requirements:   Weight Used: using ABW 54.4kg with consideration for age, wt, BMI, wound healing  ENERGY: 1632-1904kcal/day (30-35kcal/kg)  PROTEIN: 65-82 g/day ( 1.2-1.5g/kg)  FLUIDS: 1360 (25mL/kg)    [x] Previous Nutrition Diagnosis:            [x] Ongoing          [] Resolved  #1 Increased Nutrient Needs  ADDED Malnutrition, (severe) related to chronic illness, as evidence by severe muscle/fat depletion, meeting </=75% est energy needs for >/=1mo, severe body fat/muscle mass depletion, >20% wt loss x 1yr  Goal/Expected Outcome:  meet >/=75% est energy needs within 3 days    Nutrition Intervention: Meals and Snacks, Medical Food Supplement, Vitamin Supplement, Nutrition Related Medication, Coordination of Care  Indicator/Monitoring:  Monitor diet order, energy intake, food and nutrient intake, body composition, weight    Recommendations:  pureed; thin liquids; per SLP evaluation. diet already limited and patient with poor PO intake keep liberalized  continue Ensure HIGH PROTEIN 3x/day to optimize pro/kcal intake (350kcal, 20 g pro/serving)   1:1, high aspiration risk please maintain aspiration precautions  RECOMMEND: zinc 220mg/daily x10-14d, ascorbic acid 500mg/daily, MV w/ minerals daily for wound healing if medically feasible    Provided verbal education regarding oral supplements, high kcal/pro foods to patients wife  Patient at HIGH nutrition risk   will follow up within 3days  Dave Garcia RD  [id number] or via TEAM

## 2023-02-21 NOTE — PROGRESS NOTE ADULT - SUBJECTIVE AND OBJECTIVE BOX
KERWIN HERNANDEZ  82y, Male  Allergy: No Known Allergies      LOS  6d    CHIEF COMPLAINT: infected decubitus ulcers (21 Feb 2023 12:12)      INTERVAL EVENTS/HPI  - No acute events overnight  - T(F): , Max: 100.1 (02-20-23 @ 21:27)  - WBC Count: 6.40 (02-20-23 @ 06:44)     - Creatinine, Serum: 0.6 (02-21-23 @ 06:50)  Creatinine, Serum: 0.5 (02-20-23 @ 06:44)       ROS  unable to obtain history secondary to patient's mental status and/or sedation      VITALS:  T(F): 97.6, Max: 100.1 (02-20-23 @ 21:27)  HR: 88  BP: 95/59  RR: 16Vital Signs Last 24 Hrs  T(C): 36.4 (21 Feb 2023 04:51), Max: 37.8 (20 Feb 2023 21:27)  T(F): 97.6 (21 Feb 2023 04:51), Max: 100.1 (20 Feb 2023 21:27)  HR: 88 (21 Feb 2023 04:51) (88 - 110)  BP: 95/59 (21 Feb 2023 04:51) (95/59 - 135/82)  BP(mean): --  RR: 16 (21 Feb 2023 04:51) (16 - 18)  SpO2: 95% (21 Feb 2023 04:51) (95% - 100%)    Parameters below as of 21 Feb 2023 04:51  Patient On (Oxygen Delivery Method): room air        PHYSICAL EXAM:  Gen: NAD, resting in bed  HEENT: Normocephalic, atraumatic  Neck: supple, no lymphadenopathy  CV: Regular rate & regular rhythm  Lungs: decreased BS at bases, no fremitus  Abdomen: Soft, BS present  Ext: Warm, well perfused  Neursacral ulcer an dright hip ulcer dressed  Lines: no phlebitis    FH: Non-contributory  Social Hx: Non-contributory    TESTS & MEASUREMENTS:                        9.2    6.40  )-----------( 228      ( 20 Feb 2023 06:44 )             26.8     02-21    133<L>  |  102  |  5<L>  ----------------------------<  103<H>  4.4   |  24  |  0.6<L>    Ca    7.7<L>      21 Feb 2023 06:50  Mg     2.1     02-21              Culture - Other (collected 02-18-23 @ 17:28)  Source: .Other RIGHT HEEL  Preliminary Report (02-21-23 @ 12:00):    Rare Methicillin Resistant Staphylococcus aureus    Rare Enterococcus faecalis Susceptibility to follow.  Organism: Methicillin resistant Staphylococcus aureus (02-21-23 @ 09:03)  Organism: Methicillin resistant Staphylococcus aureus (02-21-23 @ 09:03)      -  Ampicillin/Sulbactam: R 16/8      -  Cefazolin: R >16      -  Clindamycin: R >4      -  Daptomycin: S 0.5      -  Erythromycin: R >4      -  Gentamicin: R >8 Should not be used as monotherapy      -  Linezolid: S 2      -  Oxacillin: R >2      -  Penicillin: R >8      -  Rifampin: S <=1 Should not be used as monotherapy      -  Tetracycline: S 2      -  Trimethoprim/Sulfamethoxazole: S <=0.5/9.5      -  Vancomycin: S 2      Method Type: EZEQUIEL    Culture - Other (collected 02-18-23 @ 17:15)  Source: .Other None  Final Report (02-21-23 @ 10:29):    No growth at 48 hours    Culture - Other (collected 02-18-23 @ 16:51)  Source: .Other None  Final Report (02-21-23 @ 08:59):    No growth at 48 hours    Culture - Blood (collected 02-14-23 @ 20:45)  Source: .Blood Blood-Peripheral  Gram Stain (02-17-23 @ 12:17):    Growth in anaerobic bottle: Gram Positive Cocci in Clusters  Final Report (02-19-23 @ 11:51):    Growth in anaerobic bottle: Staphylococcus epidermidis  Organism: Staphylococcus epidermidis (02-19-23 @ 11:51)  Organism: Staphylococcus epidermidis (02-19-23 @ 11:51)      -  Ampicillin/Sulbactam: R <=8/4      -  Cefazolin: R <=4      -  Clindamycin: R <=0.25 This isolate is presumed to be clindamycin resistant based on detection of inducible resistance. Clindamycin may still be effective in some patients.      -  Erythromycin: R >4      -  Gentamicin: R >8 Should not be used as monotherapy      -  Oxacillin: R >2      -  Penicillin: R 4      -  Rifampin: S <=1 Should not be used as monotherapy      -  Tetracycline: S 2      -  Trimethoprim/Sulfamethoxazole: R >2/38      -  Vancomycin: S 2      Method Type: EZEQUIEL    Culture - Blood (collected 02-14-23 @ 20:45)  Source: .Blood Blood-Peripheral  Gram Stain (02-18-23 @ 17:05):    Growth in anaerobic bottle: Gram Positive Cocci in Clusters    Growth in aerobic bottle: Gram Positive Cocci in Clusters  Final Report (02-19-23 @ 22:00):    Growth in aerobic and anaerobic bottles: Staphylococcus epidermidis    ***Blood Panel PCR results on this specimen are available    approximately 3 hours after the Gram stain result.***    Gram stain, PCR, and/or culture results may not always    correspond due to difference in methodologies.    ************************************************************    This PCR assay was performed by multiplex PCR. This    Assay tests for 66 bacterial and resistance gene targets.    Please refer to the [facility] test directory    at [email or web address] for details.  Organism: Blood Culture PCR (02-18-23 @ 17:06)  Organism: Blood Culture PCR (02-18-23 @ 17:06)      -  Staphylococcus epidermidis, Methicillin resistant: Detec      Method Type: PCR            INFECTIOUS DISEASES TESTING  COVID-19 PCR: NotDetec (02-18-23 @ 13:50)      INFLAMMATORY MARKERS  Sedimentation Rate, Erythrocyte: 60 mm/Hr (02-16-23 @ 08:15)  C-Reactive Protein, Serum: 126 mg/L (02-16-23 @ 08:15)      RADIOLOGY & ADDITIONAL TESTS:  I have personally reviewed the last available Chest xray  CXR      CT      CARDIOLOGY TESTING  12 Lead ECG:   Ventricular Rate 81 BPM    Atrial Rate 81 BPM    P-R Interval 112 ms    QRS Duration 70 ms    Q-T Interval 366 ms    QTC Calculation(Bazett) 425 ms    P Axis 72 degrees    R Axis -28 degrees    T Axis 24 degrees    Diagnosis Line Normal sinus rhythm  Low voltage QRS  Nonspecific ST and T wave abnormality  Abnormal ECG    Confirmed by RADHA GARCIA MD ([id number]) on 2/17/2023 12:25:33 PM (02-17-23 @ 11:36)  12 Lead ECG:   Ventricular Rate 82 BPM    Atrial Rate 82 BPM    P-R Interval 136 ms    QRS Duration 52 ms    Q-T Interval 316 ms    QTC Calculation(Bazett) 369 ms    P Axis 60 degrees    R Axis -7 degrees    T Axis 9 degrees    Diagnosis Line Sinus rhythm with occasional Premature ventricular complexes  Low voltage QRS  Junctional ST depression, probably abnormal  Abnormal ECG    Confirmed by RADHA GARCIA MD ([id number]) on 2/17/2023 12:25:15 PM (02-17-23 @ 11:36)      MEDICATIONS  apixaban 2.5 Oral every 12 hours  bisacodyl Suppository 10 Rectal once  cefepime   IVPB     cefepime   IVPB 1000 IV Intermittent every 8 hours  dextrose 5% + sodium chloride 0.9%. 1000 IV Continuous <Continuous>  donepezil 10 Oral at bedtime  memantine 5 Oral two times a day  metroNIDAZOLE  IVPB 500 IV Intermittent every 8 hours  valproic  acid Syrup 250 Oral two times a day      WEIGHT  Weight (kg): 54.4 (02-18-23 @ 16:00)  Creatinine, Serum: 0.6 mg/dL (02-21-23 @ 06:50)      ANTIBIOTICS:  cefepime   IVPB      cefepime   IVPB 1000 milliGRAM(s) IV Intermittent every 8 hours  metroNIDAZOLE  IVPB 500 milliGRAM(s) IV Intermittent every 8 hours      All available historical records have been reviewed

## 2023-02-21 NOTE — PROGRESS NOTE ADULT - SUBJECTIVE AND OBJECTIVE BOX
KERWINDAMIEN [id number]    Subjective/Interval History       ROS    PHYSICAL EXAM  Vital Signs Last 24 Hrs  T(C): 36.4 (21 Feb 2023 04:51), Max: 37.8 (20 Feb 2023 21:27)  T(F): 97.6 (21 Feb 2023 04:51), Max: 100.1 (20 Feb 2023 21:27)  HR: 88 (21 Feb 2023 04:51) (88 - 110)  BP: 95/59 (21 Feb 2023 04:51) (95/59 - 135/82)  BP(mean): --  RR: 16 (21 Feb 2023 04:51) (16 - 18)  SpO2: 95% (21 Feb 2023 04:51) (95% - 100%)    Parameters below as of 21 Feb 2023 04:51  Patient On (Oxygen Delivery Method): room air      GA : AAOX3, NAD   HEENT: PERRLA, EOMI  NECK: no JVD, no thyromegaly   CVS: S1 S2 no murmur no rubs no gallop  RESP: CTAB no wheeze, no rhonchi no rales  ABD: Soft, NT, ND, tympanic, no rebound or guarding   : No [place], No CVA tenderness   EXT; no pedal edema, no cyanosis  MSK: No ML spinal tenderness, normal ROM   NEURO: AAOX3, no new focal deficits     LABS/ IMAGING                        9.2    6.40  )-----------( 228      ( 20 Feb 2023 06:44 )             26.8         02-21    133<L>  |  102  |  5<L>  ----------------------------<  103<H>  4.4   |  24  |  0.6<L>    Ca    7.7<L>      21 Feb 2023 06:50  Mg     2.1     02-21      CAPILLARY BLOOD GLUCOSE              Culture - Other (collected 18 Feb 2023 17:28)  Source: .Other RIGHT HEEL  Preliminary Report (21 Feb 2023 12:00):    Rare Methicillin Resistant Staphylococcus aureus    Rare Enterococcus faecalis Susceptibility to follow.  Organism: Methicillin resistant Staphylococcus aureus (21 Feb 2023 09:03)  Organism: Methicillin resistant Staphylococcus aureus (21 Feb 2023 09:03)    Culture - Other (collected 18 Feb 2023 17:15)  Source: .Other None  Final Report (21 Feb 2023 10:29):    No growth at 48 hours    Culture - Other (collected 18 Feb 2023 16:51)  Source: .Other None  Final Report (21 Feb 2023 08:59):    No growth at 48 hours           KERWIN [id number]    Subjective/Interval History   - demented, eating with assistance with wife.     ROS  - unable to obtain due to dementia.     PHYSICAL EXAM  Vital Signs Last 24 Hrs  T(C): 36.4 (21 Feb 2023 04:51), Max: 37.8 (20 Feb 2023 21:27)  T(F): 97.6 (21 Feb 2023 04:51), Max: 100.1 (20 Feb 2023 21:27)  HR: 88 (21 Feb 2023 04:51) (88 - 110)  BP: 95/59 (21 Feb 2023 04:51) (95/59 - 135/82)  BP(mean): --  RR: 16 (21 Feb 2023 04:51) (16 - 18)  SpO2: 95% (21 Feb 2023 04:51) (95% - 100%)    Parameters below as of 21 Feb 2023 04:51  Patient On (Oxygen Delivery Method): room air      GA : AAOX1 demented, , NAD   HEENT: PERRL  NECK: no JVD, no thyromegaly   CVS: S1 S2 no murmur no rubs no gallop  RESP: CTAB no wheeze, no rhonchi no rales  ABD: Soft, NT, ND, tympanic, no rebound or guarding   EXT;  contractures all extremities   SKIN: Right hip pressure ulcer, pressure ulcers sacral, left heel s/p debridement and dressings   NEURO: awake demented. , no new focal deficits     LABS/ IMAGING                        9.2    6.40  )-----------( 228      ( 20 Feb 2023 06:44 )             26.8         02-21    133<L>  |  102  |  5<L>  ----------------------------<  103<H>  4.4   |  24  |  0.6<L>    Ca    7.7<L>      21 Feb 2023 06:50  Mg     2.1     02-21      CAPILLARY BLOOD GLUCOSE              Culture - Other (collected 18 Feb 2023 17:28)  Source: .Other RIGHT HEEL  Preliminary Report (21 Feb 2023 12:00):    Rare Methicillin Resistant Staphylococcus aureus    Rare Enterococcus faecalis Susceptibility to follow.  Organism: Methicillin resistant Staphylococcus aureus (21 Feb 2023 09:03)  Organism: Methicillin resistant Staphylococcus aureus (21 Feb 2023 09:03)    Culture - Other (collected 18 Feb 2023 17:15)  Source: .Other None  Final Report (21 Feb 2023 10:29):    No growth at 48 hours    Culture - Other (collected 18 Feb 2023 16:51)  Source: .Other None  Final Report (21 Feb 2023 08:59):    No growth at 48 hours

## 2023-02-21 NOTE — DIETITIAN NUTRITION RISK NOTIFICATION - TREATMENT: THE FOLLOWING DIET HAS BEEN RECOMMENDED
Diet, Pureed:   Supplement Feeding Modality:  Oral  Ensure Enlive Cans or Servings Per Day:  1       Frequency:  Three Times a day (02-19-23 @ 11:09) [Active]

## 2023-02-21 NOTE — PROGRESS NOTE ADULT - SUBJECTIVE AND OBJECTIVE BOX
S: No significant change in patient. For dressing change today  O; Vital Signs Last 24 Hrs  T(C): 36.6 (21 Feb 2023 14:35), Max: 37.8 (20 Feb 2023 21:27)  T(F): 97.9 (21 Feb 2023 14:35), Max: 100.1 (20 Feb 2023 21:27)  HR: 107 (21 Feb 2023 14:35) (88 - 110)  BP: 94/64 (21 Feb 2023 14:35) (94/64 - 135/82)  BP(mean): --  RR: 17 (21 Feb 2023 14:35) (16 - 18)  SpO2: 95% (21 Feb 2023 04:51) (95% - 97%)    Parameters below as of 21 Feb 2023 04:51  Patient On (Oxygen Delivery Method): room air    EXAM:  left heel: heel without necrotic tissue, mostly pink base - no purulence  RIgh hip: wound with brown slough noted  sacrum: mostly pink base    Labs:  CAPILLARY BLOOD GLUCOSE                              9.2    6.40  )-----------( 228      ( 20 Feb 2023 06:44 )             26.8         02-21    133<L>  |  102  |  5<L>  ----------------------------<  103<H>  4.4   |  24  |  0.6<L>      Calcium, Total Serum: 7.7 mg/dL (02-21-23 @ 06:50)    RECENT CULTURES:  02-18 @ 17:28 .Other RIGHT HEEL Methicillin resistant Staphylococcus aureus Methicillin resistant Staphylococcus aureus   Rare Methicillin Resistant Staphylococcus aureus  Rare Enterococcus faecalis Susceptibility to follow.   02-18 @ 17:15 .Other None     No growth at 48 hours   02-18 @ 16:51 .Other None     No growth at 48 hours   02-14 @ 20:45 .Blood Blood-Peripheral Blood Culture PCR Blood Culture PCR   Growth in aerobic and anaerobic bottles: Staphylococcus epidermidis  ***Blood Panel PCR results on this specimen are available  approximately 3 hours after the Gram stain result.***  Gram stain, PCR, and/or culture results may not always  correspond due to difference in methodologies.  ************************************************************  This PCR assay was performed by multiplex PCR. This  Assay tests for 66 bacterial and resistance gene targets.  Please refer to the [facility] test directory  at [email or web address] for details.     RESPIRATORY CULTURES:   OTHER:

## 2023-02-21 NOTE — PROGRESS NOTE ADULT - ASSESSMENT
Patient is a 82y old Male with CAD, HLD, dementia, seizure (on valproic acid), PE, bed bound, bilateral sacral ulcers, ulcers of b/l heels, non verbal. brought in to the ER from home for evaluation of worsening pressure ulcers which now have discharge. Pt was seen by visiting nurse and was referred to the ER. On admission, he found to have ever, tachycardia.  He has started on Flagyl, cefepime and vancomycin, and the ID consult requested to assist with further evaluation and antibiotic management.    # Sepsis on admission ( Fever + tachycardia)  - Blood Cx 2/14 Staph epi, 1 of 2 sets - likely contaminant     # Infected Left sacral  decubitus ulcer  # B/L heel  pressure ulcer with eschar   - s/p debridement 2/18 - Heel Ulcer MRSA - multiple biopsies taken     Recommendations  - add doxycycline 100 mg BID - can give PO   - switch cefepime/flagyl to Augmentin 875/125 BID   - follow-up E faecalis in Foot Cultures   - will plan 2 weeks from debridement (end date 3/4) for soft tissue infection     Please call or message on Microsoft Teams if with any questions.  Spectra [phone]

## 2023-02-21 NOTE — PROGRESS NOTE ADULT - ASSESSMENT
MEDICATIONS  (STANDING):    MEDICATIONS  (PRN):    Sepsis Likely R hip pressure ulcer infection , not c/w severe sepsis s/p debridement of sacral, left heel, and right hip pressure ulcers.   Multiple pressure ulcers various stages, right heel DTI, left heel with blackish discharge, sacral pressure ulcer, R hip pressure ulcer with purulent discharge.  - IV abx, vancomycin, cefepime and flagyl per ID, D5/7  abx iv fluids,     and wound care nursing consulted , plan for - ID following   - surgical wound check in am   - blood cx staph epidermidis likely contamination, wound cx pending (bone cx), repeat blood culture -     CT AP iv contrast no abscess or osteomyelitis noted.     Elevated troponin due to demand ischemia, ruled out ACS   - trop 0.07- 0.04  -0.03 stable  - continue with iv fluids    Severe PCM  - puree TL with ensure TID  , 1:1 feeding     Dementia with dysphagia (advance) (transfers, wheelchair bound)   - ensure  TID, puree diet   - Bed bound/NonverbaL  - feeding with assistance/ fall precautions   - resume donepezil 10 mg po bid , memantine 5 mg po daily   - previous CTH global cerebral volume loss    Hypokalemia  - repleted and monitor   - check mag in am     Seizure disorder   - IV aed, valproic acid change back to 250 mg po bid.     Hx/o PE prior to arrival on eliquis in 2021   - resume eliquis 2.5 mg bid dosing     CAD/HLD  - given age/dementia not a candidate for statins.      DVT px  - resume eliquis.     DNR/DNI     Wife would like to resume current home health (Nursing and PT)    MEDICATIONS  (STANDING):    MEDICATIONS  (PRN):    Sepsis due to infected pressure ulcers, , not c/w severe sepsis s/p debridement of sacral, left heel/right heel, and right hip pressure ulcers.   Multiple pressure ulcers various stages, right heel DTI, left heel with blackish discharge, sacral pressure ulcer, R hip pressure ulcer with purulent discharge.  - IV abx, vancomycin, cefepime and flagyl changed to doxycyclien and augmentin  per ID, D6/7  abx iv fluids,     and wound care nursing consulted , plan for - ID following   - blood cx staph epidermidis likely contamination, wound cx MRSA and enterococcus (sensitivity pending), repeat blood culture pending      CT AP iv contrast no abscess or osteomyelitis noted.   - case d/w ID     Elevated troponin due to demand ischemia, ruled out ACS   - trop 0.07- 0.04  -0.03 stable  - continue with iv fluids    Severe PCM  - puree TL with ensure TID  , 1:1 feeding     Dementia with dysphagia (advance) (transfers, wheelchair bound)   - ensure  TID, puree diet   - Bed bound/NonverbaL  - feeding with assistance/ fall precautions   - resume donepezil 10 mg po bid , memantine 5 mg po daily   - previous CTH global cerebral volume loss    Hypokalemia/Hypomag   - repleted, now normal     Seizure disorder   - valproic acid change oral solution 250 mg po bid.     Hx/o PE prior to arrival on eliquis in 2021   - resume eliquis 2.5 mg bid dosing     CAD/HLD  - given age/dementia not a candidate for statins.      DVT px  - resume eliquis.     DNR/DNI     Wife would like to resume current home health (Nursing and PT)   possible DC in 24-48 hours pending cultures.

## 2023-02-21 NOTE — PROGRESS NOTE ADULT - ASSESSMENT
POD# 3 s/p debridement of sacral/right hip/left heel ulcers    -dressings changed today - applied wet-to-dry dressings to each  - could consider wound care consult for further recommendations

## 2023-02-22 LAB
-  AMPICILLIN: SIGNIFICANT CHANGE UP
-  TETRACYCLINE: SIGNIFICANT CHANGE UP
-  VANCOMYCIN: SIGNIFICANT CHANGE UP
ANION GAP SERPL CALC-SCNC: 7 MMOL/L — SIGNIFICANT CHANGE UP (ref 7–14)
BUN SERPL-MCNC: 7 MG/DL — LOW (ref 10–20)
CALCIUM SERPL-MCNC: 8 MG/DL — LOW (ref 8.4–10.5)
CHLORIDE SERPL-SCNC: 101 MMOL/L — SIGNIFICANT CHANGE UP (ref 98–110)
CO2 SERPL-SCNC: 26 MMOL/L — SIGNIFICANT CHANGE UP (ref 17–32)
CREAT SERPL-MCNC: 0.6 MG/DL — LOW (ref 0.7–1.5)
CULTURE RESULTS: SIGNIFICANT CHANGE UP
EGFR: 96 ML/MIN/1.73M2 — SIGNIFICANT CHANGE UP
GLUCOSE SERPL-MCNC: 104 MG/DL — HIGH (ref 70–99)
METHOD TYPE: SIGNIFICANT CHANGE UP
ORGANISM # SPEC MICROSCOPIC CNT: SIGNIFICANT CHANGE UP
POTASSIUM SERPL-MCNC: 4.2 MMOL/L — SIGNIFICANT CHANGE UP (ref 3.5–5)
POTASSIUM SERPL-SCNC: 4.2 MMOL/L — SIGNIFICANT CHANGE UP (ref 3.5–5)
SODIUM SERPL-SCNC: 134 MMOL/L — LOW (ref 135–146)
SPECIMEN SOURCE: SIGNIFICANT CHANGE UP

## 2023-02-22 PROCEDURE — 99232 SBSQ HOSP IP/OBS MODERATE 35: CPT

## 2023-02-22 RX ORDER — SODIUM CHLORIDE 9 MG/ML
500 INJECTION INTRAMUSCULAR; INTRAVENOUS; SUBCUTANEOUS ONCE
Refills: 0 | Status: COMPLETED | OUTPATIENT
Start: 2023-02-22 | End: 2023-02-22

## 2023-02-22 RX ADMIN — Medication 100 MILLIGRAM(S): at 17:48

## 2023-02-22 RX ADMIN — APIXABAN 2.5 MILLIGRAM(S): 2.5 TABLET, FILM COATED ORAL at 17:47

## 2023-02-22 RX ADMIN — Medication 1 TABLET(S): at 17:48

## 2023-02-22 RX ADMIN — SODIUM CHLORIDE 500 MILLILITER(S): 9 INJECTION INTRAMUSCULAR; INTRAVENOUS; SUBCUTANEOUS at 14:02

## 2023-02-22 RX ADMIN — Medication 250 MILLIGRAM(S): at 05:46

## 2023-02-22 RX ADMIN — MEMANTINE HYDROCHLORIDE 5 MILLIGRAM(S): 10 TABLET ORAL at 05:44

## 2023-02-22 RX ADMIN — DONEPEZIL HYDROCHLORIDE 10 MILLIGRAM(S): 10 TABLET, FILM COATED ORAL at 21:15

## 2023-02-22 RX ADMIN — Medication 100 MILLIGRAM(S): at 05:44

## 2023-02-22 RX ADMIN — Medication 1 TABLET(S): at 05:45

## 2023-02-22 RX ADMIN — Medication 250 MILLIGRAM(S): at 17:49

## 2023-02-22 RX ADMIN — APIXABAN 2.5 MILLIGRAM(S): 2.5 TABLET, FILM COATED ORAL at 05:44

## 2023-02-22 RX ADMIN — MEMANTINE HYDROCHLORIDE 5 MILLIGRAM(S): 10 TABLET ORAL at 17:47

## 2023-02-22 NOTE — PROGRESS NOTE ADULT - ASSESSMENT
MEDICATIONS  (STANDING):    MEDICATIONS  (PRN):    Sepsis due to infected pressure ulcers, , not c/w severe sepsis s/p debridement of sacral, left heel/right heel, and right hip pressure ulcers.   Multiple pressure ulcers various stages, right heel DTI, left heel with blackish discharge, sacral pressure ulcer, R hip pressure ulcer with purulent discharge.  - IV abx, vancomycin, cefepime and flagyl changed to doxycyclien and augmentin  per ID, D6/7  abx iv fluids,     and wound care nursing consulted , plan for - ID following   - blood cx staph epidermidis likely contamination, wound cx MRSA and enterococcus (sensitivity pending), repeat blood culture pending      CT AP iv contrast no abscess or osteomyelitis noted.   - case d/w ID     Elevated troponin due to demand ischemia, ruled out ACS   - trop 0.07- 0.04  -0.03 stable  - continue with iv fluids    Severe PCM  - puree TL with ensure TID  , 1:1 feeding     Dementia with dysphagia (advance) (transfers, wheelchair bound)   - ensure  TID, puree diet   - Bed bound/NonverbaL  - feeding with assistance/ fall precautions   - resume donepezil 10 mg po bid , memantine 5 mg po daily   - previous CTH global cerebral volume loss    Hypokalemia/Hypomag   - repleted, now normal     Seizure disorder   - valproic acid change oral solution 250 mg po bid.     Hx/o PE prior to arrival on eliquis in 2021   - resume eliquis 2.5 mg bid dosing     CAD/HLD  - given age/dementia not a candidate for statins.      DVT px  - resume eliquis.     DNR/DNI     Wife would like to resume current home health (Nursing and PT)   possible DC in 24-48 hours pending cultures.        MEDICATIONS  (STANDING):    MEDICATIONS  (PRN):    HPI:  Hx obtained from chart as pt is non verbal.   Patient is a 80 year old M with CAD, HLD, dementia, seizure (on valproic acid), PE, bed bound, bilateral sacral ulcers, ulcers of b/l heels, non verbal. Pt presents from home due to worsening pressure ulcers which now have discharge. Pt was seen by visiting nurse and was referred to the ED. In the ED, pt had a temp of 101, wbc with left shift. Pt was started on metronidazole, cefepime and vancomycin in the ED. Spoke to daughter who verified home meds.    (15 Feb 2023 01:18)    Assessment /Plan   Sepsis due to infected pressure ulcers, , not c/w severe sepsis s/p debridement of sacral, left heel/right heel, and right hip pressure ulcers.   Multiple pressure ulcers various stages, right heel DTI, left heel with blackish discharge, sacral pressure ulcer, R hip pressure ulcer with purulent discharge.  - IV abx, vancomycin, cefepime and flagyl changed to doxycyclien and augmentin  per ID, D7 will plan 2 weeks from debridement (end date 3/4) ,  iv fluids,     and wound care nursing consulted , plan for - ID following   - blood cx staph epidermidis likely contamination, wound cx MRSA and enterococcus sensitive to amp, repeat blood culture ngtd       CT AP iv contrast no abscess or osteomyelitis noted.   - case d/w ID     Elevated troponin due to demand ischemia, ruled out ACS   - trop 0.07- 0.04  -0.03 stable  - continue with iv fluids    Severe PCM r/w dementia.   - puree TL with ensure TID  , 1:1 feeding     Dementia with dysphagia (advance) (transfers, wheelchair bound)   - ensure  TID, puree diet   - Bed bound/NonverbaL  - feeding with assistance/ fall precautions   - resume donepezil 10 mg po bid , memantine 5 mg po daily   - previous CTH global cerebral volume loss    Hypokalemia/Hypomag   - repleted, now normal     Seizure disorder   - valproic acid change oral solution 250 mg po bid.     Hx/o PE prior to arrival on eliquis in 2021   - resume eliquis 2.5 mg bid dosing     CAD/HLD  - given age/dementia not a candidate for statins.      DVT px  - resume eliquis.     DNR/DNI     Wife would like to resume current home health (Nursing and PT)     Dispo: home with home health, wife refused [facility]/STR, barrier to discharge  fever and soft bp.  If pt afebrile and bp stable can dc home.

## 2023-02-22 NOTE — PROGRESS NOTE ADULT - SUBJECTIVE AND OBJECTIVE BOX
KERWIN [id number]    Subjective/Interval History       ROS    PHYSICAL EXAM  Vital Signs Last 24 Hrs  T(C): 36.3 (22 Feb 2023 10:02), Max: 38.5 (21 Feb 2023 20:31)  T(F): 97.4 (22 Feb 2023 10:02), Max: 101.3 (21 Feb 2023 20:31)  HR: 96 (22 Feb 2023 10:02) (89 - 110)  BP: 88/62 (22 Feb 2023 10:02) (85/45 - 112/68)  BP(mean): --  RR: 20 (22 Feb 2023 10:02) (16 - 20)  SpO2: 100% (22 Feb 2023 10:02) (98% - 100%)    Parameters below as of 22 Feb 2023 10:02  Patient On (Oxygen Delivery Method): room air      GA : AAOX3, NAD   HEENT: PERRLA, EOMI  NECK: no JVD, no thyromegaly   CVS: S1 S2 no murmur no rubs no gallop  RESP: CTAB no wheeze, no rhonchi no rales  ABD: Soft, NT, ND, tympanic, no rebound or guarding   : No [place], No CVA tenderness   EXT; no pedal edema, no cyanosis  MSK: No ML spinal tenderness, normal ROM   NEURO: AAOX3, no new focal deficits     LABS/ IMAGING        02-22    134<L>  |  101  |  7<L>  ----------------------------<  104<H>  4.2   |  26  |  0.6<L>    Ca    8.0<L>      22 Feb 2023 11:11  Mg     2.1     02-21      CAPILLARY BLOOD GLUCOSE              Culture - Blood (collected 20 Feb 2023 11:19)  Source: .Blood None  Preliminary Report (21 Feb 2023 23:01):    No growth to date.           KERWIN [id number]    Subjective/Interval History   - pt not in acute distress   - had soft bp last night and temp 101 last night     ROS  - unable to obtain due to dementia.      PHYSICAL EXAM  Vital Signs Last 24 Hrs  T(C): 36.3 (22 Feb 2023 10:02), Max: 38.5 (21 Feb 2023 20:31)  T(F): 97.4 (22 Feb 2023 10:02), Max: 101.3 (21 Feb 2023 20:31)  HR: 96 (22 Feb 2023 10:02) (89 - 110)  BP: 88/62 (22 Feb 2023 10:02) (85/45 - 112/68)  BP(mean): --  RR: 20 (22 Feb 2023 10:02) (16 - 20)  SpO2: 100% (22 Feb 2023 10:02) (98% - 100%)    Parameters below as of 22 Feb 2023 10:02  Patient On (Oxygen Delivery Method): room air      GA :  demented, awake,  NAD   HEENT: PERRL   NECK: no JVD, no thyromegaly   CVS: S1 S2 no murmur no rubs no gallop  RESP: CTAB no wheeze, no rhonchi no rales  ABD: Soft, NT, ND, tympanic, no rebound or guarding   : No [place], No CVA tenderness   EXT; no pedal edema, no cyanosis  SKIN:  Right hip pressure ulcer, pressure ulcers sacral, left heel s/p debridement and dressings  Neuro: demented, unable to perform.     LABS/ IMAGING        02-22    134<L>  |  101  |  7<L>  ----------------------------<  104<H>  4.2   |  26  |  0.6<L>    Ca    8.0<L>      22 Feb 2023 11:11  Mg     2.1     02-21      CAPILLARY BLOOD GLUCOSE              Culture - Blood (collected 20 Feb 2023 11:19)  Source: .Blood None  Preliminary Report (21 Feb 2023 23:01):    No growth to date.

## 2023-02-23 ENCOUNTER — TRANSCRIPTION ENCOUNTER (OUTPATIENT)
Age: 83
End: 2023-02-23

## 2023-02-23 LAB
ALBUMIN SERPL ELPH-MCNC: 1.9 G/DL — LOW (ref 3.5–5.2)
ALP SERPL-CCNC: 109 U/L — SIGNIFICANT CHANGE UP (ref 30–115)
ALT FLD-CCNC: 16 U/L — SIGNIFICANT CHANGE UP (ref 0–41)
ANION GAP SERPL CALC-SCNC: 6 MMOL/L — LOW (ref 7–14)
AST SERPL-CCNC: 33 U/L — SIGNIFICANT CHANGE UP (ref 0–41)
BASOPHILS # BLD AUTO: 0.02 K/UL — SIGNIFICANT CHANGE UP (ref 0–0.2)
BASOPHILS NFR BLD AUTO: 0.2 % — SIGNIFICANT CHANGE UP (ref 0–1)
BILIRUB SERPL-MCNC: 0.2 MG/DL — SIGNIFICANT CHANGE UP (ref 0.2–1.2)
BUN SERPL-MCNC: 8 MG/DL — LOW (ref 10–20)
CALCIUM SERPL-MCNC: 8.2 MG/DL — LOW (ref 8.4–10.5)
CHLORIDE SERPL-SCNC: 103 MMOL/L — SIGNIFICANT CHANGE UP (ref 98–110)
CO2 SERPL-SCNC: 27 MMOL/L — SIGNIFICANT CHANGE UP (ref 17–32)
CREAT SERPL-MCNC: 0.6 MG/DL — LOW (ref 0.7–1.5)
EGFR: 96 ML/MIN/1.73M2 — SIGNIFICANT CHANGE UP
EOSINOPHIL # BLD AUTO: 0.13 K/UL — SIGNIFICANT CHANGE UP (ref 0–0.7)
EOSINOPHIL NFR BLD AUTO: 1.5 % — SIGNIFICANT CHANGE UP (ref 0–8)
GLUCOSE SERPL-MCNC: 95 MG/DL — SIGNIFICANT CHANGE UP (ref 70–99)
HCT VFR BLD CALC: 31 % — LOW (ref 42–52)
HGB BLD-MCNC: 10.4 G/DL — LOW (ref 14–18)
IMM GRANULOCYTES NFR BLD AUTO: 0.7 % — HIGH (ref 0.1–0.3)
LYMPHOCYTES # BLD AUTO: 1.53 K/UL — SIGNIFICANT CHANGE UP (ref 1.2–3.4)
LYMPHOCYTES # BLD AUTO: 18.1 % — LOW (ref 20.5–51.1)
MCHC RBC-ENTMCNC: 31 PG — SIGNIFICANT CHANGE UP (ref 27–31)
MCHC RBC-ENTMCNC: 33.5 G/DL — SIGNIFICANT CHANGE UP (ref 32–37)
MCV RBC AUTO: 92.3 FL — SIGNIFICANT CHANGE UP (ref 80–94)
MONOCYTES # BLD AUTO: 0.73 K/UL — HIGH (ref 0.1–0.6)
MONOCYTES NFR BLD AUTO: 8.7 % — SIGNIFICANT CHANGE UP (ref 1.7–9.3)
NEUTROPHILS # BLD AUTO: 5.96 K/UL — SIGNIFICANT CHANGE UP (ref 1.4–6.5)
NEUTROPHILS NFR BLD AUTO: 70.8 % — SIGNIFICANT CHANGE UP (ref 42.2–75.2)
NRBC # BLD: 0 /100 WBCS — SIGNIFICANT CHANGE UP (ref 0–0)
PLATELET # BLD AUTO: 294 K/UL — SIGNIFICANT CHANGE UP (ref 130–400)
POTASSIUM SERPL-MCNC: 4.6 MMOL/L — SIGNIFICANT CHANGE UP (ref 3.5–5)
POTASSIUM SERPL-SCNC: 4.6 MMOL/L — SIGNIFICANT CHANGE UP (ref 3.5–5)
PROT SERPL-MCNC: 5.7 G/DL — LOW (ref 6–8)
RBC # BLD: 3.36 M/UL — LOW (ref 4.7–6.1)
RBC # FLD: 13.9 % — SIGNIFICANT CHANGE UP (ref 11.5–14.5)
SODIUM SERPL-SCNC: 136 MMOL/L — SIGNIFICANT CHANGE UP (ref 135–146)
WBC # BLD: 8.43 K/UL — SIGNIFICANT CHANGE UP (ref 4.8–10.8)
WBC # FLD AUTO: 8.43 K/UL — SIGNIFICANT CHANGE UP (ref 4.8–10.8)

## 2023-02-23 PROCEDURE — 99221 1ST HOSP IP/OBS SF/LOW 40: CPT

## 2023-02-23 PROCEDURE — 99232 SBSQ HOSP IP/OBS MODERATE 35: CPT

## 2023-02-23 RX ADMIN — Medication 100 MILLIGRAM(S): at 06:24

## 2023-02-23 RX ADMIN — APIXABAN 2.5 MILLIGRAM(S): 2.5 TABLET, FILM COATED ORAL at 06:22

## 2023-02-23 RX ADMIN — Medication 100 MILLIGRAM(S): at 17:41

## 2023-02-23 RX ADMIN — MEMANTINE HYDROCHLORIDE 5 MILLIGRAM(S): 10 TABLET ORAL at 17:41

## 2023-02-23 RX ADMIN — Medication 250 MILLIGRAM(S): at 17:41

## 2023-02-23 RX ADMIN — Medication 1 TABLET(S): at 17:41

## 2023-02-23 RX ADMIN — Medication 1 TABLET(S): at 06:24

## 2023-02-23 RX ADMIN — APIXABAN 2.5 MILLIGRAM(S): 2.5 TABLET, FILM COATED ORAL at 17:41

## 2023-02-23 RX ADMIN — Medication 250 MILLIGRAM(S): at 06:25

## 2023-02-23 RX ADMIN — MEMANTINE HYDROCHLORIDE 5 MILLIGRAM(S): 10 TABLET ORAL at 06:23

## 2023-02-23 NOTE — PROGRESS NOTE ADULT - ASSESSMENT
Patient is a 80 year old M with CAD, HLD, dementia, seizure (on valproic acid), PE, bed bound, bilateral sacral ulcers, ulcers of b/l heels, non verbal. Pt presents from home due to worsening pressure ulcers which now have discharge. Pt was seen by visiting nurse and was referred to the ED. In the ED, pt had a temp of 101, wbc with left shift. Pt was started on metronidazole, cefepime and vancomycin in the ED. Spoke to daughter who verified home meds.     Sepsis due to infected pressure ulcers, , not c/w severe sepsis s/p debridement of sacral, left heel/right heel, and right hip pressure ulcers.   Multiple pressure ulcers various stages, right heel DTI, left heel with blackish discharge, sacral pressure ulcer, R hip pressure ulcer with purulent discharge.  - IV abx, vancomycin, cefepime and flagyl changed to doxycyclien and augmentin  per ID, D7 will plan 2 weeks from debridement (end date 3/4)     and wound care nursing consulted  - blood cx staph epidermidis likely contamination, wound cx MRSA and enterococcus sensitive to amp  -repeat blood culture ngtd     -CT AP iv contrast no abscess or osteomyelitis noted.     Elevated troponin due to demand ischemia, ruled out ACS, resolved    Scrotal/Penile swelling, resolved  -urology recs appreciated    Severe PCM r/w dementia.   - puree TL with ensure TID  , 1:1 feeding     Dementia with dysphagia (advance) (transfers, wheelchair bound)   - ensure  TID, puree diet   - Bed bound/NonverbaL  - feeding with assistance/ fall precautions   - resume donepezil 10 mg po bid , memantine 5 mg po daily   - previous CTH global cerebral volume loss    Hypokalemia/Hypomag   - repleted, now normal     Seizure disorder   - valproic acid change oral solution 250 mg po bid.     Hx/o PE prior to arrival on eliquis in 2021   - eliquis 2.5 mg bid dosing     CAD/HLD  - given age/dementia not a candidate for statins.      DVT px  - resume eliquis.     DNR/DNI     Wife would like to resume current home health (Nursing and PT)     Dispo: Family undecided about dispo home v [facility]

## 2023-02-23 NOTE — CONSULT NOTE ADULT - SUBJECTIVE AND OBJECTIVE BOX
HPI:  Hx obtained from chart as pt is non verbal.   Patient is a 80 year old M with CAD, HLD, dementia, seizure (on valproic acid), PE, bed bound, bilateral sacral ulcers, ulcers of b/l heels, non verbal. Pt presents from home due to worsening pressure ulcers which now have discharge. Pt was seen by visiting nurse and was referred to the ED. In the ED, pt had a temp of 101, wbc with left shift. Pt was started on metronidazole, cefepime and vancomycin in the ED. Spoke to daughter who verified home meds.          PAST MEDICAL & SURGICAL HISTORY:  Coronary artery disease, angina presence unspecified, unspecified vessel or lesion type, unspecified whether native or transplanted heart  Hyperlipidemia, unspecified hyperlipidemia type  Seizure  Dementia  Gastrointestinal perforation  Pulmonary emboli  resolved      No significant past surgical history      REVIEW OF SYSTEMS: Pt unable to offer    MEDICATIONS  (STANDING):  amoxicillin  875 milliGRAM(s)/clavulanate 1 Tablet(s) Oral every 12 hours  apixaban 2.5 milliGRAM(s) Oral every 12 hours  bisacodyl Suppository 10 milliGRAM(s) Rectal once  donepezil 10 milliGRAM(s) Oral at bedtime  doxycycline monohydrate Capsule 100 milliGRAM(s) Oral every 12 hours  memantine 5 milliGRAM(s) Oral two times a day  valproic  acid Syrup 250 milliGRAM(s) Oral two times a day    MEDICATIONS  (PRN):  acetaminophen     Tablet .. 650 milliGRAM(s) Oral every 6 hours PRN Temp greater or equal to 38C (100.4F)      Allergies    No Known Allergies    Intolerances        SOCIAL HISTORY:   From Home     FAMILY HISTORY:   No pertinent family hx noted      PHYSICAL EXAM:  Vital Signs Last 24 Hrs  T(C): 36.4 (23 Feb 2023 13:58), Max: 37 (22 Feb 2023 20:53)  T(F): 97.6 (23 Feb 2023 13:58), Max: 98.6 (22 Feb 2023 20:53)  HR: 98 (23 Feb 2023 13:58) (94 - 101)  BP: 128/71 (23 Feb 2023 13:58) (117/82 - 128/71)  BP(mean): --  RR: 18 (23 Feb 2023 13:58) (18 - 18)  SpO2: 99% (23 Feb 2023 13:58) (96% - 99%)    Parameters below as of 23 Feb 2023 13:58  Patient On (Oxygen Delivery Method): room air         General : NAD , cachectic, contracted   HEENT:  NC/AT, PERRL, EOMI, sclera clear, mucosa moist, throat clear, trachea midline, neck supple  Cardiovascular: RRR   Respiratory:  equal chest rise  Gastrointestinal soft NT/ND (+)BS    Neurology:  weakened strength & sensation  Psych: calm  Musculoskeletal:  limited stiff , deformities/ contractures  Vascular:   B/L LE diminished pulses     Skin: pressure injury     LABS/ CULTURES/ RADIOLOGY:                        10.4   8.43  )-----------( 294      ( 23 Feb 2023 07:13 )             31.0       136  |  103  |  8   ----------------------------<  95      [02-23-23 @ 07:13]  4.6   |  27  |  0.6        Ca     8.2     [02-23-23 @ 07:13]    TPro  5.7  /  Alb  1.9  /  TBili  0.2  /  DBili  x   /  AST  33  /  ALT  16  /  AlkPhos  109  [02-23-23 @ 07:13]              Culture - Blood (collected 02-20-23 @ 11:19)  Source: .Blood None  Preliminary Report (02-21-23 @ 23:01):    No growth to date.            Culture - Other (collected 02-18-23 @ 17:28)  Source: .Other RIGHT HEEL  Final Report (02-22-23 @ 15:59):    Rare Methicillin Resistant Staphylococcus aureus    Rare Enterococcus faecalis  Organism: Methicillin resistant Staphylococcus aureus  Enterococcus faecalis (02-22-23 @ 15:59)  Organism: Enterococcus faecalis (02-22-23 @ 15:59)      -  Ampicillin: S <=2 Predicts results to ampicillin/sulbactam, amoxacillin-clavulanate and  piperacillin-tazobactam.      -  Tetracycline: S <=1      -  Vancomycin: S 2      Method Type: EZEQUIEL  Organism: Methicillin resistant Staphylococcus aureus (02-22-23 @ 15:59)      -  Ampicillin/Sulbactam: R 16/8      -  Cefazolin: R >16      -  Clindamycin: R >4      -  Daptomycin: S 0.5      -  Erythromycin: R >4      -  Gentamicin: R >8 Should not be used as monotherapy      -  Linezolid: S 2      -  Oxacillin: R >2      -  Penicillin: R >8      -  Rifampin: S <=1 Should not be used as monotherapy      -  Tetracycline: S 2      -  Trimethoprim/Sulfamethoxazole: S <=0.5/9.5      -  Vancomycin: S 2      Method Type: EZEQUIEL    Culture - Other (collected 02-18-23 @ 17:15)  Source: .Other None  Final Report (02-21-23 @ 10:29):    No growth at 48 hours    Culture - Other (collected 02-18-23 @ 16:51)  Source: .Other None  Final Report (02-21-23 @ 08:59):    No growth at 48 hours

## 2023-02-23 NOTE — DISCHARGE NOTE PROVIDER - EXTENDED VTE YES NO FOR MLM ASPIRIN
Donald Gracia (:  2021) is a 2 m.o. male,Established patient, here for evaluation of the following chief complaint(s):  Well Child      ASSESSMENT/PLAN:  1. Healthy infant on routine physical examination over 29days old      Recheck at four months  Discussed teething and gerd   SUBJECTIVE/OBJECTIVE:  HPI  Well 1 months old  Doing well   No new concerns  Wondering about gerd    Review of Systems   Constitutional: Negative. HENT: Negative. Eyes: Negative. Respiratory: Negative. Cardiovascular: Negative. Gastrointestinal: Negative. Genitourinary: Negative. Musculoskeletal: Negative. Skin: Negative. Neurological: Negative. Hematological: Negative. Physical Exam  Vitals signs and nursing note reviewed. Constitutional:       General: He is not in acute distress. Appearance: He is well-developed. HENT:      Head: Anterior fontanelle is flat. Left Ear: Tympanic membrane normal.      Nose: Nose normal.      Mouth/Throat:      Mouth: Mucous membranes are moist.      Pharynx: Oropharynx is clear. Eyes:      Conjunctiva/sclera: Conjunctivae normal.   Cardiovascular:      Rate and Rhythm: Regular rhythm. Heart sounds: S1 normal and S2 normal.   Pulmonary:      Effort: Pulmonary effort is normal.      Breath sounds: Normal breath sounds. Abdominal:      Palpations: There is no mass. Genitourinary:     Penis: Normal and circumcised. Musculoskeletal: Normal range of motion. Lymphadenopathy:      Cervical: No cervical adenopathy. Skin:     General: Skin is warm. Findings: No rash. Neurological:      Mental Status: He is alert. An electronic signature was used to authenticate this note.     --Ryan Larsen DO
,

## 2023-02-23 NOTE — CONSULT NOTE ADULT - REASON FOR ADMISSION
infected decubitus ulcers

## 2023-02-23 NOTE — DISCHARGE NOTE PROVIDER - NSDCCPCAREPLAN_GEN_ALL_CORE_FT
PRINCIPAL DISCHARGE DIAGNOSIS  Diagnosis: Infected decubitus ulcer  Assessment and Plan of Treatment: You were admitted for an infected decubitus ulcers. You were started on antibiotics and you were evaluated by the infectious disease and surgical team. You were taken to surgery on 2/18 and debridements were done to the ulcers. Samples of the wounds were taken and sent to the lab. Based on those results, you were started on the appropriate antibiotics.   Please follow up with your primary care doctor within 1 week.   Continue taking all of your home medications.      SECONDARY DISCHARGE DIAGNOSES  Diagnosis: Elevated troponin  Assessment and Plan of Treatment: You were noted to have an elevated troponin level.   Troponin is a protein that is released when the heart muscle is damaged. Causes of this can be a.fib. You were evaluated by the cardiology team.   Please follow up with cardiologist Dr. Juarez.   If you experience chest pain, palpitations or shortness of breath report to the nearest ER.     PRINCIPAL DISCHARGE DIAGNOSIS  Diagnosis: Infected decubitus ulcer  Assessment and Plan of Treatment: You were admitted for an infected decubitus ulcers. You were started on antibiotics and you were evaluated by the infectious disease and surgical team. You were taken to surgery on 2/18 and debridements were done to the ulcers. Samples of the wounds were taken and sent to the lab. Based on those results, you were started on the appropriate antibiotics.   Please follow up with your primary care doctor within 1 week.   Continue taking all of your home medications.  Please continue doxycycline until 3/4  Please continue Augmentin until 3/4      SECONDARY DISCHARGE DIAGNOSES  Diagnosis: Elevated troponin  Assessment and Plan of Treatment: You were noted to have an elevated troponin level.   Troponin is a protein that is released when the heart muscle is damaged. Causes of this can be a.fib. You were evaluated by the cardiology team.   Please follow up with cardiologist Dr. Juarez.   If you experience chest pain, palpitations or shortness of breath report to the nearest ER.

## 2023-02-23 NOTE — DISCHARGE NOTE PROVIDER - CARE PROVIDER_API CALL
Garland Arce)  Pediatrics  [address] [place] [address]  Phone: [phone]  Fax: [phone]  Follow Up Time: 1 week    Chucho Juarez)  Cardiovascular Disease; Internal Medicine  [address] [place] [address]  Phone: [phone]  Fax: [phone]  Follow Up Time: 1 week

## 2023-02-23 NOTE — DISCHARGE NOTE PROVIDER - ATTENDING DISCHARGE PHYSICAL EXAMINATION:
GA :  demented, awake,  NAD   HEENT: PERRL   NECK: no JVD, no thyromegaly   CVS: S1 S2 no murmur no rubs no gallop  RESP: CTAB no wheeze, no rhonchi no rales  ABD: Soft, NT, ND, tympanic, no rebound or guarding   : No [place], No CVA tenderness   EXT; no pedal edema, no cyanosis  SKIN:  Right hip pressure ulcer, pressure ulcers sacral, left heel s/p debridement and dressings  Neuro: demented, unable to perform.

## 2023-02-23 NOTE — PROGRESS NOTE ADULT - SUBJECTIVE AND OBJECTIVE BOX
PROGRESS NOTE:   Dharmesh Pyle MD  Available on teams    Patient is a 82y old  Male who presents with a chief complaint of infected decubitus ulcers (24 Feb 2023 07:23)    SUBJECTIVE / OVERNIGHT EVENTS:  Seen for follow up for infected decubitus ulcers   Unable to obtain due to dementia    ADDITIONAL REVIEW OF SYSTEMS:    MEDICATIONS  (STANDING):  amoxicillin  875 milliGRAM(s)/clavulanate 1 Tablet(s) Oral every 12 hours  apixaban 2.5 milliGRAM(s) Oral every 12 hours  bisacodyl Suppository 10 milliGRAM(s) Rectal once  donepezil 10 milliGRAM(s) Oral at bedtime  doxycycline monohydrate Capsule 100 milliGRAM(s) Oral every 12 hours  memantine 5 milliGRAM(s) Oral two times a day  valproic  acid Syrup 250 milliGRAM(s) Oral two times a day    MEDICATIONS  (PRN):  acetaminophen     Tablet .. 650 milliGRAM(s) Oral every 6 hours PRN Temp greater or equal to 38C (100.4F)      CAPILLARY BLOOD GLUCOSE        I&O's Summary      PHYSICAL EXAM:  Vital Signs Last 24 Hrs  T(C): 36.9 (24 Feb 2023 04:56), Max: 38.2 (23 Feb 2023 22:07)  T(F): 98.4 (24 Feb 2023 04:56), Max: 100.8 (23 Feb 2023 22:07)  HR: 92 (24 Feb 2023 04:56) (92 - 109)  BP: 97/63 (24 Feb 2023 04:56) (97/63 - 131/82)  BP(mean): --  RR: 18 (24 Feb 2023 04:56) (18 - 18)  SpO2: 97% (24 Feb 2023 04:56) (96% - 99%)    Parameters below as of 24 Feb 2023 04:56  Patient On (Oxygen Delivery Method): room air    GA :  demented, awake,  NAD   HEENT: PERRL   NECK: no JVD, no thyromegaly   CVS: S1 S2 no murmur no rubs no gallop  RESP: CTAB no wheeze, no rhonchi no rales  ABD: Soft, NT, ND, tympanic, no rebound or guarding   : No [place], No CVA tenderness   EXT; no pedal edema, no cyanosis  SKIN:  Right hip pressure ulcer, pressure ulcers sacral, left heel s/p debridement and dressings  Neuro: demented, unable to perform.     LABS:                        10.4   8.43  )-----------( 294      ( 23 Feb 2023 07:13 )             31.0     02-23    136  |  103  |  8<L>  ----------------------------<  95  4.6   |  27  |  0.6<L>    Ca    8.2<L>      23 Feb 2023 07:13    TPro  5.7<L>  /  Alb  1.9<L>  /  TBili  0.2  /  DBili  x   /  AST  33  /  ALT  16  /  AlkPhos  109  02-23                RADIOLOGY & ADDITIONAL TESTS:  Results Reviewed:   Imaging Personally Reviewed:  Electrocardiogram Personally Reviewed:    COORDINATION OF CARE:  Care Discussed with Consultants/Other Providers [Y/N]:  Prior or Outpatient Records Reviewed [Y/N]:

## 2023-02-23 NOTE — DISCHARGE NOTE PROVIDER - NSDCFUADDINST_GEN_ALL_CORE_FT
Please continue taking all of your home medications as prescribed.   Please follow up with cardiologist Dr. Juarez as well. Telephone number is [phone].  Please follow up with your primary care doctor Dr. Arce within 1 week.   If you experience chest pain, fever > 100.4, shortness of breath, vomiting diarrhea report to the nearest ER.

## 2023-02-23 NOTE — DISCHARGE NOTE PROVIDER - DETAILS OF MALNUTRITION DIAGNOSIS/DIAGNOSES
This patient has been assessed with a concern for Malnutrition and was treated during this hospitalization for the following Nutrition diagnosis/diagnoses:     -  02/21/2023: Severe protein-calorie malnutrition   -  02/21/2023: Underweight (BMI < 19)

## 2023-02-23 NOTE — DISCHARGE NOTE PROVIDER - NSDCCAREPROVSEEN_GEN_ALL_CORE_FT
Tyler, Mitchell De, Kennedy Crowder, Dionicio Pyle, Dharmesh Timmons, Adore Juarez, Chucho Guerrero, Lauro Jones, Farnaz Cole, Carmencita Sainz, Kristopher Reese, Ned Yu, Larry Turner, Haris Reyes, Orly Delaney, Kennedy Burgess, Yovani Rangel, Cricket Carter, Maribel Kwan, Corey Del Angel, Rodolfo LOPEZ Sudeep Lindsay

## 2023-02-23 NOTE — CONSULT NOTE ADULT - ASSESSMENT
patient has diffuse edema secondary to meing bedbound, multipel medical commorbidities, hypoalbumemia. The penile and scrotal swelling is being caused by this. No evidence of any infection on exam.   NO urgent urological intervention required.  Pt can f/u as outpt with urology.    patient has diffuse edema secondary to being bedbound, multiple medical commorbidities, hypoalbumemia. The penile and scrotal swelling is being caused by this. No evidence of any infection on exam.   NO urgent urological intervention required.  Pt can f/u as outpt with urology.

## 2023-02-23 NOTE — DISCHARGE NOTE PROVIDER - PROVIDER TOKENS
PROVIDER:[TOKEN:[[id number]],FOLLOWUP:[1 week]],PROVIDER:[TOKEN:[[id number]],FOLLOWUP:[1 week]]

## 2023-02-23 NOTE — DISCHARGE NOTE PROVIDER - NSDCMRMEDTOKEN_GEN_ALL_CORE_FT
[facility]  [address] [facility]  [address] [place] [id number]  Dept: [phone]  Loc: [phone]     Date:   2016    Patient:  Chito Ibrahim  :      1975  MRN:      [id number]    Chief Complaint:  Leslie Cormier Occupational History      Not on file    Tobacco Use      Smoking status: Former Smoker        Quit date: 2011        Years since quittin.6      Smokeless tobacco: Never Used    Substance and Sexual Activity      Alcohol use: No        Comment: sabrina Reviewed and Discussed    walks    ROS:    Constitutional: positive for fatigue  Respiratory: positive for dyspnea on exertion  Cardiovascular: negative  Gastrointestinal: negative  Musculoskeletal:positive for arthralgias and back pain  Neurological: nega soda.  3. Increase activity-upper body exercises, walk 10 minutes per day. 4. Increase fruit and vegetable servings to 5-6 per day.       Should come to seminar    Will refer to RD for pre surgical weight loss  Has met with Dr Ritika Slaughter (he recommended Rou acetaminophen 325 mg oral tablet: 2 tab(s) orally every 6 hours, As needed, Temp greater or equal to 38.5C (101.3F), Mild Pain (1 - 3)  donepezil 10 mg oral tablet: 1 tab(s) orally once a day (at bedtime)  Eliquis Starter Pack for Treatment of DVT and PE 5 mg oral tablet: 1 tab(s) orally 2 times a day MDD:take 10 mg BID x 7 days then 5 mg BID x 21 days  memantine 5 mg oral tablet: 1 tab(s) orally 2 times a day  valproic acid 250 mg/5 mL oral liquid: 5 milliliter(s) orally 2 times a day (dose in the morning and at bedtime)   acetaminophen 325 mg oral tablet: 2 tab(s) orally every 6 hours, As needed, Temp greater or equal to 38.5C (101.3F), Mild Pain (1 - 3)  amoxicillin-clavulanate 875 mg-125 mg oral tablet: 1 tab(s) orally every 12 hours  donepezil 10 mg oral tablet: 1 tab(s) orally once a day (at bedtime)  doxycycline monohydrate 50 mg oral capsule: 2 cap(s) orally every 12 hours  Eliquis Starter Pack for Treatment of DVT and PE 5 mg oral tablet: 1 tab(s) orally 2 times a day MDD:take 10 mg BID x 7 days then 5 mg BID x 21 days  memantine 5 mg oral tablet: 1 tab(s) orally 2 times a day  valproic acid 250 mg/5 mL oral liquid: 5 milliliter(s) orally 2 times a day (dose in the morning and at bedtime)   acetaminophen 325 mg oral tablet: 2 tab(s) orally every 6 hours, As needed, Temp greater or equal to 38.5C (101.3F), Mild Pain (1 - 3)  amoxicillin-clavulanate 875 mg-125 mg oral tablet: 1 tab(s) orally every 12 hours  donepezil 10 mg oral tablet: 1 tab(s) orally once a day (at bedtime)  doxycycline monohydrate 50 mg oral capsule: 2 cap(s) orally every 12 hours  Eliquis 2.5 mg oral tablet: 1 tab(s) orally 2 times a day   memantine 5 mg oral tablet: 1 tab(s) orally 2 times a day  valproic acid 250 mg/5 mL oral liquid: 5 milliliter(s) orally 2 times a day (dose in the morning and at bedtime)

## 2023-02-23 NOTE — DISCHARGE NOTE PROVIDER - HOSPITAL COURSE
Patient is an 79 y/o M with a pmhx of CAD, HLD, dementia, seizure, PE, bed bound, bilateral sacral ulcers, ulcers of bilateral heels. In the ER, patient noted to have a temp of 101, WBC with a left shift. Who is admitted for sepsis due to infected pressure ulcers.   Patient started on metronidazole, cefepime and vancomycin.   IV fluids were started and blood cultures were obtained.   Patient was also noted to have elevated troponin with possible a.fib. Rate was controlled. Anticoagulation continued.   Troponin were trended x 3 with improvement. Cardiology was consulted who reported mildly elevated troponin likely secondary to demand ischemia.   ID consulted who recommends to continue to abx until work up is done.   Surgery consulted for possible debridement and recommendations appreciated.  Patient was taken to surgery on 2/18 for debridement of multiple decubitus ulcers. Cultures obtained. Wounds were packed.   Wound care nursing consulted.   CT AP IV contrast showed no abscess or osteomyelitis noted.   Blood culture from 2/14 showed staph epi, 1 of 2 sets- likely contaminant.   Per ID doxycycline 100mg BID was started and cefepime/flagyl was switched to Augmentin 875/125 BID.   Wound culture resulted as MRSA and enterococcus sensitive to amp. Repeat blood cultures were NGTD.     Currently patient is hemodynamically stable and ready for discharge. As per patients wife, would like to resume current home health (nursing and PT)   Wife refused MARIAM/STR. Patient is an 81 y/o M with a pmhx of CAD, HLD, dementia, seizure, PE, bed bound, bilateral sacral ulcers, ulcers of bilateral heels. In the ER, patient noted to have a temp of 101, WBC with a left shift. Who is admitted for sepsis due to infected pressure ulcers.   Patient started on metronidazole, cefepime and vancomycin.   IV fluids were started and blood cultures were obtained.   Patient was also noted to have elevated troponin with possible a.fib. Rate was controlled. Anticoagulation continued.   Troponin were trended x 3 with improvement. Cardiology was consulted who reported mildly elevated troponin likely secondary to demand ischemia.   ID consulted who recommends transitioning abx to doxycycline and augment until 3/4  Surgery consulted for possible debridement and recommendations appreciated.  Patient was taken to surgery on 2/18 for debridement of multiple decubitus ulcers. Cultures obtained. Wounds were packed.   Wound care nursing consulted.   CT AP IV contrast showed no abscess or osteomyelitis noted.   Blood culture from 2/14 showed staph epi, 1 of 2 sets- likely contaminant.   Per ID doxycycline 100mg BID was started and cefepime/flagyl was switched to Augmentin 875/125 BID.   Wound culture resulted as MRSA and enterococcus sensitive to amp. Repeat blood cultures were NGTD.     Currently patient is hemodynamically stable and ready for discharge. As per patients wife, would like to resume current home health (nursing and PT)   Wife refused MARIAM/STR. Patient is an 81 y/o M with a pmhx of CAD, HLD, dementia, seizure, PE, bed bound, bilateral sacral ulcers, ulcers of bilateral heels. In the ER, patient noted to have a temp of 101, WBC with a left shift. Who is admitted for sepsis due to infected pressure ulcers.   Patient started on metronidazole, cefepime and vancomycin.   IV fluids were started and blood cultures were obtained.   Patient was also noted to have elevated troponin with possible a.fib. Rate was controlled. Anticoagulation continued.   Troponin were trended x 3 with improvement. Cardiology was consulted who reported mildly elevated troponin likely secondary to demand ischemia.   ID consulted who recommends transitioning abx to doxycycline and augment until 3/4  Surgery consulted for possible debridement and recommendations appreciated.  Patient was taken to surgery on 2/18 for debridement of multiple decubitus ulcers. Cultures obtained. Wounds were packed.   Wound care nursing consulted.   CT AP IV contrast showed no abscess or osteomyelitis noted.   Blood culture from 2/14 showed staph epi, 1 of 2 sets- likely contaminant.   Per ID doxycycline 100mg BID was started and cefepime/flagyl was switched to Augmentin 875/125 BID.   Wound culture resulted as MRSA and enterococcus sensitive to amp. Repeat blood cultures were NGTD.   -CT AP iv contrast showed no abscess or osteomyelitis noted.   - The patient was febrile, discussed with ID, repeated septic workup, sent RVP and UE dopplers were negative    - The urine culture negative to date; the patient was enterovirus positive    Currently patient is hemodynamically stable and ready for discharge. As per patients wife, would like to resume current home health (nursing and PT)   Wife refused MARIAM/STR.

## 2023-02-23 NOTE — CONSULT NOTE ADULT - CONSULT REASON
Infected sacral ulcer
afib post troponin
Pressure injury asses ment and treatment recommendation
multiple decubiti
penile and scrotal swelling

## 2023-02-23 NOTE — CONSULT NOTE ADULT - SUBJECTIVE AND OBJECTIVE BOX
HPI:  Patient is an 79 y/o M with a pmhx of CAD, HLD, dementia, seizure, PE, bed bound, bilateral sacral ulcers, ulcers of bilateral heels. admitted for sepsis secondary to infected ulcers.   PAST MEDICAL & SURGICAL HISTORY:  Coronary artery disease, angina presence unspecified, unspecified vessel or lesion type, unspecified whether native or transplanted heart    Hyperlipidemia, unspecified hyperlipidemia type    Seizure    Dementia    Gastrointestinal perforation    Pulmonary emboli  resolved    No significant past surgical history        Allergies    No Known Allergies          MEDICATIONS  (STANDING):  amoxicillin  875 milliGRAM(s)/clavulanate 1 Tablet(s) Oral every 12 hours  apixaban 2.5 milliGRAM(s) Oral every 12 hours  bisacodyl Suppository 10 milliGRAM(s) Rectal once  donepezil 10 milliGRAM(s) Oral at bedtime  doxycycline monohydrate Capsule 100 milliGRAM(s) Oral every 12 hours  memantine 5 milliGRAM(s) Oral two times a day  valproic  acid Syrup 250 milliGRAM(s) Oral two times a day    MEDICATIONS  (PRN):  acetaminophen     Tablet .. 650 milliGRAM(s) Oral every 6 hours PRN Temp greater or equal to 38C (100.4F)      General:	no fever, weight loss,  chills  Skin: no rash, ulcers  Ophthalmologic: no visual changes  ENMT:	no sore throat  Respiratory and Thorax: no cough, wheeze,  sob  Cardiovascular:	no chest pain, palpitations, dizziness  Gastrointestinal:	no nausea, vomiting, diarrhea, abd pain  Genitourinary:	no dysuria, hematuria  Musculoskeletal:	no joint pains  Neurological:	 no speech disturbance, focal weakness, numbness  Psychiatric:	no depression, anxiety, psychosis  Hematology/Lymphatics:	no anemia  Endocrine:	no polyuria, polydipsia        Vital Signs Last 24 Hrs  T(C): 36.4 (23 Feb 2023 13:58), Max: 37 (22 Feb 2023 20:53)  T(F): 97.6 (23 Feb 2023 13:58), Max: 98.6 (22 Feb 2023 20:53)  HR: 98 (23 Feb 2023 13:58) (94 - 101)  BP: 128/71 (23 Feb 2023 13:58) (117/82 - 134/78)  BP(mean): --  RR: 18 (23 Feb 2023 13:58) (18 - 18)  SpO2: 99% (23 Feb 2023 13:58) (96% - 99%)    PHYSICAL EXAM:      Constitutional: A&Ox4  Respiratory: cta b/l  Cardiovascular: s1 s2 rrr  Gastrointestinal: soft nt  nd + bs no rebound or guarding  Genitourinary: no cva tenderness  Extremities: normal rom, no edema, calf tenderness  Neurological:no focal deficits  Skin: no rash                            10.4   8.43  )-----------( 294      ( 23 Feb 2023 07:13 )             31.0       02-23    136  |  103  |  8<L>  ----------------------------<  95  4.6   |  27  |  0.6<L>    Ca    8.2<L>      23 Feb 2023 07:13    TPro  5.7<L>  /  Alb  1.9<L>  /  TBili  0.2  /  DBili  x   /  AST  33  /  ALT  16  /  AlkPhos  109  02-23        UA-no evidence of UTI

## 2023-02-23 NOTE — CONSULT NOTE ADULT - ASSESSMENT
Assessment:  Patient received in bed , awake non verbal.   Currently admitted for infected pressure injury                        High risk for pressrun injury development or progression   Skin assessed-  Wound #1  Type and location :  pressure injury stage  four to sacrum s/p debridement by surgical team    Size: ~8x8x1.5  Tissue Description : red skin tissue    No odor, erythema, increased warmth, tenderness, induration, fluctuance, nor crepitus  Wound Exudate : None    Wound Edge: Intact   Periwound Condition : dry     Wound #2  Type and location :  pressure injury stage four to  R hip s/p debridement by surgical team    Size: ~4x3x1.5  Tissue Description : red skin tissue    No odor, erythema, increased warmth, tenderness, induration, fluctuance, nor crepitus  Wound Exudate : None    Wound Edge: Intact   Periwound Condition : dry     Wound #3  Type and location :  pressure injury stage  four to B/l heel   Size: L , 4x2x0.2 R 3.5x3.5x0.2  Tissue Description :  pale devitalised  skin tissue    No odor, erythema, increased warmth, tenderness, induration, fluctuance, nor crepitus  Wound Exudate : None    Wound Edge: Intact   Periwound Condition : dry         Other Etiology:  [ ] Aterial  [ ] Venous   [ ] Surgical Incision  [x ] Other:  B/l lower extremity multiple small open wounds                   No drainage or foul smell noted     Plan:  Wound and skin care recs   Clean sacrum and hip with vashe wound cleanser pat dry then  apply  hydrogel with moist guaze dressing   Clean B/l heel with vashe wound cleanser, pat dry then apply  xeroform with Kerlix dressing    Apply skin barrier to B/l lower extremity superficial wounds   Pressure injury  preventive  measures  skin  and incontinence care    Assess wound and inform primary provider of any changes   Case discussed with primary Rn  Wound/ ostomy specialist  to f/u as needed     Offloading: [x ] Frequent position changes [ ] Devices/Equipment  Cleansing: [ ] Saline [ ] Soap/Water [x ] Other: ______  Topicals: [x ] Barrier Cream [ ] Antimicrobial [ ] Enzymatic Wound Debridement  Dressings: [ ] Dry, sterile [ ] Allevyn  Foam [ ] Absorbant Pads [ ] Collagenase    Other Recs.   Per primary team      Total time for bedside assessment , review of medical records  and  discussion of plan of care with primary team greater than 35 min

## 2023-02-24 PROCEDURE — 71045 X-RAY EXAM CHEST 1 VIEW: CPT | Mod: 26

## 2023-02-24 PROCEDURE — 93970 EXTREMITY STUDY: CPT | Mod: 26

## 2023-02-24 PROCEDURE — 99232 SBSQ HOSP IP/OBS MODERATE 35: CPT

## 2023-02-24 RX ADMIN — Medication 100 MILLIGRAM(S): at 18:59

## 2023-02-24 RX ADMIN — DONEPEZIL HYDROCHLORIDE 10 MILLIGRAM(S): 10 TABLET, FILM COATED ORAL at 00:00

## 2023-02-24 RX ADMIN — MEMANTINE HYDROCHLORIDE 5 MILLIGRAM(S): 10 TABLET ORAL at 05:54

## 2023-02-24 RX ADMIN — Medication 250 MILLIGRAM(S): at 18:59

## 2023-02-24 RX ADMIN — Medication 650 MILLIGRAM(S): at 00:01

## 2023-02-24 RX ADMIN — APIXABAN 2.5 MILLIGRAM(S): 2.5 TABLET, FILM COATED ORAL at 05:55

## 2023-02-24 RX ADMIN — Medication 1 TABLET(S): at 05:55

## 2023-02-24 RX ADMIN — Medication 250 MILLIGRAM(S): at 05:54

## 2023-02-24 RX ADMIN — Medication 100 MILLIGRAM(S): at 06:09

## 2023-02-24 RX ADMIN — DONEPEZIL HYDROCHLORIDE 10 MILLIGRAM(S): 10 TABLET, FILM COATED ORAL at 22:16

## 2023-02-24 RX ADMIN — APIXABAN 2.5 MILLIGRAM(S): 2.5 TABLET, FILM COATED ORAL at 18:59

## 2023-02-24 RX ADMIN — MEMANTINE HYDROCHLORIDE 5 MILLIGRAM(S): 10 TABLET ORAL at 18:59

## 2023-02-24 RX ADMIN — Medication 1 TABLET(S): at 19:00

## 2023-02-24 NOTE — SWALLOW BEDSIDE ASSESSMENT ADULT - SWALLOW EVAL: RECOMMENDED FEEDING/EATING TECHNIQUES
crush medication (when feasible)/oral hygiene/position upright (90 degrees)
crush medication (when feasible)/no straws/oral hygiene/position upright (90 degrees)
crush medication (when feasible)/no straws/oral hygiene/position upright (90 degrees)
crush medication (when feasible)/oral hygiene/position upright (90 degrees)
crush medication (when feasible)/oral hygiene/position upright (90 degrees)

## 2023-02-24 NOTE — SWALLOW BEDSIDE ASSESSMENT ADULT - NS SPL SWALLOW CLINIC TRIAL FT
Heightened aspiration risk given poor mobility, poor cognition, inability to perform oral care independently. Therefore aspiration precautions should be maintained.
Heightened aspiration risk given poor mobility, poor cognition, inability to perform oral care independently. Therefore aspiration precautions should be maintained. Pt benefits from liquid wash in order to improve oral transit time and reduce lingual residue
Heightened aspiration risk given poor mobility, poor cognition, inability to perform oral care independently. Therefore aspiration precautions should be maintained.  Functional swallow for minced&moist with thin liquids w/o overt s/s of penetration/aspiration. Break medication in half with apple sauce.
Heightened aspiration risk given poor mobility, poor cognition, inability to perform oral care independently. Therefore aspiration precautions should be maintained.
Heightened aspiration risk given poor mobility, poor cognition, inability to perform oral care independently

## 2023-02-24 NOTE — CHART NOTE - NSCHARTNOTEFT_GEN_A_CORE
Patient is an 81 y/o M with a pmhx of CAD, HLD, dementia, seizure, PE, bed bound, bilateral sacral ulcers, ulcers of bilateral heels requires hospital bed for home with gel overlay.  Due to dx:  bed bound, bilateral sacral ulcers, ulcers of bilateral heels  Patient requires frequent position changes to prevent skin breakdown.  Pillows and wedges have been tried and failed.  Patient requires head of bed to be elevated greater than 30 degrees. Pt is at risk of aspiration.       Qualification for wheelchair with upper body ability. (NO CARE GIVER)  This patient has a mobility limitation that significantly impairs the patient’s ability to participate in one or more MRADL's such as: toileting, eating, dressing and bathing in customary locations in the home.  Patient's home provides adequate access between rooms for the use of the manual wheelchair.  The wheelchair will significantly improve patient's ability to participate in MRADL's and will be used on a regular basis in the home. The patient's mobility limitation cannot be resolved by the use of a walker or cane.  The patient has sufficient upper extremity function to safely propel the manual wheelchair. The patient has agreed to use the wheelchair on a daily basis in the home.    Diagnosis: bed bound, heel ulcer       LOW AIR LOSS MATTRESS  This 80 yr old male will need a low air loss mattress for his new hospital bed for home.  Due to his multiple medical conditions including  CAD, HLD, dementia, seizure, PE, bed bound, bilateral sacral ulcers, ulcers of bilateral heels  This patient is at high risk of skin breakdown and skin ulcerations in that he is unable to make effective position changes in the bed.  This patient requires the assistance of another to successfully make position changes.  This patient will benefit from a low air matres to prevent skin breakdown.

## 2023-02-24 NOTE — SWALLOW BEDSIDE ASSESSMENT ADULT - CONSISTENCIES ADMINISTERED
thin liquid/pureed
thin liquid/pureed/minced & moist/soft & bite-sized
thin liquid/pureed/minced & moist
thin liquid/pureed/minced & moist/soft & bite-sized
thin liquid/pureed/soft & bite-sized

## 2023-02-24 NOTE — SWALLOW BEDSIDE ASSESSMENT ADULT - MODE OF PRESENTATION
cup/spoon/straw/fed by clinician

## 2023-02-24 NOTE — SWALLOW BEDSIDE ASSESSMENT ADULT - SWALLOW EVAL: RECOMMENDED DIET
Puree/thins. 1:1 feeds
minced&moist with thin liquids. 1:1 feeds
puree/thin via tsp
Puree/thins. 1:1 feeds
puree/thin via tsp
all other ROS negative except as per HPI

## 2023-02-24 NOTE — SWALLOW BEDSIDE ASSESSMENT ADULT - ASR SWALLOW DENTITION
present and adequate

## 2023-02-24 NOTE — SWALLOW BEDSIDE ASSESSMENT ADULT - ADDITIONAL RECOMMENDATIONS
Aspiration precautions. 1:1 feed. SLP to f/u
Aspiration precautions. 1:1 feed. Liquids via tsp only. SLP to f/u
Aspiration precautions. 1:1 feed. SLP to f/u
Aspiration precautions. 1:1 feed. SLP to f/u
Aspiration precautions. 1:1 feed. Liquids via tsp only. SLP to f/u

## 2023-02-24 NOTE — SWALLOW BEDSIDE ASSESSMENT ADULT - SWALLOW EVAL: REHAB POTENTIAL
fair, will monitor progress closely

## 2023-02-24 NOTE — SWALLOW BEDSIDE ASSESSMENT ADULT - DIET PRIOR TO ADMI
reg/thin per daughter
yes

## 2023-02-24 NOTE — PROGRESS NOTE ADULT - SUBJECTIVE AND OBJECTIVE BOX
KERWIN HERNANDEZ  82y, Male  Allergy: No Known Allergies      LOS  9d    CHIEF COMPLAINT: infected decubitus ulcers (23 Feb 2023 15:22)      INTERVAL EVENTS/HPI  - No acute events overnight  - T(F): , Max: 100.8 (02-23-23 @ 22:07)  -   - WBC Count: 8.43 (02-23-23 @ 07:13)     - Creatinine, Serum: 0.6 (02-23-23 @ 07:13)  Creatinine, Serum: 0.6 (02-22-23 @ 11:11)       ROS  General: Denies rigors, nightsweats  HEENT: Denies headache, rhinorrhea, sore throat, eye pain  CV: Denies CP, palpitations  PULM: Denies wheezing, hemoptysis  GI: Denies hematemesis, hematochezia, melena  : Denies discharge, hematuria  MSK: Denies arthralgias, myalgias  SKIN: Denies rash, lesions  NEURO: Denies paresthesias, weakness  PSYCH: Denies depression, anxiety    VITALS:  T(F): 98.4, Max: 100.8 (02-23-23 @ 22:07)  HR: 92  BP: 97/63  RR: 18Vital Signs Last 24 Hrs  T(C): 36.9 (24 Feb 2023 04:56), Max: 38.2 (23 Feb 2023 22:07)  T(F): 98.4 (24 Feb 2023 04:56), Max: 100.8 (23 Feb 2023 22:07)  HR: 92 (24 Feb 2023 04:56) (92 - 109)  BP: 97/63 (24 Feb 2023 04:56) (97/63 - 131/82)  BP(mean): --  RR: 18 (24 Feb 2023 04:56) (18 - 18)  SpO2: 97% (24 Feb 2023 04:56) (96% - 99%)    Parameters below as of 24 Feb 2023 04:56  Patient On (Oxygen Delivery Method): room air        PHYSICAL EXAM:  unable to obtain history secondary to patient's mental status and/or sedation    FH: Non-contributory  Social Hx: Non-contributory    TESTS & MEASUREMENTS:                        10.4   8.43  )-----------( 294      ( 23 Feb 2023 07:13 )             31.0     02-23    136  |  103  |  8<L>  ----------------------------<  95  4.6   |  27  |  0.6<L>    Ca    8.2<L>      23 Feb 2023 07:13    TPro  5.7<L>  /  Alb  1.9<L>  /  TBili  0.2  /  DBili  x   /  AST  33  /  ALT  16  /  AlkPhos  109  02-23      LIVER FUNCTIONS - ( 23 Feb 2023 07:13 )  Alb: 1.9 g/dL / Pro: 5.7 g/dL / ALK PHOS: 109 U/L / ALT: 16 U/L / AST: 33 U/L / GGT: x               Culture - Blood (collected 02-20-23 @ 11:19)  Source: .Blood None  Preliminary Report (02-21-23 @ 23:01):    No growth to date.    Culture - Other (collected 02-18-23 @ 17:28)  Source: .Other RIGHT HEEL  Final Report (02-22-23 @ 15:59):    Rare Methicillin Resistant Staphylococcus aureus    Rare Enterococcus faecalis  Organism: Methicillin resistant Staphylococcus aureus  Enterococcus faecalis (02-22-23 @ 15:59)  Organism: Enterococcus faecalis (02-22-23 @ 15:59)      -  Ampicillin: S <=2 Predicts results to ampicillin/sulbactam, amoxacillin-clavulanate and  piperacillin-tazobactam.      -  Tetracycline: S <=1      -  Vancomycin: S 2      Method Type: EZEQUIEL  Organism: Methicillin resistant Staphylococcus aureus (02-22-23 @ 15:59)      -  Ampicillin/Sulbactam: R 16/8      -  Cefazolin: R >16      -  Clindamycin: R >4      -  Daptomycin: S 0.5      -  Erythromycin: R >4      -  Gentamicin: R >8 Should not be used as monotherapy      -  Linezolid: S 2      -  Oxacillin: R >2      -  Penicillin: R >8      -  Rifampin: S <=1 Should not be used as monotherapy      -  Tetracycline: S 2      -  Trimethoprim/Sulfamethoxazole: S <=0.5/9.5      -  Vancomycin: S 2      Method Type: EZEQUIEL    Culture - Other (collected 02-18-23 @ 17:15)  Source: .Other None  Final Report (02-21-23 @ 10:29):    No growth at 48 hours    Culture - Other (collected 02-18-23 @ 16:51)  Source: .Other None  Final Report (02-21-23 @ 08:59):    No growth at 48 hours    Culture - Blood (collected 02-14-23 @ 20:45)  Source: .Blood Blood-Peripheral  Gram Stain (02-17-23 @ 12:17):    Growth in anaerobic bottle: Gram Positive Cocci in Clusters  Final Report (02-19-23 @ 11:51):    Growth in anaerobic bottle: Staphylococcus epidermidis  Organism: Staphylococcus epidermidis (02-19-23 @ 11:51)  Organism: Staphylococcus epidermidis (02-19-23 @ 11:51)      -  Ampicillin/Sulbactam: R <=8/4      -  Cefazolin: R <=4      -  Clindamycin: R <=0.25 This isolate is presumed to be clindamycin resistant based on detection of inducible resistance. Clindamycin may still be effective in some patients.      -  Erythromycin: R >4      -  Gentamicin: R >8 Should not be used as monotherapy      -  Oxacillin: R >2      -  Penicillin: R 4      -  Rifampin: S <=1 Should not be used as monotherapy      -  Tetracycline: S 2      -  Trimethoprim/Sulfamethoxazole: R >2/38      -  Vancomycin: S 2      Method Type: EZEQUIEL    Culture - Blood (collected 02-14-23 @ 20:45)  Source: .Blood Blood-Peripheral  Gram Stain (02-18-23 @ 17:05):    Growth in anaerobic bottle: Gram Positive Cocci in Clusters    Growth in aerobic bottle: Gram Positive Cocci in Clusters  Final Report (02-19-23 @ 22:00):    Growth in aerobic and anaerobic bottles: Staphylococcus epidermidis    ***Blood Panel PCR results on this specimen are available    approximately 3 hours after the Gram stain result.***    Gram stain, PCR, and/or culture results may not always    correspond due to difference in methodologies.    ************************************************************    This PCR assay was performed by multiplex PCR. This    Assay tests for 66 bacterial and resistance gene targets.    Please refer to the [facility] test directory    at [email or web address] for details.  Organism: Blood Culture PCR (02-18-23 @ 17:06)  Organism: Blood Culture PCR (02-18-23 @ 17:06)      -  Staphylococcus epidermidis, Methicillin resistant: Detec      Method Type: PCR            INFECTIOUS DISEASES TESTING  COVID-19 PCR: NotDetec (02-18-23 @ 13:50)      INFLAMMATORY MARKERS  Sedimentation Rate, Erythrocyte: 60 mm/Hr (02-16-23 @ 08:15)  C-Reactive Protein, Serum: 126 mg/L (02-16-23 @ 08:15)      RADIOLOGY & ADDITIONAL TESTS:  I have personally reviewed the last available Chest xray  CXR      CT      CARDIOLOGY TESTING  12 Lead ECG:   Ventricular Rate 81 BPM    Atrial Rate 81 BPM    P-R Interval 112 ms    QRS Duration 70 ms    Q-T Interval 366 ms    QTC Calculation(Bazett) 425 ms    P Axis 72 degrees    R Axis -28 degrees    T Axis 24 degrees    Diagnosis Line Normal sinus rhythm  Low voltage QRS  Nonspecific ST and T wave abnormality  Abnormal ECG    Confirmed by RADHA GARCIA MD ([id number]) on 2/17/2023 12:25:33 PM (02-17-23 @ 11:36)  12 Lead ECG:   Ventricular Rate 82 BPM    Atrial Rate 82 BPM    P-R Interval 136 ms    QRS Duration 52 ms    Q-T Interval 316 ms    QTC Calculation(Bazett) 369 ms    P Axis 60 degrees    R Axis -7 degrees    T Axis 9 degrees    Diagnosis Line Sinus rhythm with occasional Premature ventricular complexes  Low voltage QRS  Junctional ST depression, probably abnormal  Abnormal ECG    Confirmed by RADHA GARCIA MD ([id number]) on 2/17/2023 12:25:15 PM (02-17-23 @ 11:36)      MEDICATIONS  amoxicillin  875 milliGRAM(s)/clavulanate 1 Oral every 12 hours  apixaban 2.5 Oral every 12 hours  bisacodyl Suppository 10 Rectal once  donepezil 10 Oral at bedtime  doxycycline monohydrate Capsule 100 Oral every 12 hours  memantine 5 Oral two times a day  valproic  acid Syrup 250 Oral two times a day      WEIGHT  Weight (kg): 54.4 (02-18-23 @ 16:00)      ANTIBIOTICS:  amoxicillin  875 milliGRAM(s)/clavulanate 1 Tablet(s) Oral every 12 hours  doxycycline monohydrate Capsule 100 milliGRAM(s) Oral every 12 hours      All available historical records have been reviewed       KERWIN HERNANDEZ  82y, Male  Allergy: No Known Allergies      LOS  9d    CHIEF COMPLAINT: infected decubitus ulcers (23 Feb 2023 15:22)      INTERVAL EVENTS/HPI  - No acute events overnight  - T(F): , Max: 100.8 (02-23-23 @ 22:07)  - febrile overnight   - limited historian   - WBC Count: 8.43 (02-23-23 @ 07:13)     - Creatinine, Serum: 0.6 (02-23-23 @ 07:13)  Creatinine, Serum: 0.6 (02-22-23 @ 11:11)       ROS  unable to obtain history secondary to patient's mental status and/or sedation      VITALS:  T(F): 98.4, Max: 100.8 (02-23-23 @ 22:07)  HR: 92  BP: 97/63  RR: 18Vital Signs Last 24 Hrs  T(C): 36.9 (24 Feb 2023 04:56), Max: 38.2 (23 Feb 2023 22:07)  T(F): 98.4 (24 Feb 2023 04:56), Max: 100.8 (23 Feb 2023 22:07)  HR: 92 (24 Feb 2023 04:56) (92 - 109)  BP: 97/63 (24 Feb 2023 04:56) (97/63 - 131/82)  BP(mean): --  RR: 18 (24 Feb 2023 04:56) (18 - 18)  SpO2: 97% (24 Feb 2023 04:56) (96% - 99%)    Parameters below as of 24 Feb 2023 04:56  Patient On (Oxygen Delivery Method): room air        PHYSICAL EXAM:  unable to obtain history secondary to patient's mental status and/or sedation    FH: Non-contributory  Social Hx: Non-contributory    TESTS & MEASUREMENTS:                        10.4   8.43  )-----------( 294      ( 23 Feb 2023 07:13 )             31.0     02-23    136  |  103  |  8<L>  ----------------------------<  95  4.6   |  27  |  0.6<L>    Ca    8.2<L>      23 Feb 2023 07:13    TPro  5.7<L>  /  Alb  1.9<L>  /  TBili  0.2  /  DBili  x   /  AST  33  /  ALT  16  /  AlkPhos  109  02-23      LIVER FUNCTIONS - ( 23 Feb 2023 07:13 )  Alb: 1.9 g/dL / Pro: 5.7 g/dL / ALK PHOS: 109 U/L / ALT: 16 U/L / AST: 33 U/L / GGT: x               Culture - Blood (collected 02-20-23 @ 11:19)  Source: .Blood None  Preliminary Report (02-21-23 @ 23:01):    No growth to date.    Culture - Other (collected 02-18-23 @ 17:28)  Source: .Other RIGHT HEEL  Final Report (02-22-23 @ 15:59):    Rare Methicillin Resistant Staphylococcus aureus    Rare Enterococcus faecalis  Organism: Methicillin resistant Staphylococcus aureus  Enterococcus faecalis (02-22-23 @ 15:59)  Organism: Enterococcus faecalis (02-22-23 @ 15:59)      -  Ampicillin: S <=2 Predicts results to ampicillin/sulbactam, amoxacillin-clavulanate and  piperacillin-tazobactam.      -  Tetracycline: S <=1      -  Vancomycin: S 2      Method Type: EZEQUIEL  Organism: Methicillin resistant Staphylococcus aureus (02-22-23 @ 15:59)      -  Ampicillin/Sulbactam: R 16/8      -  Cefazolin: R >16      -  Clindamycin: R >4      -  Daptomycin: S 0.5      -  Erythromycin: R >4      -  Gentamicin: R >8 Should not be used as monotherapy      -  Linezolid: S 2      -  Oxacillin: R >2      -  Penicillin: R >8      -  Rifampin: S <=1 Should not be used as monotherapy      -  Tetracycline: S 2      -  Trimethoprim/Sulfamethoxazole: S <=0.5/9.5      -  Vancomycin: S 2      Method Type: EZEQUIEL    Culture - Other (collected 02-18-23 @ 17:15)  Source: .Other None  Final Report (02-21-23 @ 10:29):    No growth at 48 hours    Culture - Other (collected 02-18-23 @ 16:51)  Source: .Other None  Final Report (02-21-23 @ 08:59):    No growth at 48 hours    Culture - Blood (collected 02-14-23 @ 20:45)  Source: .Blood Blood-Peripheral  Gram Stain (02-17-23 @ 12:17):    Growth in anaerobic bottle: Gram Positive Cocci in Clusters  Final Report (02-19-23 @ 11:51):    Growth in anaerobic bottle: Staphylococcus epidermidis  Organism: Staphylococcus epidermidis (02-19-23 @ 11:51)  Organism: Staphylococcus epidermidis (02-19-23 @ 11:51)      -  Ampicillin/Sulbactam: R <=8/4      -  Cefazolin: R <=4      -  Clindamycin: R <=0.25 This isolate is presumed to be clindamycin resistant based on detection of inducible resistance. Clindamycin may still be effective in some patients.      -  Erythromycin: R >4      -  Gentamicin: R >8 Should not be used as monotherapy      -  Oxacillin: R >2      -  Penicillin: R 4      -  Rifampin: S <=1 Should not be used as monotherapy      -  Tetracycline: S 2      -  Trimethoprim/Sulfamethoxazole: R >2/38      -  Vancomycin: S 2      Method Type: EZEQUIEL    Culture - Blood (collected 02-14-23 @ 20:45)  Source: .Blood Blood-Peripheral  Gram Stain (02-18-23 @ 17:05):    Growth in anaerobic bottle: Gram Positive Cocci in Clusters    Growth in aerobic bottle: Gram Positive Cocci in Clusters  Final Report (02-19-23 @ 22:00):    Growth in aerobic and anaerobic bottles: Staphylococcus epidermidis    ***Blood Panel PCR results on this specimen are available    approximately 3 hours after the Gram stain result.***    Gram stain, PCR, and/or culture results may not always    correspond due to difference in methodologies.    ************************************************************    This PCR assay was performed by multiplex PCR. This    Assay tests for 66 bacterial and resistance gene targets.    Please refer to the [facility] test directory    at [email or web address] for details.  Organism: Blood Culture PCR (02-18-23 @ 17:06)  Organism: Blood Culture PCR (02-18-23 @ 17:06)      -  Staphylococcus epidermidis, Methicillin resistant: Detec      Method Type: PCR            INFECTIOUS DISEASES TESTING  COVID-19 PCR: NotDetec (02-18-23 @ 13:50)      INFLAMMATORY MARKERS  Sedimentation Rate, Erythrocyte: 60 mm/Hr (02-16-23 @ 08:15)  C-Reactive Protein, Serum: 126 mg/L (02-16-23 @ 08:15)      RADIOLOGY & ADDITIONAL TESTS:  I have personally reviewed the last available Chest xray  CXR      CT      CARDIOLOGY TESTING  12 Lead ECG:   Ventricular Rate 81 BPM    Atrial Rate 81 BPM    P-R Interval 112 ms    QRS Duration 70 ms    Q-T Interval 366 ms    QTC Calculation(Bazett) 425 ms    P Axis 72 degrees    R Axis -28 degrees    T Axis 24 degrees    Diagnosis Line Normal sinus rhythm  Low voltage QRS  Nonspecific ST and T wave abnormality  Abnormal ECG    Confirmed by RADHA GARCIA MD ([id number]) on 2/17/2023 12:25:33 PM (02-17-23 @ 11:36)  12 Lead ECG:   Ventricular Rate 82 BPM    Atrial Rate 82 BPM    P-R Interval 136 ms    QRS Duration 52 ms    Q-T Interval 316 ms    QTC Calculation(Bazett) 369 ms    P Axis 60 degrees    R Axis -7 degrees    T Axis 9 degrees    Diagnosis Line Sinus rhythm with occasional Premature ventricular complexes  Low voltage QRS  Junctional ST depression, probably abnormal  Abnormal ECG    Confirmed by RADHA GARCIA MD ([id number]) on 2/17/2023 12:25:15 PM (02-17-23 @ 11:36)      MEDICATIONS  amoxicillin  875 milliGRAM(s)/clavulanate 1 Oral every 12 hours  apixaban 2.5 Oral every 12 hours  bisacodyl Suppository 10 Rectal once  donepezil 10 Oral at bedtime  doxycycline monohydrate Capsule 100 Oral every 12 hours  memantine 5 Oral two times a day  valproic  acid Syrup 250 Oral two times a day      WEIGHT  Weight (kg): 54.4 (02-18-23 @ 16:00)      ANTIBIOTICS:  amoxicillin  875 milliGRAM(s)/clavulanate 1 Tablet(s) Oral every 12 hours  doxycycline monohydrate Capsule 100 milliGRAM(s) Oral every 12 hours      All available historical records have been reviewed

## 2023-02-24 NOTE — SWALLOW BEDSIDE ASSESSMENT ADULT - SWALLOW EVAL: PATIENT/FAMILY GOALS STATEMENT
none stated at this time

## 2023-02-24 NOTE — SWALLOW BEDSIDE ASSESSMENT ADULT - NS ASR SWALLOW FINDINGS DISCUS
PA Don/Physician/Nursing
MELL Hernandez, ED PA/Physician/Nursing
Physician/Nursing

## 2023-02-24 NOTE — PROGRESS NOTE ADULT - ASSESSMENT
Patient is a 80 year old M with CAD, HLD, dementia, seizure (on valproic acid), PE, bed bound, bilateral sacral ulcers, ulcers of b/l heels, non verbal. Pt presents from home due to worsening pressure ulcers which now have discharge. Pt was seen by visiting nurse and was referred to the ED. In the ED, pt had a temp of 101, wbc with left shift. Pt was started on metronidazole, cefepime and vancomycin in the ED. Spoke to daughter who verified home meds.     Sepsis due to infected pressure ulcers, , not c/w severe sepsis s/p debridement of sacral, left heel/right heel, and right hip pressure ulcers.   Multiple pressure ulcers various stages, right heel DTI, left heel with blackish discharge, sacral pressure ulcer, R hip pressure ulcer with purulent discharge.  - IV abx, vancomycin, cefepime and flagyl changed to doxycyclien and augmentin  per ID, D7 will plan 2 weeks from debridement (end date 3/4)     and wound care nursing consulted  - blood cx staph epidermidis likely contamination, wound cx MRSA and enterococcus sensitive to amp  -repeat blood culture ngtd     -CT AP iv contrast no abscess or osteomyelitis noted.   - 2/24: Febrile, DW ID, repeat septic workup, send RVP and check UE dopplers     Elevated troponin due to demand ischemia, ruled out ACS, resolved    Scrotal/Penile swelling, resolved  -urology recs appreciated    Severe PCM r/w dementia.   - puree TL with ensure TID  , 1:1 feeding     Dementia with dysphagia (advance) (transfers, wheelchair bound)   - ensure  TID, puree diet   - Bed bound/NonverbaL  - feeding with assistance/ fall precautions   - resume donepezil 10 mg po bid , memantine 5 mg po daily   - previous CTH global cerebral volume loss    Hypokalemia/Hypomag   - repleted, now normal     Seizure disorder   - valproic acid change oral solution 250 mg po bid.     Hx/o PE prior to arrival on eliquis in 2021   - eliquis 2.5 mg bid dosing     CAD/HLD  - given age/dementia not a candidate for statins.      DVT px  - resume eliquis.     DNR/DNI     Wife would like to resume current home health (Nursing and PT)     Dispo: Acute

## 2023-02-24 NOTE — PROGRESS NOTE ADULT - SUBJECTIVE AND OBJECTIVE BOX
PROGRESS NOTE:   Dharmesh Pyle MD  Available on teams    Patient is a 82y old  Male who presents with a chief complaint of infected decubitus ulcers (24 Feb 2023 07:23)    SUBJECTIVE / OVERNIGHT EVENTS:  Seen for follow up for infected decubitus ulcers   Unable to obtain due to dementia  Febrile overnight   ADDITIONAL REVIEW OF SYSTEMS:    MEDICATIONS  (STANDING):  amoxicillin  875 milliGRAM(s)/clavulanate 1 Tablet(s) Oral every 12 hours  apixaban 2.5 milliGRAM(s) Oral every 12 hours  bisacodyl Suppository 10 milliGRAM(s) Rectal once  donepezil 10 milliGRAM(s) Oral at bedtime  doxycycline monohydrate Capsule 100 milliGRAM(s) Oral every 12 hours  memantine 5 milliGRAM(s) Oral two times a day  valproic  acid Syrup 250 milliGRAM(s) Oral two times a day    MEDICATIONS  (PRN):  acetaminophen     Tablet .. 650 milliGRAM(s) Oral every 6 hours PRN Temp greater or equal to 38C (100.4F)      CAPILLARY BLOOD GLUCOSE        I&O's Summary      PHYSICAL EXAM:  Vital Signs Last 24 Hrs  T(C): 36.9 (24 Feb 2023 04:56), Max: 38.2 (23 Feb 2023 22:07)  T(F): 98.4 (24 Feb 2023 04:56), Max: 100.8 (23 Feb 2023 22:07)  HR: 92 (24 Feb 2023 04:56) (92 - 109)  BP: 97/63 (24 Feb 2023 04:56) (97/63 - 131/82)  BP(mean): --  RR: 18 (24 Feb 2023 04:56) (18 - 18)  SpO2: 97% (24 Feb 2023 04:56) (96% - 99%)    Parameters below as of 24 Feb 2023 04:56  Patient On (Oxygen Delivery Method): room air    GA :  demented, awake,  NAD   HEENT: PERRL   NECK: no JVD, no thyromegaly   CVS: S1 S2 no murmur no rubs no gallop  RESP: CTAB no wheeze, no rhonchi no rales  ABD: Soft, NT, ND, tympanic, no rebound or guarding   : No [place], No CVA tenderness   EXT; no pedal edema, no cyanosis  SKIN:  Right hip pressure ulcer, pressure ulcers sacral, left heel s/p debridement and dressings  Neuro: demented, unable to perform.     LABS:                        10.4   8.43  )-----------( 294      ( 23 Feb 2023 07:13 )             31.0     02-23    136  |  103  |  8<L>  ----------------------------<  95  4.6   |  27  |  0.6<L>    Ca    8.2<L>      23 Feb 2023 07:13    TPro  5.7<L>  /  Alb  1.9<L>  /  TBili  0.2  /  DBili  x   /  AST  33  /  ALT  16  /  AlkPhos  109  02-23                RADIOLOGY & ADDITIONAL TESTS:  Results Reviewed:   Imaging Personally Reviewed:  Electrocardiogram Personally Reviewed:    COORDINATION OF CARE:  Care Discussed with Consultants/Other Providers [Y/N]:  Prior or Outpatient Records Reviewed [Y/N]:

## 2023-02-24 NOTE — SWALLOW BEDSIDE ASSESSMENT ADULT - COMMENTS
Pt nonverbal and does not follow commands at baseline

## 2023-02-24 NOTE — SWALLOW BEDSIDE ASSESSMENT ADULT - ASR SWALLOW RECOMMEND DIAG
not appropriate given inability to follow commands

## 2023-02-24 NOTE — SWALLOW BEDSIDE ASSESSMENT ADULT - ORAL PHASE
prolonged bolus manipulation, munch like rotary pattern/Decreased anterior-posterior movement of the bolus/Delayed oral transit time
prolonged bolus manipulation, munch like rotary patern/Decreased anterior-posterior movement of the bolus/Delayed oral transit time

## 2023-02-24 NOTE — SWALLOW BEDSIDE ASSESSMENT ADULT - ORAL PREPARATORY PHASE
w/ soft solids/Decreased mastication ability
w/ soft solids/Reduced oral grading/Decreased mastication ability
w/ soft solids/Decreased mastication ability
w/ soft solids/Reduced oral grading/Decreased mastication ability
w/ soft solids/Decreased mastication ability

## 2023-02-24 NOTE — SWALLOW BEDSIDE ASSESSMENT ADULT - PHARYNGEAL PHASE
+ toleration observed without overt symptoms of penetration/aspiration/Within functional limits
Within functional limits
w/ straw sips of thin liquids/Cough post oral intake/Multiple swallows
+ toleration observed without overt symptoms of penetration/aspiration/Within functional limits
+ toleration observed without overt symptoms of penetration/aspiration/Within functional limits

## 2023-02-24 NOTE — SWALLOW BEDSIDE ASSESSMENT ADULT - SLP GENERAL OBSERVATIONS
Pt received alert in bed, nonverbal, not able to follow commands, on room air. Baseline cough noted
Pt received alert in bed, nonverbal, not able to follow commands, on room air. Baseline cough noted
Pt received alert in bed, nonverbal, able to follow simple one step commands from SLP (i.e., open mouth), on room air.
Pt received alert in bed, nonverbal, not able to follow commands, on room air.
Pt received alert in bed, nonverbal, not able to follow commands, on room air.

## 2023-02-24 NOTE — SWALLOW BEDSIDE ASSESSMENT ADULT - SWALLOW EVAL: DIAGNOSIS
+ toleration observed without overt symptoms of penetration/aspiration for minced&moist/thins. +moderate oral dysphagia for chewable solids
+ toleration observed without overt symptoms of penetration/aspiration for puree/thins. +moderate oral dysphagia for chewable solids
+moderate oral dysphagia; high risk aspiration PNA
+ toleration observed without overt symptoms of penetration/aspiration for puree/thins. +moderate oral dysphagia for chewable solids
+moderate oral dysphagia; moderate aspiration PNA risk

## 2023-02-24 NOTE — PROGRESS NOTE ADULT - ASSESSMENT
Patient is a 82y old Male with CAD, HLD, dementia, seizure (on valproic acid), PE, bed bound, bilateral sacral ulcers, ulcers of b/l heels, non verbal. brought in to the ER from home for evaluation of worsening pressure ulcers which now have discharge. Pt was seen by visiting nurse and was referred to the ER. On admission, he found to have ever, tachycardia.  He has started on Flagyl, cefepime and vancomycin, and the ID consult requested to assist with further evaluation and antibiotic management.    # Sepsis on admission ( Fever + tachycardia)  - Blood Cx 2/14 Staph epi, 1 of 2 sets - likely contaminant     # Infected Left sacral  decubitus ulcer  # B/L heel  pressure ulcer with eschar   - s/p debridement 2/18 - Heel Ulcer MRSA - multiple biopsies taken     Recommendations  This is a preliminary incomplete pended note, all final recommendations to follow after interview and examination of the patient.    Please call or message on [employer] Teams if with any questions.  Spectra [phone]     Patient is a 82y old Male with CAD, HLD, dementia, seizure (on valproic acid), PE, bed bound, bilateral sacral ulcers, ulcers of b/l heels, non verbal. brought in to the ER from home for evaluation of worsening pressure ulcers which now have discharge. Pt was seen by visiting nurse and was referred to the ER. On admission, he found to have ever, tachycardia.  He has started on Flagyl, cefepime and vancomycin, and the ID consult requested to assist with further evaluation and antibiotic management.    # Sepsis on admission ( Fever + tachycardia)  - Blood Cx 2/14 Staph epi, 1 of 2 sets - likely contaminant     # Infected Left sacral  decubitus ulcer  # B/L heel  pressure ulcer with eschar   - s/p debridement 2/18 - Heel Ulcer MRSA - multiple biopsies taken     Recommendations  - Unclear source for fevers -- left calcaneal wound and sacral ulcer are debrided - right heel with pressure ulcer/DTI without purulent drainage   - repeat blood cx, UA, and Urine Cx and check CXR  - check RVP  - Upper extremity venous US to ensure no thrombus/phlebitis   - if febrile again and RVP is negative, can give vancomycin 1g q 12 hours and cefepime 1g q 8 hours until cultures return     Please call or message on [employer] Teams if with any questions.  Spectra [phone]

## 2023-02-24 NOTE — SWALLOW BEDSIDE ASSESSMENT ADULT - SLP PERTINENT HISTORY OF CURRENT PROBLEM
Patient is a 80 year old M with CAD, HLD, dementia, seizure (on valproic acid), PE, bed bound, bilateral sacral ulcers, ulcers of b/l heels, non verbal. Pt presents from home due to worsening pressure ulcers which now have discharge. Pt was seen by visiting nurse and was referred to the ED. In the ED, pt had a temp of 101, wbc with left shift. Pt was started on metronidazole, cefepime and vancomycin in the ED. Spoke to daughter who verified home meds.

## 2023-02-25 LAB
ANION GAP SERPL CALC-SCNC: 10 MMOL/L — SIGNIFICANT CHANGE UP (ref 7–14)
BUN SERPL-MCNC: 7 MG/DL — LOW (ref 10–20)
CALCIUM SERPL-MCNC: 8 MG/DL — LOW (ref 8.4–10.5)
CHLORIDE SERPL-SCNC: 97 MMOL/L — LOW (ref 98–110)
CO2 SERPL-SCNC: 24 MMOL/L — SIGNIFICANT CHANGE UP (ref 17–32)
CREAT SERPL-MCNC: 0.6 MG/DL — LOW (ref 0.7–1.5)
CULTURE RESULTS: NO GROWTH — SIGNIFICANT CHANGE UP
CULTURE RESULTS: SIGNIFICANT CHANGE UP
EGFR: 96 ML/MIN/1.73M2 — SIGNIFICANT CHANGE UP
GLUCOSE SERPL-MCNC: 76 MG/DL — SIGNIFICANT CHANGE UP (ref 70–99)
HCT VFR BLD CALC: 30.2 % — LOW (ref 42–52)
HGB BLD-MCNC: 9.8 G/DL — LOW (ref 14–18)
MCHC RBC-ENTMCNC: 30.6 PG — SIGNIFICANT CHANGE UP (ref 27–31)
MCHC RBC-ENTMCNC: 32.5 G/DL — SIGNIFICANT CHANGE UP (ref 32–37)
MCV RBC AUTO: 94.4 FL — HIGH (ref 80–94)
NRBC # BLD: 0 /100 WBCS — SIGNIFICANT CHANGE UP (ref 0–0)
PLATELET # BLD AUTO: 268 K/UL — SIGNIFICANT CHANGE UP (ref 130–400)
POTASSIUM SERPL-MCNC: 4.7 MMOL/L — SIGNIFICANT CHANGE UP (ref 3.5–5)
POTASSIUM SERPL-SCNC: 4.7 MMOL/L — SIGNIFICANT CHANGE UP (ref 3.5–5)
RAPID RVP RESULT: DETECTED
RBC # BLD: 3.2 M/UL — LOW (ref 4.7–6.1)
RBC # FLD: 14.5 % — SIGNIFICANT CHANGE UP (ref 11.5–14.5)
RV+EV RNA SPEC QL NAA+PROBE: DETECTED
SARS-COV-2 RNA SPEC QL NAA+PROBE: SIGNIFICANT CHANGE UP
SODIUM SERPL-SCNC: 131 MMOL/L — LOW (ref 135–146)
SPECIMEN SOURCE: SIGNIFICANT CHANGE UP
SPECIMEN SOURCE: SIGNIFICANT CHANGE UP
WBC # BLD: 6.89 K/UL — SIGNIFICANT CHANGE UP (ref 4.8–10.8)
WBC # FLD AUTO: 6.89 K/UL — SIGNIFICANT CHANGE UP (ref 4.8–10.8)

## 2023-02-25 PROCEDURE — 99232 SBSQ HOSP IP/OBS MODERATE 35: CPT

## 2023-02-25 RX ADMIN — Medication 100 MILLIGRAM(S): at 18:06

## 2023-02-25 RX ADMIN — MEMANTINE HYDROCHLORIDE 5 MILLIGRAM(S): 10 TABLET ORAL at 18:04

## 2023-02-25 RX ADMIN — Medication 250 MILLIGRAM(S): at 06:32

## 2023-02-25 RX ADMIN — Medication 1 TABLET(S): at 06:31

## 2023-02-25 RX ADMIN — MEMANTINE HYDROCHLORIDE 5 MILLIGRAM(S): 10 TABLET ORAL at 06:32

## 2023-02-25 RX ADMIN — DONEPEZIL HYDROCHLORIDE 10 MILLIGRAM(S): 10 TABLET, FILM COATED ORAL at 21:07

## 2023-02-25 RX ADMIN — APIXABAN 2.5 MILLIGRAM(S): 2.5 TABLET, FILM COATED ORAL at 06:31

## 2023-02-25 RX ADMIN — APIXABAN 2.5 MILLIGRAM(S): 2.5 TABLET, FILM COATED ORAL at 18:05

## 2023-02-25 RX ADMIN — Medication 1 TABLET(S): at 18:05

## 2023-02-25 RX ADMIN — Medication 250 MILLIGRAM(S): at 18:04

## 2023-02-25 RX ADMIN — Medication 100 MILLIGRAM(S): at 06:31

## 2023-02-25 NOTE — PROGRESS NOTE ADULT - SUBJECTIVE AND OBJECTIVE BOX
Patient is seen and examined at the bed side, is afebrile now. The RVP from 2/24/23 detected Entero/rhino virus.       REVIEW OF SYSTEMS: All other review systems are negative      ALLERGIES: No Known Allergies      Vital Signs Last 24 Hrs  T(C): 37.6 (25 Feb 2023 20:33), Max: 37.6 (25 Feb 2023 20:33)  T(F): 99.6 (25 Feb 2023 20:33), Max: 99.6 (25 Feb 2023 20:33)  HR: 100 (25 Feb 2023 20:33) (91 - 100)  BP: 104/68 (25 Feb 2023 20:33) (82/52 - 116/55)  BP(mean): --  RR: 17 (25 Feb 2023 20:33) (17 - 18)  SpO2: 99% (25 Feb 2023 20:33) (99% - 99%)    Parameters below as of 25 Feb 2023 20:33  Patient On (Oxygen Delivery Method): room air        PHYSICAL EXAM:  GENERAL: Not in distress   CHEST/LUNG:  Not using accessory muscles   HEART: s1 and s2 present  ABDOMEN:  Nontender and  Nondistended  EXTREMITIES: contracted, left heel bandage in placed   CNS: Non verbal        LABS:                        9.8    6.89  )-----------( 268      ( 25 Feb 2023 06:56 )             30.2       02-25    131<L>  |  97<L>  |  7<L>  ----------------------------<  76  4.7   |  24  |  0.6<L>    Ca    8.0<L>      25 Feb 2023 06:56        MEDICATIONS  (STANDING):  amoxicillin  875 milliGRAM(s)/clavulanate 1 Tablet(s) Oral every 12 hours  apixaban 2.5 milliGRAM(s) Oral every 12 hours  bisacodyl Suppository 10 milliGRAM(s) Rectal once  donepezil 10 milliGRAM(s) Oral at bedtime  doxycycline monohydrate Capsule 100 milliGRAM(s) Oral every 12 hours  memantine 5 milliGRAM(s) Oral two times a day  valproic  acid Syrup 250 milliGRAM(s) Oral two times a day    MEDICATIONS  (PRN):  acetaminophen     Tablet .. 650 milliGRAM(s) Oral every 6 hours PRN Temp greater or equal to 38C (100.4F)        RADIOLOGY & ADDITIONAL TESTS:    < from: [facility] Duplex Upper Ext Vein Scan, Bilat (02.24.23 @ 17:00) >    No evidence of deep or superficial thrombosis in the visualized veins of   bilateral upper extremities.  Right internal jugular, radial, ulnar veins could not be visualized..        < from: Xray Chest 1 View- PORTABLE-Routine (Xray Chest 1 View- PORTABLE-Routine .) (02.24.23 @ 15:12) >  No radiographic evidence of acute cardiopulmonary disease. Somewhat   limited exam.      < from: CT Abdomen and Pelvis w/ IV Cont (02.17.23 @ 23:13) >  Areas of soft tissue edema along the sacrum and coccyx. At least one   small focus of subcutaneous air at the coccyx, suggesting decubitus   ulcer. Additional lower abdominal wall and flank anasarca.    Stool impaction of the rectum measuring up to 9 cm.    Urinary bladder wall thickening. Consider correlation with urinalysis.    Age indeterminate compression deformities of L1 and L5.    Trace bilateral pleural effusions.      < from: CT Abdomen and Pelvis w/ IV Cont (02.17.23 @ 23:13) >  Areas of soft tissue edema along the sacrum and coccyx. At least one   small focus of subcutaneous air at the coccyx, suggesting decubitus   ulcer. Additional lower abdominal wall and flank anasarca.    Stool impaction of the rectum measuring up to 9 cm.    Urinary bladder wall thickening. Consider correlation with urinalysis.    Age indeterminate compression deformities of L1 and L5.    Trace bilateral pleural effusions.        MICROBIOLOGY DATA:    Culture - Urine (02.24.23 @ 17:25)   Specimen Source: Catheterized Catheterized   Culture Results: No growth     Respiratory Viral Panel with COVID-19 by MECCA (02.24.23 @ 17:00)   Rapid RVP Result: Detected   [facility]-CoV-2: [facility]:     Culture - Blood (02.20.23 @ 11:19)   Specimen Source: .Blood None   Culture Results: No Growth Final

## 2023-02-25 NOTE — PROGRESS NOTE ADULT - ASSESSMENT
Patient is a 80 year old M with CAD, HLD, dementia, seizure (on valproic acid), PE, bed bound, bilateral sacral ulcers, ulcers of b/l heels, non verbal. Pt presents from home due to worsening pressure ulcers which now have discharge. Pt was seen by visiting nurse and was referred to the ED. In the ED, pt had a temp of 101, wbc with left shift. Pt was started on metronidazole, cefepime and vancomycin in the ED. Spoke to daughter who verified home meds.     Sepsis due to infected pressure ulcers, , not c/w severe sepsis s/p debridement of sacral, left heel/right heel, and right hip pressure ulcers.   Multiple pressure ulcers various stages, right heel DTI, left heel with blackish discharge, sacral pressure ulcer, R hip pressure ulcer with purulent discharge.  - IV abx, vancomycin, cefepime and flagyl changed to doxycyclien and augmentin  per ID, D7 will plan 2 weeks from debridement (end date 3/4)     and wound care nursing consulted  - blood cx staph epidermidis likely contamination, wound cx MRSA and enterococcus sensitive to amp  -repeat blood culture ngtd     -CT AP iv contrast no abscess or osteomyelitis noted.   - 2/24: Febrile, DW ID, repeat septic workup, send RVP and check UE dopplers   - 2/25: Dopplers negative, urine negative to date; enterovirus positive  - f/u repeat blood cultures    Enterovirus positive, likely cause of recent fevers  -c/w supportive measures    Elevated troponin due to demand ischemia, ruled out ACS, resolved    Scrotal/Penile swelling, resolved  -urology recs appreciated    Severe PCM r/w dementia.   - puree TL with ensure TID  , 1:1 feeding     Dementia with dysphagia (advance) (transfers, wheelchair bound)   - ensure  TID, puree diet   - Bed bound/NonverbaL  - feeding with assistance/ fall precautions   - resume donepezil 10 mg po bid , memantine 5 mg po daily   - previous CTH global cerebral volume loss    Hypokalemia/Hypomag   - repleted, now normal     Seizure disorder   - valproic acid change oral solution 250 mg po bid.     Hx/o PE prior to arrival on eliquis in 2021   - eliquis 2.5 mg bid dosing     CAD/HLD  - given age/dementia not a candidate for statins.      DVT px  - resume eliquis.     DNR/DNI     Wife would like to resume current home health (Nursing and PT)     Dispo: Acute

## 2023-02-25 NOTE — PROGRESS NOTE ADULT - ASSESSMENT
Patient is a 82y old Male with CAD, HLD, dementia, seizure (on valproic acid), PE, bed bound, bilateral sacral ulcers, ulcers of b/l heels, non verbal. brought in to the ER from home for evaluation of worsening pressure ulcers which now have discharge. Pt was seen by visiting nurse and was referred to the ER. On admission, he found to have ever, tachycardia.  He has started on Flagyl, cefepime and vancomycin, and the ID consult requested to assist with further evaluation and antibiotic management.    # Sepsis on admission ( Fever + tachycardia)  - Blood Cx 2/14 Staph epi, 1 of 2 sets - likely contaminant     # Infected Left sacral  decubitus ulcer  # B/L heel  pressure ulcer with eschar   - s/p debridement 2/18 - Heel Ulcer MRSA - multiple biopsies taken   # Entero/Rhino virus- as of 2/24/23    would recommend:    1. Monitor Temp and c/w supportive care, is afebrile now  2. Supportive care for Entero/Rhino virus  3. Supplemental oxygenation and bronchodilator as needed  4. Aspiration precaution  5. Isolation as per protocol    Attending Attestation:    Spent more than 35 minutes on total encounter, more than 50 % of the visit was spent counseling and/or coordinating care by the Attending physician.

## 2023-02-25 NOTE — CHART NOTE - NSCHARTNOTESELECT_GEN_ALL_CORE
Event Note
Hospital bed/air matress/wheelchair
PA note/Event Note
Transfer Note
treatment plan

## 2023-02-26 LAB
ANION GAP SERPL CALC-SCNC: 7 MMOL/L — SIGNIFICANT CHANGE UP (ref 7–14)
BUN SERPL-MCNC: 8 MG/DL — LOW (ref 10–20)
CALCIUM SERPL-MCNC: 7.9 MG/DL — LOW (ref 8.4–10.5)
CHLORIDE SERPL-SCNC: 99 MMOL/L — SIGNIFICANT CHANGE UP (ref 98–110)
CO2 SERPL-SCNC: 25 MMOL/L — SIGNIFICANT CHANGE UP (ref 17–32)
CREAT SERPL-MCNC: 0.5 MG/DL — LOW (ref 0.7–1.5)
EGFR: 102 ML/MIN/1.73M2 — SIGNIFICANT CHANGE UP
GLUCOSE SERPL-MCNC: 89 MG/DL — SIGNIFICANT CHANGE UP (ref 70–99)
HCT VFR BLD CALC: 26.2 % — LOW (ref 42–52)
HGB BLD-MCNC: 8.8 G/DL — LOW (ref 14–18)
MCHC RBC-ENTMCNC: 30.9 PG — SIGNIFICANT CHANGE UP (ref 27–31)
MCHC RBC-ENTMCNC: 33.6 G/DL — SIGNIFICANT CHANGE UP (ref 32–37)
MCV RBC AUTO: 91.9 FL — SIGNIFICANT CHANGE UP (ref 80–94)
NRBC # BLD: 0 /100 WBCS — SIGNIFICANT CHANGE UP (ref 0–0)
PLATELET # BLD AUTO: 366 K/UL — SIGNIFICANT CHANGE UP (ref 130–400)
POTASSIUM SERPL-MCNC: 4.5 MMOL/L — SIGNIFICANT CHANGE UP (ref 3.5–5)
POTASSIUM SERPL-SCNC: 4.5 MMOL/L — SIGNIFICANT CHANGE UP (ref 3.5–5)
RBC # BLD: 2.85 M/UL — LOW (ref 4.7–6.1)
RBC # FLD: 13.9 % — SIGNIFICANT CHANGE UP (ref 11.5–14.5)
SODIUM SERPL-SCNC: 131 MMOL/L — LOW (ref 135–146)
WBC # BLD: 9 K/UL — SIGNIFICANT CHANGE UP (ref 4.8–10.8)
WBC # FLD AUTO: 9 K/UL — SIGNIFICANT CHANGE UP (ref 4.8–10.8)

## 2023-02-26 PROCEDURE — 99232 SBSQ HOSP IP/OBS MODERATE 35: CPT

## 2023-02-26 RX ORDER — SODIUM CHLORIDE 9 MG/ML
1000 INJECTION INTRAMUSCULAR; INTRAVENOUS; SUBCUTANEOUS
Refills: 0 | Status: DISCONTINUED | OUTPATIENT
Start: 2023-02-26 | End: 2023-02-28

## 2023-02-26 RX ADMIN — SODIUM CHLORIDE 75 MILLILITER(S): 9 INJECTION INTRAMUSCULAR; INTRAVENOUS; SUBCUTANEOUS at 18:21

## 2023-02-26 RX ADMIN — Medication 650 MILLIGRAM(S): at 22:45

## 2023-02-26 RX ADMIN — DONEPEZIL HYDROCHLORIDE 10 MILLIGRAM(S): 10 TABLET, FILM COATED ORAL at 22:15

## 2023-02-26 RX ADMIN — APIXABAN 2.5 MILLIGRAM(S): 2.5 TABLET, FILM COATED ORAL at 17:30

## 2023-02-26 RX ADMIN — Medication 100 MILLIGRAM(S): at 17:30

## 2023-02-26 RX ADMIN — APIXABAN 2.5 MILLIGRAM(S): 2.5 TABLET, FILM COATED ORAL at 05:02

## 2023-02-26 RX ADMIN — MEMANTINE HYDROCHLORIDE 5 MILLIGRAM(S): 10 TABLET ORAL at 17:30

## 2023-02-26 RX ADMIN — Medication 250 MILLIGRAM(S): at 05:02

## 2023-02-26 RX ADMIN — Medication 250 MILLIGRAM(S): at 17:31

## 2023-02-26 RX ADMIN — Medication 1 TABLET(S): at 17:30

## 2023-02-26 RX ADMIN — Medication 650 MILLIGRAM(S): at 22:15

## 2023-02-26 RX ADMIN — MEMANTINE HYDROCHLORIDE 5 MILLIGRAM(S): 10 TABLET ORAL at 05:01

## 2023-02-26 RX ADMIN — Medication 1 TABLET(S): at 05:02

## 2023-02-26 RX ADMIN — Medication 650 MILLIGRAM(S): at 05:02

## 2023-02-26 RX ADMIN — Medication 100 MILLIGRAM(S): at 05:02

## 2023-02-26 NOTE — PROGRESS NOTE ADULT - ASSESSMENT
Patient is a 82y old Male with CAD, HLD, dementia, seizure (on valproic acid), PE, bed bound, bilateral sacral ulcers, ulcers of b/l heels, non verbal. brought in to the ER from home for evaluation of worsening pressure ulcers which now have discharge. Pt was seen by visiting nurse and was referred to the ER. On admission, he found to have ever, tachycardia.  He has started on Flagyl, cefepime and vancomycin, and the ID consult requested to assist with further evaluation and antibiotic management.    # Sepsis on admission ( Fever + tachycardia)  - Blood Cx 2/14 Staph epi, 1 of 2 sets - likely contaminant     # Infected Left sacral  decubitus ulcer  # B/L heel  pressure ulcer with eschar   - s/p debridement 2/18 - Heel Ulcer MRSA - multiple biopsies taken   # Entero/Rhino virus- as of 2/24/23    would recommend:    1. Monitor Temp and c/w supportive care, is afebrile now  2. Supportive care for Entero/Rhino virus  3. Supplemental oxygenation and bronchodilator as needed  4. Aspiration precaution  5. Isolation as per protocol    Attending Attestation:    Spent more than 35 minutes on total encounter, more than 50 % of the visit was spent counseling and/or coordinating care by the Attending physician. Patient is a 82y old Male with CAD, HLD, dementia, seizure (on valproic acid), PE, bed bound, bilateral sacral ulcers, ulcers of b/l heels, non verbal. brought in to the ER from home for evaluation of worsening pressure ulcers which now have discharge. Pt was seen by visiting nurse and was referred to the ER. On admission, he found to have ever, tachycardia.  He has started on Flagyl, cefepime and vancomycin, and the ID consult requested to assist with further evaluation and antibiotic management.    # Sepsis on admission ( Fever + tachycardia)  - Blood Cx 2/14 Staph epi, 1 of 2 sets - likely contaminant     # Infected Left sacral  decubitus ulcer  # B/L heel  pressure ulcer with eschar   - s/p debridement 2/18 - Heel Ulcer MRSA - multiple biopsies taken   # Entero/Rhino virus- as of 2/24/23    would recommend:    1. Monitor Temp and c/w supportive care, remains afebrile   2. Supportive care for Entero/Rhino virus  3. Supplemental oxygenation and bronchodilator as needed  4. Aspiration precaution  5. Isolation as per protocol    Attending Attestation:    Spent more than 35 minutes on total encounter, more than 50 % of the visit was spent counseling and/or coordinating care by the Attending physician.

## 2023-02-26 NOTE — PROGRESS NOTE ADULT - SUBJECTIVE AND OBJECTIVE BOX
Progress note    Patient seen and examined at bedside. No acute distress.    INTERVAL HPI/OVERNIGHT EVENTS:    REVIEW OF SYSTEMS:  All other 13 Review of systems were reviewed and are negative    FAMILY HISTORY:    T(C): 36.7 (02-26-23 @ 04:45), Max: 37.6 (02-25-23 @ 20:33)  HR: 94 (02-26-23 @ 04:45) (91 - 100)  BP: 113/69 (02-26-23 @ 04:45) (82/52 - 113/69)  RR: 18 (02-26-23 @ 04:45) (17 - 18)  SpO2: 98% (02-26-23 @ 04:45) (98% - 99%)  Wt(kg): --Vital Signs Last 24 Hrs  T(C): 36.7 (26 Feb 2023 04:45), Max: 37.6 (25 Feb 2023 20:33)  T(F): 98.1 (26 Feb 2023 04:45), Max: 99.6 (25 Feb 2023 20:33)  HR: 94 (26 Feb 2023 04:45) (91 - 100)  BP: 113/69 (26 Feb 2023 04:45) (82/52 - 113/69)  BP(mean): --  RR: 18 (26 Feb 2023 04:45) (17 - 18)  SpO2: 98% (26 Feb 2023 04:45) (98% - 99%)    Parameters below as of 25 Feb 2023 20:33  Patient On (Oxygen Delivery Method): room air      No Known Allergies      PHYSICAL EXAM:  GENERAL: NAD, well-groomed, well-developed  HEAD:  Atraumatic, Normocephalic  EYES: EOMI, PERRLA, conjunctiva and sclera clear  ENMT: No tonsillar erythema, exudates, or enlargement; Moist mucous membranes, Good dentition, No lesions  NECK: Supple, No JVD, Normal thyroid  NERVOUS SYSTEM:  Alert & Oriented X3, Good concentration; Motor Strength 5/5 B/L upper and lower extremities; DTRs 2+ intact and symmetric  CHEST/LUNG: Clear to percussion bilaterally; No rales, rhonchi, wheezing, or rubs  HEART: Regular rate and rhythm; No murmurs, rubs, or gallops  ABDOMEN: Soft, Nontender, Nondistended; Bowel sounds present  EXTREMITIES:  2+ Peripheral Pulses, No clubbing, cyanosis, or edema  LYMPH: No lymphadenopathy noted  SKIN: No rashes or lesions    Consultant(s) Notes Reviewed:  [x ] YES  [ ] NO  Care Discussed with Consultants/Other Providers [ x] YES  [ ] NO    LABS:      RADIOLOGY & ADDITIONAL TESTS:    Imaging Personally Reviewed:  [ ] YES  [ ] NO  acetaminophen     Tablet .. 650 milliGRAM(s) Oral every 6 hours PRN  amoxicillin  875 milliGRAM(s)/clavulanate 1 Tablet(s) Oral every 12 hours  apixaban 2.5 milliGRAM(s) Oral every 12 hours  bisacodyl Suppository 10 milliGRAM(s) Rectal once  donepezil 10 milliGRAM(s) Oral at bedtime  doxycycline monohydrate Capsule 100 milliGRAM(s) Oral every 12 hours  memantine 5 milliGRAM(s) Oral two times a day  valproic  acid Syrup 250 milliGRAM(s) Oral two times a day      HEALTH ISSUES - PROBLEM Dx:    Patient is a 80 year old M with CAD, HLD, dementia, seizure (on valproic acid), PE, bed bound, bilateral sacral ulcers, ulcers of b/l heels, non verbal. Pt presents from home due to worsening pressure ulcers which now have discharge. Pt was seen by visiting nurse and was referred to the ED. In the ED, pt had a temp of 101, wbc with left shift. Pt was started on metronidazole, cefepime and vancomycin in the ED. Spoke to daughter who verified home meds.     Sepsis due to infected pressure ulcers, not c/w severe sepsis s/p debridement of sacral, left heel/right heel, and right hip pressure ulcers.   Multiple pressure ulcers various stages, right heel DTI, left heel with blackish discharge, sacral pressure ulcer, R hip pressure ulcer with purulent discharge.  - IV abx, vancomycin, cefepime and flagyl changed to doxycyclien and augmentin per ID, D8 will plan 2 weeks from debridement (end date 3/4)     and wound care nursing consulted  - blood cx staph epidermidis likely contamination, wound cx MRSA and enterococcus sensitive to amp  -repeat blood culture ngtd     -CT AP iv contrast no abscess or osteomyelitis noted.   - 2/24: Febrile, DW ID, repeat septic workup, send RVP and check UE dopplers   - 2/25: Dopplers negative, urine negative to date; enterovirus positive  - f/u repeat blood cultures    Enterovirus positive, likely cause of recent fevers  -c/w supportive measures    Elevated troponin due to demand ischemia, ruled out ACS, resolved    Scrotal/Penile swelling, resolved  -urology recs appreciated    Severe PCM r/w dementia.   - puree TL with ensure TID, 1:1 feeding     Dementia with dysphagia (advance) (transfers, wheelchair bound)   - ensure  TID, puree diet   - Bed bound/NonverbaL  - feeding with assistance/ fall precautions   - resume donepezil 10 mg po bid , memantine 5 mg po daily   - previous CTH global cerebral volume loss    Hypokalemia/Hypomag   - repleted, now normal     Seizure disorder   - valproic acid change oral solution 250 mg po bid.     Hx/o PE prior to arrival on eliquis in 2021   - eliquis 2.5 mg bid dosing     CAD/HLD  - given age/dementia not a candidate for statins.      DVT px  - resume eliquis.     DNR/DNI     Wife would like to resume current home health (Nursing and PT)     Dispo: Acute, pending hospital bed to be delivered at home. Plans for dc 2/28    Total time spent to complete patient's bedside assessment, review medical chart, discuss medical plan of care with covering medical team was ____35____ with > 50% of time spent face to face w/ patient, discussion with patient/family and/or coordination of care

## 2023-02-26 NOTE — PROGRESS NOTE ADULT - SUBJECTIVE AND OBJECTIVE BOX
Patient is seen and examined at the bed side, remains afebrile . The RVP from 2/24/23 detected Entero/rhino virus.       REVIEW OF SYSTEMS: All other review systems are negative      ALLERGIES: No Known Allergies      Vital Signs Last 24 Hrs  T(C): 36.9 (26 Feb 2023 13:38), Max: 37.6 (25 Feb 2023 20:33)  T(F): 98.4 (26 Feb 2023 13:38), Max: 99.6 (25 Feb 2023 20:33)  HR: 96 (26 Feb 2023 13:38) (94 - 100)  BP: 87/61 (26 Feb 2023 13:38) (87/61 - 113/69)  BP(mean): --  RR: 18 (26 Feb 2023 13:38) (17 - 18)  SpO2: 98% (26 Feb 2023 04:45) (98% - 99%)    Parameters below as of 25 Feb 2023 20:33  Patient On (Oxygen Delivery Method): room air        PHYSICAL EXAM:  GENERAL: Not in distress   CHEST/LUNG:  Not using accessory muscles   HEART: s1 and s2 present  ABDOMEN:  Nontender and  Nondistended  EXTREMITIES: contracted, left heel bandage in placed   CNS: Non verbal        LABS:                        8.8    9.00  )-----------( 366      ( 26 Feb 2023 08:00 )             26.2                           9.8    6.89  )-----------( 268      ( 25 Feb 2023 06:56 )             30.2         02-26    131<L>  |  99  |  8<L>  ----------------------------<  89  4.5   |  25  |  0.5<L>    Ca    7.9<L>      26 Feb 2023 08:00      02-25    131<L>  |  97<L>  |  7<L>  ----------------------------<  76  4.7   |  24  |  0.6<L>    Ca    8.0<L>      25 Feb 2023 06:56        MEDICATIONS  (STANDING):    amoxicillin  875 milliGRAM(s)/clavulanate 1 Tablet(s) Oral every 12 hours  apixaban 2.5 milliGRAM(s) Oral every 12 hours  bisacodyl Suppository 10 milliGRAM(s) Rectal once  donepezil 10 milliGRAM(s) Oral at bedtime  doxycycline monohydrate Capsule 100 milliGRAM(s) Oral every 12 hours  memantine 5 milliGRAM(s) Oral two times a day  sodium chloride 0.9%. 1000 milliLiter(s) (75 mL/Hr) IV Continuous <Continuous>  valproic  acid Syrup 250 milliGRAM(s) Oral two times a day          RADIOLOGY & ADDITIONAL TESTS:    < from: [facility] Duplex Upper Ext Vein Scan, Bilat (02.24.23 @ 17:00) >    No evidence of deep or superficial thrombosis in the visualized veins of   bilateral upper extremities.  Right internal jugular, radial, ulnar veins could not be visualized..        < from: Xray Chest 1 View- PORTABLE-Routine (Xray Chest 1 View- PORTABLE-Routine .) (02.24.23 @ 15:12) >  No radiographic evidence of acute cardiopulmonary disease. Somewhat   limited exam.      < from: CT Abdomen and Pelvis w/ IV Cont (02.17.23 @ 23:13) >  Areas of soft tissue edema along the sacrum and coccyx. At least one   small focus of subcutaneous air at the coccyx, suggesting decubitus   ulcer. Additional lower abdominal wall and flank anasarca.    Stool impaction of the rectum measuring up to 9 cm.    Urinary bladder wall thickening. Consider correlation with urinalysis.    Age indeterminate compression deformities of L1 and L5.    Trace bilateral pleural effusions.      < from: CT Abdomen and Pelvis w/ IV Cont (02.17.23 @ 23:13) >  Areas of soft tissue edema along the sacrum and coccyx. At least one   small focus of subcutaneous air at the coccyx, suggesting decubitus   ulcer. Additional lower abdominal wall and flank anasarca.    Stool impaction of the rectum measuring up to 9 cm.    Urinary bladder wall thickening. Consider correlation with urinalysis.    Age indeterminate compression deformities of L1 and L5.    Trace bilateral pleural effusions.        MICROBIOLOGY DATA:    Culture - Urine (02.24.23 @ 17:25)   Specimen Source: Catheterized Catheterized   Culture Results: No growth     Respiratory Viral Panel with COVID-19 by MECCA (02.24.23 @ 17:00)   Rapid RVP Result: Detected   SARS-CoV-2: NotDetec:     Culture - Blood (02.20.23 @ 11:19)   Specimen Source: .Blood None   Culture Results: No Growth Final      Patient is seen and examined at the bed side, remains afebrile. No new events. The WBC count and kidney function is normal.      REVIEW OF SYSTEMS: Unable to obtain due to mental status       ALLERGIES: No Known Allergies      Vital Signs Last 24 Hrs  T(C): 36.9 (26 Feb 2023 13:38), Max: 37.6 (25 Feb 2023 20:33)  T(F): 98.4 (26 Feb 2023 13:38), Max: 99.6 (25 Feb 2023 20:33)  HR: 96 (26 Feb 2023 13:38) (94 - 100)  BP: 87/61 (26 Feb 2023 13:38) (87/61 - 113/69)  BP(mean): --  RR: 18 (26 Feb 2023 13:38) (17 - 18)  SpO2: 98% (26 Feb 2023 04:45) (98% - 99%)    Parameters below as of 25 Feb 2023 20:33  Patient On (Oxygen Delivery Method): room air        PHYSICAL EXAM:  GENERAL: Not in distress   CHEST/LUNG:  Not using accessory muscles   HEART: s1 and s2 present  ABDOMEN:  Nontender and  Nondistended  EXTREMITIES: contracted, left heel bandage in placed   CNS: Non verbal        LABS:                        8.8    9.00  )-----------( 366      ( 26 Feb 2023 08:00 )             26.2                           9.8    6.89  )-----------( 268      ( 25 Feb 2023 06:56 )             30.2         02-26    131<L>  |  99  |  8<L>  ----------------------------<  89  4.5   |  25  |  0.5<L>    Ca    7.9<L>      26 Feb 2023 08:00      02-25    131<L>  |  97<L>  |  7<L>  ----------------------------<  76  4.7   |  24  |  0.6<L>    Ca    8.0<L>      25 Feb 2023 06:56        MEDICATIONS  (STANDING):    amoxicillin  875 milliGRAM(s)/clavulanate 1 Tablet(s) Oral every 12 hours  apixaban 2.5 milliGRAM(s) Oral every 12 hours  bisacodyl Suppository 10 milliGRAM(s) Rectal once  donepezil 10 milliGRAM(s) Oral at bedtime  doxycycline monohydrate Capsule 100 milliGRAM(s) Oral every 12 hours  memantine 5 milliGRAM(s) Oral two times a day  sodium chloride 0.9%. 1000 milliLiter(s) (75 mL/Hr) IV Continuous <Continuous>  valproic  acid Syrup 250 milliGRAM(s) Oral two times a day          RADIOLOGY & ADDITIONAL TESTS:    < from: VA Duplex Upper Ext Vein Scan, Bilat (02.24.23 @ 17:00) >    No evidence of deep or superficial thrombosis in the visualized veins of   bilateral upper extremities.  Right internal jugular, radial, ulnar veins could not be visualized..        < from: Xray Chest 1 View- PORTABLE-Routine (Xray Chest 1 View- PORTABLE-Routine .) (02.24.23 @ 15:12) >  No radiographic evidence of acute cardiopulmonary disease. Somewhat   limited exam.      < from: CT Abdomen and Pelvis w/ IV Cont (02.17.23 @ 23:13) >  Areas of soft tissue edema along the sacrum and coccyx. At least one   small focus of subcutaneous air at the coccyx, suggesting decubitus   ulcer. Additional lower abdominal wall and flank anasarca.    Stool impaction of the rectum measuring up to 9 cm.    Urinary bladder wall thickening. Consider correlation with urinalysis.    Age indeterminate compression deformities of L1 and L5.    Trace bilateral pleural effusions.      < from: CT Abdomen and Pelvis w/ IV Cont (02.17.23 @ 23:13) >  Areas of soft tissue edema along the sacrum and coccyx. At least one   small focus of subcutaneous air at the coccyx, suggesting decubitus   ulcer. Additional lower abdominal wall and flank anasarca.    Stool impaction of the rectum measuring up to 9 cm.    Urinary bladder wall thickening. Consider correlation with urinalysis.    Age indeterminate compression deformities of L1 and L5.    Trace bilateral pleural effusions.        MICROBIOLOGY DATA:    Culture - Urine (02.24.23 @ 17:25)   Specimen Source: Catheterized Catheterized   Culture Results: No growth     Respiratory Viral Panel with COVID-19 by MECCA (02.24.23 @ 17:00)   Rapid RVP Result: Detected   SARS-CoV-2: NotDetec:     Culture - Blood (02.20.23 @ 11:19)   Specimen Source: .Blood None   Culture Results: No Growth Final

## 2023-02-27 LAB
ANION GAP SERPL CALC-SCNC: 9 MMOL/L — SIGNIFICANT CHANGE UP (ref 7–14)
BUN SERPL-MCNC: 7 MG/DL — LOW (ref 10–20)
CALCIUM SERPL-MCNC: 8.1 MG/DL — LOW (ref 8.4–10.5)
CHLORIDE SERPL-SCNC: 99 MMOL/L — SIGNIFICANT CHANGE UP (ref 98–110)
CO2 SERPL-SCNC: 25 MMOL/L — SIGNIFICANT CHANGE UP (ref 17–32)
CREAT SERPL-MCNC: 0.5 MG/DL — LOW (ref 0.7–1.5)
EGFR: 102 ML/MIN/1.73M2 — SIGNIFICANT CHANGE UP
GLUCOSE SERPL-MCNC: 98 MG/DL — SIGNIFICANT CHANGE UP (ref 70–99)
POTASSIUM SERPL-MCNC: 4.5 MMOL/L — SIGNIFICANT CHANGE UP (ref 3.5–5)
POTASSIUM SERPL-SCNC: 4.5 MMOL/L — SIGNIFICANT CHANGE UP (ref 3.5–5)
SODIUM SERPL-SCNC: 133 MMOL/L — LOW (ref 135–146)

## 2023-02-27 PROCEDURE — 99232 SBSQ HOSP IP/OBS MODERATE 35: CPT

## 2023-02-27 RX ADMIN — MEMANTINE HYDROCHLORIDE 5 MILLIGRAM(S): 10 TABLET ORAL at 05:03

## 2023-02-27 RX ADMIN — Medication 250 MILLIGRAM(S): at 17:17

## 2023-02-27 RX ADMIN — MEMANTINE HYDROCHLORIDE 5 MILLIGRAM(S): 10 TABLET ORAL at 17:17

## 2023-02-27 RX ADMIN — Medication 250 MILLIGRAM(S): at 05:03

## 2023-02-27 RX ADMIN — Medication 1 TABLET(S): at 05:03

## 2023-02-27 RX ADMIN — Medication 1 TABLET(S): at 17:17

## 2023-02-27 RX ADMIN — APIXABAN 2.5 MILLIGRAM(S): 2.5 TABLET, FILM COATED ORAL at 17:17

## 2023-02-27 RX ADMIN — Medication 100 MILLIGRAM(S): at 05:03

## 2023-02-27 RX ADMIN — Medication 100 MILLIGRAM(S): at 17:17

## 2023-02-27 RX ADMIN — DONEPEZIL HYDROCHLORIDE 10 MILLIGRAM(S): 10 TABLET, FILM COATED ORAL at 21:19

## 2023-02-27 RX ADMIN — APIXABAN 2.5 MILLIGRAM(S): 2.5 TABLET, FILM COATED ORAL at 05:03

## 2023-02-27 NOTE — PROGRESS NOTE ADULT - ASSESSMENT
Patient is a 82y old Male with CAD, HLD, dementia, seizure (on valproic acid), PE, bed bound, bilateral sacral ulcers, ulcers of b/l heels, non verbal. brought in to the ER from home for evaluation of worsening pressure ulcers which now have discharge. Pt was seen by visiting nurse and was referred to the ER. On admission, he found to have ever, tachycardia.  He has started on Flagyl, cefepime and vancomycin, and the ID consult requested to assist with further evaluation and antibiotic management.    # Sepsis on admission ( Fever + tachycardia)  - Blood Cx 2/14 Staph epi, 1 of 2 sets - likely contaminant     # Infected Left sacral  decubitus ulcer  # B/L heel  pressure ulcer with eschar   - s/p debridement 2/18 - Heel Ulcer MRSA - multiple biopsies taken     #Fevers 2/21, 2/23 - RVP + Enterovirus/rhinovirus     Recommendations  - continue doxycycline 100 mg BID and  Augmentin 875/125 BID   - plan 2 weeks from debridement (end date 3/4) for soft tissue infection   - local wound care   - recall as needed    Please call or message on [employer] Teams if with any questions.  Spectra [phone]

## 2023-02-27 NOTE — PROGRESS NOTE ADULT - SUBJECTIVE AND OBJECTIVE BOX
KERWIN HERNANDEZ  82y, Male  Allergy: No Known Allergies      LOS  12d    CHIEF COMPLAINT: infected decubitus ulcers (26 Feb 2023 18:38)      INTERVAL EVENTS/HPI  - No acute events overnight  - T(F): , Max: 98.4 (02-26-23 @ 13:38)  - no further fevers  - WBC Count: 9.00 (02-26-23 @ 08:00)  WBC Count: 6.89 (02-25-23 @ 06:56)     - Creatinine, Serum: 0.5 (02-27-23 @ 06:52)  Creatinine, Serum: 0.5 (02-26-23 @ 08:00)       ROS  unable to obtain history secondary to patient's mental status and/or sedation      VITALS:  T(F): 96.5, Max: 98.4 (02-26-23 @ 13:38)  HR: 91  BP: 126/62  RR: 18Vital Signs Last 24 Hrs  T(C): 35.8 (27 Feb 2023 04:50), Max: 36.9 (26 Feb 2023 13:38)  T(F): 96.5 (27 Feb 2023 04:50), Max: 98.4 (26 Feb 2023 13:38)  HR: 91 (27 Feb 2023 04:50) (91 - 107)  BP: 126/62 (27 Feb 2023 04:50) (87/61 - 126/62)  BP(mean): --  RR: 18 (27 Feb 2023 04:50) (18 - 18)  SpO2: 97% (27 Feb 2023 04:50) (97% - 97%)        PHYSICAL EXAM:  Gen: NAD, resting in bed  HEENT: Normocephalic, atraumatic  Neck: supple, no lymphadenopathy  CV: Regular rate & regular rhythm  Lungs: decreased BS at bases, no fremitus  Abdomen: Soft, BS present  Ext: Warm, well perfused  Neuro: non focal, awake  Skin: no rash, no erythema  Lines: no phlebitis    FH: Non-contributory  Social Hx: Non-contributory    TESTS & MEASUREMENTS:                        8.8    9.00  )-----------( 366      ( 26 Feb 2023 08:00 )             26.2     02-27    133<L>  |  99  |  7<L>  ----------------------------<  98  4.5   |  25  |  0.5<L>    Ca    8.1<L>      27 Feb 2023 06:52              Culture - Urine (collected 02-24-23 @ 17:25)  Source: Catheterized Catheterized  Final Report (02-25-23 @ 20:04):    No growth    Culture - Blood (collected 02-20-23 @ 11:19)  Source: .Blood None  Final Report (02-25-23 @ 23:01):    No Growth Final    Culture - Other (collected 02-18-23 @ 17:28)  Source: .Other RIGHT HEEL  Final Report (02-22-23 @ 15:59):    Rare Methicillin Resistant Staphylococcus aureus    Rare Enterococcus faecalis  Organism: Methicillin resistant Staphylococcus aureus  Enterococcus faecalis (02-22-23 @ 15:59)  Organism: Enterococcus faecalis (02-22-23 @ 15:59)      -  Ampicillin: S <=2 Predicts results to ampicillin/sulbactam, amoxacillin-clavulanate and  piperacillin-tazobactam.      -  Tetracycline: S <=1      -  Vancomycin: S 2      Method Type: EZEQUIEL  Organism: Methicillin resistant Staphylococcus aureus (02-22-23 @ 15:59)      -  Ampicillin/Sulbactam: R 16/8      -  Cefazolin: R >16      -  Clindamycin: R >4      -  Daptomycin: S 0.5      -  Erythromycin: R >4      -  Gentamicin: R >8 Should not be used as monotherapy      -  Linezolid: S 2      -  Oxacillin: R >2      -  Penicillin: R >8      -  Rifampin: S <=1 Should not be used as monotherapy      -  Tetracycline: S 2      -  Trimethoprim/Sulfamethoxazole: S <=0.5/9.5      -  Vancomycin: S 2      Method Type: EZEQUIEL    Culture - Other (collected 02-18-23 @ 17:15)  Source: .Other None  Final Report (02-21-23 @ 10:29):    No growth at 48 hours    Culture - Other (collected 02-18-23 @ 16:51)  Source: .Other None  Final Report (02-21-23 @ 08:59):    No growth at 48 hours    Culture - Blood (collected 02-14-23 @ 20:45)  Source: .Blood Blood-Peripheral  Gram Stain (02-17-23 @ 12:17):    Growth in anaerobic bottle: Gram Positive Cocci in Clusters  Final Report (02-19-23 @ 11:51):    Growth in anaerobic bottle: Staphylococcus epidermidis  Organism: Staphylococcus epidermidis (02-19-23 @ 11:51)  Organism: Staphylococcus epidermidis (02-19-23 @ 11:51)      -  Ampicillin/Sulbactam: R <=8/4      -  Cefazolin: R <=4      -  Clindamycin: R <=0.25 This isolate is presumed to be clindamycin resistant based on detection of inducible resistance. Clindamycin may still be effective in some patients.      -  Erythromycin: R >4      -  Gentamicin: R >8 Should not be used as monotherapy      -  Oxacillin: R >2      -  Penicillin: R 4      -  Rifampin: S <=1 Should not be used as monotherapy      -  Tetracycline: S 2      -  Trimethoprim/Sulfamethoxazole: R >2/38      -  Vancomycin: S 2      Method Type: EZEQUIEL    Culture - Blood (collected 02-14-23 @ 20:45)  Source: .Blood Blood-Peripheral  Gram Stain (02-18-23 @ 17:05):    Growth in anaerobic bottle: Gram Positive Cocci in Clusters    Growth in aerobic bottle: Gram Positive Cocci in Clusters  Final Report (02-19-23 @ 22:00):    Growth in aerobic and anaerobic bottles: Staphylococcus epidermidis    ***Blood Panel PCR results on this specimen are available    approximately 3 hours after the Gram stain result.***    Gram stain, PCR, and/or culture results may not always    correspond due to difference in methodologies.    ************************************************************    This PCR assay was performed by multiplex PCR. This    Assay tests for 66 bacterial and resistance gene targets.    Please refer to the [facility] test directory    at [email or web address] for details.  Organism: Blood Culture PCR (02-18-23 @ 17:06)  Organism: Blood Culture PCR (02-18-23 @ 17:06)      -  Staphylococcus epidermidis, Methicillin resistant: Detec      Method Type: PCR            INFECTIOUS DISEASES TESTING  Rapid RVP Result: Detected (02-24-23 @ 17:00)  COVID-19 PCR: NotDetec (02-18-23 @ 13:50)      INFLAMMATORY MARKERS  Sedimentation Rate, Erythrocyte: 60 mm/Hr (02-16-23 @ 08:15)  C-Reactive Protein, Serum: 126 mg/L (02-16-23 @ 08:15)      RADIOLOGY & ADDITIONAL TESTS:  I have personally reviewed the last available Chest xray  CXR      CT      CARDIOLOGY TESTING  12 Lead ECG:   Ventricular Rate 81 BPM    Atrial Rate 81 BPM    P-R Interval 112 ms    QRS Duration 70 ms    Q-T Interval 366 ms    QTC Calculation(Bazett) 425 ms    P Axis 72 degrees    R Axis -28 degrees    T Axis 24 degrees    Diagnosis Line Normal sinus rhythm  Low voltage QRS  Nonspecific ST and T wave abnormality  Abnormal ECG    Confirmed by RADHA GARCIA MD ([id number]) on 2/17/2023 12:25:33 PM (02-17-23 @ 11:36)  12 Lead ECG:   Ventricular Rate 82 BPM    Atrial Rate 82 BPM    P-R Interval 136 ms    QRS Duration 52 ms    Q-T Interval 316 ms    QTC Calculation(Bazett) 369 ms    P Axis 60 degrees    R Axis -7 degrees    T Axis 9 degrees    Diagnosis Line Sinus rhythm with occasional Premature ventricular complexes  Low voltage QRS  Junctional ST depression, probably abnormal  Abnormal ECG    Confirmed by RADHA GARCIA MD ([id number]) on 2/17/2023 12:25:15 PM (02-17-23 @ 11:36)      MEDICATIONS  amoxicillin  875 milliGRAM(s)/clavulanate 1 Oral every 12 hours  apixaban 2.5 Oral every 12 hours  bisacodyl Suppository 10 Rectal once  donepezil 10 Oral at bedtime  doxycycline monohydrate Capsule 100 Oral every 12 hours  memantine 5 Oral two times a day  sodium chloride 0.9%. 1000 IV Continuous <Continuous>  valproic  acid Syrup 250 Oral two times a day      WEIGHT  Weight (kg): 54.4 (02-18-23 @ 16:00)  Creatinine, Serum: 0.5 mg/dL (02-27-23 @ 06:52)      ANTIBIOTICS:  amoxicillin  875 milliGRAM(s)/clavulanate 1 Tablet(s) Oral every 12 hours  doxycycline monohydrate Capsule 100 milliGRAM(s) Oral every 12 hours      All available historical records have been reviewed

## 2023-02-27 NOTE — PROGRESS NOTE ADULT - SUBJECTIVE AND OBJECTIVE BOX
Progress note    Patient seen and examined at bedside. No acute distress.    INTERVAL HPI/OVERNIGHT EVENTS:    REVIEW OF SYSTEMS:  All other 13 Review of systems were reviewed and are negative    FAMILY HISTORY:    T(C): 35.8 (02-27-23 @ 04:50), Max: 36.1 (02-26-23 @ 20:26)  HR: 91 (02-27-23 @ 04:50) (91 - 107)  BP: 126/62 (02-27-23 @ 04:50) (104/69 - 126/62)  RR: 18 (02-27-23 @ 04:50) (18 - 18)  SpO2: 97% (02-27-23 @ 04:50) (97% - 97%)  Wt(kg): --Vital Signs Last 24 Hrs  T(C): 35.8 (27 Feb 2023 04:50), Max: 36.1 (26 Feb 2023 20:26)  T(F): 96.5 (27 Feb 2023 04:50), Max: 96.9 (26 Feb 2023 20:26)  HR: 91 (27 Feb 2023 04:50) (91 - 107)  BP: 126/62 (27 Feb 2023 04:50) (104/69 - 126/62)  BP(mean): --  RR: 18 (27 Feb 2023 04:50) (18 - 18)  SpO2: 97% (27 Feb 2023 04:50) (97% - 97%)      No Known Allergies      PHYSICAL EXAM:  GENERAL: NAD, well-groomed, well-developed  HEAD:  Atraumatic, Normocephalic  EYES: EOMI, PERRLA, conjunctiva and sclera clear  ENMT: No tonsillar erythema, exudates, or enlargement; Moist mucous membranes, Good dentition, No lesions  NECK: Supple, No JVD, Normal thyroid  NERVOUS SYSTEM: Good concentration; Motor Strength 5/5 B/L upper and lower extremities; DTRs 2+ intact and symmetric  CHEST/LUNG: Clear to percussion bilaterally; No rales, rhonchi, wheezing, or rubs  HEART: Regular rate and rhythm; No murmurs, rubs, or gallops  ABDOMEN: Soft, Nontender, Nondistended; Bowel sounds present  EXTREMITIES:  2+ Peripheral Pulses, No clubbing, cyanosis, or edema  LYMPH: No lymphadenopathy noted  SKIN: No rashes or lesions    Consultant(s) Notes Reviewed:  [x ] YES  [ ] NO  Care Discussed with Consultants/Other Providers [ x] YES  [ ] NO    LABS:      RADIOLOGY & ADDITIONAL TESTS:    Imaging Personally Reviewed:  [ ] YES  [ ] NO  acetaminophen     Tablet .. 650 milliGRAM(s) Oral every 6 hours PRN  amoxicillin  875 milliGRAM(s)/clavulanate 1 Tablet(s) Oral every 12 hours  apixaban 2.5 milliGRAM(s) Oral every 12 hours  bisacodyl Suppository 10 milliGRAM(s) Rectal once  donepezil 10 milliGRAM(s) Oral at bedtime  doxycycline monohydrate Capsule 100 milliGRAM(s) Oral every 12 hours  memantine 5 milliGRAM(s) Oral two times a day  sodium chloride 0.9%. 1000 milliLiter(s) IV Continuous <Continuous>  valproic  acid Syrup 250 milliGRAM(s) Oral two times a day      HEALTH ISSUES - PROBLEM Dx:    Patient is a 80 year old M with CAD, HLD, dementia, seizure (on valproic acid), PE, bed bound, bilateral sacral ulcers, ulcers of b/l heels, non verbal. Pt presents from home due to worsening pressure ulcers which now have discharge. Pt was seen by visiting nurse and was referred to the ED. In the ED, pt had a temp of 101, wbc with left shift. Pt was started on metronidazole, cefepime and vancomycin in the ED. Spoke to daughter who verified home meds.     Sepsis due to infected pressure ulcers, not c/w severe sepsis s/p debridement of sacral, left heel/right heel, and right hip pressure ulcers.   Multiple pressure ulcers various stages, right heel DTI, left heel with blackish discharge, sacral pressure ulcer, R hip pressure ulcer with purulent discharge.  - IV abx, vancomycin, cefepime and flagyl changed to doxycyclien and augmentin per ID, D8 will plan 2 weeks from debridement (end date 3/4)     and wound care nursing consulted  - blood cx staph epidermidis likely contamination, wound cx MRSA and enterococcus sensitive to amp  -repeat blood culture ngtd     -CT AP iv contrast no abscess or osteomyelitis noted.   - 2/24: Febrile, DW ID, repeat septic workup, send RVP and check UE dopplers   - 2/25: Dopplers negative, urine negative to date; enterovirus positive  - Repeat blood cultures 2/20 negative  - Complete Augment and Doxycycline (last day 3/4)    Enterovirus positive, likely cause of recent fevers  -c/w supportive measures    Elevated troponin due to demand ischemia, ruled out ACS, resolved    Scrotal/Penile swelling, resolved  -urology recs appreciated    Severe PCM r/w dementia.   - puree TL with ensure TID, 1:1 feeding     Dementia with dysphagia (advance) (transfers, wheelchair bound)   - ensure  TID, puree diet   - Bed bound/NonverbaL  - feeding with assistance/ fall precautions   - resume donepezil 10 mg po bid , memantine 5 mg po daily   - previous CTH global cerebral volume loss    Hypokalemia/Hypomag   - repleted, now normal     Seizure disorder   - valproic acid change oral solution 250 mg po bid.     Hx/o PE prior to arrival on eliquis in 2021   - eliquis 2.5 mg bid dosing     CAD/HLD  - given age/dementia not a candidate for statins.      DVT px  - resume eliquis.     DNR/DNI     Wife would like to resume current home health (Nursing and PT)     Dispo: medically stable, pending hospital bed to be delivered at home. Plans for dc 2/28    BMP reviewed    Total time spent to complete patient's bedside assessment, review medical chart, discuss medical plan of care with covering medical team was ___35_____ with > 50% of time spent face to face w/ patient, discussion with patient/family and/or coordination of care

## 2023-02-28 ENCOUNTER — TRANSCRIPTION ENCOUNTER (OUTPATIENT)
Age: 83
End: 2023-02-28

## 2023-02-28 VITALS
HEART RATE: 91 BPM | RESPIRATION RATE: 18 BRPM | TEMPERATURE: 99 F | DIASTOLIC BLOOD PRESSURE: 65 MMHG | SYSTOLIC BLOOD PRESSURE: 105 MMHG

## 2023-02-28 PROCEDURE — 99239 HOSP IP/OBS DSCHRG MGMT >30: CPT

## 2023-02-28 RX ORDER — APIXABAN 2.5 MG/1
0.5 TABLET, FILM COATED ORAL
Qty: 30 | Refills: 0
Start: 2023-02-28 | End: 2023-03-29

## 2023-02-28 RX ORDER — APIXABAN 2.5 MG/1
1 TABLET, FILM COATED ORAL
Qty: 60 | Refills: 0
Start: 2023-02-28 | End: 2023-03-29

## 2023-02-28 RX ADMIN — APIXABAN 2.5 MILLIGRAM(S): 2.5 TABLET, FILM COATED ORAL at 05:49

## 2023-02-28 RX ADMIN — MEMANTINE HYDROCHLORIDE 5 MILLIGRAM(S): 10 TABLET ORAL at 05:50

## 2023-02-28 RX ADMIN — Medication 250 MILLIGRAM(S): at 05:51

## 2023-02-28 RX ADMIN — Medication 1 TABLET(S): at 05:49

## 2023-02-28 RX ADMIN — Medication 100 MILLIGRAM(S): at 05:49

## 2023-02-28 NOTE — PROGRESS NOTE ADULT - PROVIDER SPECIALTY LIST ADULT
Hospitalist
Infectious Disease
Surgery
Hospitalist
Infectious Disease
Hospitalist
Infectious Disease
Hospitalist
Cardiology
Infectious Disease

## 2023-02-28 NOTE — PROGRESS NOTE ADULT - REASON FOR ADMISSION
infected decubitus ulcers
sepsis infected decubitus ulcers
infected decubitus ulcers

## 2023-02-28 NOTE — DISCHARGE NOTE NURSING/CASE MANAGEMENT/SOCIAL WORK - PATIENT PORTAL LINK FT
You can access the FollowMyHealth Patient Portal offered by [facility] by registering at the following website: [email or web address]. By joining [facility]’s FollowMyHealth portal, you will also be able to view your health information using other applications (apps) compatible with our system.

## 2023-02-28 NOTE — DISCHARGE NOTE NURSING/CASE MANAGEMENT/SOCIAL WORK - NSDCVIVACCINE_GEN_ALL_CORE_FT
influenza, injectable, quadrivalent, preservative free; 14-Oct-2018 12:11; Shara Carreno (RN); [employer]; 499DE (Exp. Date: 30-Jun-2019); IntraMuscular; Deltoid Right.; 0.5 milliLiter(s); VIS (VIS Published: 07-Aug-2015, VIS Presented: 14-Oct-2018);

## 2023-02-28 NOTE — PROGRESS NOTE ADULT - SUBJECTIVE AND OBJECTIVE BOX
Progress note    Patient seen and examined at bedside. No acute distress.    INTERVAL HPI/OVERNIGHT EVENTS:    REVIEW OF SYSTEMS:  All other 13 Review of systems were reviewed and are negative    FAMILY HISTORY:    T(C): 37 (02-28-23 @ 04:45), Max: 37 (02-28-23 @ 04:45)  HR: 91 (02-28-23 @ 04:45) (91 - 101)  BP: 105/65 (02-28-23 @ 04:45) (102/51 - 127/61)  RR: 18 (02-28-23 @ 04:45) (18 - 18)  SpO2: --  Wt(kg): --Vital Signs Last 24 Hrs  T(C): 37 (28 Feb 2023 04:45), Max: 37 (28 Feb 2023 04:45)  T(F): 98.6 (28 Feb 2023 04:45), Max: 98.6 (28 Feb 2023 04:45)  HR: 91 (28 Feb 2023 04:45) (91 - 101)  BP: 105/65 (28 Feb 2023 04:45) (102/51 - 127/61)  BP(mean): --  RR: 18 (28 Feb 2023 04:45) (18 - 18)  SpO2: --      No Known Allergies      PHYSICAL EXAM:  GENERAL: NAD, well-groomed, well-developed  HEAD:  Atraumatic, Normocephalic  EYES: EOMI, PERRLA, conjunctiva and sclera clear  ENMT: No tonsillar erythema, exudates, or enlargement; Moist mucous membranes, Good dentition, No lesions  NECK: Supple, No JVD, Normal thyroid  NERVOUS SYSTEM:   Good concentration; Motor Strength 5/5 B/L upper and lower extremities; DTRs 2+ intact and symmetric  CHEST/LUNG: Clear to percussion bilaterally; No rales, rhonchi, wheezing, or rubs  HEART: Regular rate and rhythm; No murmurs, rubs, or gallops  ABDOMEN: Soft, Nontender, Nondistended; Bowel sounds present  EXTREMITIES:  2+ Peripheral Pulses, No clubbing, cyanosis, or edema  LYMPH: No lymphadenopathy noted  SKIN: No rashes or lesions    Consultant(s) Notes Reviewed:  [x ] YES  [ ] NO  Care Discussed with Consultants/Other Providers [ x] YES  [ ] NO    LABS:      RADIOLOGY & ADDITIONAL TESTS:    Imaging Personally Reviewed:  [ ] YES  [ ] NO  acetaminophen     Tablet .. 650 milliGRAM(s) Oral every 6 hours PRN  amoxicillin  875 milliGRAM(s)/clavulanate 1 Tablet(s) Oral every 12 hours  apixaban 2.5 milliGRAM(s) Oral every 12 hours  bisacodyl Suppository 10 milliGRAM(s) Rectal once  donepezil 10 milliGRAM(s) Oral at bedtime  doxycycline monohydrate Capsule 100 milliGRAM(s) Oral every 12 hours  memantine 5 milliGRAM(s) Oral two times a day  sodium chloride 0.9%. 1000 milliLiter(s) IV Continuous <Continuous>  valproic  acid Syrup 250 milliGRAM(s) Oral two times a day      HEALTH ISSUES - PROBLEM Dx:    Patient is a 80 year old M with CAD, HLD, dementia, seizure (on valproic acid), PE, bed bound, bilateral sacral ulcers, ulcers of b/l heels, non verbal. Pt presents from home due to worsening pressure ulcers which now have discharge. Pt was seen by visiting nurse and was referred to the ED. In the ED, pt had a temp of 101, wbc with left shift. Pt was started on metronidazole, cefepime and vancomycin in the ED. Spoke to daughter who verified home meds.     Sepsis due to infected pressure ulcers, not c/w severe sepsis s/p debridement of sacral, left heel/right heel, and right hip pressure ulcers.   Multiple pressure ulcers various stages, right heel DTI, left heel with blackish discharge, sacral pressure ulcer, R hip pressure ulcer with purulent discharge.  - IV abx, vancomycin, cefepime and flagyl changed to doxycyclien and augmentin per ID, D8 will plan 2 weeks from debridement (end date 3/4)     and wound care nursing consulted  - blood cx staph epidermidis likely contamination, wound cx MRSA and enterococcus sensitive to amp  -repeat blood culture ngtd     -CT AP iv contrast no abscess or osteomyelitis noted.   - 2/24: Febrile, DW ID, repeat septic workup, send RVP and check UE dopplers   - 2/25: Dopplers negative, urine negative to date; enterovirus positive  - Repeat blood cultures 2/20 negative  - Complete Augment and Doxycycline (last day 3/4)    Enterovirus positive, likely cause of recent fevers  -c/w supportive measures    Elevated troponin due to demand ischemia, ruled out ACS, resolved    Scrotal/Penile swelling, resolved  -urology recs appreciated    Severe PCM r/w dementia.   - puree TL with ensure TID, 1:1 feeding     Dementia with dysphagia (advance) (transfers, wheelchair bound)   - ensure  TID, puree diet   - Bed bound/NonverbaL  - feeding with assistance/ fall precautions   - resume donepezil 10 mg po bid , memantine 5 mg po daily   - previous CTH global cerebral volume loss    Hypokalemia/Hypomag   - repleted, now normal     Seizure disorder   - valproic acid change oral solution 250 mg po bid.     Hx/o PE prior to arrival on eliquis in 2021   - eliquis 2.5 mg bid dosing     CAD/HLD  - given age/dementia not a candidate for statins.      DVT px  - resume eliquis.     Complete immobility associated with severe disability      DNR/DNI     Wife would like to resume current home health (Nursing and PT)     Dispo: for discharge today

## 2023-02-28 NOTE — DISCHARGE NOTE NURSING/CASE MANAGEMENT/SOCIAL WORK - NSDCPEFALRISK_GEN_ALL_CORE
For information on Fall & Injury Prevention, visit: [email or web address] OR  [email or web address] OR  https://www.cdc.gov/steadi/patient.html

## 2023-02-28 NOTE — PROGRESS NOTE ADULT - NUTRITIONAL ASSESSMENT
This patient has been assessed with a concern for Malnutrition and has been determined to have a diagnosis/diagnoses of Severe protein-calorie malnutrition and Underweight (BMI < 19).    This patient is being managed with:   Diet Pureed-  Supplement Feeding Modality:  Oral  Ensure Enlive Cans or Servings Per Day:  1       Frequency:  Three Times a day  Entered: Feb 19 2023 11:09AM    

## 2023-02-28 NOTE — CDI QUERY NOTE - NSCDIOTHERTXTBX_GEN_ALL_CORE_HH
Clinical Indicators     2/15 H&P Adult:... 80 year old M with CAD, HLD, dementia, seizure (on valproic  acid), PE, bed bound, bilateral sacral ulcers, ulcers of b/l heels, non verbal;  ...    2/27 Progress Note Adult-Hospitalist Attending: ... Dementia with dysphagia  (advance) (transfers, wheelchair bound) - ensure  TID, puree diet - Bed  bound/NonverbaL- feeding with assistance/ fall precautions - resume donepezil 10  mg po bid , memantine 5 mg po daily - previous CTH global cerebral volume loss;  ...    No PT evaluation     2/15 to Present Adult Assessment AM PAC Functional assessment: Score 6= Total  assistance AM PAC Functional assessment Daily activity: score 6= total  Assistance; Skin Assessment Dmitry: bedfast (1), mobility: very limited (2)    Query     Based on your professional judgment and consideration of these clinical  indicators, please clarify if bedbound can be further specified as:  • Complete immobility associated with severe disability    • Complete immobility associated with severe frailty     • Functional quadriplegia    * Other (please specify)   * Unable to further specify bedbound    Thank you,  Miya Sanchez  [phone]

## 2023-03-01 LAB — SURGICAL PATHOLOGY STUDY: SIGNIFICANT CHANGE UP

## 2023-03-05 DIAGNOSIS — I24.8 OTHER FORMS OF ACUTE ISCHEMIC HEART DISEASE: ICD-10-CM

## 2023-03-05 DIAGNOSIS — I96 GANGRENE, NOT ELSEWHERE CLASSIFIED: ICD-10-CM

## 2023-03-05 DIAGNOSIS — B34.1 ENTEROVIRUS INFECTION, UNSPECIFIED: ICD-10-CM

## 2023-03-05 DIAGNOSIS — A41.9 SEPSIS, UNSPECIFIED ORGANISM: ICD-10-CM

## 2023-03-05 DIAGNOSIS — B34.8 OTHER VIRAL INFECTIONS OF UNSPECIFIED SITE: ICD-10-CM

## 2023-03-05 DIAGNOSIS — Z79.01 LONG TERM (CURRENT) USE OF ANTICOAGULANTS: ICD-10-CM

## 2023-03-05 DIAGNOSIS — Z86.718 PERSONAL HISTORY OF OTHER VENOUS THROMBOSIS AND EMBOLISM: ICD-10-CM

## 2023-03-05 DIAGNOSIS — L89.214 PRESSURE ULCER OF RIGHT HIP, STAGE 4: ICD-10-CM

## 2023-03-05 DIAGNOSIS — I25.10 ATHEROSCLEROTIC HEART DISEASE OF NATIVE CORONARY ARTERY WITHOUT ANGINA PECTORIS: ICD-10-CM

## 2023-03-05 DIAGNOSIS — E87.6 HYPOKALEMIA: ICD-10-CM

## 2023-03-05 DIAGNOSIS — E83.42 HYPOMAGNESEMIA: ICD-10-CM

## 2023-03-05 DIAGNOSIS — Z20.822 CONTACT WITH AND (SUSPECTED) EXPOSURE TO COVID-19: ICD-10-CM

## 2023-03-05 DIAGNOSIS — I48.91 UNSPECIFIED ATRIAL FIBRILLATION: ICD-10-CM

## 2023-03-05 DIAGNOSIS — N50.89 OTHER SPECIFIED DISORDERS OF THE MALE GENITAL ORGANS: ICD-10-CM

## 2023-03-05 DIAGNOSIS — G40.909 EPILEPSY, UNSPECIFIED, NOT INTRACTABLE, WITHOUT STATUS EPILEPTICUS: ICD-10-CM

## 2023-03-05 DIAGNOSIS — Z66 DO NOT RESUSCITATE: ICD-10-CM

## 2023-03-05 DIAGNOSIS — B95.62 METHICILLIN RESISTANT STAPHYLOCOCCUS AUREUS INFECTION AS THE CAUSE OF DISEASES CLASSIFIED ELSEWHERE: ICD-10-CM

## 2023-03-05 DIAGNOSIS — R53.2 FUNCTIONAL QUADRIPLEGIA: ICD-10-CM

## 2023-03-05 DIAGNOSIS — E78.5 HYPERLIPIDEMIA, UNSPECIFIED: ICD-10-CM

## 2023-03-05 DIAGNOSIS — Z74.01 BED CONFINEMENT STATUS: ICD-10-CM

## 2023-03-05 DIAGNOSIS — L89.154 PRESSURE ULCER OF SACRAL REGION, STAGE 4: ICD-10-CM

## 2023-03-05 DIAGNOSIS — L89.624 PRESSURE ULCER OF LEFT HEEL, STAGE 4: ICD-10-CM

## 2023-03-05 DIAGNOSIS — L89.224 PRESSURE ULCER OF LEFT HIP, STAGE 4: ICD-10-CM

## 2023-03-05 DIAGNOSIS — E43 UNSPECIFIED SEVERE PROTEIN-CALORIE MALNUTRITION: ICD-10-CM

## 2023-03-05 DIAGNOSIS — L89.614 PRESSURE ULCER OF RIGHT HEEL, STAGE 4: ICD-10-CM

## 2023-03-05 DIAGNOSIS — R13.10 DYSPHAGIA, UNSPECIFIED: ICD-10-CM

## 2023-03-05 DIAGNOSIS — F03.90 UNSPECIFIED DEMENTIA, UNSPECIFIED SEVERITY, WITHOUT BEHAVIORAL DISTURBANCE, PSYCHOTIC DISTURBANCE, MOOD DISTURBANCE, AND ANXIETY: ICD-10-CM

## 2023-03-15 ENCOUNTER — RX RENEWAL (OUTPATIENT)
Age: 83
End: 2023-03-15

## 2023-03-15 RX ORDER — DONEPEZIL HYDROCHLORIDE 10 MG/1
10 TABLET ORAL
Qty: 90 | Refills: 0 | Status: ACTIVE | COMMUNITY
Start: 2020-07-16 | End: 1900-01-01

## 2023-05-01 NOTE — CONSULT NOTE ADULT - ASSESSMENT
Patient: Moon Tomlinson Date: 2023   : 1944    78 year old female      OUTPATIENT WOUND CARE PROGRESS NOTE    Consulting Provider:  Adam Fleischer, DPM      SUBJECTIVE:    Chief Complaint   Patient presents with   • Wound     Left lateral ankle ulcer       Maximum Baseline Ambulatory Status:  Walks with cane    History of Present Illness:    This note was copied forward from Dr. Fleischer's note on 23.    This is a 78 year old female with multiple medical issues, including, but not limited to, HTN, COPD, depression, and macrocytosis. She is non-diabetic. Has a pacemaker for heart block. She is following up on a left lower extremity lateral ankle wound, that recently re-opened for her. She has been seen here at [facility] three times for this condition, last healed in 2022. She states the ulcer has been open on/off for 4 years, it is mildly painful, and she is uncertain how it developed. She healed up last time fairly rapidly once we switched to GrafixPL applications. She's also had medihoney alginate, MIST therapy, etc.     Update 2023 RZ the pt's left ankle appears healed. Continue to treat with Adaptic and Mepilex.  Return to the department 2 weeks, hopefully the patient can be discharged at that time.    Review of Systems:  Pertinent items are noted in HPI (history of present illness).    Past Medical History:   Diagnosis Date   • Anemia    • Anxiety    • Avulsion fracture of ankle    • Basilar artery aneurysm (CMD)    • Bronchiectasis (CMD)    • Chronic obstructive lung disease (CMD)    • Gait disturbance    • Glaucoma    • Heartburn    • Hepatosplenomegaly    • History of electroconvulsive therapy    • History of MAC infection    • Hypercalcemia    • Macrocytosis    • Myasthenia gravis (CMD)    • Osteopenia    • Osteoporosis     2019 diagnosed   • Radiculopathy, lumbar region     right   • Recurrent major depressive disorder (CMD)    • Uterine fibroid    • Vitamin D deficiency       Past Surgical History:   Procedure Laterality Date   • Breast biopsy     • [place] tooth extraction       Social History     Socioeconomic History   • Marital status:      Spouse name: Not on file   • Number of children: Not on file   • Years of education: Not on file   • Highest education level: Not on file   Occupational History   • Not on file   Tobacco Use   • Smoking status: Never   • Smokeless tobacco: Never   Vaping Use   • Vaping status: never used   Substance and Sexual Activity   • Alcohol use: Never   • Drug use: Never   • Sexual activity: Not on file   Other Topics Concern   • Not on file   Social History Narrative    Came to [place] from [facility] about 5 years ago - moved to be closer to sister.    No children.  in remote past.    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     Social Determinants of Health     Financial Resource Strain: Low Risk  (10/27/2022)    Financial Resource Strain    • Social Determinants: Financial Resource Strain: None   Food Insecurity: No Food Insecurity (10/27/2022)    Food Insecurity    • Social Determinants: Food Insecurity: Never   Transportation Needs: No Transportation Needs (10/27/2022)    PRAPARE - Transportation    • Lack of Transportation (Medical): No    • Lack of Transportation (Non-Medical): No   Physical Activity: Inactive (10/27/2022)    Exercise Vital Sign    • Days of Exercise per Week: 0 days    • Minutes of Exercise per Session: 0 min   Stress: Medium Risk (10/27/2022)    Stress    • Social Determinants: Stress: Somewhat   Social Connections: Socially Integrated (10/27/2022)    Social Connections    • Social Determinants: Social Connections: 3 to 5 times a week   Intimate Partner Violence: Not At Risk (10/27/2022)    Intimate Partner Violence    • Social Determinants: Intimate Partner Violence Past Fear: No    • Social  Determinants: Intimate Partner Violence Current Fear: No     Family History   Problem Relation Age of Onset   • Other Mother         chronic bronchitis, sepsis syndrome   • Dementia/Alzheimers Mother    • Hypertension Mother    • Stroke/TIA Father    • Hypertension Father    • Hereditary non-polyposis colon cancer Maternal Cousin        Current Outpatient Medications   Medication Sig   • pyridostigmine (MESTINON) 60 MG tablet Take 1 1/2 tablets 4 times a day   • brimonidine (ALPHAGAN) 0.2 % ophthalmic solution Apply 1 drop to eye every 8 hours.   • dorzolamide (TRUSOPT) 2 % ophthalmic solution Apply 1 drop to eye every 8 hours.   • alendronate (FOSAMAX) 70 MG tablet Take one tablet by mouth every 7 days on empty stomach with water only.   • atorvastatin (LIPITOR) 10 MG tablet Take 1 tablet by mouth daily.   • losartan (COZAAR) 50 MG tablet Take one tablet by mouth once daily for blood pressure   • carvedilol (COREG) 12.5 MG tablet Take 1 tablet by mouth in the morning and 1 tablet in the evening. Take with meals.   • aspirin 81 MG chewable tablet Chew 1 tablet by mouth daily.   • fluticasone propionate (FLOVENT HFA) 110 MCG/ACT inhaler Inhale 2 puffs into the lungs 2 times daily.   • ipratropium-albuterol (DUONEB) 0.5-2.5 (3) MG/3ML nebulizer solution Take 3 mLs by nebulization 2 times daily.   • Melatonin 1 MG Cap Take 1.5 mg by mouth at bedtime.   • Multiple Vitamin tablet Take 1 tablet by mouth daily.    • nortriptyline (PAMELOR) 25 MG capsule Take 25 mg by mouth nightly.   • risperiDONE (RISPERDAL) 0.25 MG tablet Take 0.125 mg by mouth at bedtime.      No current facility-administered medications for this encounter.        ALLERGIES:  Ethambutol, Mirtazapine, Olanzapine, Rifampin, Serotonin reuptake inhibitors, Zyprexa, Amoxicillin-pot clavulanate, and Ciprofloxacin    OBJECTIVE:  Vital Signs:    Visit Vitals  BP (!) 141/65 (BP Location: LUE - Left upper extremity, Patient Position: Sitting)   Pulse 65   Temp  97.1 °F (36.2 °C) (Temporal)   Resp 18   Ht 5' 1\" (1.549 m)   Wt 43.1 kg (95 lb)   BMI 17.95 kg/m²         Physical Exam:  General appearance: oriented to person, place, time and situation  Physical Exam   Small left lateral ankle ulcer, appears essentially healed. No bone palpable or exposed. No signs of infection. Faint DP pulse in the left foot. Foot is cool. CFT is 5 sec.    No debridement today  Reviewed her ABIs and arterial duplex results from August 2022:.    Ankle-brachial index right-sided:     Posterior tibial artery: 1.19  Dorsalis pedis artery: 1.08     Ankle-brachial index left-sided:     Posterior tibial artery: 1.08  Dorsalis pedis artery: 1.17     PROCEDURE:  No procedure today, almost healed.      Laboratory assessments reviewed:  No results found for: PAB   Albumin (g/dL)   Date Value   11/01/2022 3.7   03/08/2022 3.5 (L)   12/13/2021 3.6      No results available in last 24 hours    Lab Results   Component Value Date    WBC 6.8 11/01/2022    GLUCOSE 97 11/01/2022    CREATININE 0.70 12/06/2022    GFRA >90 11/16/2020    GFRNA 89 11/16/2020        Culture results:  No results found for: SDES No results found for: CULT     Diagnostic Assessments Reviewed:  No new update            Wound treatment goals are palliative:  No    DIAGNOSES:  Problem List Items Addressed This Visit        Musculoskeletal and Injuries    Ulcer of left ankle (CMD) - Primary        Medical Decision Making:     Today we examined the patient's condition. The wound is rather long-standing (4+ years duration). It may be pressure related as it sits directly over the lateral malleolus (a bony prominence), but it's not clear. Healing may be complicated by several factors including, but not limited to pressure, arterial insufficiency, venous insufficiency, her macrocytosis even, and/or atypia (e.g., possible vasculitis, etc.).     No debridement today. . Will again look into coverage for amniotic tissues as she responded best to this  in the past.    We may want to biopsy it for histopathology examination as her arterial flow can support this. She has a faint DP pulse in the left foot, but clinically demonstrates signs (shiny skin, palor, delayed cap refill, etc.) of arterial insufficiency, but her ABIs were essentially normal (1.0) in the LLE.     She had previously connected with her hematologist (h/o macrocytosis), and did start on Pentoxifylline last time, which seemed to help. So consider re-starting this.     20 minutes spent in review of patient's records, preparing to see the patient, discussion of her results, independently reviewing her medical history, counseling, reviewing her tests, and in documentation, seperate from the wound debridement.     No Mist therapy today, pt almost healed. It the wound stalls we will consider Grafix.    Currently we are treating with Adaptic and bordered foam - almost healed.    Local Wound Care  Complete Wound Care  Every visit  Diagnosis: Other (specify)  Other (specify): arterial  Dressing change(s) to be done by: Wound Care Team  Dressing change(s) to be done by: Other (specify)  Other (specify): patient  Dressing frequency: Two times/week (specify)  Wound location: left lateral ankle  Dressing change(s) to be done using: Clean Technique  Clean wound with: Normal saline  Clean wound with: Vashe  Dressing type: Oil emulsion (e.g. Adaptic)  Dressing type: Foam silicone dressing with borders  Specify order of application: adaptic-primary, bordered foam-secondary    Order Comments:  Apply lidocaine 2% gel to ulcer bed as needed for patient comfort or predebridement.     Risk with Open Wound    As discussed, with open skin there is always a risk of infection.  Infection may lead to need of further surgical debridement or possible amputation.  Infection can possibly lead sepsis and subsequently death or permanent disability.       Offloading    Offloading was reviewed and discussed with patient  today.        Edema  Negative edema noted    Infectious Disease  No signs of infection    Imaging & Vascular  No new imaging or vascular studies    Labs  No labs at this time    Consults  No consults necessary    Pain  Pain is minimal    Follow Up  Return in one week.  Patient to follow up as scheduled for continued management and recommendations.    All questions solicited and answered.       Patient stable. All questions were answered.     Patient is a 82y old Male with CAD, HLD, dementia, seizure (on valproic acid), PE, bed bound, bilateral sacral ulcers, ulcers of b/l heels, non verbal. brought in to the ER from home for evaluation of worsening pressure ulcers which now have discharge. Pt was seen by visiting nurse and was referred to the ER. On admission, he found to have ever, tachycardia.  He has started on Flagyl, cefepime and vancomycin, and the ID consult requested to assist with further evaluation and antibiotic management.    # Sepsis ( Fever + tachycardia)  # Infected Left sacral  decubitus ulcer  # B/L heel  pressure ulcer with eschar     would recommend:    1. Follow up Blood cultures  2. Agree with surgical debridement of Sacral ulcer in OR on 2/17/23  3. Continue Cefepime, Flagyl and IV Vancomycin until work up is done  4. Wound care  5. Frequent repositioning    will follow the patient with you and make further recommendation based on the clinical course and Lab results  Thank you for the opportunity to participate in Mr. Lambert's care      Attending Attestation:    Spent more than 65 minutes on total encounter, more than 50 % of the visit was spent counseling and/or coordinating care by the Attending physician.

## 2023-11-30 NOTE — ED ADULT TRIAGE NOTE - SOURCE OF INFORMATION
Chief Complaint:    Chief Complaint   Patient presents with    Follow-up       History of Present Illness:  74 y.o. male presents to [facility] with a T3N0M0 laryngeal squamous cell carcinoma. He underwent triple endoscopy, tracheostomy, and PEG tube placement on 1/10/2022. The patient completed his chemoradiation on 4/20/2022.     The patients course has been complicated by complete stenosis of the upper esophagus s/p dilation and stent placement on 8/3/2022 as well as a new finding of a sigmoid adenocarcinoma. In addition, during work-up for her his colon issues he was also found to have a prostate mass for which he is seeing urology. Work-up of the prostate was negative for malignancy. He is now status post surgery (with Dr. Fallon) for his rectal cancer.     11/30/23  The patient presents today for follow-up.  He has been doing okay from a laryngeal cancer standpoint.  He denies any breathing difficulties but has found that his swallowing has become more difficult.  He is still elevated to tolerate a regular diet, but he feels a tightness in his neck.  He has worked with speech therapy in the past, but not recently.  He is doing well from a colorectal cancer standpoint.  He is actually due to undergo a colonoscopy in January and there were thoughts that he could undergo an EGD at that time.  This will be with Dr. Buck.    Past Medical History  No past medical history on file.    Past Surgical History  Past Surgical History:   Procedure Laterality Date    OTHER SURGICAL HISTORY  08/18/2022    Shoulder surgery       Social History  Social History     Socioeconomic History    Marital status:      Spouse name: Not on file    Number of children: Not on file    Years of education: Not on file    Highest education level: Not on file   Occupational History    Not on file   Tobacco Use    Smoking status: Some Days     Types: Cigarettes    Smokeless tobacco: Not on file  "  Substance and Sexual Activity    Alcohol use: Not on file    Drug use: Not on file    Sexual activity: Not on file   Other Topics Concern    Not on file   Social History Narrative    Not on file     Social Determinants of Health     Financial Resource Strain: Not on file   Food Insecurity: Not on file   Transportation Needs: Not on file   Physical Activity: Not on file   Stress: Not on file   Social Connections: Not on file   Intimate Partner Violence: Not on file   Housing Stability: Not on file       Family History  No family history on file.    Medications:  No current outpatient medications on file.     No current facility-administered medications for this visit.       Allergies: Patient has no known allergies.    Immunizations:   There is no immunization history on file for this patient.     Physical Exam  Vital Signs:  Temp 36.2 °C (97.2 °F)   Ht 1.575 m (5' 2\")   Wt 47.2 kg (104 lb 1.6 oz)   BMI 19.04 kg/m²   Constitutional   General appearance: Healthy-appearing, well-nourished, well groomed, in no acute distress. Thin male-appears thinner than last visit. Long beard. Appears to be M[employer]onite. Wife in room.  Ability to communicate: Normal communication without aids, normal voice quality. Hoarseness is significantly improved since last visit. No stridor.  Head and face: Atraumatic with no masses, lesions, or scarring.   Facial strength: Normal strength and symmetry, no synkinesis or facial tic.   Eyes   Pupils and irises: EOM intact, PERRLA, conjunctiva non-injected.   Ears   External inspection of ears: Ears normally formed and free of lesions.   Nose: Dorsum symmetric with no visible or palpable deformities.   External inspection of nose: No nasal lesions, lacerations, or scars. Nasal tip symmetrical with normal nasal valves.   Oral Cavity/Mouth   Lips, teeth, and gums: Normal lips, gums, and edentulous. Upper and lower dentures.  Oropharynx: Mucosa moist, no lesions. Hard and soft palate normal. Tongue " normal, no lesions or edema. Tonsils normal, no lesions.   Neck: Symmetrical, trachea midline. No masses visible. Radiation related firmness of the neck.  Palpation of cervical lymph nodes: No palpable lymph node enlargement, no submandibular adenopathy, no anterior cervical adenopathy, no supraclavicular adenopathy.   Neurological/Psychiatric   Cranial Nerve Examination: II - XII grossly intact.   Orientation to person, place, and time: Normal.   Mood and affect: Normal.   Skin: Normal without rashes or lesions.   Pulmonary   Respiratory effort: Chest expands symmetrically.   Cardiovascular: Good peripheral pulses   Peripheral vascular system: No varicosities, carotid pulse normal, no edema. No jugular venous distension.   Extremities Appearance of extremities: Normal. Gait normal.      Procedure  Procedure note: Recommended flexible nasopharyngoscopy. Risks, benefits, and alternatives were explained.   Procedure: Flexible nasopharyngoscopy   Indications: Head and neck cancer history  Anesthesia: 4% lidocaine and 0.5% phenylephrine   After adequate Afrin and lidocaine spray advance the flexible endoscope. I was able to visualize the nasal cavity and nasopharynx. The findings were notable for the following:  No masses/lesions/ulcers. The right vocal cord is completely immobile- this is stable from prior to treatment. There is no evidence of mucosal lesion or mass. He does have evidence of some anterior subglottic stenosis but he has an adequately patent airway posteriorly. No concerning lesions or masses. No pooling of secretions.     Impression/Plan:  74 y.o. male presenting to [facility] with a T3N0M0 laryngeal squamous cell carcinoma. He underwent triple endoscopy, tracheostomy, and PEG tube placement on 1/10/2022. The patient completed his chemoradiation on 4/20/2022. No evidence of disease today. He seems to also be doing well from a colorectal cancer standpoint.     -The  patient is scheduled to undergo a colonoscopy in January.  I will reach out to Dr. Buck to see whether an EGD with possible dilation can also be done at that time  -Continue follow-up with his colorectal team  -The patient is up-to-date in terms of TSH and chest imaging  -We will plan for follow-up in 4 months.      Patient

## 2024-02-20 NOTE — ED ADULT NURSE NOTE - NS ED NURSE LEVEL OF CONSCIOUSNESS MENTAL STATUS
Detail Level: Generalized
Patient Specific Counseling (Will Not Stick From Patient To Patient): Well controlled on dupixent - recurring with missing injections. Will continue standard q2w dosing.
Awake/Alert

## 2024-04-10 NOTE — CHART NOTE - NSCHARTNOTEFT_GEN_A_CORE
Registered Dietitian Follow-Up     Patient Profile Reviewed                           Yes [X]   No []     Nutrition History Previously Obtained        Yes [X]  No []       Pertinent Information:  pt is 80 year old male with hx of CAD, HLD, dementia, seizures, PE, bedbound, B/L sacral and heel ulcers, non-verbal. presents from home with worsening wounds + discharge and temps. admitted with sepsis 2/2 infected pressure ulcers with + wound cultures. 2/18 s/p wound debridement. 2/19 GOC--> no feeding tube, DNR/DNI     As per RD note 2/21 pt meets criteria for severe PCM        Diet order:  Diet, Pureed:   Supplement Feeding Modality:  Oral  Ensure Enlive Cans or Servings Per Day:  1       Frequency:  Three Times a day (02-19-23 @ 11:09) [Active]         Anthropometrics:  - Ht. 175.3 cm  - Wt. 54.4 kgs  - %wt change  - BMI 17.7  - IBW      Pertinent Lab Data:  02-25    131<L>  |  97<L>  |  7<L>  ----------------------------<  76  4.7   |  24  |  0.6<L>    Ca    8.0<L>      25 Feb 2023 06:56                            9.8    6.89  )-----------( 268      ( 25 Feb 2023 06:56 )             30.2        Pertinent Meds:  MEDICATIONS  (STANDING):  amoxicillin  875 milliGRAM(s)/clavulanate 1 Tablet(s) Oral every 12 hours  apixaban 2.5 milliGRAM(s) Oral every 12 hours  bisacodyl Suppository 10 milliGRAM(s) Rectal once  donepezil 10 milliGRAM(s) Oral at bedtime  doxycycline monohydrate Capsule 100 milliGRAM(s) Oral every 12 hours  memantine 5 milliGRAM(s) Oral two times a day  valproic  acid Syrup 250 milliGRAM(s) Oral two times a day    MEDICATIONS  (PRN):  acetaminophen     Tablet .. 650 milliGRAM(s) Oral every 6 hours PRN Temp greater or equal to 38C (100.4F)       Physical Findings:  - Appearance: sleeping, non-verbal  - GI function: +BS, BM noted 2/25  - Tubes:   - Oral/Mouth cavity:  - Skin:   sacrum stage IV s/p debridement   R hip stage IV s/p debridement   B/L heel stage IV         Nutrition Requirements  Weight Used: 54.4 kgs     Estimated Energy Needs:  4768-6015 kcals   82-93g protein  1:1 kcal for estimated fluid needs         Nutrient Intake: pt consumed >75% of lunch and ensure with 1;1 feed        [] Previous Nutrition Diagnosis:            [X] Ongoing          [] Resolved    increased nutrient needs and severe PCM remains       Nutrition Intervention: maintain on present diet and supplements     Goal/Expected Outcome: pt to continue to tolerate po diet well and consume >75% of estimated nutrient needs within 3-5 days     Indicator/Monitoring: RD to monitor tolerance to po diet, labs/meds, NFPF and f/u as needed within 3-5 days  high risk [de-identified] : Longstanding right knee pain greater than 1 year.  Has had extensive conservative care consisting of physical therapy exercises anti-inflammatory medications without relief.  Given pain for greater than 1 year that limits his ability to perform at work as well as to gym type exercises and activities we will obtain an MRI to further evaluate he will follow-up after MRI

## 2024-05-01 NOTE — PROGRESS NOTE ADULT - ASSESSMENT
(0) No abnormality # RRT today seemed related to sleeping episode, pt seems fine  will check CXR (prior was neg) to r/o aspiration (though RA PO was %)  check EEG to r/o subclinical sz  d/c depakote    # Dementia - seems mild-mod  wife much prefers him to be home  gave Rx for cane  gave Rx for outpt PT  CT head negative  cont aricept  I did family teaching re dementia and expectations  # BPH  On Flomax    # h/o CAD; DLD  c/w ASA, Atorvastatin     # DVT ppx: Lovenox    Dispo: check EEG and CXR; observe overnight b/o RRT; anticipate d/c tomorrow

## 2024-11-07 NOTE — ASU PREOP CHECKLIST - NOTHING BY MOUTH SINCE
[facility] EMERGENCY DEPARTMENT  EMERGENCY DEPARTMENT ENCOUNTER        Pt Name: Victor Hugo Coughlin  MRN: [id number]  Birthdate 1967  Date of evaluation: 11/7/2024  Provider: Jaime Cowan PA-C  PCP: Beto Vaz MD  Note Started: 9:22 AM EST 11/7/24      SOLITARIO. I have evaluated this patient.        CHIEF COMPLAINT       Chief Complaint   Patient presents with    Urinary Retention       HISTORY OF PRESENT ILLNESS: 1 or more Elements     Victor Hugo Coughlin is a 57 y.o. male with ESRD on home dialysis with a left upper arm fistula, type 2 diabetes, latent tuberculosis on therapy, and is on the renal transplant list with [facility] who presents complaining of urinary dribbling.  States that he last peed this morning, however it is dribbling, states that he has always made urine while being on dialysis, states that he does not feel like he can fully void.  Denies any fever, chills.  States that he did notice some    Nursing Notes were all reviewed and agreed with or any disagreements were addressed in the HPI.    Historians other than the patient: none    Limitations to history : None    Social Determinants Significantly Affecting Health : None    Records Reviewed (External and Source): pulmonology visit from 10/10 at [facility]    PAST MEDICAL HISTORY      has a past medical history of Diabetes mellitus (HCC) and Peritoneal dialysis catheter in place (HCC).     REVIEW OF SYSTEMS :      Review of Systems    Positives and Pertinent negatives as per HPI.     SURGICAL HISTORY   History reviewed. No pertinent surgical history.    CURRENTMEDICATIONS       Previous Medications    ACETAMINOPHEN (TYLENOL) 325 MG TABLET    Take 650 mg by mouth every 6 hours as needed for Pain.    ALLOPURINOL (ZYLOPRIM) 100 MG TABLET    Take 100 mg by mouth daily    AMLODIPINE (NORVASC) 5 MG TABLET    Take 5 mg by mouth    ASPIRIN 81 MG TABLET    Take 81 mg by mouth daily     ATORVASTATIN (LIPITOR) 40 MG TABLET  
07-Sep-2021 00:00
